# Patient Record
Sex: FEMALE | Race: WHITE | Employment: OTHER | URBAN - METROPOLITAN AREA
[De-identification: names, ages, dates, MRNs, and addresses within clinical notes are randomized per-mention and may not be internally consistent; named-entity substitution may affect disease eponyms.]

---

## 2019-06-05 ENCOUNTER — OFFICE VISIT (OUTPATIENT)
Dept: INTERNAL MEDICINE CLINIC | Facility: CLINIC | Age: 64
End: 2019-06-05
Payer: COMMERCIAL

## 2019-06-05 VITALS
TEMPERATURE: 99 F | BODY MASS INDEX: 33.78 KG/M2 | OXYGEN SATURATION: 96 % | DIASTOLIC BLOOD PRESSURE: 78 MMHG | HEIGHT: 63 IN | WEIGHT: 190.63 LBS | HEART RATE: 84 BPM | SYSTOLIC BLOOD PRESSURE: 120 MMHG

## 2019-06-05 DIAGNOSIS — Z00.00 ANNUAL PHYSICAL EXAM: Primary | ICD-10-CM

## 2019-06-05 DIAGNOSIS — N63.20 LEFT BREAST LUMP: ICD-10-CM

## 2019-06-05 DIAGNOSIS — Z12.11 SCREENING FOR COLON CANCER: ICD-10-CM

## 2019-06-05 DIAGNOSIS — Z12.39 SCREENING FOR BREAST CANCER: ICD-10-CM

## 2019-06-05 DIAGNOSIS — Z78.0 MENOPAUSE: ICD-10-CM

## 2019-06-05 DIAGNOSIS — Z12.4 SCREENING FOR CERVICAL CANCER: ICD-10-CM

## 2019-06-05 PROCEDURE — 99396 PREV VISIT EST AGE 40-64: CPT | Performed by: INTERNAL MEDICINE

## 2019-06-05 NOTE — PROGRESS NOTES
HPI:   Elsa Palmer is a 60year old female who presents for a complete physical exam. Symptoms: is menopausal.  She did palpate  Lump on her left breast.     Wt Readings from Last 3 Encounters:  06/05/19 : 190 lb 9.6 oz (86.5 kg)  01/29/15 : 193 lb (87. Neuro Negative Dizziness, extremity weakness, headache, memory impairment, numbness in extremities, seizures and tremors. Psych Negative Anxiety, depression and insomnia. Integumentary Negative Breast discharge, breast lump(s), hair loss and rash. Balance & gait - Normal.   Psychiatric Normal Orientation - Oriented to time, place, person & situation. Appropriate mood and affect.           ASSESSMENT AND PLAN:   Maribel Torres is a 60year old female who presents for a complete physical exam. Pap and

## 2019-06-10 ENCOUNTER — TELEPHONE (OUTPATIENT)
Dept: INTERNAL MEDICINE CLINIC | Facility: CLINIC | Age: 64
End: 2019-06-10

## 2019-06-10 NOTE — TELEPHONE ENCOUNTER
Spoke with physician scheduling will call mammography to get sooner appt and call patient directly to schedule.

## 2019-06-10 NOTE — TELEPHONE ENCOUNTER
Patient called to inform us that she already scheduled an appt for her mammogram, but they only have an opening until the 19th. Would like to know if this is okay or are you going to call them to schedule it sooner?  as she had felt a lump    Please advis

## 2019-06-11 ENCOUNTER — HOSPITAL ENCOUNTER (OUTPATIENT)
Dept: ULTRASOUND IMAGING | Facility: HOSPITAL | Age: 64
Discharge: HOME | End: 2019-06-11
Attending: INTERNAL MEDICINE
Payer: COMMERCIAL

## 2019-06-11 ENCOUNTER — HOSPITAL ENCOUNTER (OUTPATIENT)
Dept: MAMMOGRAPHY | Facility: HOSPITAL | Age: 64
Discharge: HOME | End: 2019-06-11
Attending: INTERNAL MEDICINE
Payer: COMMERCIAL

## 2019-06-11 ENCOUNTER — TELEPHONE (OUTPATIENT)
Dept: INTERNAL MEDICINE CLINIC | Facility: CLINIC | Age: 64
End: 2019-06-11

## 2019-06-11 DIAGNOSIS — Z12.39 SCREENING FOR BREAST CANCER: ICD-10-CM

## 2019-06-11 DIAGNOSIS — N63.20 LEFT BREAST LUMP: ICD-10-CM

## 2019-06-11 PROCEDURE — 77062 BREAST TOMOSYNTHESIS BI: CPT | Performed by: INTERNAL MEDICINE

## 2019-06-11 PROCEDURE — 76642 ULTRASOUND BREAST LIMITED: CPT | Performed by: INTERNAL MEDICINE

## 2019-06-11 PROCEDURE — 77066 DX MAMMO INCL CAD BI: CPT | Performed by: INTERNAL MEDICINE

## 2019-06-11 NOTE — TELEPHONE ENCOUNTER
Attempted to call the patient and discuss about the mammogram results unable to leave a message.   I will call her again

## 2019-06-11 NOTE — IMAGING NOTE
This nurse introduced self and role of breast coordinator. Discussed recommended breast biopsy with patient.  Pt was recommended by Dr Rosa Rubalcava to have a left  Breast x 2 sites  ultrasound guided biopsy and stereotactic biopsy of the right breast x 2 sites Aleve, or other anti-inflammatory medication)   and aspirin 81 mg to be held for 5 days prior to biopsy will stop her herbals       -Blood thinners/antiplatelet medications (Coumadin, Plavix  xeralto , effient brilinta  etc) should be held at the  directio be awake during this procedure and are able to drive themselves home if they wish. Educated patient that they should eat breakfast and park in green lot and check in with [facility] .  Educated patient that some soreness  may occur after biopsy.   Dis

## 2019-06-11 NOTE — TELEPHONE ENCOUNTER
Radiologist calling. Patient presented for diagnostic mammogram today. Letting you know he suspects cancer. Recommending left breast ultrasound biopsy two sites and right breast sterotactic biopsy two sites. Report to follow.

## 2019-06-12 ENCOUNTER — TELEPHONE (OUTPATIENT)
Dept: INTERNAL MEDICINE CLINIC | Facility: CLINIC | Age: 64
End: 2019-06-12

## 2019-06-14 ENCOUNTER — LAB ENCOUNTER (OUTPATIENT)
Dept: LAB | Age: 64
End: 2019-06-14
Attending: INTERNAL MEDICINE
Payer: COMMERCIAL

## 2019-06-14 DIAGNOSIS — Z00.00 ANNUAL PHYSICAL EXAM: ICD-10-CM

## 2019-06-14 PROCEDURE — 83036 HEMOGLOBIN GLYCOSYLATED A1C: CPT

## 2019-06-14 PROCEDURE — 80053 COMPREHEN METABOLIC PANEL: CPT

## 2019-06-14 PROCEDURE — 84439 ASSAY OF FREE THYROXINE: CPT

## 2019-06-14 PROCEDURE — 36415 COLL VENOUS BLD VENIPUNCTURE: CPT

## 2019-06-14 PROCEDURE — 80061 LIPID PANEL: CPT

## 2019-06-14 PROCEDURE — 85025 COMPLETE CBC W/AUTO DIFF WBC: CPT

## 2019-06-14 PROCEDURE — 84443 ASSAY THYROID STIM HORMONE: CPT

## 2019-06-19 ENCOUNTER — HOSPITAL ENCOUNTER (OUTPATIENT)
Dept: ULTRASOUND IMAGING | Facility: HOSPITAL | Age: 64
Discharge: HOME | End: 2019-06-19
Attending: INTERNAL MEDICINE
Payer: COMMERCIAL

## 2019-06-19 ENCOUNTER — HOSPITAL ENCOUNTER (OUTPATIENT)
Dept: MAMMOGRAPHY | Facility: HOSPITAL | Age: 64
Discharge: HOME | End: 2019-06-19
Attending: INTERNAL MEDICINE
Payer: COMMERCIAL

## 2019-06-19 VITALS — SYSTOLIC BLOOD PRESSURE: 141 MMHG | DIASTOLIC BLOOD PRESSURE: 81 MMHG | HEART RATE: 85 BPM | RESPIRATION RATE: 20 BRPM

## 2019-06-19 DIAGNOSIS — R92.8 ABNORMAL MAMMOGRAM: ICD-10-CM

## 2019-06-19 PROCEDURE — 88305 TISSUE EXAM BY PATHOLOGIST: CPT | Performed by: INTERNAL MEDICINE

## 2019-06-19 PROCEDURE — 19083 BX BREAST 1ST LESION US IMAG: CPT | Performed by: INTERNAL MEDICINE

## 2019-06-19 PROCEDURE — 19084 BX BREAST ADD LESION US IMAG: CPT | Performed by: INTERNAL MEDICINE

## 2019-06-19 PROCEDURE — 88342 IMHCHEM/IMCYTCHM 1ST ANTB: CPT | Performed by: INTERNAL MEDICINE

## 2019-06-19 PROCEDURE — 88360 TUMOR IMMUNOHISTOCHEM/MANUAL: CPT | Performed by: INTERNAL MEDICINE

## 2019-06-19 PROCEDURE — 77065 DX MAMMO INCL CAD UNI: CPT | Performed by: INTERNAL MEDICINE

## 2019-06-19 NOTE — IMAGING NOTE
905 AM  hx take and as follows SEE BELOW  identified with spelling of name and date of birth. Medications and allergies reviewed. Denies taking aspirin containing medications, NSAIDs, fish oil, vitamin E  5 days prior to biopsy.  Denies prescription anti co Dr. Green Salts at 15:30 on 6/11/2019. BI-RADS CATEGORY:     DIAGNOSTIC CATEGORY 5--HIGHLY SUGGESTIVE OF MALIGNANCY.        RECOMMENDATIONS:   STEREOTACTIC BREAST BIOPSY: RIGHT BREAST TWO SITES    ULTRASOUND-GUIDED CORE BIOPSY: LEFT BREAST TWO SITES
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
## 2019-06-20 ENCOUNTER — TELEPHONE (OUTPATIENT)
Dept: HEMATOLOGY/ONCOLOGY | Facility: HOSPITAL | Age: 64
End: 2019-06-20

## 2019-06-20 ENCOUNTER — TELEPHONE (OUTPATIENT)
Dept: INTERNAL MEDICINE CLINIC | Facility: CLINIC | Age: 64
End: 2019-06-20

## 2019-06-20 NOTE — TELEPHONE ENCOUNTER
Patient is s/p left breast US guided biopsy, performed 6/19/19. Dr. Helder Gentile has shared biopsy results with the patient, and referred her to Dr. Althea Mcmahan and Dr. Kyaw Magana for further management. Phoned patient and introduced myself as Breast RN Navigator.   Pa

## 2019-06-21 PROBLEM — Z17.0 MALIGNANT NEOPLASM OF OVERLAPPING SITES OF LEFT BREAST IN FEMALE, ESTROGEN RECEPTOR POSITIVE  (HCC): Status: ACTIVE | Noted: 2019-06-21

## 2019-06-21 PROBLEM — C50.812 MALIGNANT NEOPLASM OF OVERLAPPING SITES OF LEFT BREAST IN FEMALE, ESTROGEN RECEPTOR POSITIVE: Status: ACTIVE | Noted: 2019-06-21

## 2019-06-21 PROBLEM — Z17.0 MALIGNANT NEOPLASM OF OVERLAPPING SITES OF LEFT BREAST IN FEMALE, ESTROGEN RECEPTOR POSITIVE: Status: ACTIVE | Noted: 2019-06-21

## 2019-06-21 PROBLEM — Z17.0 MALIGNANT NEOPLASM OF OVERLAPPING SITES OF LEFT BREAST IN FEMALE, ESTROGEN RECEPTOR POSITIVE (HCC): Status: ACTIVE | Noted: 2019-06-21

## 2019-06-21 PROBLEM — C50.812 MALIGNANT NEOPLASM OF OVERLAPPING SITES OF LEFT BREAST IN FEMALE, ESTROGEN RECEPTOR POSITIVE  (HCC): Status: ACTIVE | Noted: 2019-06-21

## 2019-06-21 PROBLEM — C50.812 MALIGNANT NEOPLASM OF OVERLAPPING SITES OF LEFT BREAST IN FEMALE, ESTROGEN RECEPTOR POSITIVE (HCC): Status: ACTIVE | Noted: 2019-06-21

## 2019-06-24 ENCOUNTER — NURSE NAVIGATOR ENCOUNTER (OUTPATIENT)
Dept: HEMATOLOGY/ONCOLOGY | Facility: HOSPITAL | Age: 64
End: 2019-06-24

## 2019-06-24 ENCOUNTER — TELEPHONE (OUTPATIENT)
Dept: HEMATOLOGY/ONCOLOGY | Facility: HOSPITAL | Age: 64
End: 2019-06-24

## 2019-06-24 ENCOUNTER — OFFICE VISIT (OUTPATIENT)
Dept: HEMATOLOGY/ONCOLOGY | Facility: HOSPITAL | Age: 64
End: 2019-06-24
Attending: INTERNAL MEDICINE
Payer: COMMERCIAL

## 2019-06-24 VITALS
HEART RATE: 93 BPM | RESPIRATION RATE: 18 BRPM | DIASTOLIC BLOOD PRESSURE: 95 MMHG | OXYGEN SATURATION: 98 % | TEMPERATURE: 99 F | SYSTOLIC BLOOD PRESSURE: 162 MMHG

## 2019-06-24 DIAGNOSIS — F41.9 ANXIETY: ICD-10-CM

## 2019-06-24 DIAGNOSIS — Z17.0 MALIGNANT NEOPLASM OF OVERLAPPING SITES OF LEFT BREAST IN FEMALE, ESTROGEN RECEPTOR POSITIVE (HCC): Primary | ICD-10-CM

## 2019-06-24 DIAGNOSIS — C50.812 MALIGNANT NEOPLASM OF OVERLAPPING SITES OF LEFT BREAST IN FEMALE, ESTROGEN RECEPTOR POSITIVE (HCC): Primary | ICD-10-CM

## 2019-06-24 PROCEDURE — 99245 OFF/OP CONSLTJ NEW/EST HI 55: CPT | Performed by: INTERNAL MEDICINE

## 2019-06-24 NOTE — TELEPHONE ENCOUNTER
Phoned patient for care coordination. Shared with patient that Dr. Benito Rodney is unable to meet with her today. Shared with patient that I have spoken with Dr. Benito Rodney who has reviewed her case, and provided feedback for next steps in her management.   Patient

## 2019-06-25 ENCOUNTER — HOSPITAL ENCOUNTER (OUTPATIENT)
Dept: MAMMOGRAPHY | Facility: HOSPITAL | Age: 64
Discharge: HOME | End: 2019-06-25
Attending: INTERNAL MEDICINE
Payer: COMMERCIAL

## 2019-06-25 VITALS — SYSTOLIC BLOOD PRESSURE: 152 MMHG | HEART RATE: 75 BPM | DIASTOLIC BLOOD PRESSURE: 86 MMHG | RESPIRATION RATE: 16 BRPM

## 2019-06-25 DIAGNOSIS — R92.8 ABNORMAL MAMMOGRAM: ICD-10-CM

## 2019-06-25 PROCEDURE — 77065 DX MAMMO INCL CAD UNI: CPT | Performed by: INTERNAL MEDICINE

## 2019-06-25 NOTE — PROGRESS NOTES
Patient presents to the [facility], accompanied by her close friend Racquel Sawyer, for consultation with Dr. Jessika Bernstein. Introduced myself to patient, as we have not met previously.   Shared with patient my role as Navigator, to provide her with support and educat [facility] and encouraged patient to reach out to the staff there. Provided patient with Breast Cancer Treatment Handbook as well. Reinforced my role as Navigator. Emotional support again provided to patient.   Encouraged patient to contact me with

## 2019-06-25 NOTE — IMAGING NOTE
1115 am  hx take and as follows newly dx  Left breast ca Identified with spelling of name and date of birth. Medications and allergies reviewed. Denies taking aspirin containing medications, NSAIDs, fish oil, vitamin E  5 days prior to biopsy.  Denies pres

## 2019-06-26 ENCOUNTER — TELEPHONE (OUTPATIENT)
Dept: INTERNAL MEDICINE CLINIC | Facility: CLINIC | Age: 64
End: 2019-06-26

## 2019-06-26 ENCOUNTER — TELEPHONE (OUTPATIENT)
Dept: HEMATOLOGY/ONCOLOGY | Facility: HOSPITAL | Age: 64
End: 2019-06-26

## 2019-06-26 PROBLEM — F41.9 ANXIETY: Status: ACTIVE | Noted: 2019-06-26

## 2019-06-26 NOTE — CONSULTS
[facility] History and Physical    Patient Name: Cynthia Thibodeaux   YOB: 1955   Medical Record Number: [id number]   CSN: [id number]   Attending Physician:  Gm Flynn MD       Date of Visit: 6/26/2019     Chief Complaint:  Patient prese did not find any suspicious sonographic correlate. And an incidental cyst was seen at 12:00 location. Patient's last mammogram was in 2014.   She had not had repeat mammograms due to complications with her family and caretaker role that patient was in a 12-point system reviewed and negative except as listed per HPI    Vital Signs:  BP (!) 162/95 (BP Location: Left arm, Patient Position: Sitting, Cuff Size: adult)   Pulse 93   Temp 98.7 °F (37.1 °C) (Oral)   Resp 18   LMP 07/14/2006   SpO2 98%     Physical 7.7 06/14/2019       Radiology reviewed:  Lito De León Procedure Image Right (cpt=77065)    Result Date: 6/25/2019  PROCEDURE: [facility] BIOPSY POST DIGITAL IMAGE RIGHT (CPT=77065)  COMPARISON: [facility], [facility]. for [facility], [facility] POST [facility] IMAGE LEFT (CPT BIOPSY WITH CLIP 2 SITE LEFT; [facility] POST PROCEDURAL IMAGE LEFT (CPT=77065)  COMPARISON: [facility], [facility]. [facility], [address] [place] [address] ([facility] SAME DAY ) (DPE=07167-00), 6/11/2019, 13:37.   [facility], [facility], [facility] KODAK 2D+3D Maricruz Connor anesthesia. A small incision was made in the skin with a #11 scalpel. LOCATION, SPECIMEN #: Left breast 10:00 o'clock 5 cm from the nipple; 6 core specimens. BIOPSY NEEDLE: 12 gauge spring activated biopsy device.  MARKERS(S) PLACED: Q marker was placed grade 1-2, with with focal ductal formation, and associated focal microcalcification. ? The maximum length of tumor area in the tissue submitted is 13 mm.   Comment: Immunohistochemical stain for E-cadherin on specimens A and B demonstrate negative staining projection and 4.5 cm on lateral projection.   Given mammographic and sonographic appearance, the additional areas of decreased echogenicity in the left breast at 11:00 o'clock and 12:00 o'clock between the biopsy clips are considered additional extent of d performed and images were reviewed with the Videostrip GERBER [de-identified] CAD device. 3D tomosynthesis was performed and reviewed. Limited bilateral breast ultrasound. BREAST COMPOSITION:   CATEGORY b- There are scattered areas of fibroglandular density.    FINDINGS: which spans the upper inner and upper central left breast.  This includes the patient directed palpable area of concern at 11:00 o'clock 2 cm from the nipple.   Findings are most readily seen at 10:00 o'clock 5 cm from the nipple, measuring 2.7 x 0.6 x 1.4 MALIGNANCY. RECOMMENDATIONS:  STEREOTACTIC BREAST BIOPSY: RIGHT BREAST TWO SITES  ULTRASOUND-GUIDED CORE BIOPSY: LEFT BREAST TWO SITES        PLEASE NOTE: NORMAL MAMMOGRAM DOES NOT EXCLUDE THE POSSIBILITY OF BREAST CANCER.   A CLINICALLY SUSPICIOUS PALPAB discussion with patient and her friend. Together we went over all of her laboratory, pathology and imaging. We discussed her diagnosis of breast cancer, clinical course, further management, and treatment options.   Case was discussed with her primary care MD

## 2019-06-26 NOTE — TELEPHONE ENCOUNTER
Phoned patient to discuss care coordination. Report from Breast Care Coordinator, Natalia Neville RN, patient unable to complete right breast stereotactic breast biopsy 6/25/19. Patient has been re-scheduled for Monday 7/1/19.     Patient shares she has a

## 2019-06-26 NOTE — TELEPHONE ENCOUNTER
Patient called, she is scheduled for a breast biopsy next week and she is very nervous. Patient went to have it done, but she couldn't get through it. She is requesting a rx for Lorazepam 1 mg to be taken the night before and the day of the procedure.  Pa

## 2019-06-27 NOTE — PROGRESS NOTES
Breast Surgery New Patient Consultation    This is the first visit for this 60year old woman, referred by Dr. Jimmy Moseley, who presents for multicentric L breast cancer and R ALH.     History of Present Illness:   Ms. Kunal Fraire is a 60 year ol History:   Procedure Laterality Date   • EMBER BIOPSY STEREOTACTIC NODULE 1 SITE LEFT     • EMBER BIOPSY STEREOTACTIC NODULE 1 SITE RIGHT  2019    KODAK        Gynecological History:  Pt is a   Pt was 34years old at time of first pregnancy.     She ha ears, ear drainage, earaches, nasal congestion, nose bleeds, snoring, pain in mouth/throat, hoarseness, change in voice, facial trauma.     Respiratory:  The patient denies chronic cough, phlegm, hemoptysis, pleurisy/chest pain, pneumonia, asthma, wheezing, lost her  to cancer 15 years ago and was caring for her parents who both recently passed. Endocrine: There is no history of poor/slow wound healing, weight loss/gain, fertility or hormone problems, cold intolerance, thyroid disease.      Allerg liver is not enlarged. There are no palpable masses. Lymph Nodes: The supraclavicular, axillary and cervical regions are free of significant lymphadenopathy. Back: There is no vertebral column tenderness.     Skin: The skin appears normal. There are this procedure. With regard to systemic therapy, final recommendation will be made following receipt of final  pathology post-op, but I outlined the possibility of endocrine therapy, chemotherapy, and herceptin, depending on tumor marker profile.  Following

## 2019-06-29 ENCOUNTER — HOSPITAL ENCOUNTER (OUTPATIENT)
Dept: CT IMAGING | Facility: HOSPITAL | Age: 64
Discharge: HOME | End: 2019-06-29
Attending: INTERNAL MEDICINE
Payer: COMMERCIAL

## 2019-06-29 DIAGNOSIS — C50.812 MALIGNANT NEOPLASM OF OVERLAPPING SITES OF LEFT BREAST IN FEMALE, ESTROGEN RECEPTOR POSITIVE (HCC): ICD-10-CM

## 2019-06-29 DIAGNOSIS — Z17.0 MALIGNANT NEOPLASM OF OVERLAPPING SITES OF LEFT BREAST IN FEMALE, ESTROGEN RECEPTOR POSITIVE (HCC): ICD-10-CM

## 2019-06-29 PROCEDURE — 74177 CT ABD & PELVIS W/CONTRAST: CPT | Performed by: INTERNAL MEDICINE

## 2019-06-29 PROCEDURE — 71260 CT THORAX DX C+: CPT | Performed by: INTERNAL MEDICINE

## 2019-07-01 ENCOUNTER — HOSPITAL ENCOUNTER (OUTPATIENT)
Dept: MAMMOGRAPHY | Facility: HOSPITAL | Age: 64
Discharge: HOME | End: 2019-07-01
Attending: INTERNAL MEDICINE
Payer: COMMERCIAL

## 2019-07-01 ENCOUNTER — NURSE NAVIGATOR ENCOUNTER (OUTPATIENT)
Dept: HEMATOLOGY/ONCOLOGY | Facility: HOSPITAL | Age: 64
End: 2019-07-01

## 2019-07-01 VITALS — DIASTOLIC BLOOD PRESSURE: 81 MMHG | HEART RATE: 85 BPM | SYSTOLIC BLOOD PRESSURE: 156 MMHG

## 2019-07-01 DIAGNOSIS — R92.8 ABNORMAL MAMMOGRAM: ICD-10-CM

## 2019-07-01 PROCEDURE — 19499 UNLISTED PROCEDURE BREAST: CPT

## 2019-07-01 PROCEDURE — 19081 BX BREAST 1ST LESION STRTCTC: CPT | Performed by: INTERNAL MEDICINE

## 2019-07-01 PROCEDURE — 19082 BX BREAST ADD LESION STRTCTC: CPT | Performed by: INTERNAL MEDICINE

## 2019-07-01 PROCEDURE — 88305 TISSUE EXAM BY PATHOLOGIST: CPT | Performed by: INTERNAL MEDICINE

## 2019-07-01 PROCEDURE — 88342 IMHCHEM/IMCYTCHM 1ST ANTB: CPT | Performed by: INTERNAL MEDICINE

## 2019-07-01 NOTE — PROCEDURES
[facility]  Procedure Note    Yovana Gannon Patient Status:  Outpatient    1955 MRN [id number]   Location [address] [facility] Attending Misael Jauregui MD   Hosp Day # 0 PCP Jason Bowling MD     Procedure: T

## 2019-07-01 NOTE — PROGRESS NOTES
Patient able to tolerate stereotactic right breast biopsy today with the assistance of Lorazepam, as prescribed by Dr. Yolande Acuña. Met with patient s/p biopsy for emotional support.   Discussed with patient scheduling a follow up appointment with Dr. Arin Jensen fo

## 2019-07-02 ENCOUNTER — TELEPHONE (OUTPATIENT)
Dept: GASTROENTEROLOGY | Facility: CLINIC | Age: 64
End: 2019-07-02

## 2019-07-02 ENCOUNTER — OFFICE VISIT (OUTPATIENT)
Dept: HEMATOLOGY/ONCOLOGY | Facility: HOSPITAL | Age: 64
End: 2019-07-02
Attending: INTERNAL MEDICINE
Payer: COMMERCIAL

## 2019-07-02 VITALS
HEART RATE: 80 BPM | WEIGHT: 190 LBS | SYSTOLIC BLOOD PRESSURE: 130 MMHG | RESPIRATION RATE: 18 BRPM | BODY MASS INDEX: 33.66 KG/M2 | DIASTOLIC BLOOD PRESSURE: 73 MMHG | HEIGHT: 63 IN | TEMPERATURE: 99 F

## 2019-07-02 DIAGNOSIS — C50.812 MALIGNANT NEOPLASM OF OVERLAPPING SITES OF LEFT BREAST IN FEMALE, ESTROGEN RECEPTOR POSITIVE (HCC): Primary | ICD-10-CM

## 2019-07-02 DIAGNOSIS — K86.89 PANCREATIC MASS: ICD-10-CM

## 2019-07-02 DIAGNOSIS — Z17.0 MALIGNANT NEOPLASM OF OVERLAPPING SITES OF LEFT BREAST IN FEMALE, ESTROGEN RECEPTOR POSITIVE (HCC): Primary | ICD-10-CM

## 2019-07-02 PROCEDURE — 99212 OFFICE O/P EST SF 10 MIN: CPT | Performed by: INTERNAL MEDICINE

## 2019-07-02 NOTE — TELEPHONE ENCOUNTER
----- Message from Summer De La Fuente MD sent at 7/2/2019 11:35 AM CDT -----  Joselin Pump  This patient has breast cancer, CT showed incidental 1cm pancreatic foci. I will plan on an MRCP but she will need bx. Can you get it with EUS?

## 2019-07-02 NOTE — TELEPHONE ENCOUNTER
GI Staff:    Please call the patient to schedule an appointment with me in clinic per Dr. Diana Sow.     I can see her next Tuesday at 8:30 AM    Thanks    Clementina Elmore MD  [facility], [facility] - Gastroenterology  7/2/2019  3:33 PM

## 2019-07-02 NOTE — PROGRESS NOTES
Cancer Center Progress Note    Patient Name: Cynthia Thibodeaux   YOB: 1955   Medical Record Number: [id number]   Attending Physician: Gm Flynn M.D.      Chief Complaint:  Breast cancer    Oncology History:  60year old female being referr Patient's last mammogram was in 2014. Interim HPI:  Here to follow up on her results of the CT scan. She had her bx yesterday, feeling good today. Better than before. Breast pain is stable, no change, bruising is better.       Past Medical History:  Anx bruising  Neurological: motor strength grossly intact  Psych: appropriate mood and affect      Laboratory assessed and reviewed:  Lab Results   Component Value Date    GLU 94 06/14/2019    BUN 15 06/14/2019    BUNCREA 23.1 (H) 06/14/2019    CREATSERUM 0.65 discharge summary from the technologist after completion of exam.  Breast marker legend used on images  Triangle = Palpable lump [place] = Skin tag or mole BB = Nipple Linear marc = Scar Square = Pain    Dictated by (CST): Allyson Steele MD on 6/25/2019 [facility] Ze 2d+3d Diagnostic [facility]  Bilat (yol=[id number])        CONCLUSION:   Left breast mass at 02:00 o'clock 8 cm from the nipple, highly suggestive of malignancy. Ultrasound-guided biopsy is recommended for further evaluation.   Left breast areas of Vladimir Sepulveda MD on 6/11/2019 at 15:52          [address] ([facility] Day Us)(ivo=[id number])           Impression and Plan:  Cancer Staging  Malignant neoplasm of overlapping sites of left breast in female, estrogen receptor positive ([facility] [place] [address]  [address]

## 2019-07-02 NOTE — TELEPHONE ENCOUNTER
Pt contacted and reviewed Dr. Rockwell Bearded message below. She states she will be out of town from 7/5/19 to possibly 7/18/19, but will rearrange if need be depending on her consultation with the surgeon, Dr. Izabela Tate tomorrow.     She declined appt on 7/9/19 and states

## 2019-07-03 ENCOUNTER — NURSE NAVIGATOR ENCOUNTER (OUTPATIENT)
Dept: HEMATOLOGY/ONCOLOGY | Facility: HOSPITAL | Age: 64
End: 2019-07-03

## 2019-07-03 ENCOUNTER — OFFICE VISIT (OUTPATIENT)
Dept: SURGERY | Facility: CLINIC | Age: 64
End: 2019-07-03
Payer: COMMERCIAL

## 2019-07-03 VITALS
DIASTOLIC BLOOD PRESSURE: 85 MMHG | WEIGHT: 190 LBS | OXYGEN SATURATION: 97 % | HEIGHT: 63 IN | BODY MASS INDEX: 33.66 KG/M2 | SYSTOLIC BLOOD PRESSURE: 142 MMHG | HEART RATE: 81 BPM | RESPIRATION RATE: 16 BRPM

## 2019-07-03 DIAGNOSIS — C50.812 MALIGNANT NEOPLASM OF OVERLAPPING SITES OF LEFT BREAST IN FEMALE, ESTROGEN RECEPTOR POSITIVE (HCC): Primary | ICD-10-CM

## 2019-07-03 DIAGNOSIS — Z17.0 MALIGNANT NEOPLASM OF OVERLAPPING SITES OF LEFT BREAST IN FEMALE, ESTROGEN RECEPTOR POSITIVE (HCC): Primary | ICD-10-CM

## 2019-07-03 PROCEDURE — 99245 OFF/OP CONSLTJ NEW/EST HI 55: CPT | Performed by: SURGERY

## 2019-07-03 NOTE — PROGRESS NOTES
Patient presents for surgical consultation with Dr. Merlin Villasenor. She is accompanied by a close friend from her [employer] [place]. Patient has been referred to Plastics for discussion of breast reconstruction options. Met with patient.   Emotional support prov

## 2019-07-08 NOTE — TELEPHONE ENCOUNTER
Pt out of town from 7/5/19-7/18/19.  Has consultation with Dr. Alexandra Travis on 7/19/19.     -Will f/u w/ pt upon her return to schedule consultation with Dr. Lazara Salvador.

## 2019-07-19 ENCOUNTER — OFFICE VISIT (OUTPATIENT)
Dept: SURGERY | Facility: CLINIC | Age: 64
End: 2019-07-19
Payer: COMMERCIAL

## 2019-07-19 VITALS — BODY MASS INDEX: 32.78 KG/M2 | WEIGHT: 192 LBS | HEIGHT: 64 IN

## 2019-07-19 DIAGNOSIS — C50.812 MALIGNANT NEOPLASM OF OVERLAPPING SITES OF LEFT BREAST IN FEMALE, ESTROGEN RECEPTOR POSITIVE (HCC): Primary | ICD-10-CM

## 2019-07-19 DIAGNOSIS — Z17.0 MALIGNANT NEOPLASM OF OVERLAPPING SITES OF LEFT BREAST IN FEMALE, ESTROGEN RECEPTOR POSITIVE (HCC): Primary | ICD-10-CM

## 2019-07-19 PROCEDURE — 99243 OFF/OP CNSLTJ NEW/EST LOW 30: CPT | Performed by: SURGERY

## 2019-07-19 NOTE — CONSULTS
New Patient Consultation    This is the first visit for this 60year old female who presents to discuss reconstructive options following surgery for breast cancer. History of Present Illness:    The patient is a 60year old female who presents with a lef wound healing, weight loss/gain, fertility or hormone problems, cold intolerance, +heat intolerance, excessive thirst, or thyroid disease. Allergic/Immunologic:  There is no history of hives, hay fever, angioedema or anaphylaxis.     HEENT:  The patient fainting/black outs, dizziness, memory problems, loss of sensation/numbness, problems walking, weakness, tingling or burning in hands/feet.      Psychiatric:  There is no history of abusive relationship, bipolar disorder, sleep disturbance, +anxiety, depres awaiting bilateral skin-sparing mastectomy. Discussion and Plan: We reviewed the options for post-mastectomy reconstruction and discussed the risks/benefits of  timing (immediate versus delayed) and technique (implant-based versus autologous).      Spe free TRAM flap. Representative illustrations and photographs were demonstrated to the patient.  The risks of surgery including but not limited to bleeding, infection, scarring, seroma, delayed wound healing, partial/total flap loss, fat necrosis, asymmetry,

## 2019-07-19 NOTE — PROGRESS NOTES
Surgeon: Dr. Chris Cross      Tel:  [phone]    Fax: [phone]     Surgery/Procedure: Bilateral Breast Reconstruction with Tissue Expander Placement, Acellular Dermal Matrix   Joint with Dr. Jigna Larsen, 3 hours     Hospital:  [facility]: [address]

## 2019-07-22 ENCOUNTER — TELEPHONE (OUTPATIENT)
Dept: SURGERY | Facility: CLINIC | Age: 64
End: 2019-07-22

## 2019-07-22 ENCOUNTER — TELEPHONE (OUTPATIENT)
Dept: INTERNAL MEDICINE CLINIC | Facility: CLINIC | Age: 64
End: 2019-07-22

## 2019-07-22 ENCOUNTER — TELEPHONE (OUTPATIENT)
Dept: HEMATOLOGY/ONCOLOGY | Facility: HOSPITAL | Age: 64
End: 2019-07-22

## 2019-07-22 DIAGNOSIS — Z17.0 MALIGNANT NEOPLASM OF OVERLAPPING SITES OF LEFT BREAST IN FEMALE, ESTROGEN RECEPTOR POSITIVE (HCC): Primary | ICD-10-CM

## 2019-07-22 DIAGNOSIS — C50.812 MALIGNANT NEOPLASM OF OVERLAPPING SITES OF LEFT BREAST IN FEMALE, ESTROGEN RECEPTOR POSITIVE (HCC): Primary | ICD-10-CM

## 2019-07-22 NOTE — TELEPHONE ENCOUNTER
Patient calling is having some anxiety regarding upcoming breast surgery patient has appointment with Dr Jose Avila Tuesday July 23rd patient is unsure what pre surgery testing she will need she just wants to clarify.

## 2019-07-23 ENCOUNTER — OFFICE VISIT (OUTPATIENT)
Dept: INTERNAL MEDICINE CLINIC | Facility: CLINIC | Age: 64
End: 2019-07-23
Payer: COMMERCIAL

## 2019-07-23 VITALS
HEIGHT: 64 IN | SYSTOLIC BLOOD PRESSURE: 126 MMHG | BODY MASS INDEX: 32.44 KG/M2 | DIASTOLIC BLOOD PRESSURE: 83 MMHG | TEMPERATURE: 98 F | RESPIRATION RATE: 18 BRPM | WEIGHT: 190 LBS | HEART RATE: 78 BPM

## 2019-07-23 DIAGNOSIS — Z01.810 PREOP CARDIOVASCULAR EXAM: ICD-10-CM

## 2019-07-23 DIAGNOSIS — C50.912 MALIGNANT NEOPLASM OF BOTH BREASTS IN FEMALE, ESTROGEN RECEPTOR NEGATIVE, UNSPECIFIED SITE OF BREAST (HCC): ICD-10-CM

## 2019-07-23 DIAGNOSIS — Z17.1 MALIGNANT NEOPLASM OF BOTH BREASTS IN FEMALE, ESTROGEN RECEPTOR NEGATIVE, UNSPECIFIED SITE OF BREAST (HCC): ICD-10-CM

## 2019-07-23 DIAGNOSIS — C50.911 MALIGNANT NEOPLASM OF BOTH BREASTS IN FEMALE, ESTROGEN RECEPTOR NEGATIVE, UNSPECIFIED SITE OF BREAST (HCC): ICD-10-CM

## 2019-07-23 DIAGNOSIS — Z01.818 PREOP EXAM FOR INTERNAL MEDICINE: Primary | ICD-10-CM

## 2019-07-23 PROCEDURE — 93000 ELECTROCARDIOGRAM COMPLETE: CPT | Performed by: INTERNAL MEDICINE

## 2019-07-23 PROCEDURE — 99213 OFFICE O/P EST LOW 20 MIN: CPT | Performed by: INTERNAL MEDICINE

## 2019-07-23 NOTE — PROGRESS NOTES
Hao Solorzano is a 60year old female who presents for a pre-operative physical exam. Patient is to have double mastectomy  to be done by Dr. Chantal Cruz  at [facility] on August 8   Pt has had previous anesthesia:  No.  Previous complications:    Patient p dysuria  MUSCULOSKELETAL: denies joint pain  NEURO: denies headaches  PSYCHE: denies depression or anxiety  HEMATOLOGIC: denies hx of anemia  ALL/ASTHMA: denies hx of allergy or asthma    EXAM:   /83   Pulse 78   Temp 98.4 °F (36.9 °C) (Oral)   Resp present. · No in situ or invasive carcinoma identified. B. Right breast, anterior (site #2); core biopsy:  · Multiple foci of atypical lobular hyperplasia. · Microcalcifications present. · No in situ or invasive carcinoma identified.     Comment:  E-c enlargement , no previous ekg to compare, will order echo, if ok , seh can proceed with surgery     Per ACC/ AHA guidelines, this patient may proceed to surgery without further risk stratification.         3. Heme  No history of bleeding disorder, no eviden

## 2019-07-23 NOTE — TELEPHONE ENCOUNTER
Dr. Hurman Claude Dr. Alejo Belt. Lizette Allen    3rd attempt to reach pt to schedule CONSULTATION appt with Dr. Shilpa Ochoa as per Dr. Michelle Light request. Pt has not c/b to schedule and when previously spoke to her on 7/2/19, she also declined appt with GI. TE closed.

## 2019-07-24 ENCOUNTER — OFFICE VISIT (OUTPATIENT)
Dept: PHYSICAL THERAPY | Facility: HOSPITAL | Age: 64
End: 2019-07-24
Attending: SURGERY
Payer: COMMERCIAL

## 2019-07-24 NOTE — PROGRESS NOTES
BREAST CANCER SURGICAL SCREENINGS  [facility]      PATIENT SUMMARY:     Involved Side:      LEFT                                               Dominant Hand:    RIGHT                              Occupation:      Works with special ed abd     abd      ER 80 80   ER     ER      IR 80 80   IR     IR     Sitting flex 155 145  Sitting flex    Sitting flex      abd 165 150*   abd     abd      ext 50 40   ext     ext     Functional IR  T9 T9  Functional IR     Functional IR         *pain in u POSITION  supine with 1 pillow and bolster under knees   LIMB POSITION 75 deg abduction   STARTING POINT 17cm from 3rd cuticle   LEFT HAND VOLUME 345   MEASUREMENT A 16   MEASUREMENT B 16.8   MEASUREMENT C 22   MEASUREMENT D 24.7   MEASUREMENT E 26   MEASU

## 2019-07-25 ENCOUNTER — LAB ENCOUNTER (OUTPATIENT)
Dept: LAB | Facility: HOSPITAL | Age: 64
End: 2019-07-25
Attending: INTERNAL MEDICINE
Payer: COMMERCIAL

## 2019-07-25 ENCOUNTER — TELEPHONE (OUTPATIENT)
Dept: HEMATOLOGY/ONCOLOGY | Facility: HOSPITAL | Age: 64
End: 2019-07-25

## 2019-07-25 DIAGNOSIS — Z01.818 PREOP EXAM FOR INTERNAL MEDICINE: ICD-10-CM

## 2019-07-25 LAB
ALBUMIN SERPL-MCNC: 3.8 G/DL (ref 3.4–5)
ALBUMIN/GLOB SERPL: 1 {RATIO} (ref 1–2)
ALP LIVER SERPL-CCNC: 77 U/L (ref 50–130)
ALT SERPL-CCNC: 47 U/L (ref 13–56)
ANION GAP SERPL CALC-SCNC: 7 MMOL/L (ref 0–18)
APTT PPP: 26.4 SECONDS (ref 23.2–35.3)
AST SERPL-CCNC: 27 U/L (ref 15–37)
BASOPHILS # BLD AUTO: 0.06 X10(3) UL (ref 0–0.2)
BASOPHILS NFR BLD AUTO: 0.9 %
BILIRUB SERPL-MCNC: 0.4 MG/DL (ref 0.1–2)
BUN BLD-MCNC: 15 MG/DL (ref 7–18)
BUN/CREAT SERPL: 23.8 (ref 10–20)
CALCIUM BLD-MCNC: 8.8 MG/DL (ref 8.5–10.1)
CHLORIDE SERPL-SCNC: 108 MMOL/L (ref 98–112)
CO2 SERPL-SCNC: 29 MMOL/L (ref 21–32)
CREAT BLD-MCNC: 0.63 MG/DL (ref 0.55–1.02)
DEPRECATED RDW RBC AUTO: 40.7 FL (ref 35.1–46.3)
EOSINOPHIL # BLD AUTO: 0.15 X10(3) UL (ref 0–0.7)
EOSINOPHIL NFR BLD AUTO: 2.2 %
ERYTHROCYTE [DISTWIDTH] IN BLOOD BY AUTOMATED COUNT: 12.3 % (ref 11–15)
GLOBULIN PLAS-MCNC: 3.9 G/DL (ref 2.8–4.4)
GLUCOSE BLD-MCNC: 100 MG/DL (ref 70–99)
HCT VFR BLD AUTO: 42.8 % (ref 35–48)
HGB BLD-MCNC: 14 G/DL (ref 12–16)
IMM GRANULOCYTES # BLD AUTO: 0.01 X10(3) UL (ref 0–1)
IMM GRANULOCYTES NFR BLD: 0.1 %
INR BLD: 0.99 (ref 0.9–1.2)
LYMPHOCYTES # BLD AUTO: 2.13 X10(3) UL (ref 1–4)
LYMPHOCYTES NFR BLD AUTO: 31.7 %
M PROTEIN MFR SERPL ELPH: 7.7 G/DL (ref 6.4–8.2)
MCH RBC QN AUTO: 29.9 PG (ref 26–34)
MCHC RBC AUTO-ENTMCNC: 32.7 G/DL (ref 31–37)
MCV RBC AUTO: 91.3 FL (ref 80–100)
MONOCYTES # BLD AUTO: 0.5 X10(3) UL (ref 0.1–1)
MONOCYTES NFR BLD AUTO: 7.4 %
NEUTROPHILS # BLD AUTO: 3.87 X10 (3) UL (ref 1.5–7.7)
NEUTROPHILS # BLD AUTO: 3.87 X10(3) UL (ref 1.5–7.7)
NEUTROPHILS NFR BLD AUTO: 57.7 %
OSMOLALITY SERPL CALC.SUM OF ELEC: 299 MOSM/KG (ref 275–295)
PATIENT FASTING: YES
PLATELET # BLD AUTO: 222 10(3)UL (ref 150–450)
POTASSIUM SERPL-SCNC: 4 MMOL/L (ref 3.5–5.1)
PROTHROMBIN TIME: 12.9 SECONDS (ref 11.8–14.5)
RBC # BLD AUTO: 4.69 X10(6)UL (ref 3.8–5.3)
SODIUM SERPL-SCNC: 144 MMOL/L (ref 136–145)
WBC # BLD AUTO: 6.7 X10(3) UL (ref 4–11)

## 2019-07-25 PROCEDURE — 80053 COMPREHEN METABOLIC PANEL: CPT

## 2019-07-25 PROCEDURE — 36415 COLL VENOUS BLD VENIPUNCTURE: CPT

## 2019-07-25 PROCEDURE — 85610 PROTHROMBIN TIME: CPT

## 2019-07-25 PROCEDURE — 85025 COMPLETE CBC W/AUTO DIFF WBC: CPT

## 2019-07-25 PROCEDURE — 85730 THROMBOPLASTIN TIME PARTIAL: CPT

## 2019-07-25 NOTE — TELEPHONE ENCOUNTER
Patient aware she needs to schedule. She is planning on proceding with surgery first then will see GI.

## 2019-07-25 NOTE — TELEPHONE ENCOUNTER
Patient phoned asking for assistance with obtaining an appointment for 2D echo prior to Aug 3rd. Patient phoned to schedule and was told this was the first availability.     Phoned Department on patient's behalf and re-scheduled to Tuesday 7/30/19 at [address] [place] [place] [address] a

## 2019-07-30 ENCOUNTER — HOSPITAL ENCOUNTER (OUTPATIENT)
Dept: CV DIAGNOSTICS | Age: 64
Discharge: HOME | End: 2019-07-30
Attending: INTERNAL MEDICINE
Payer: COMMERCIAL

## 2019-07-30 DIAGNOSIS — Z01.810 PREOP CARDIOVASCULAR EXAM: ICD-10-CM

## 2019-07-30 PROCEDURE — 93306 TTE W/DOPPLER COMPLETE: CPT | Performed by: INTERNAL MEDICINE

## 2019-08-05 ENCOUNTER — TELEPHONE (OUTPATIENT)
Dept: INTERNAL MEDICINE CLINIC | Facility: CLINIC | Age: 64
End: 2019-08-05

## 2019-08-05 ENCOUNTER — TELEPHONE (OUTPATIENT)
Dept: HEMATOLOGY/ONCOLOGY | Facility: HOSPITAL | Age: 64
End: 2019-08-05

## 2019-08-05 RX ORDER — SENNOSIDES 8.6 MG
CAPSULE ORAL EVERY 8 HOURS PRN
COMMUNITY

## 2019-08-05 NOTE — TELEPHONE ENCOUNTER
Was done July 23, 2019 and seeing if echo was okay on the bottom of the note that she should be okay for surgery. So we can attach the echo results to it.

## 2019-08-05 NOTE — TELEPHONE ENCOUNTER
Phoned patient for reinforcement of preoperative education. Patient is scheduled for lymphoscintigraphy on 8/7/19 at 2:30. Educated patient as to this exam.  This included rationale for performing as well as what to expect.   Provided patient with all velvet

## 2019-08-05 NOTE — TELEPHONE ENCOUNTER
Patient called requesting note of clearance for surgery 8/8/2019, for a double mastectomy. Please send Dr. Wilmer Garcia office. Patient also would like to get the echo results.   Please call patient if there is any problems

## 2019-08-06 DIAGNOSIS — Z17.0 MALIGNANT NEOPLASM OF OVERLAPPING SITES OF LEFT BREAST IN FEMALE, ESTROGEN RECEPTOR POSITIVE (HCC): Primary | ICD-10-CM

## 2019-08-06 DIAGNOSIS — C50.812 MALIGNANT NEOPLASM OF OVERLAPPING SITES OF LEFT BREAST IN FEMALE, ESTROGEN RECEPTOR POSITIVE (HCC): Primary | ICD-10-CM

## 2019-08-07 ENCOUNTER — HOSPITAL ENCOUNTER (OUTPATIENT)
Dept: NUCLEAR MEDICINE | Facility: HOSPITAL | Age: 64
Discharge: HOME | End: 2019-08-07
Attending: SURGERY
Payer: COMMERCIAL

## 2019-08-07 DIAGNOSIS — C50.812 MALIGNANT NEOPLASM OF OVERLAPPING SITES OF LEFT BREAST IN FEMALE, ESTROGEN RECEPTOR POSITIVE (HCC): ICD-10-CM

## 2019-08-07 DIAGNOSIS — Z17.0 MALIGNANT NEOPLASM OF OVERLAPPING SITES OF LEFT BREAST IN FEMALE, ESTROGEN RECEPTOR POSITIVE (HCC): ICD-10-CM

## 2019-08-07 PROCEDURE — 78195 LYMPH SYSTEM IMAGING: CPT | Performed by: SURGERY

## 2019-08-08 ENCOUNTER — ANESTHESIA EVENT (OUTPATIENT)
Dept: SURGERY | Facility: HOSPITAL | Age: 64
End: 2019-08-08
Payer: COMMERCIAL

## 2019-08-08 ENCOUNTER — ANESTHESIA (OUTPATIENT)
Dept: SURGERY | Facility: HOSPITAL | Age: 64
End: 2019-08-08
Payer: COMMERCIAL

## 2019-08-08 ENCOUNTER — HOSPITAL ENCOUNTER (OUTPATIENT)
Facility: HOSPITAL | Age: 64
Discharge: HOME HEALTH | End: 2019-08-09
Attending: SURGERY | Admitting: SURGERY
Payer: COMMERCIAL

## 2019-08-08 DIAGNOSIS — C50.812 MALIGNANT NEOPLASM OF OVERLAPPING SITES OF LEFT BREAST IN FEMALE, ESTROGEN RECEPTOR POSITIVE (HCC): ICD-10-CM

## 2019-08-08 DIAGNOSIS — Z17.0 MALIGNANT NEOPLASM OF OVERLAPPING SITES OF LEFT BREAST IN FEMALE, ESTROGEN RECEPTOR POSITIVE (HCC): ICD-10-CM

## 2019-08-08 PROCEDURE — 0HRV0JZ REPLACEMENT OF BILATERAL BREAST WITH SYNTHETIC SUBSTITUTE, OPEN APPROACH: ICD-10-PCS | Performed by: SURGERY

## 2019-08-08 PROCEDURE — 0HTV0ZZ RESECTION OF BILATERAL BREAST, OPEN APPROACH: ICD-10-PCS | Performed by: SURGERY

## 2019-08-08 PROCEDURE — 88300 SURGICAL PATH GROSS: CPT | Performed by: SURGERY

## 2019-08-08 PROCEDURE — 88363 XM ARCHIVE TISSUE MOLEC ANAL: CPT | Performed by: SURGERY

## 2019-08-08 PROCEDURE — 88334 PATH CONSLTJ SURG CYTO XM EA: CPT | Performed by: SURGERY

## 2019-08-08 PROCEDURE — 88342 IMHCHEM/IMCYTCHM 1ST ANTB: CPT | Performed by: SURGERY

## 2019-08-08 PROCEDURE — 88341 IMHCHEM/IMCYTCHM EA ADD ANTB: CPT | Performed by: SURGERY

## 2019-08-08 PROCEDURE — 88331 PATH CONSLTJ SURG 1 BLK 1SPC: CPT | Performed by: SURGERY

## 2019-08-08 PROCEDURE — 88307 TISSUE EXAM BY PATHOLOGIST: CPT | Performed by: SURGERY

## 2019-08-08 PROCEDURE — 88360 TUMOR IMMUNOHISTOCHEM/MANUAL: CPT | Performed by: SURGERY

## 2019-08-08 PROCEDURE — 07B53ZX EXCISION OF RIGHT AXILLARY LYMPHATIC, PERCUTANEOUS APPROACH, DIAGNOSTIC: ICD-10-PCS | Performed by: SURGERY

## 2019-08-08 PROCEDURE — 88309 TISSUE EXAM BY PATHOLOGIST: CPT | Performed by: SURGERY

## 2019-08-08 PROCEDURE — 88305 TISSUE EXAM BY PATHOLOGIST: CPT | Performed by: SURGERY

## 2019-08-08 RX ORDER — ONDANSETRON 2 MG/ML
4 INJECTION INTRAMUSCULAR; INTRAVENOUS ONCE AS NEEDED
Status: DISCONTINUED | OUTPATIENT
Start: 2019-08-08 | End: 2019-08-08 | Stop reason: HOSPADM

## 2019-08-08 RX ORDER — ACETAMINOPHEN 500 MG
1000 TABLET ORAL ONCE
Status: COMPLETED | OUTPATIENT
Start: 2019-08-08 | End: 2019-08-08

## 2019-08-08 RX ORDER — HYDROCODONE BITARTRATE AND ACETAMINOPHEN 5; 325 MG/1; MG/1
1 TABLET ORAL EVERY 4 HOURS PRN
Status: DISCONTINUED | OUTPATIENT
Start: 2019-08-08 | End: 2019-08-09

## 2019-08-08 RX ORDER — SODIUM CHLORIDE, SODIUM LACTATE, POTASSIUM CHLORIDE, CALCIUM CHLORIDE 600; 310; 30; 20 MG/100ML; MG/100ML; MG/100ML; MG/100ML
INJECTION, SOLUTION INTRAVENOUS CONTINUOUS
Status: DISCONTINUED | OUTPATIENT
Start: 2019-08-08 | End: 2019-08-08

## 2019-08-08 RX ORDER — HYDROCODONE BITARTRATE AND ACETAMINOPHEN 5; 325 MG/1; MG/1
1 TABLET ORAL AS NEEDED
Status: DISCONTINUED | OUTPATIENT
Start: 2019-08-08 | End: 2019-08-08 | Stop reason: HOSPADM

## 2019-08-08 RX ORDER — NALOXONE HYDROCHLORIDE 0.4 MG/ML
80 INJECTION, SOLUTION INTRAMUSCULAR; INTRAVENOUS; SUBCUTANEOUS AS NEEDED
Status: DISCONTINUED | OUTPATIENT
Start: 2019-08-08 | End: 2019-08-08 | Stop reason: HOSPADM

## 2019-08-08 RX ORDER — ENOXAPARIN SODIUM 100 MG/ML
40 INJECTION SUBCUTANEOUS DAILY
Status: DISCONTINUED | OUTPATIENT
Start: 2019-08-09 | End: 2019-08-09

## 2019-08-08 RX ORDER — FAMOTIDINE 20 MG/1
20 TABLET ORAL ONCE
Status: COMPLETED | OUTPATIENT
Start: 2019-08-08 | End: 2019-08-08

## 2019-08-08 RX ORDER — DEXTROSE, SODIUM CHLORIDE, SODIUM LACTATE, POTASSIUM CHLORIDE, AND CALCIUM CHLORIDE 5; .6; .31; .03; .02 G/100ML; G/100ML; G/100ML; G/100ML; G/100ML
INJECTION, SOLUTION INTRAVENOUS CONTINUOUS
Status: DISCONTINUED | OUTPATIENT
Start: 2019-08-08 | End: 2019-08-09

## 2019-08-08 RX ORDER — ACETAMINOPHEN 325 MG/1
650 TABLET ORAL EVERY 6 HOURS PRN
Status: DISCONTINUED | OUTPATIENT
Start: 2019-08-08 | End: 2019-08-09

## 2019-08-08 RX ORDER — ONDANSETRON 2 MG/ML
4 INJECTION INTRAMUSCULAR; INTRAVENOUS EVERY 6 HOURS PRN
Status: DISCONTINUED | OUTPATIENT
Start: 2019-08-08 | End: 2019-08-09

## 2019-08-08 RX ORDER — DOCUSATE SODIUM 100 MG/1
100 CAPSULE, LIQUID FILLED ORAL 2 TIMES DAILY
Status: DISCONTINUED | OUTPATIENT
Start: 2019-08-08 | End: 2019-08-09

## 2019-08-08 RX ORDER — HYDROMORPHONE HYDROCHLORIDE 1 MG/ML
0.4 INJECTION, SOLUTION INTRAMUSCULAR; INTRAVENOUS; SUBCUTANEOUS EVERY 5 MIN PRN
Status: DISCONTINUED | OUTPATIENT
Start: 2019-08-08 | End: 2019-08-08 | Stop reason: HOSPADM

## 2019-08-08 RX ORDER — DEXAMETHASONE SODIUM PHOSPHATE 4 MG/ML
VIAL (ML) INJECTION AS NEEDED
Status: DISCONTINUED | OUTPATIENT
Start: 2019-08-08 | End: 2019-08-08 | Stop reason: SURG

## 2019-08-08 RX ORDER — MIDAZOLAM HYDROCHLORIDE 1 MG/ML
INJECTION INTRAMUSCULAR; INTRAVENOUS AS NEEDED
Status: DISCONTINUED | OUTPATIENT
Start: 2019-08-08 | End: 2019-08-08 | Stop reason: SURG

## 2019-08-08 RX ORDER — 0.9 % SODIUM CHLORIDE 0.9 %
VIAL (ML) INJECTION AS NEEDED
Status: DISCONTINUED | OUTPATIENT
Start: 2019-08-08 | End: 2019-08-08 | Stop reason: HOSPADM

## 2019-08-08 RX ORDER — METOCLOPRAMIDE 10 MG/1
10 TABLET ORAL ONCE
Status: COMPLETED | OUTPATIENT
Start: 2019-08-08 | End: 2019-08-08

## 2019-08-08 RX ORDER — EPHEDRINE SULFATE 50 MG/ML
INJECTION, SOLUTION INTRAVENOUS AS NEEDED
Status: DISCONTINUED | OUTPATIENT
Start: 2019-08-08 | End: 2019-08-08 | Stop reason: SURG

## 2019-08-08 RX ORDER — LORAZEPAM 1 MG/1
1 TABLET ORAL NIGHTLY PRN
Status: DISCONTINUED | OUTPATIENT
Start: 2019-08-08 | End: 2019-08-09

## 2019-08-08 RX ORDER — CEFAZOLIN SODIUM/WATER 2 G/20 ML
2 SYRINGE (ML) INTRAVENOUS ONCE
Status: COMPLETED | OUTPATIENT
Start: 2019-08-08 | End: 2019-08-08

## 2019-08-08 RX ORDER — PHENYLEPHRINE HCL 10 MG/ML
VIAL (ML) INJECTION AS NEEDED
Status: DISCONTINUED | OUTPATIENT
Start: 2019-08-08 | End: 2019-08-08 | Stop reason: SURG

## 2019-08-08 RX ORDER — CEFAZOLIN SODIUM/WATER 2 G/20 ML
2 SYRINGE (ML) INTRAVENOUS EVERY 8 HOURS
Status: COMPLETED | OUTPATIENT
Start: 2019-08-08 | End: 2019-08-09

## 2019-08-08 RX ORDER — MORPHINE SULFATE 4 MG/ML
2 INJECTION, SOLUTION INTRAMUSCULAR; INTRAVENOUS EVERY 2 HOUR PRN
Status: DISCONTINUED | OUTPATIENT
Start: 2019-08-08 | End: 2019-08-09

## 2019-08-08 RX ORDER — LORAZEPAM 1 MG/1
1 TABLET ORAL EVERY 6 HOURS PRN
Status: DISCONTINUED | OUTPATIENT
Start: 2019-08-08 | End: 2019-08-08

## 2019-08-08 RX ORDER — ONDANSETRON 2 MG/ML
INJECTION INTRAMUSCULAR; INTRAVENOUS AS NEEDED
Status: DISCONTINUED | OUTPATIENT
Start: 2019-08-08 | End: 2019-08-08 | Stop reason: SURG

## 2019-08-08 RX ORDER — SODIUM CHLORIDE, SODIUM LACTATE, POTASSIUM CHLORIDE, CALCIUM CHLORIDE 600; 310; 30; 20 MG/100ML; MG/100ML; MG/100ML; MG/100ML
INJECTION, SOLUTION INTRAVENOUS CONTINUOUS
Status: DISCONTINUED | OUTPATIENT
Start: 2019-08-08 | End: 2019-08-08 | Stop reason: HOSPADM

## 2019-08-08 RX ORDER — METHYLENE BLUE 10 MG/ML
INJECTION INTRAVENOUS AS NEEDED
Status: DISCONTINUED | OUTPATIENT
Start: 2019-08-08 | End: 2019-08-08 | Stop reason: HOSPADM

## 2019-08-08 RX ORDER — BUPIVACAINE HYDROCHLORIDE 5 MG/ML
INJECTION, SOLUTION EPIDURAL; INTRACAUDAL AS NEEDED
Status: DISCONTINUED | OUTPATIENT
Start: 2019-08-08 | End: 2019-08-08 | Stop reason: HOSPADM

## 2019-08-08 RX ORDER — MORPHINE SULFATE 4 MG/ML
4 INJECTION, SOLUTION INTRAMUSCULAR; INTRAVENOUS EVERY 10 MIN PRN
Status: DISCONTINUED | OUTPATIENT
Start: 2019-08-08 | End: 2019-08-08 | Stop reason: HOSPADM

## 2019-08-08 RX ORDER — HYDROMORPHONE HYDROCHLORIDE 1 MG/ML
0.2 INJECTION, SOLUTION INTRAMUSCULAR; INTRAVENOUS; SUBCUTANEOUS EVERY 5 MIN PRN
Status: DISCONTINUED | OUTPATIENT
Start: 2019-08-08 | End: 2019-08-08 | Stop reason: HOSPADM

## 2019-08-08 RX ORDER — MORPHINE SULFATE 10 MG/ML
6 INJECTION, SOLUTION INTRAMUSCULAR; INTRAVENOUS EVERY 10 MIN PRN
Status: DISCONTINUED | OUTPATIENT
Start: 2019-08-08 | End: 2019-08-08 | Stop reason: HOSPADM

## 2019-08-08 RX ORDER — HYDROCODONE BITARTRATE AND ACETAMINOPHEN 5; 325 MG/1; MG/1
2 TABLET ORAL AS NEEDED
Status: DISCONTINUED | OUTPATIENT
Start: 2019-08-08 | End: 2019-08-08 | Stop reason: HOSPADM

## 2019-08-08 RX ORDER — HYDROMORPHONE HYDROCHLORIDE 1 MG/ML
0.6 INJECTION, SOLUTION INTRAMUSCULAR; INTRAVENOUS; SUBCUTANEOUS EVERY 5 MIN PRN
Status: DISCONTINUED | OUTPATIENT
Start: 2019-08-08 | End: 2019-08-08 | Stop reason: HOSPADM

## 2019-08-08 RX ORDER — MORPHINE SULFATE 2 MG/ML
2 INJECTION, SOLUTION INTRAMUSCULAR; INTRAVENOUS EVERY 10 MIN PRN
Status: DISCONTINUED | OUTPATIENT
Start: 2019-08-08 | End: 2019-08-08 | Stop reason: HOSPADM

## 2019-08-08 RX ORDER — HYDROCODONE BITARTRATE AND ACETAMINOPHEN 5; 325 MG/1; MG/1
2 TABLET ORAL EVERY 4 HOURS PRN
Status: DISCONTINUED | OUTPATIENT
Start: 2019-08-08 | End: 2019-08-09

## 2019-08-08 RX ORDER — LIDOCAINE HYDROCHLORIDE 10 MG/ML
INJECTION, SOLUTION EPIDURAL; INFILTRATION; INTRACAUDAL; PERINEURAL AS NEEDED
Status: DISCONTINUED | OUTPATIENT
Start: 2019-08-08 | End: 2019-08-08 | Stop reason: SURG

## 2019-08-08 RX ORDER — ROCURONIUM BROMIDE 10 MG/ML
INJECTION, SOLUTION INTRAVENOUS AS NEEDED
Status: DISCONTINUED | OUTPATIENT
Start: 2019-08-08 | End: 2019-08-08 | Stop reason: SURG

## 2019-08-08 RX ORDER — CEFAZOLIN SODIUM/WATER 2 G/20 ML
2 SYRINGE (ML) INTRAVENOUS EVERY 8 HOURS
Status: DISCONTINUED | OUTPATIENT
Start: 2019-08-08 | End: 2019-08-08

## 2019-08-08 RX ADMIN — MIDAZOLAM HYDROCHLORIDE 2 MG: 1 INJECTION INTRAMUSCULAR; INTRAVENOUS at 07:34:00

## 2019-08-08 RX ADMIN — LIDOCAINE HYDROCHLORIDE 20 MG: 10 INJECTION, SOLUTION EPIDURAL; INFILTRATION; INTRACAUDAL; PERINEURAL at 07:39:00

## 2019-08-08 RX ADMIN — DEXAMETHASONE SODIUM PHOSPHATE 4 MG: 4 MG/ML VIAL (ML) INJECTION at 11:16:00

## 2019-08-08 RX ADMIN — PHENYLEPHRINE HCL 100 MCG: 10 MG/ML VIAL (ML) INJECTION at 08:10:00

## 2019-08-08 RX ADMIN — SODIUM CHLORIDE, SODIUM LACTATE, POTASSIUM CHLORIDE, CALCIUM CHLORIDE: 600; 310; 30; 20 INJECTION, SOLUTION INTRAVENOUS at 11:52:00

## 2019-08-08 RX ADMIN — SODIUM CHLORIDE, SODIUM LACTATE, POTASSIUM CHLORIDE, CALCIUM CHLORIDE: 600; 310; 30; 20 INJECTION, SOLUTION INTRAVENOUS at 09:50:00

## 2019-08-08 RX ADMIN — PHENYLEPHRINE HCL 100 MCG: 10 MG/ML VIAL (ML) INJECTION at 08:05:00

## 2019-08-08 RX ADMIN — EPHEDRINE SULFATE 5 MG: 50 INJECTION, SOLUTION INTRAVENOUS at 09:07:00

## 2019-08-08 RX ADMIN — EPHEDRINE SULFATE 5 MG: 50 INJECTION, SOLUTION INTRAVENOUS at 08:35:00

## 2019-08-08 RX ADMIN — ROCURONIUM BROMIDE 50 MG: 10 INJECTION, SOLUTION INTRAVENOUS at 07:39:00

## 2019-08-08 RX ADMIN — ONDANSETRON 4 MG: 2 INJECTION INTRAMUSCULAR; INTRAVENOUS at 09:32:00

## 2019-08-08 RX ADMIN — LIDOCAINE HYDROCHLORIDE 30 MG: 10 INJECTION, SOLUTION EPIDURAL; INFILTRATION; INTRACAUDAL; PERINEURAL at 07:38:00

## 2019-08-08 RX ADMIN — CEFAZOLIN SODIUM/WATER 2 G: 2 G/20 ML SYRINGE (ML) INTRAVENOUS at 07:50:00

## 2019-08-08 NOTE — ANESTHESIA POSTPROCEDURE EVALUATION
Patient: Hao Solorzano    Procedure Summary     Date:  08/08/19 Room / Location:  [facility] MAIN OR 02 / [address] [facility] MAIN OR    Anesthesia Start:  2557 Anesthesia Stop:  9842    Procedures:       BREAST MASTECTOMY WITH PLASTIC SURGEON RECONSTRUCTION (Bilateral [place]

## 2019-08-08 NOTE — BRIEF OP NOTE
Pre-Operative Diagnosis: Malignant [facility] of [facility] sites of left [facility] in [facility], [facility] [C50.812, Z17.0]     Post-Operative Diagnosis: Malignant neoplasm of overlapping sites of left breast in female, estrogen receptor p

## 2019-08-08 NOTE — PROGRESS NOTES
Plan for bilateral mastectomy by Dr. Beth Polanco and immediate reconstruction with tissue expander and acellular dermal matrix reviewed with patient and daughter.  Given the pt's large, ptotic breasts, we discussed the need for skin envelope reduction via a stag

## 2019-08-08 NOTE — H&P
History of Present Illness:   Ms. Gloria Quezada is a 60year old woman who presents with a left palpable breast mass. She noticed a firmness in her left breast several months ago which has worsened over time.   On June 4, 2019 she noticed a small lump History:  Pt is a   Pt was 34years old at time of first pregnancy. She has cumulative breastfeeding history of 6 months, last unknown years.   She achieved menarche at age 17 and LMP at age 52  Age of Menopause: 52  Type: Natural menopause  She rajendra trauma.     Respiratory:  The patient denies chronic cough, phlegm, hemoptysis, pleurisy/chest pain, pneumonia, asthma, wheezing, difficulty in breathing with exertion, emphysema, chronic bronchitis, shortness of breath or abnormal sound when breathing. is no history of poor/slow wound healing, weight loss/gain, fertility or hormone problems, cold intolerance, thyroid disease.      Allergic/Immunologic:  There is no history of hives, hay fever, angioedema or anaphylaxis.     /85 (BP Location: Left a regions are free of significant lymphadenopathy.     Back: There is no vertebral column tenderness.     Skin: The skin appears normal. There are no suspicious appearing rashes or lesions.     Extremities:  The extremities are without deformity, cyanosis or final  pathology post-op, but I outlined the possibility of endocrine therapy, chemotherapy, and herceptin, depending on tumor marker profile.  Following this discussion, where all of the patient's questions were answered, we agreed to proceed with olvin

## 2019-08-08 NOTE — OPERATIVE REPORT
[facility]    PATIENT'S NAME: Anibal Carmichael   ATTENDING PHYSICIAN: Wang Beyer MD   OPERATING PHYSICIAN: Wang Beyer MD   PATIENT ACCOUNT#:   [id number]    LOCATION:  [address] [place] [address] #:   [id number]       DA The risks and possible complications of the procedure including, but not limited to infection, bleeding, injury to surrounding structures, possible need for reoperation were discussed with the patient, and she agreed to proceed.   The approach to abdiel stag contents, and the long thoracic and thoracodorsal nerves were both confirmed to be intact at the conclusion of procedure. Additional time and efforts were taken in order to preserve the sensory intercostal brachial nerve as well during this procedure.   At electrocautery. Hemoclips and a moist lap running pad was left in place for the remainder of the procedure which would be dictated separately by Dr. Carrol Bamberger. All counts were correct at the conclusion of my portion of the procedure.   Estimated blood loss a Statement Selected

## 2019-08-08 NOTE — ANESTHESIA PROCEDURE NOTES
Airway  Date/Time: 8/8/2019 7:40 AM  Urgency: elective    Airway not difficult    General Information and Staff    Patient location during procedure: OR  Anesthesiologist: Akash Salguero MD  Resident/CRNA: Tasneem Saul CRNA  Performed: CRNA     Indications
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
## 2019-08-08 NOTE — CM/SW NOTE
8/9 203pm: Plan is for discharge home today 8/9. [facility] is aware of discharge.   ---------------------  8/8 439pm: SANAZ met with the pt. And her dtr. Collette At bedside. The pt. [place] with 1 step to enter. The pt.  Reports being

## 2019-08-08 NOTE — OPERATIVE REPORT
[facility]    PATIENT'S NAME: Chad Ramirez   ATTENDING PHYSICIAN: Jorge Luis Frye MD   OPERATING PHYSICIAN: Jorge Larsen MD   PATIENT ACCOUNT#:   [id number]    LOCATION:  [id number] #:   [id number]       DATE [facility] plastic surgery portion of the procedure began. The mastectomy skin flaps were inspected. They appeared to be of suitable thickness. There were some areas at the superior pole bilaterally which appeared somewhat thin.   There was significant ptosis of th

## 2019-08-08 NOTE — BRIEF OP NOTE
Pre-Operative Diagnosis: Malignant neoplasm of overlapping sites of left breast in female, estrogen receptor positive [facility] [C50.812, Z17.0]     Post-Operative Diagnosis: Malignant neoplasm of overlapping sites of left breast in female, estrogen receptor p

## 2019-08-08 NOTE — PLAN OF CARE
Patient up with standby assist. Surgical dressing in place, ace wrap. 4x TRISHA drain sites with bloody drainage. Patient's daughter at bedside. Concerned about discharge plan, met with social work today to discuss options. Morphine prn for pain control.  IVF r severity of pain and evaluate response  - Implement non-pharmacological measures as appropriate and evaluate response  - Consider cultural and social influences on pain and pain management  - Manage/alleviate anxiety  - Utilize distraction and/or relaxatio ability or social support system  Outcome: Progressing     Problem: GASTROINTESTINAL - ADULT  Goal: Minimal or absence of nausea and vomiting  Description  INTERVENTIONS:  - Maintain adequate hydration with IV or PO as ordered and tolerated  - Nasogastric bundle as indicated  Outcome: Progressing     Problem: Impaired Functional Mobility  Goal: Achieve highest/safest level of mobility/gait  Description  Interventions:  - Assess patient's functional ability and stability  - Promote increasing activity/tolera

## 2019-08-09 ENCOUNTER — NURSE NAVIGATOR ENCOUNTER (OUTPATIENT)
Dept: HEMATOLOGY/ONCOLOGY | Facility: HOSPITAL | Age: 64
End: 2019-08-09

## 2019-08-09 VITALS
HEIGHT: 64 IN | SYSTOLIC BLOOD PRESSURE: 128 MMHG | BODY MASS INDEX: 32.61 KG/M2 | OXYGEN SATURATION: 96 % | HEART RATE: 82 BPM | DIASTOLIC BLOOD PRESSURE: 68 MMHG | WEIGHT: 191 LBS | RESPIRATION RATE: 18 BRPM | TEMPERATURE: 98 F

## 2019-08-09 RX ORDER — DOCUSATE SODIUM 100 MG/1
100 CAPSULE, LIQUID FILLED ORAL 2 TIMES DAILY
Qty: 30 CAPSULE | Refills: 1 | Status: SHIPPED | OUTPATIENT
Start: 2019-08-09 | End: 2019-10-03

## 2019-08-09 RX ORDER — HYDROCODONE BITARTRATE AND ACETAMINOPHEN 5; 325 MG/1; MG/1
1-2 TABLET ORAL EVERY 4 HOURS PRN
Qty: 40 TABLET | Refills: 0 | Status: SHIPPED | OUTPATIENT
Start: 2019-08-09 | End: 2019-08-16

## 2019-08-09 NOTE — HOME CARE LIAISON
Met with patient at the bedside. [facility] is agreeable to [facility] Residential brochure provided with contact information. All questions addressed and answered.

## 2019-08-09 NOTE — PROGRESS NOTES
Pain well-controlled  Drains 238 cc in 24hrs  Awake and alert  Mastectomy skin well-perfused  Drain effluent serosanguinous  A/P:  POD# 1  Doing well  Plan d/c home  Home care instructions reviewed with pt and questions answererd

## 2019-08-09 NOTE — PROGRESS NOTES
Patient is POD #1 s/p bilateral skin-sparing mastectomies with left sentinel lymph node biopsy and left completion axillary lymph node dissection, with complex closure of breast wounds. Patient is up and in a chair.   Patient reports tolerating PO intake

## 2019-08-09 NOTE — PLAN OF CARE
Problem: Patient/Family Goals  Goal: Patient/Family Long Term Goal  Description  Patient's Long Term Goal: discharge home with home health care    Interventions:  - See additional Care Plan goals for specific interventions   Outcome: Adequate for Dischar appropriate  Outcome: Adequate for Discharge     Problem: SAFETY ADULT - FALL  Goal: Free from fall injury  Description  INTERVENTIONS:  - Assess pt frequently for physical needs  - Identify cognitive and physical deficits and behaviors that affect risk of appropriate  - Advance diet as tolerated, if ordered  - Obtain nutritional consult as needed  - Evaluate fluid balance  Outcome: Adequate for Discharge     Problem: GENITOURINARY - ADULT  Goal: Absence of urinary retention  Description  INTERVENTIONS:  - A precautions  Outcome: Adequate for Discharge     Problem: Impaired Activities of Daily Living  Goal: Achieve highest/safest level of independence in self care  Description  Interventions:  - Assess ability and encourage patient to participate in ADLs to ma

## 2019-08-09 NOTE — PLAN OF CARE
Pt A&Ox4. Pt is anxious at time when family is at bedside. Morphine for pain with relief. Pt stated Jimmy Strange makes her wired and wanted to try it in the morning. Jpx4 with bloody output. Surgical dressing and ace wrap CDI. Upx1 Sb.  SCDs and lovenox for VTE pr cultural and social influences on pain and pain management  - Manage/alleviate anxiety  - Utilize distraction and/or relaxation techniques  - Monitor for opioid side effects  - Notify MD/LIP if interventions unsuccessful or patient reports new pain  - Anti vomiting  Description  INTERVENTIONS:  - Maintain adequate hydration with IV or PO as ordered and tolerated  - Nasogastric tube to low intermittent suction as ordered  - Evaluate effectiveness of ordered antiemetic medications  - Provide nonpharmacologic c mobility/gait  Description  Interventions:  - Assess patient's functional ability and stability  - Promote increasing activity/tolerance for mobility and gait  - Educate and engage patient/family in tolerated activity level and precautions  {Additional Mob

## 2019-08-12 ENCOUNTER — TELEPHONE (OUTPATIENT)
Dept: HEMATOLOGY/ONCOLOGY | Facility: HOSPITAL | Age: 64
End: 2019-08-12

## 2019-08-12 ENCOUNTER — TELEPHONE (OUTPATIENT)
Dept: INTERNAL MEDICINE CLINIC | Facility: CLINIC | Age: 64
End: 2019-08-12

## 2019-08-12 NOTE — TELEPHONE ENCOUNTER
Phoned patient to confirm her follow up with Dr. Jules Dangelo for this Wednesday at 11am.  Patient states she will be attending. Patient is s/p bilateral mastectomies. Patient shares pain is controlled with Tylenol.   Home Health RN visited patient on Saturday

## 2019-08-14 ENCOUNTER — MED REC SCAN ONLY (OUTPATIENT)
Dept: INTERNAL MEDICINE CLINIC | Facility: CLINIC | Age: 64
End: 2019-08-14

## 2019-08-14 ENCOUNTER — TELEPHONE (OUTPATIENT)
Dept: HEMATOLOGY/ONCOLOGY | Facility: HOSPITAL | Age: 64
End: 2019-08-14

## 2019-08-14 ENCOUNTER — TELEPHONE (OUTPATIENT)
Dept: SURGERY | Facility: CLINIC | Age: 64
End: 2019-08-14

## 2019-08-14 ENCOUNTER — OFFICE VISIT (OUTPATIENT)
Dept: HEMATOLOGY/ONCOLOGY | Facility: HOSPITAL | Age: 64
End: 2019-08-14
Attending: Other
Payer: COMMERCIAL

## 2019-08-14 ENCOUNTER — NURSE NAVIGATOR ENCOUNTER (OUTPATIENT)
Dept: HEMATOLOGY/ONCOLOGY | Facility: HOSPITAL | Age: 64
End: 2019-08-14

## 2019-08-14 VITALS
BODY MASS INDEX: 32.1 KG/M2 | HEART RATE: 80 BPM | DIASTOLIC BLOOD PRESSURE: 79 MMHG | WEIGHT: 188 LBS | SYSTOLIC BLOOD PRESSURE: 138 MMHG | RESPIRATION RATE: 16 BRPM | OXYGEN SATURATION: 97 % | TEMPERATURE: 98 F | HEIGHT: 64 IN

## 2019-08-14 DIAGNOSIS — Z17.0 MALIGNANT NEOPLASM OF OVERLAPPING SITES OF LEFT BREAST IN FEMALE, ESTROGEN RECEPTOR POSITIVE (HCC): Primary | ICD-10-CM

## 2019-08-14 DIAGNOSIS — K86.89 PANCREATIC MASS: ICD-10-CM

## 2019-08-14 DIAGNOSIS — F41.9 ANXIETY: ICD-10-CM

## 2019-08-14 DIAGNOSIS — C50.812 MALIGNANT NEOPLASM OF OVERLAPPING SITES OF LEFT BREAST IN FEMALE, ESTROGEN RECEPTOR POSITIVE (HCC): Primary | ICD-10-CM

## 2019-08-14 PROCEDURE — 99214 OFFICE O/P EST MOD 30 MIN: CPT | Performed by: INTERNAL MEDICINE

## 2019-08-14 NOTE — TELEPHONE ENCOUNTER
I contacted the patient to check in. She is doing well. Taking Tylenol for pain, may add Advil at this time. She is a little sore, but is being pretty active. She is managing the drains without a problem.  The right has pretty low output, and left is ade

## 2019-08-14 NOTE — TELEPHONE ENCOUNTER
Called Juanita Serna to let her know that the tumor board decided to run the mammoprint in order to determine her course of treatment. Explained that it would take approximately 2 weeks to get the results. Made an appointment for 8/29 to review the results.   Pt

## 2019-08-14 NOTE — PROGRESS NOTES
Cancer Center Progress Note    Patient Name: Boston Claudio   YOB: 1955   Medical Record Number: [id number]   Attending Physician: Zoraida Crespo M.D.      Chief Complaint:  Breast cancer (PCP: Dr Guillermina Monzon)    Oncology History:  60year old pos of malignancy in the dermal lymphatics or the overlying skin.   [place] lymph node was positive with extracapsular extension she proceeded to have axillary dissection which showed 2 of 22 regional lymph nodes had metastatic cancer without extracapsular ext Medications:    Current Outpatient Medications:   •  docusate sodium 100 MG Oral Cap, Take 1 capsule (100 mg total) by mouth 2 (two) times daily. , Disp: 30 capsule, Rfl: 1  •  HYDROcodone-acetaminophen 5-325 MG Oral Tab, Take 1-2 tablets by mouth every 4 ( 07/25/2019    GLOBULIN 3.9 07/25/2019     07/25/2019    K 4.0 07/25/2019     07/25/2019    CO2 29.0 07/25/2019       Radiology assessed and reviewed:  Ct Chest+abdomen+pelvis(all Cntrst Only)(cpt=71260/37721)         CONCLUSION:  1.   There is h bleeding/hemorrhage, infection, and need f       CONCLUSION:   Ultrasound-guided core needle biopsy of left breast mass at 02:00 o'clock 5 cm from the nipple marked with vision clip. Pathology is malignant and concordant with the imaging assessment.   Ultr is recommended. Postprocedure mammographic clip correlation is recommended.   Right breast segmental amorphous calcifications in the upper central breast.  Tomosynthesis guided biopsies of the anterior and posterior extent are recommended for further evalu 8/12/2019    1. left invasive mammary carcinoma with features of lobular and ductal, ER/DC >90% HER2 neg, cT2N0, pT3N1  - CT CAP as below  - s/p double mastectomy; path reviewed and discussed at tumor board today  - Discussed in detail MINDACT trial data in

## 2019-08-16 ENCOUNTER — OFFICE VISIT (OUTPATIENT)
Dept: SURGERY | Facility: CLINIC | Age: 64
End: 2019-08-16
Payer: COMMERCIAL

## 2019-08-16 DIAGNOSIS — C50.812 MALIGNANT NEOPLASM OF OVERLAPPING SITES OF LEFT BREAST IN FEMALE, ESTROGEN RECEPTOR POSITIVE (HCC): Primary | ICD-10-CM

## 2019-08-16 DIAGNOSIS — Z17.0 MALIGNANT NEOPLASM OF OVERLAPPING SITES OF LEFT BREAST IN FEMALE, ESTROGEN RECEPTOR POSITIVE (HCC): Primary | ICD-10-CM

## 2019-08-16 PROCEDURE — 99024 POSTOP FOLLOW-UP VISIT: CPT | Performed by: SURGERY

## 2019-08-16 RX ORDER — MULTIVIT-MIN/IRON/FOLIC ACID/K 18-600-40
1000 CAPSULE ORAL DAILY
COMMUNITY

## 2019-08-16 NOTE — PROGRESS NOTES
Rogelio Rainey is a 60year old female who presents today for a follow-up. She reports minimal drainage from her remaining drains    Physical Examination:  Breasts: Her breast incisions are with intact Steri-Strips.   Drain effluent is serosanguineous

## 2019-08-19 ENCOUNTER — OFFICE VISIT (OUTPATIENT)
Dept: SURGERY | Facility: CLINIC | Age: 64
End: 2019-08-19
Payer: COMMERCIAL

## 2019-08-19 ENCOUNTER — TELEPHONE (OUTPATIENT)
Dept: SURGERY | Facility: CLINIC | Age: 64
End: 2019-08-19

## 2019-08-19 DIAGNOSIS — Z90.13 HISTORY OF MASTECTOMY, BILATERAL: ICD-10-CM

## 2019-08-19 DIAGNOSIS — Z17.0 MALIGNANT NEOPLASM OF OVERLAPPING SITES OF LEFT BREAST IN FEMALE, ESTROGEN RECEPTOR POSITIVE (HCC): Primary | ICD-10-CM

## 2019-08-19 DIAGNOSIS — C50.812 MALIGNANT NEOPLASM OF OVERLAPPING SITES OF LEFT BREAST IN FEMALE, ESTROGEN RECEPTOR POSITIVE (HCC): Primary | ICD-10-CM

## 2019-08-19 DIAGNOSIS — Z90.13 ABSENCE OF BREAST, BILATERAL: Primary | ICD-10-CM

## 2019-08-19 DIAGNOSIS — Z98.890 HISTORY OF LYMPH NODE DISSECTION OF LEFT AXILLA: ICD-10-CM

## 2019-08-19 PROCEDURE — 99213 OFFICE O/P EST LOW 20 MIN: CPT | Performed by: PHYSICIAN ASSISTANT

## 2019-08-19 NOTE — PROGRESS NOTES
This is a 62-year-old female that is 11 days status post her bilateral mastectomy with left sentinel lymph node biopsy with complete axillary dissection by Dr. Nereida Mcdaniel with closure without reconstruction by Dr. Paz Amaya.   Given the patient's large ptotic skin her back in a week for reevaluation to ensure that there is no seroma collection. She will notify us if she experience is any fevers, redness, chills. Currently there are no signs of infection or reasons for acute intervention at this time.

## 2019-08-19 NOTE — TELEPHONE ENCOUNTER
Patient called regarding concerns with Left Arm/ Left Axilla area pain and swelling. Stated that she had drains removed on 8/16 with Dr. Rich Phillips and her concerns with Left arm are now worse and would like to be seen ASAP.     Patient to be scheduled for toda

## 2019-08-20 ENCOUNTER — OFFICE VISIT (OUTPATIENT)
Dept: PHYSICAL THERAPY | Facility: HOSPITAL | Age: 64
End: 2019-08-20
Attending: SURGERY
Payer: COMMERCIAL

## 2019-08-20 DIAGNOSIS — Z90.13 HISTORY OF MASTECTOMY, BILATERAL: ICD-10-CM

## 2019-08-20 DIAGNOSIS — C50.812 MALIGNANT NEOPLASM OF OVERLAPPING SITES OF LEFT BREAST IN FEMALE, ESTROGEN RECEPTOR POSITIVE (HCC): ICD-10-CM

## 2019-08-20 DIAGNOSIS — Z98.890 HISTORY OF LYMPH NODE DISSECTION OF LEFT AXILLA: ICD-10-CM

## 2019-08-20 DIAGNOSIS — Z17.0 MALIGNANT NEOPLASM OF OVERLAPPING SITES OF LEFT BREAST IN FEMALE, ESTROGEN RECEPTOR POSITIVE (HCC): ICD-10-CM

## 2019-08-20 PROCEDURE — 97110 THERAPEUTIC EXERCISES: CPT | Performed by: PHYSICAL THERAPIST

## 2019-08-20 PROCEDURE — 97162 PT EVAL MOD COMPLEX 30 MIN: CPT | Performed by: PHYSICAL THERAPIST

## 2019-08-20 PROCEDURE — 97140 MANUAL THERAPY 1/> REGIONS: CPT | Performed by: PHYSICAL THERAPIST

## 2019-08-20 NOTE — PROGRESS NOTES
Referring Physician: Dr. Sofia Shone  Diagnosis:  Malignant neoplasm of overlapping sites of left breast in female, estrogen receptor positive ([facility] (C50.812,Z17.0)  History of lymph node dissection of left axilla (D66.182)  History of mastectomy, bilateral Indurations noted B upper trap from previous bra straps   Protracted scapula and forward head  AROM/Strength:     L shld AROM flex 65, abd 50   R shld AROM flex 75, abd 75    Edema/Tissue Observations:   Patient arrived wearing binder across chest, pulled abd 165; L flex 145, abd 150; B ER to 80;  B IR to T10 to improve ease of dressing/bathing/and reaching overhead  4) Increase UE strength to at least 4/5 to improve ease of lifting and performing household chores    Frequency / Duration: Patient will be see

## 2019-08-21 ENCOUNTER — OFFICE VISIT (OUTPATIENT)
Dept: SURGERY | Facility: CLINIC | Age: 64
End: 2019-08-21
Payer: COMMERCIAL

## 2019-08-21 ENCOUNTER — OFFICE VISIT (OUTPATIENT)
Dept: PHYSICAL THERAPY | Facility: HOSPITAL | Age: 64
End: 2019-08-21
Attending: SURGERY
Payer: COMMERCIAL

## 2019-08-21 VITALS
SYSTOLIC BLOOD PRESSURE: 159 MMHG | OXYGEN SATURATION: 97 % | WEIGHT: 188 LBS | DIASTOLIC BLOOD PRESSURE: 81 MMHG | RESPIRATION RATE: 16 BRPM | HEART RATE: 100 BPM | BODY MASS INDEX: 32 KG/M2

## 2019-08-21 DIAGNOSIS — Z17.0 MALIGNANT NEOPLASM OF OVERLAPPING SITES OF LEFT BREAST IN FEMALE, ESTROGEN RECEPTOR POSITIVE (HCC): Primary | ICD-10-CM

## 2019-08-21 DIAGNOSIS — C50.812 MALIGNANT NEOPLASM OF OVERLAPPING SITES OF LEFT BREAST IN FEMALE, ESTROGEN RECEPTOR POSITIVE (HCC): Primary | ICD-10-CM

## 2019-08-21 PROCEDURE — 99024 POSTOP FOLLOW-UP VISIT: CPT | Performed by: SURGERY

## 2019-08-21 PROCEDURE — 97140 MANUAL THERAPY 1/> REGIONS: CPT

## 2019-08-21 PROCEDURE — 97110 THERAPEUTIC EXERCISES: CPT

## 2019-08-21 NOTE — PROGRESS NOTES
Breast Surgery Post-Operative Visit    Diagnosis:   Left invasive ductal carcinoma status post bilateral mastectomies with left sentinel lymph node biopsy, and left completion axillary lymph node dissection on August 8, 2019.     Stage: Cancer Staging  [facility] the [place] Ki-67 25%. Tomosynthesis guided biopsies of the anterior and posterior extent in the right breast were performed on July 1, 2019. Both sites show foci of atypical lobular hyperplasia. She does not have breast pain.  She does not have significant past h achieved menarche at age 17 and LMP at age 52  Age of Menopause: 52  Type: Natural menopause  She denies any history of hormone replacement therapy. She has history of oral contraceptive use for 3 years, last in 1989.   She denies infertility treatment to earaches, nasal congestion, nose bleeds, snoring, pain in mouth/throat, hoarseness, change in voice, facial trauma.     Respiratory:  The patient denies chronic cough, phlegm, hemoptysis, pleurisy/chest pain, pneumonia, asthma, wheezing, difficulty in breat cancer 15 years ago and was caring for her parents who both recently passed. Endocrine: There is no history of poor/slow wound healing, weight loss/gain, fertility or hormone problems, cold intolerance, thyroid disease. Allergic/Immunologic:   The scheduled appointment.

## 2019-08-21 NOTE — PROGRESS NOTES
Referring Physician: Dr. Shelbie Al  Diagnosis:  Malignant neoplasm of overlapping sites of left breast in female, [facility] (C50.812,Z17.0)  History of lymph node dissection of left axilla (E39.677)  History of mastectomy, bilateral (Z9 w/ MLD techniques to B chest, axilla, and trunk. STM to B upper trap/levator for pain relief.  STM to left axilla/UE (areas of cording) to improve tissue mobility.      Compression:  Loosened binder, provided custom fabricated compression pad for trunk    overhead  4) Increase UE strength to at least 4/5 to improve ease of lifting and performing household chores       Plan: Follow up with Dr. Ronny Cannon today. Frequency / Duration: Patient will be seen for 2 x/week or a total of 10 visits over a 90 day period.

## 2019-08-22 ENCOUNTER — TELEPHONE (OUTPATIENT)
Dept: INTERNAL MEDICINE CLINIC | Facility: CLINIC | Age: 64
End: 2019-08-22

## 2019-08-22 NOTE — TELEPHONE ENCOUNTER
Marcelle Nugent with [facility] calling to inform pt has started receiving outpatient therapy as of Monday 8/19, will be discharged at this time.     Please reply to radha: BARRY Turner

## 2019-08-23 NOTE — TELEPHONE ENCOUNTER
Left voice mail for Talia reina with Dr. Quan Stafford to d/c patient as she is going for out patient hospital physical therapy.

## 2019-08-26 ENCOUNTER — OFFICE VISIT (OUTPATIENT)
Dept: PHYSICAL THERAPY | Facility: HOSPITAL | Age: 64
End: 2019-08-26
Attending: SURGERY
Payer: COMMERCIAL

## 2019-08-26 PROCEDURE — 97140 MANUAL THERAPY 1/> REGIONS: CPT

## 2019-08-26 PROCEDURE — 97110 THERAPEUTIC EXERCISES: CPT

## 2019-08-26 NOTE — PROGRESS NOTES
Referring Physician: Dr. Calixto Webber  Diagnosis:  Malignant neoplasm of overlapping sites of left breast in female, estrogen receptor positive [facility] (C50.812,Z17.0)  History of lymph node dissection of left axilla (O64.657)  History of mastectomy, bilateral (Z9 arm. Bruising of chest. L trunk w/ blister that had scabbed over. Onset of axillary cording into inner arm, down to the elbow. R: Moderate swelling of chest and trunk.            Treatment:    Date 8/20/2019 8/21/2019 8/26/2019     Visit # 1/10  2/10 3/1 Management/Education:        There Act (5 minutes):      Education: Self-Care Management/Education:                Assessment:   Continues to demonstrated improved AROM. Pain still limites mobility as well as guarding.      Goals:   Goals (discussed and pl

## 2019-08-28 ENCOUNTER — OFFICE VISIT (OUTPATIENT)
Dept: PHYSICAL THERAPY | Facility: HOSPITAL | Age: 64
End: 2019-08-28
Attending: SURGERY
Payer: COMMERCIAL

## 2019-08-28 PROCEDURE — 97140 MANUAL THERAPY 1/> REGIONS: CPT

## 2019-08-28 PROCEDURE — 97110 THERAPEUTIC EXERCISES: CPT

## 2019-08-28 NOTE — PROGRESS NOTES
Referring Physician: Dr. Alpesh Hollis  Diagnosis:  Malignant neoplasm of overlapping sites of left breast in female, [facility] (C50.812,Z17.0)  History of lymph node dissection of left axilla (R85.424)  History of mastectomy, bilateral (Z9 shld AROM flex 65, abd 50               R shld AROM flex 75, abd 75     Edema/Tissue Observations:   Patient arrived wearing binder across chest, pulled tight, causing swelling up into B axilla and pect tendon region  L:  Marked swelling chest, trunk, axil against binder.)  . There Ex ( 10 minutes):  Standing codmans  Shld flex/abd x 10   shld shrugs x 10  scap retraction/protraction x 10     Re-inforced ROM exercises throughout the day in pain free range.  Encouraged pt to play cards or board games with f facilitate swelling reduction and to be independent at self management once discharged  3) Increase shoulder AROM R flex 155, abd 165; L flex 145, abd 150; B ER to 80;  B IR to T10 to improve ease of dressing/bathing/and reaching overhead  4) Increase UE st

## 2019-08-29 ENCOUNTER — OFFICE VISIT (OUTPATIENT)
Dept: HEMATOLOGY/ONCOLOGY | Facility: HOSPITAL | Age: 64
End: 2019-08-29
Attending: Other
Payer: COMMERCIAL

## 2019-08-29 VITALS
SYSTOLIC BLOOD PRESSURE: 140 MMHG | HEIGHT: 64 IN | RESPIRATION RATE: 18 BRPM | DIASTOLIC BLOOD PRESSURE: 83 MMHG | TEMPERATURE: 98 F | HEART RATE: 78 BPM | WEIGHT: 190 LBS | OXYGEN SATURATION: 95 % | BODY MASS INDEX: 32.44 KG/M2

## 2019-08-29 DIAGNOSIS — C50.812 MALIGNANT NEOPLASM OF OVERLAPPING SITES OF LEFT BREAST IN FEMALE, ESTROGEN RECEPTOR POSITIVE (HCC): Primary | ICD-10-CM

## 2019-08-29 DIAGNOSIS — K86.89 PANCREATIC MASS: ICD-10-CM

## 2019-08-29 DIAGNOSIS — Z17.0 MALIGNANT NEOPLASM OF OVERLAPPING SITES OF LEFT BREAST IN FEMALE, ESTROGEN RECEPTOR POSITIVE (HCC): Primary | ICD-10-CM

## 2019-08-29 DIAGNOSIS — F41.9 ANXIETY: ICD-10-CM

## 2019-08-29 DIAGNOSIS — N64.4 BREAST PAIN: ICD-10-CM

## 2019-08-29 PROCEDURE — 99215 OFFICE O/P EST HI 40 MIN: CPT | Performed by: INTERNAL MEDICINE

## 2019-08-29 NOTE — PROGRESS NOTES
Cancer Center Progress Note    Patient Name: Yovana Gannon   YOB: 1955   Medical Record Number: [id number]   Attending Physician: Summer De La Fuente M.D.      Chief Complaint:  Breast cancer (PCP: Dr John Shrestha)    Oncology History:  60year old pos of malignancy in the dermal lymphatics or the overlying skin.   [place] lymph node was positive with extracapsular extension she proceeded to have axillary dissection which showed 2 of 22 regional lymph nodes had metastatic cancer without extracapsular ext Medications:   •  Ascorbic Acid (VITAMIN C) 100 MG Oral Tab, Take 100 mg by mouth daily. , Disp: , Rfl:   •  docusate sodium 100 MG Oral Cap, Take 1 capsule (100 mg total) by mouth 2 (two) times daily. , Disp: 30 capsule, Rfl: 1  •  Acetaminophen ER (TYLENOL 07/25/2019     07/25/2019    CO2 29.0 07/25/2019       Radiology assessed and reviewed:  Ct Chest+abdomen+pelvis(all Cntrst Only)(cpt=71260/[id number])         CONCLUSION:  1. There is history of recently diagnosed left breast cancer.   There are 2 nodula mastectomy  - benign tissue    3. Pancreatic mass  -Recommend ERCP/EUS  - anxiety remains severe, will bring this up again next week    4. Anxiety  Much controlled currently and stable.     5. Surgical pain  - change to Ibuprofen 600mg to allow for mobility

## 2019-08-30 ENCOUNTER — OFFICE VISIT (OUTPATIENT)
Dept: SURGERY | Facility: CLINIC | Age: 64
End: 2019-08-30
Payer: COMMERCIAL

## 2019-08-30 DIAGNOSIS — Z90.13 ABSENCE OF BREAST, BILATERAL: Primary | ICD-10-CM

## 2019-08-30 PROCEDURE — 99212 OFFICE O/P EST SF 10 MIN: CPT | Performed by: SURGERY

## 2019-08-30 NOTE — PROGRESS NOTES
Boston Claudio is a 60year old female who presents today for a follow-up. She is undergoing physical therapy for cording of the left axillary region. She denies fevers or chills.     Physical Examination:  Breasts:bilateral breast incisions with int

## 2019-09-03 ENCOUNTER — OFFICE VISIT (OUTPATIENT)
Dept: PHYSICAL THERAPY | Facility: HOSPITAL | Age: 64
End: 2019-09-03
Attending: SURGERY
Payer: COMMERCIAL

## 2019-09-03 PROCEDURE — 97140 MANUAL THERAPY 1/> REGIONS: CPT

## 2019-09-03 PROCEDURE — 97110 THERAPEUTIC EXERCISES: CPT

## 2019-09-03 NOTE — PROGRESS NOTES
Referring Physician: Dr. Jigna Larsen  Diagnosis:  Malignant neoplasm of overlapping sites of left breast in female, estrogen receptor [facility] (C50.812,Z17.0)  History of lymph node dissection of left axilla (E91.149)  History of mastectomy, bilateral (Z9 d/t guarding    Evaluation Measurements: (8/20/2019)     Posture:                Significant guarding BUE, especially the left               Tightness of B upper trap and levator.  Indurations noted B upper trap from previous bra straps               Protra w/ MLD techniques to L chest, axilla, and trunk and upper arm. STM to L upper trap/levator for pain relief. STM to L axilla/UE (areas of cording)  to improve tissue mobility.    Lotion used on L chest, axilla, upper arms    Additional 3\" stockinette cut wi axilla tissue stretch using R hand 5 x 10 seconds  Door way stretch 5 x 10 seconds  Posterior capsule stretch 3 x 10 seconds. Reviewed and printed out ROM exercises to be performed 3 times a day. Pictures and written instructions provided.    There Act ( Complete Decongestive Therapy, Manual Therapy, Self-Care/Home Management Training, Therapeutic Exercise, Neuromuscular Re-education, Orthotic Management and Training    Charges: MM4,  Ex1   Total Timed Treatment:80 min  Total Treatment Time: 80 min

## 2019-09-04 ENCOUNTER — TELEPHONE (OUTPATIENT)
Dept: SURGERY | Facility: CLINIC | Age: 64
End: 2019-09-04

## 2019-09-04 ENCOUNTER — OFFICE VISIT (OUTPATIENT)
Dept: SURGERY | Facility: CLINIC | Age: 64
End: 2019-09-04
Payer: COMMERCIAL

## 2019-09-04 VITALS
OXYGEN SATURATION: 98 % | DIASTOLIC BLOOD PRESSURE: 93 MMHG | SYSTOLIC BLOOD PRESSURE: 153 MMHG | HEART RATE: 98 BPM | WEIGHT: 190 LBS | BODY MASS INDEX: 32.44 KG/M2 | RESPIRATION RATE: 16 BRPM | HEIGHT: 64 IN

## 2019-09-04 DIAGNOSIS — C50.812 MALIGNANT NEOPLASM OF OVERLAPPING SITES OF LEFT BREAST IN FEMALE, ESTROGEN RECEPTOR POSITIVE (HCC): Primary | ICD-10-CM

## 2019-09-04 DIAGNOSIS — Z17.0 MALIGNANT NEOPLASM OF OVERLAPPING SITES OF LEFT BREAST IN FEMALE, ESTROGEN RECEPTOR POSITIVE (HCC): Primary | ICD-10-CM

## 2019-09-04 PROCEDURE — 99024 POSTOP FOLLOW-UP VISIT: CPT | Performed by: SURGERY

## 2019-09-04 NOTE — TELEPHONE ENCOUNTER
I returned the patients call regarding port placement and taking the advil. She is taking Advil 600 mg every 6 hours for the inflammation and swelling in her affected side, and it is helping.    She was told she had to hold NSAIDS for 5 days prior to port

## 2019-09-05 ENCOUNTER — OFFICE VISIT (OUTPATIENT)
Dept: HEMATOLOGY/ONCOLOGY | Facility: HOSPITAL | Age: 64
End: 2019-09-05
Attending: INTERNAL MEDICINE
Payer: COMMERCIAL

## 2019-09-05 ENCOUNTER — TELEPHONE (OUTPATIENT)
Dept: GASTROENTEROLOGY | Facility: CLINIC | Age: 64
End: 2019-09-05

## 2019-09-05 VITALS
TEMPERATURE: 98 F | RESPIRATION RATE: 16 BRPM | HEART RATE: 77 BPM | OXYGEN SATURATION: 97 % | SYSTOLIC BLOOD PRESSURE: 131 MMHG | WEIGHT: 189 LBS | DIASTOLIC BLOOD PRESSURE: 68 MMHG | BODY MASS INDEX: 32.27 KG/M2 | HEIGHT: 64 IN

## 2019-09-05 DIAGNOSIS — Z17.0 MALIGNANT NEOPLASM OF OVERLAPPING SITES OF LEFT BREAST IN FEMALE, ESTROGEN RECEPTOR POSITIVE (HCC): Primary | ICD-10-CM

## 2019-09-05 DIAGNOSIS — C50.812 MALIGNANT NEOPLASM OF OVERLAPPING SITES OF LEFT BREAST IN FEMALE, ESTROGEN RECEPTOR POSITIVE (HCC): Primary | ICD-10-CM

## 2019-09-05 DIAGNOSIS — K86.89 PANCREATIC MASS: ICD-10-CM

## 2019-09-05 DIAGNOSIS — F41.9 ANXIETY: ICD-10-CM

## 2019-09-05 DIAGNOSIS — G89.18 PAIN ASSOCIATED WITH SURGICAL PROCEDURE: ICD-10-CM

## 2019-09-05 PROCEDURE — 99215 OFFICE O/P EST HI 40 MIN: CPT | Performed by: PHYSICIAN ASSISTANT

## 2019-09-05 PROCEDURE — 99214 OFFICE O/P EST MOD 30 MIN: CPT | Performed by: INTERNAL MEDICINE

## 2019-09-05 RX ORDER — ONDANSETRON HYDROCHLORIDE 8 MG/1
8 TABLET, FILM COATED ORAL EVERY 8 HOURS PRN
Qty: 30 TABLET | Refills: 3 | Status: SHIPPED | OUTPATIENT
Start: 2019-09-05 | End: 2020-06-02

## 2019-09-05 RX ORDER — LORAZEPAM 1 MG/1
1 TABLET ORAL EVERY 8 HOURS PRN
Qty: 90 TABLET | Refills: 0 | OUTPATIENT
Start: 2019-09-05

## 2019-09-05 RX ORDER — PROCHLORPERAZINE MALEATE 10 MG
10 TABLET ORAL EVERY 6 HOURS PRN
Qty: 60 TABLET | Refills: 2 | Status: SHIPPED | OUTPATIENT
Start: 2019-09-05 | End: 2020-06-02

## 2019-09-05 NOTE — PATIENT INSTRUCTIONS
Medication Education Record: IV Therapy    Learner:  Patient and Friend    Barriers / Limitations:  None    Psychosocial Assessment:  patient psychosocial response appropriate    Diagnosis:   Breast cancer    IV Cancer Treatment Name(s): Adriamycin, Cyclop prescribed therapy has a higher risk for this, steps will be taken to prevent and minimize this from occurring.     Recommended Anti-nausea medications (as directed by your provider):  Prochloperazine (Compazine) 10 mg every 6 hours and Ondansetron (Zofran) include Senokot, Ducolax, Milk of Magnesia or MiraLAX  o If you have persistent diarrhea or constipation, please contact the triage nurse for further instructions.   Skin Care  o Avoid direct sunlight.  o Wear a broad-spectrum sunscreen with an SPF of 27 or to the following link if you are interested in additional information about chemotherapy for yourself or family members: http://www.heck.info/. html        Safety and Handling of Chemotherapy  While you or your family member is receiv be used during this time. Effective birth control should be used throughout treatment to prevent pregnancy while on these medications and for several months or years after therapy.   Chemotherapy can have harmful side effects to the fetus, especially in th

## 2019-09-05 NOTE — TELEPHONE ENCOUNTER
Dr. Liz Alvarado-    Discussed w/ Alecia Amend. She is requesting Wed, 9/11/19 at 1PM if OK w/ MD, please advise, thank you.

## 2019-09-05 NOTE — PROGRESS NOTES
Medication Education Record: IV Therapy    Learner:  Patient and Friend    Barriers / Limitations:  None    Psychosocial Assessment:  patient psychosocial response appropriate    Diagnosis:   Breast cancer    IV Cancer Treatment Name(s): Adriamycin, Cyclop prescribed therapy has a higher risk for this, steps will be taken to prevent and minimize this from occurring.     Recommended Anti-nausea medications (as directed by your provider):  Prochloperazine (Compazine) 10 mg every 6 hours and Ondansetron (Zofran) include Senokot, Ducolax, Milk of Magnesia or MiraLAX  o If you have persistent diarrhea or constipation, please contact the triage nurse for further instructions.   Skin Care  o Avoid direct sunlight.  o Wear a broad-spectrum sunscreen with an SPF of 27 or to the following link if you are interested in additional information about chemotherapy for yourself or family members: http://www.heck.info/. 160 EZEQUIEL Ochoa and Gayathri resources provided.      Safety in vaginal fluid or semen for 48 hours after taking. A condom should be used during this time. Effective birth control should be used throughout treatment to prevent pregnancy while on these medications and for several months or years after therapy.   La Webber

## 2019-09-05 NOTE — TELEPHONE ENCOUNTER
Noted, thank you.     Future Appointments   Date Time Provider Nacho Mcginnis   9/11/2019  1:00 PM Shruthi Castillo MD [facility] Enzo CASTRO

## 2019-09-05 NOTE — PROGRESS NOTES
Cancer Center Progress Note    Patient Name: Kunal Fraire   YOB: 1955   Medical Record Number: [id number]   Attending Physician: Bianca Gonzalez M.D.      Chief Complaint:  Breast cancer (PCP: Dr Alyssa Prajapati)    Oncology History:  60year old pos of malignancy in the dermal lymphatics or the overlying skin.   [place] lymph node was positive with extracapsular extension she proceeded to have axillary dissection which showed 2 of 22 regional lymph nodes had metastatic cancer without extracapsular ext total) by mouth 2 (two) times daily. , Disp: 30 capsule, Rfl: 1  •  Acetaminophen ER (TYLENOL 8 HOUR) 650 MG Oral Tab CR, Take 650 mg by mouth every 8 (eight) hours as needed for Pain., Disp: , Rfl:   •  Multiple Vitamins-Minerals (MULTIVITAMIN OR), Take 1 biopsied tumor sites. There is no axillary lymphadenopathy or evidence for metastatic disease. 2.  Nonspecific avidly enhancing 10 x 9 mm nodular focus in the uncinate process of the pancreas.   The enhancement pattern raises the possibility of a pancreati

## 2019-09-05 NOTE — TELEPHONE ENCOUNTER
Asuncion Rainey.     GI Staff:    Please add her to my schedule next Tuesday at 8:30    Thank you    Mayur Calle MD  [facility], [facility] - Gastroenterology  9/5/2019  9:41 AM

## 2019-09-05 NOTE — TELEPHONE ENCOUNTER
That is fine    Thanks    Nuzhat Hillman MD  [facility], [facility] - Gastroenterology  9/5/2019  10:35 AM

## 2019-09-05 NOTE — TELEPHONE ENCOUNTER
----- Message from Corin Rosario MD sent at 9/5/2019  8:57 AM CDT -----  Hate to do this to you, but this patient is ready to discuss the pancreatic mass with you now ;-)    I am planning to start chemotherapy week of the 16th for 5 months.   TAMI

## 2019-09-05 NOTE — TELEPHONE ENCOUNTER
Left detailed message on pt identifiable VM that per Dr. Sushma Knox request she is to schedule consultation appt on 9/11/19 at 1PM at the [facility], advised this is urgent and needs to c/b to confirm this appt ASAP.      CSS-please confirm appt on 9/11/1

## 2019-09-05 NOTE — TELEPHONE ENCOUNTER
Allayne Pallas, MD Juline Sor, MD 43 minutes ago (9:48 AM)      Thank you for accommodating.      Routing comment

## 2019-09-06 ENCOUNTER — TELEPHONE (OUTPATIENT)
Dept: HEMATOLOGY/ONCOLOGY | Facility: HOSPITAL | Age: 64
End: 2019-09-06

## 2019-09-06 ENCOUNTER — OFFICE VISIT (OUTPATIENT)
Dept: PHYSICAL THERAPY | Facility: HOSPITAL | Age: 64
End: 2019-09-06
Attending: SURGERY
Payer: COMMERCIAL

## 2019-09-06 PROBLEM — Z45.2 ENCOUNTER FOR CENTRAL LINE CARE: Status: ACTIVE | Noted: 2019-09-06

## 2019-09-06 PROBLEM — Z79.899 ENCOUNTER FOR MEDICATION MANAGEMENT: Status: ACTIVE | Noted: 2019-09-06

## 2019-09-06 PROCEDURE — 97140 MANUAL THERAPY 1/> REGIONS: CPT

## 2019-09-06 PROCEDURE — 97110 THERAPEUTIC EXERCISES: CPT

## 2019-09-06 NOTE — PROGRESS NOTES
Referring Physician: Dr. Black Me  Diagnosis:  Malignant neoplasm of overlapping sites of left breast in female, estrogen receptor positive ([facility] (C50.812,Z17.0)  History of lymph node dissection of left axilla (B10.268)  History of mastectomy, bilateral (Z9 L shld AROM flex 100, abd 75 (significant improvement, but still limited by pain)               R shld AROM flex 150, abd 120 ( significant improvement, but still limited by pain)  Pt continues to wear binder around chest and foam piece fabricated for L tr chest, axilla, and trunk and upper arm. STM to B upper trap/levator for pain relief. STM to L axilla/UE (areas of cording)  to improve tissue mobility. Lotion used on B chest, axilla, upper arms.       3\" stockinette cut with artiflex for L upper arm (ar retraction/protraction x 10     Re-inforced ROM exercises throughout the day in pain free range.  Encouraged pt to play cards or board games with friends to keep arms moving/away from guarded positioning     There Ex (5 minutes)  PROM during STM/MLD of L sh instructed to improve tissue mobility, LUE ROM and strength. There Act 5 minutes:  Discussed with patient to use the new foam compression pads as needed. That if they are uncomfortable to remove then try them again. Pt verbalized understanding.

## 2019-09-06 NOTE — TELEPHONE ENCOUNTER
Left message letting gely know that Dr Adonay Frazier would not like her to take the supplements during the week of chemo, but can take it the weeks following. Also told her that IR will be reaching out to her to schedule the port today.   Asked for a call back if

## 2019-09-09 ENCOUNTER — TELEPHONE (OUTPATIENT)
Dept: HEMATOLOGY/ONCOLOGY | Facility: HOSPITAL | Age: 64
End: 2019-09-09

## 2019-09-09 ENCOUNTER — OFFICE VISIT (OUTPATIENT)
Dept: PHYSICAL THERAPY | Facility: HOSPITAL | Age: 64
End: 2019-09-09
Attending: SURGERY
Payer: COMMERCIAL

## 2019-09-09 PROCEDURE — 97110 THERAPEUTIC EXERCISES: CPT

## 2019-09-09 PROCEDURE — 97140 MANUAL THERAPY 1/> REGIONS: CPT

## 2019-09-09 NOTE — PROGRESS NOTES
Referring Physician: Dr. Disha Munoz  Diagnosis:  Malignant neoplasm of overlapping sites of left breast in female, estrogen receptor positive ([facility] (C50.812,Z17.0)  History of lymph node dissection of left axilla (Q09.963)  History of mastectomy, bilateral (Z9 pulls on her chest.   Pain and tenderness, hypersensitivity to L medial arm  L UE AROM limited and difficult to raise arm higher than 80 deg elevation  (+) cording L axilla    (8/26/2019)  AROM/Strength:                 L shld AROM flex 100, abd 75 (signif STM to L axilla/UE (areas of cording)  to improve tissue mobility. Lotion used on L chest, axilla, upper arms  Used aquaphor and towel to remove old glue from axillary scar and L breast scar. Tissue integrity has improved to these areas.   STM provided to during STM/MLD BUE shoulders  Supine B abduction x 10 BUE  Hands clasped OH flexion x 10 BUE  Pulleys into flexion and abduction x 15 with 3 second holds. Wall wash circles CW/CCW LUE x 10 each.      There Act (5 minutes):  Reviewed/reinforced importance o completed  Purchase over the door pulley system (information printed on where to purchase home pulleys)- completed  Frequency / Duration: Patient will be seen for 2 x/week or a total of 10 visits over a 90 day period.    Treatment will include: Complete Leigha Lank

## 2019-09-10 NOTE — H&P
[facility], [facility] - Gastroenterology                                                                                                  Clinic History and Physical NODULE 1 SITE LEFT     • EMBER BIOPSY STEREOTACTIC NODULE 1 SITE RIGHT  07/01/2019    KODAK    • MASTECTOMY,SIMPLE Bilateral 08/08/2019    Bilateral mastectomies w L SLNB and immediate reconstruction with tissue expander and acellular dermal matrix and L SLNB 10 systems were reviewed and were negative except as noted in the HPI    PHYSICAL EXAM:   Blood pressure 142/84, pulse 85, height 5' 4\" (1.626 m), weight 191 lb (86.6 kg), last menstrual period 07/14/2006, not currently breastfeeding.     Iker Martinez helpful to better characterize this pancreas lesion. We discussed the other issues at hand like her planned port placement next week which will likely preclude future MRI.  We also discussed that ultimately she may still require EUS based on MRI imaging, wh

## 2019-09-11 ENCOUNTER — TELEPHONE (OUTPATIENT)
Dept: GASTROENTEROLOGY | Facility: CLINIC | Age: 64
End: 2019-09-11

## 2019-09-11 ENCOUNTER — OFFICE VISIT (OUTPATIENT)
Dept: GASTROENTEROLOGY | Facility: CLINIC | Age: 64
End: 2019-09-11
Payer: COMMERCIAL

## 2019-09-11 VITALS
HEIGHT: 64 IN | HEART RATE: 85 BPM | SYSTOLIC BLOOD PRESSURE: 142 MMHG | BODY MASS INDEX: 32.61 KG/M2 | DIASTOLIC BLOOD PRESSURE: 84 MMHG | WEIGHT: 191 LBS

## 2019-09-11 DIAGNOSIS — K86.9 LESION OF PANCREAS: Primary | ICD-10-CM

## 2019-09-11 DIAGNOSIS — R93.5 ABNORMAL CT OF THE ABDOMEN: ICD-10-CM

## 2019-09-11 PROCEDURE — 99244 OFF/OP CNSLTJ NEW/EST MOD 40: CPT | Performed by: INTERNAL MEDICINE

## 2019-09-11 NOTE — TELEPHONE ENCOUNTER
[employer] contacted at [phone] and spoke with Vida who then transferred me to [facility] at [phone] for prior authorization. MRI MRCP approved.  Auth # [id number]    Pt Surgery/Procedure: MRI MRCP  Pt insurance/number to co

## 2019-09-11 NOTE — PROGRESS NOTES
Breast Surgery Surveillance Visit    Diagnosis:   Left invasive ductal carcinoma status post bilateral mastectomies with left sentinel lymph node biopsy, and left completion axillary lymph node dissection on August 8, 2019.     Stage: Cancer Staging  Malign Her-2 Negative 1+, Ki-67 25%. Tomosynthesis guided biopsies of the anterior and posterior extent in the right breast were performed on July 1, 2019. Both sites show foci of atypical lobular hyperplasia. She does not have breast pain.  She does not have 6 months, last unknown years. She achieved menarche at age 17 and LMP at age 52  Age of Menopause: 52  Type: Natural menopause  She denies any history of hormone replacement therapy. She has history of oral contraceptive use for 3 years, last in 1989.   Delfina Grandchild ringing in the ears, ear drainage, earaches, nasal congestion, nose bleeds, snoring, pain in mouth/throat, hoarseness, change in voice, facial trauma.     Respiratory:  The patient denies chronic cough, phlegm, hemoptysis, pleurisy/chest pain, pneumonia, as +depression. She lost her  to cancer 15 years ago and was caring for her parents who both recently passed. Endocrine:     There is no history of poor/slow wound healing, weight loss/gain, fertility or hormone problems, cold intolerance, thyroid di

## 2019-09-11 NOTE — TELEPHONE ENCOUNTER
GI Staff:    I would appreciate if we can assist Ms. Laurita Youngblood in trying to obtain prior auth and schedule MRI/MRCP in the next 2-3 days. She has a plan for port placement mid next week and then starting chemotherapy the following day.  She may need EUS Monday

## 2019-09-11 NOTE — TELEPHONE ENCOUNTER
Fantastic! Thanks!     Neptali Samano MD  [facility], [facility] - Gastroenterology  9/11/2019  3:22 PM

## 2019-09-12 ENCOUNTER — OFFICE VISIT (OUTPATIENT)
Dept: PHYSICAL THERAPY | Facility: HOSPITAL | Age: 64
End: 2019-09-12
Attending: SURGERY
Payer: COMMERCIAL

## 2019-09-12 ENCOUNTER — HOSPITAL ENCOUNTER (OUTPATIENT)
Dept: MRI IMAGING | Age: 64
Discharge: HOME | End: 2019-09-12
Attending: INTERNAL MEDICINE
Payer: COMMERCIAL

## 2019-09-12 DIAGNOSIS — R93.5 ABNORMAL CT OF THE ABDOMEN: ICD-10-CM

## 2019-09-12 DIAGNOSIS — K86.9 LESION OF PANCREAS: ICD-10-CM

## 2019-09-12 LAB — CREAT BLD-MCNC: 0.5 MG/DL (ref 0.55–1.02)

## 2019-09-12 PROCEDURE — 97140 MANUAL THERAPY 1/> REGIONS: CPT

## 2019-09-12 PROCEDURE — 97110 THERAPEUTIC EXERCISES: CPT

## 2019-09-12 PROCEDURE — 74183 MRI ABD W/O CNTR FLWD CNTR: CPT | Performed by: INTERNAL MEDICINE

## 2019-09-12 PROCEDURE — 82565 ASSAY OF CREATININE: CPT

## 2019-09-12 PROCEDURE — A9575 INJ GADOTERATE MEGLUMI 0.1ML: HCPCS | Performed by: INTERNAL MEDICINE

## 2019-09-12 NOTE — TELEPHONE ENCOUNTER
Future Appointments   Date Time Provider Nacho Mcginnis   9/12/2019  1:00 PM ADO MRI RM1 (1.5T) ADO MRI EM Rishabh

## 2019-09-12 NOTE — PROGRESS NOTES
Referring Physician: Dr. Jigna Larsen  Diagnosis:  Malignant neoplasm of overlapping sites of left breast in female, [facility] (C50.812,Z17.0)  History of lymph node dissection of left axilla (X10.691)  History of mastectomy, bilateral (Z9 hypersensitivity to L medial arm  L UE AROM limited and difficult to raise arm higher than 80 deg elevation  (+) cording L axilla    (8/26/2019)  AROM/Strength:                 L shld AROM flex 100, abd 75 (significant improvement, but still limited by elena cording)  to improve tissue mobility. Lotion used on L chest, axilla, upper arms  Used aquaphor and towel to remove old glue from axillary scar and L breast scar. Tissue integrity has improved to these areas. STM provided to R axilla, chest and trunk. day.  Pictures and written instructions provided.  There Ex (15 minutes)  PROM during STM/MLD techniques to LUE shoulder, elbow  Supine abduction x 10 LUE  Hands clasped OH supine x 10  Pulleys into flexion and abduction x 10 each (reviewed for use at home) bandages/garments to facilitate swelling reduction and to be independent at self management once discharged  3) Increase shoulder AROM R flex 155, abd 165; L flex 145, abd 150; B ER to 80;  B IR to T10 to improve ease of dressing/bathing/and reaching overhe

## 2019-09-13 ENCOUNTER — TELEPHONE (OUTPATIENT)
Dept: GASTROENTEROLOGY | Facility: CLINIC | Age: 64
End: 2019-09-13

## 2019-09-13 NOTE — TELEPHONE ENCOUNTER
Called patient and discussed MRI/MRCP which does not show a pancreatic lesion.  Discussed given the CT from approximately 2.5 months ago and interval to now showing no lesion then would suggest we can likely defer EUS at this time, which she also prefers

## 2019-09-16 ENCOUNTER — OFFICE VISIT (OUTPATIENT)
Dept: PHYSICAL THERAPY | Facility: HOSPITAL | Age: 64
End: 2019-09-16
Attending: SURGERY
Payer: COMMERCIAL

## 2019-09-16 PROCEDURE — 97110 THERAPEUTIC EXERCISES: CPT

## 2019-09-16 PROCEDURE — 97140 MANUAL THERAPY 1/> REGIONS: CPT

## 2019-09-16 NOTE — PROGRESS NOTES
Referring Physician: Dr. Dolly Frye  Diagnosis:  Malignant neoplasm of overlapping sites of left breast in female, estrogen receptor positive [facility] (C50.812,Z17.0)  History of lymph node dissection of left axilla (H94.915)  History of mastectomy, bilateral (Z9 significant improvement, but still limited by pain)  Pt continues to wear binder around chest and foam piece fabricated for L trunk    (8/21/2019)  Guarded movement d/t pain and pain anticipation  Over firing L upper trap d/t guarding    Evaluation Measure well.  Instructed pt on self massage to L axilla to help decrease tightness in this area from cording. Pt return demos in clinic. Will need review. Cut smaller foam compression pads for B lateral trunk and chest to wear with her new zipper front bras.  Tomás Asher to be performed 3 times a day. Pictures and written instructions provided.  There Ex (15 minutes)  PROM during STM/MLD techniques to LUE shoulder, elbow  Supine abduction x 10 LUE  Hands clasped OH supine x 10  Pulleys into flexion and abduction x 10 each overwhelming and distracting at times. Will continue to assess. Will continue to progress as able. Goals:   Goals (discussed and planned with patient involvement):       To be met in 10 visits:  1) Decrease swelling to be less than 5 % greater than unaff

## 2019-09-17 ENCOUNTER — APPOINTMENT (OUTPATIENT)
Dept: INTEGRATIVE MEDICINE | Facility: HOSPITAL | Age: 64
End: 2019-09-17
Attending: PHYSICIAN ASSISTANT

## 2019-09-17 ENCOUNTER — TELEPHONE (OUTPATIENT)
Dept: HEMATOLOGY/ONCOLOGY | Facility: HOSPITAL | Age: 64
End: 2019-09-17

## 2019-09-17 ENCOUNTER — APPOINTMENT (OUTPATIENT)
Dept: INTEGRATIVE MEDICINE | Facility: HOSPITAL | Age: 64
End: 2019-09-17
Attending: INTERNAL MEDICINE

## 2019-09-17 DIAGNOSIS — Z17.0 MALIGNANT NEOPLASM OF OVERLAPPING SITES OF LEFT BREAST IN FEMALE, ESTROGEN RECEPTOR POSITIVE (HCC): Primary | ICD-10-CM

## 2019-09-17 DIAGNOSIS — C50.812 MALIGNANT NEOPLASM OF OVERLAPPING SITES OF LEFT BREAST IN FEMALE, ESTROGEN RECEPTOR POSITIVE (HCC): Primary | ICD-10-CM

## 2019-09-17 RX ORDER — IBUPROFEN 200 MG
400 TABLET ORAL EVERY 6 HOURS PRN
COMMUNITY

## 2019-09-17 NOTE — TELEPHONE ENCOUNTER
Patient returned call to Breast RN Navigator. Patient shares her concerns related to an increase in the swelling to her left trunk. Patient is s/p bilateral skin-sparing mastectomies with left axillary lymph node dissection performed 8/8/19.   Patient h

## 2019-09-17 NOTE — TELEPHONE ENCOUNTER
Returned message left by patient sharing concerns related to left chest edema. Will await return call from patient.

## 2019-09-18 ENCOUNTER — HOSPITAL ENCOUNTER (OUTPATIENT)
Dept: INTERVENTIONAL RADIOLOGY/VASCULAR | Facility: HOSPITAL | Age: 64
Discharge: HOME | End: 2019-09-18
Attending: INTERNAL MEDICINE | Admitting: INTERNAL MEDICINE
Payer: COMMERCIAL

## 2019-09-18 VITALS
WEIGHT: 190 LBS | OXYGEN SATURATION: 98 % | BODY MASS INDEX: 33 KG/M2 | SYSTOLIC BLOOD PRESSURE: 124 MMHG | RESPIRATION RATE: 20 BRPM | HEART RATE: 81 BPM | DIASTOLIC BLOOD PRESSURE: 74 MMHG

## 2019-09-18 DIAGNOSIS — Z17.0 MALIGNANT NEOPLASM OF OVERLAPPING SITES OF LEFT BREAST IN FEMALE, ESTROGEN RECEPTOR POSITIVE (HCC): ICD-10-CM

## 2019-09-18 DIAGNOSIS — C50.812 MALIGNANT NEOPLASM OF OVERLAPPING SITES OF LEFT BREAST IN FEMALE, ESTROGEN RECEPTOR POSITIVE (HCC): ICD-10-CM

## 2019-09-18 LAB
ALBUMIN SERPL-MCNC: 3.6 G/DL (ref 3.4–5)
ALBUMIN/GLOB SERPL: 0.8 {RATIO} (ref 1–2)
ALP LIVER SERPL-CCNC: 77 U/L (ref 50–130)
ALT SERPL-CCNC: 29 U/L (ref 13–56)
ANION GAP SERPL CALC-SCNC: 5 MMOL/L (ref 0–18)
AST SERPL-CCNC: 20 U/L (ref 15–37)
BASOPHILS # BLD AUTO: 0.06 X10(3) UL (ref 0–0.2)
BASOPHILS NFR BLD AUTO: 0.7 %
BILIRUB SERPL-MCNC: 0.4 MG/DL (ref 0.1–2)
BUN BLD-MCNC: 16 MG/DL (ref 7–18)
BUN/CREAT SERPL: 26.2 (ref 10–20)
CALCIUM BLD-MCNC: 9.1 MG/DL (ref 8.5–10.1)
CHLORIDE SERPL-SCNC: 108 MMOL/L (ref 98–112)
CO2 SERPL-SCNC: 27 MMOL/L (ref 21–32)
CREAT BLD-MCNC: 0.61 MG/DL (ref 0.55–1.02)
DEPRECATED RDW RBC AUTO: 40.1 FL (ref 35.1–46.3)
EOSINOPHIL # BLD AUTO: 0.12 X10(3) UL (ref 0–0.7)
EOSINOPHIL NFR BLD AUTO: 1.4 %
ERYTHROCYTE [DISTWIDTH] IN BLOOD BY AUTOMATED COUNT: 12.1 % (ref 11–15)
GLOBULIN PLAS-MCNC: 4.6 G/DL (ref 2.8–4.4)
GLUCOSE BLD-MCNC: 105 MG/DL (ref 70–99)
HCT VFR BLD AUTO: 39.3 % (ref 35–48)
HGB BLD-MCNC: 13.2 G/DL (ref 12–16)
IMM GRANULOCYTES # BLD AUTO: 0.02 X10(3) UL (ref 0–1)
IMM GRANULOCYTES NFR BLD: 0.2 %
LYMPHOCYTES # BLD AUTO: 2.13 X10(3) UL (ref 1–4)
LYMPHOCYTES NFR BLD AUTO: 24.5 %
M PROTEIN MFR SERPL ELPH: 8.2 G/DL (ref 6.4–8.2)
MCH RBC QN AUTO: 30.2 PG (ref 26–34)
MCHC RBC AUTO-ENTMCNC: 33.6 G/DL (ref 31–37)
MCV RBC AUTO: 89.9 FL (ref 80–100)
MONOCYTES # BLD AUTO: 0.69 X10(3) UL (ref 0.1–1)
MONOCYTES NFR BLD AUTO: 7.9 %
NEUTROPHILS # BLD AUTO: 5.66 X10 (3) UL (ref 1.5–7.7)
NEUTROPHILS # BLD AUTO: 5.66 X10(3) UL (ref 1.5–7.7)
NEUTROPHILS NFR BLD AUTO: 65.3 %
OSMOLALITY SERPL CALC.SUM OF ELEC: 292 MOSM/KG (ref 275–295)
PATIENT FASTING: YES
PLATELET # BLD AUTO: 285 10(3)UL (ref 150–450)
POTASSIUM SERPL-SCNC: 4 MMOL/L (ref 3.5–5.1)
RBC # BLD AUTO: 4.37 X10(6)UL (ref 3.8–5.3)
SODIUM SERPL-SCNC: 140 MMOL/L (ref 136–145)
WBC # BLD AUTO: 8.7 X10(3) UL (ref 4–11)

## 2019-09-18 PROCEDURE — 85025 COMPLETE CBC W/AUTO DIFF WBC: CPT | Performed by: PHYSICIAN ASSISTANT

## 2019-09-18 PROCEDURE — 02HV33Z INSERTION OF INFUSION DEVICE INTO SUPERIOR VENA CAVA, PERCUTANEOUS APPROACH: ICD-10-PCS | Performed by: RADIOLOGY

## 2019-09-18 PROCEDURE — 99153 MOD SED SAME PHYS/QHP EA: CPT

## 2019-09-18 PROCEDURE — 36561 INSERT TUNNELED CV CATH: CPT

## 2019-09-18 PROCEDURE — 77001 FLUOROGUIDE FOR VEIN DEVICE: CPT

## 2019-09-18 PROCEDURE — 99152 MOD SED SAME PHYS/QHP 5/>YRS: CPT

## 2019-09-18 PROCEDURE — B5181ZA FLUOROSCOPY OF SUPERIOR VENA CAVA USING LOW OSMOLAR CONTRAST, GUIDANCE: ICD-10-PCS | Performed by: RADIOLOGY

## 2019-09-18 PROCEDURE — 80053 COMPREHEN METABOLIC PANEL: CPT | Performed by: PHYSICIAN ASSISTANT

## 2019-09-18 PROCEDURE — 36415 COLL VENOUS BLD VENIPUNCTURE: CPT

## 2019-09-18 RX ORDER — SODIUM CHLORIDE 9 MG/ML
INJECTION, SOLUTION INTRAVENOUS CONTINUOUS
Status: DISCONTINUED | OUTPATIENT
Start: 2019-09-18 | End: 2019-09-18

## 2019-09-18 RX ORDER — MIDAZOLAM HYDROCHLORIDE 1 MG/ML
INJECTION INTRAMUSCULAR; INTRAVENOUS
Status: COMPLETED
Start: 2019-09-18 | End: 2019-09-18

## 2019-09-18 RX ORDER — BACITRACIN 50000 [USP'U]/1
INJECTION, POWDER, LYOPHILIZED, FOR SOLUTION INTRAMUSCULAR
Status: COMPLETED
Start: 2019-09-18 | End: 2019-09-18

## 2019-09-18 RX ORDER — LIDOCAINE HYDROCHLORIDE 20 MG/ML
INJECTION, SOLUTION EPIDURAL; INFILTRATION; INTRACAUDAL; PERINEURAL
Status: COMPLETED
Start: 2019-09-18 | End: 2019-09-18

## 2019-09-18 RX ORDER — HEPARIN SODIUM (PORCINE) LOCK FLUSH IV SOLN 100 UNIT/ML 100 UNIT/ML
SOLUTION INTRAVENOUS
Status: COMPLETED
Start: 2019-09-18 | End: 2019-09-18

## 2019-09-18 RX ORDER — SODIUM CHLORIDE 9 MG/ML
INJECTION, SOLUTION INTRAVENOUS
Status: DISCONTINUED
Start: 2019-09-18 | End: 2019-09-18

## 2019-09-18 RX ORDER — ACETAMINOPHEN 500 MG
TABLET ORAL
Status: DISCONTINUED
Start: 2019-09-18 | End: 2019-09-18

## 2019-09-18 RX ORDER — CEFAZOLIN SODIUM/WATER 2 G/20 ML
SYRINGE (ML) INTRAVENOUS
Status: COMPLETED
Start: 2019-09-18 | End: 2019-09-18

## 2019-09-19 ENCOUNTER — APPOINTMENT (OUTPATIENT)
Dept: HEMATOLOGY/ONCOLOGY | Facility: HOSPITAL | Age: 64
End: 2019-09-19
Attending: INTERNAL MEDICINE
Payer: COMMERCIAL

## 2019-09-19 ENCOUNTER — APPOINTMENT (OUTPATIENT)
Dept: PHYSICAL THERAPY | Facility: HOSPITAL | Age: 64
End: 2019-09-19
Attending: SURGERY
Payer: COMMERCIAL

## 2019-09-19 VITALS
BODY MASS INDEX: 32.61 KG/M2 | RESPIRATION RATE: 16 BRPM | SYSTOLIC BLOOD PRESSURE: 168 MMHG | TEMPERATURE: 98 F | DIASTOLIC BLOOD PRESSURE: 81 MMHG | WEIGHT: 191 LBS | HEIGHT: 64 IN | OXYGEN SATURATION: 97 % | HEART RATE: 104 BPM

## 2019-09-19 VITALS
WEIGHT: 191 LBS | TEMPERATURE: 99 F | RESPIRATION RATE: 16 BRPM | DIASTOLIC BLOOD PRESSURE: 92 MMHG | HEART RATE: 102 BPM | SYSTOLIC BLOOD PRESSURE: 163 MMHG | OXYGEN SATURATION: 96 % | BODY MASS INDEX: 33 KG/M2

## 2019-09-19 DIAGNOSIS — Z51.11 CHEMOTHERAPY MANAGEMENT, ENCOUNTER FOR: ICD-10-CM

## 2019-09-19 DIAGNOSIS — F41.9 ANXIETY: ICD-10-CM

## 2019-09-19 DIAGNOSIS — Z17.0 MALIGNANT NEOPLASM OF OVERLAPPING SITES OF LEFT BREAST IN FEMALE, ESTROGEN RECEPTOR POSITIVE (HCC): Primary | ICD-10-CM

## 2019-09-19 DIAGNOSIS — C50.812 MALIGNANT NEOPLASM OF OVERLAPPING SITES OF LEFT BREAST IN FEMALE, ESTROGEN RECEPTOR POSITIVE (HCC): Primary | ICD-10-CM

## 2019-09-19 DIAGNOSIS — Z45.2 ENCOUNTER FOR CENTRAL LINE CARE: ICD-10-CM

## 2019-09-19 DIAGNOSIS — Z79.899 ENCOUNTER FOR MEDICATION MANAGEMENT: ICD-10-CM

## 2019-09-19 DIAGNOSIS — K86.89 PANCREATIC MASS: ICD-10-CM

## 2019-09-19 PROCEDURE — 96411 CHEMO IV PUSH ADDL DRUG: CPT

## 2019-09-19 PROCEDURE — 96367 TX/PROPH/DG ADDL SEQ IV INF: CPT

## 2019-09-19 PROCEDURE — 99215 OFFICE O/P EST HI 40 MIN: CPT | Performed by: INTERNAL MEDICINE

## 2019-09-19 PROCEDURE — 96413 CHEMO IV INFUSION 1 HR: CPT

## 2019-09-19 RX ORDER — SUCRALFATE 1 G/1
1 TABLET ORAL
Qty: 60 TABLET | Refills: 0 | Status: SHIPPED | OUTPATIENT
Start: 2019-09-19 | End: 2019-10-09

## 2019-09-19 RX ORDER — 0.9 % SODIUM CHLORIDE 0.9 %
10 VIAL (ML) INJECTION ONCE
Status: CANCELLED | OUTPATIENT
Start: 2019-09-19

## 2019-09-19 RX ORDER — HEPARIN SODIUM (PORCINE) LOCK FLUSH IV SOLN 100 UNIT/ML 100 UNIT/ML
5 SOLUTION INTRAVENOUS ONCE
Status: COMPLETED | OUTPATIENT
Start: 2019-09-19 | End: 2019-09-19

## 2019-09-19 RX ORDER — HEPARIN SODIUM (PORCINE) LOCK FLUSH IV SOLN 100 UNIT/ML 100 UNIT/ML
SOLUTION INTRAVENOUS
Status: COMPLETED
Start: 2019-09-19 | End: 2019-09-19

## 2019-09-19 RX ORDER — LORATADINE 10 MG/1
10 TABLET ORAL DAILY
Qty: 30 TABLET | Refills: 0 | Status: SHIPPED | OUTPATIENT
Start: 2019-09-19 | End: 2019-10-03

## 2019-09-19 RX ORDER — 0.9 % SODIUM CHLORIDE 0.9 %
VIAL (ML) INJECTION
Status: DISCONTINUED
Start: 2019-09-19 | End: 2019-09-19

## 2019-09-19 RX ORDER — HEPARIN SODIUM (PORCINE) LOCK FLUSH IV SOLN 100 UNIT/ML 100 UNIT/ML
5 SOLUTION INTRAVENOUS ONCE
Status: CANCELLED | OUTPATIENT
Start: 2019-09-19

## 2019-09-19 RX ADMIN — HEPARIN SODIUM (PORCINE) LOCK FLUSH IV SOLN 100 UNIT/ML 500 UNITS: 100 SOLUTION INTRAVENOUS at 16:00:00

## 2019-09-19 NOTE — PROGRESS NOTES
Pt here for C1D1 AC.   Arrives Ambulating independently, accompanied by Friend and Provide name: Danielle Group           Patient denies possible pregnancy since last therapy cycle: Not Applicable    Modifications in dose or schedule: No.  Pt very anxious about josefina

## 2019-09-19 NOTE — PATIENT INSTRUCTIONS
Carafate-  With every meal and before bed, 4 times daily, for 3 days  Claritin-  Day after chemo in AM with shot and day after shot

## 2019-09-19 NOTE — PATIENT INSTRUCTIONS
Recommended Anti-nausea medications (as directed by your provider):  Prochloperazine (Compazine) 10 mg every 6 hours and Ondansetron (Zofran) 8 mg every 8 hours  Take as needed     Helpful hints during cancer treatment:                  Diet:  ?  Avoid grea constipation, please contact the triage nurse for further instructions. Skin Care  · Avoid direct sunlight. · Wear a broad-spectrum sunscreen with an SPF of 30 or higher on any skin exposed to the sun.   Re-apply every 2 hours if in the sun and after bath medication. Handling Body Waste:   1. Caregivers must wear gloves if exposed to the patient’s blood, urine, stool or vomit. Dispose of the used gloves after each use and wash hands with soap and water.   2. Any sheets or clothes soiled with the bodily you are at a time in your cycle when you could become pregnant or if you are actually pregnant.

## 2019-09-19 NOTE — PROGRESS NOTES
Cancer Center Progress Note    Patient Name: Will Davis   YOB: 1955   Medical Record Number: [id number]   Attending Physician: Jorge Moreno M.D.      Chief Complaint:  Breast cancer (PCP: Dr Brian Osborn)    Oncology History:  60year old pos of malignancy in the dermal lymphatics or the overlying skin.   [place] lymph node was positive with extracapsular extension she proceeded to have axillary dissection which showed 2 of 22 regional lymph nodes had metastatic cancer without extracapsular ext (four) times daily before meals and nightly., Disp: 60 tablet, Rfl: 0  •  loratadine 10 MG Oral Tab, Take 1 tablet (10 mg total) by mouth daily. , Disp: 30 tablet, Rfl: 0  •  ibuprofen (ADVIL) 200 MG Oral Tab, Take 400 mg by mouth every 6 (six) hours as nee Oropharynx is clear. Chest:  nonlabored breathing  Breast: Bilateral mastectomy.   Left mastectomy shows slightly worse cording and subq fluid,   Extremities: No edema, cyanosis, or bruising  Neurological: motor strength grossly intact  Psych: appropriate [facility])  Staging form: Breast, AJCC 8th Edition  - Clinical stage from 6/20/2019: Stage IIA (cT3(2), cN0, cM0, G1, ER+, WA+, HER2-) - Signed by Lea Andrea MD on 9/5/2019  - Pathologic stage from 8/9/2019: Stage IB (pT3, pN1a(sn), cM0, G2, ER+, WA+, HER2-)

## 2019-09-20 ENCOUNTER — OFFICE VISIT (OUTPATIENT)
Dept: PHYSICAL THERAPY | Facility: HOSPITAL | Age: 64
End: 2019-09-20
Attending: SURGERY
Payer: COMMERCIAL

## 2019-09-20 ENCOUNTER — TELEPHONE (OUTPATIENT)
Dept: HEMATOLOGY/ONCOLOGY | Facility: HOSPITAL | Age: 64
End: 2019-09-20

## 2019-09-20 ENCOUNTER — NURSE ONLY (OUTPATIENT)
Dept: HEMATOLOGY/ONCOLOGY | Facility: HOSPITAL | Age: 64
End: 2019-09-20
Attending: INTERNAL MEDICINE
Payer: COMMERCIAL

## 2019-09-20 ENCOUNTER — TELEPHONE (OUTPATIENT)
Dept: SURGERY | Facility: CLINIC | Age: 64
End: 2019-09-20

## 2019-09-20 DIAGNOSIS — B99.9 INFECTION: Primary | ICD-10-CM

## 2019-09-20 DIAGNOSIS — Z17.0 MALIGNANT NEOPLASM OF OVERLAPPING SITES OF LEFT BREAST IN FEMALE, ESTROGEN RECEPTOR POSITIVE (HCC): Primary | ICD-10-CM

## 2019-09-20 DIAGNOSIS — C50.812 MALIGNANT NEOPLASM OF OVERLAPPING SITES OF LEFT BREAST IN FEMALE, ESTROGEN RECEPTOR POSITIVE (HCC): Primary | ICD-10-CM

## 2019-09-20 PROCEDURE — 97140 MANUAL THERAPY 1/> REGIONS: CPT | Performed by: PHYSICAL THERAPIST

## 2019-09-20 PROCEDURE — 96372 THER/PROPH/DIAG INJ SC/IM: CPT

## 2019-09-20 RX ORDER — FLUCONAZOLE 150 MG/1
150 TABLET ORAL ONCE
Qty: 1 TABLET | Refills: 0 | Status: SHIPPED | OUTPATIENT
Start: 2019-09-20 | End: 2019-10-09 | Stop reason: ALTCHOICE

## 2019-09-20 RX ORDER — CEFADROXIL 500 MG/1
500 CAPSULE ORAL 2 TIMES DAILY
Qty: 14 CAPSULE | Refills: 0 | Status: SHIPPED | OUTPATIENT
Start: 2019-09-20 | End: 2019-10-09 | Stop reason: ALTCHOICE

## 2019-09-20 NOTE — PROGRESS NOTES
Referring Physician: Dr. Merlin Villasenor  Diagnosis:  Malignant neoplasm of overlapping sites of left breast in female, estrogen receptor positive [facility] (C50.812,Z17.0)  History of lymph node dissection of left axilla (Q60.444)  History of mastectomy, bilateral (Z9 cording still present that extends from axilla to forearm, limited L shoulder mobility  Hypersensitivity to L chest especially  Increased swelling, tissue hard, some pitting areas at L chest  Slight cording LUE, mostly at ant elbow            9/9/2019  Rem and upper arm. STM to B upper trap/levator for pain relief. STM to B axilla/UE (areas of cording on L)  to improve tissue mobility. 2 small cavitations palpated during STM. Mild relief noted per pt report. Lotion used on B chest, axilla, upper arms. (discussed and planned with patient involvement):       To be met in 10 visits:  1) Decrease swelling to be less than 5 % greater than unaffected arm to allow patient to be fitted for compression garments due to the higher risk of lymphedema  2) Pt to be in

## 2019-09-20 NOTE — PROGRESS NOTES
Patient arrives for [facility]. Reports she is very fatigued today. Reports no nausea, states she took her antiemetics. Educated patient on what to expect when receiving udenyca, patient verablized understanding.  Udenyca given in right upper arm, patient dorothy

## 2019-09-20 NOTE — TELEPHONE ENCOUNTER
Received a call from PT Katina Kee, regarding redness noted on pt's left chest.   It seems worse than its been in the past, and is warm to touch. Had 1 fever a week ago, nothing since. Does not feel like there is a seroma to aspirate.

## 2019-09-20 NOTE — PROGRESS NOTES
Breast Surgery Surveillance Visit    Diagnosis:   Left invasive ductal carcinoma status post bilateral mastectomies with left sentinel lymph node biopsy, and left completion axillary lymph node dissection on August 8, 2019.     Stage: Cancer Staging  Malign showed an infiltrating lobular carcinoma, Grade 1-2  ER 99% positive, RI 98% positive, Her-2 Negative 1+, Ki-67 25%. Tomosynthesis guided biopsies of the anterior and posterior extent in the right breast were performed on July 1, 2019.  Both sites show foc MASTECTOMY,SIMPLE Bilateral 2019    Bilateral mastectomies w L SLNB and immediate reconstruction with tissue expander and acellular dermal matrix and L SLNB     Gynecological History:  Pt is a   Pt was 34years old at time of first pregnancy. mouth every 8 (eight) hours as needed for Pain. Disp:  Rfl:    Multiple Vitamins-Minerals (MULTIVITAMIN OR) Take 1 tablet by mouth daily. Disp:  Rfl:    Multiple Vitamins-Minerals (EYE VITAMINS OR) Take 1 tablet by mouth daily.    Disp:  Rfl:    Bioflav no history of difficulty or pain with swallowing, reflux symptoms, vomiting, dark or bloody stools, constipation, yellowing of the skin, indigestion, nausea, change in bowel habits, diarrhea, abdominal pain or vomiting blood.      Genitourinary:  The patien underlying seroma or cellulitis.     Impression:   Ms. Jame Cadet is a 60year old woman presents with invasive ductal carcinoma left Cancer Staging  Malignant neoplasm of overlapping sites of left breast in female, estrogen receptor positive ([facility]

## 2019-09-20 NOTE — TELEPHONE ENCOUNTER
Called to check in after chemo. Rochelle Gilbert states she has a touch of nausea and a slight headache but took advil which seemed to work. Asked gely to try tylenol for the headache and to keep pushing fluids today.   Rochelle Gilbert states she is really pushing the fluids

## 2019-09-23 ENCOUNTER — OFFICE VISIT (OUTPATIENT)
Dept: PHYSICAL THERAPY | Facility: HOSPITAL | Age: 64
End: 2019-09-23
Attending: SURGERY
Payer: COMMERCIAL

## 2019-09-23 ENCOUNTER — OFFICE VISIT (OUTPATIENT)
Dept: HEMATOLOGY/ONCOLOGY | Facility: HOSPITAL | Age: 64
End: 2019-09-23
Attending: INTERNAL MEDICINE
Payer: COMMERCIAL

## 2019-09-23 ENCOUNTER — OFFICE VISIT (OUTPATIENT)
Dept: SURGERY | Facility: CLINIC | Age: 64
End: 2019-09-23
Payer: COMMERCIAL

## 2019-09-23 VITALS
TEMPERATURE: 98 F | DIASTOLIC BLOOD PRESSURE: 58 MMHG | SYSTOLIC BLOOD PRESSURE: 137 MMHG | RESPIRATION RATE: 20 BRPM | HEART RATE: 100 BPM | BODY MASS INDEX: 33 KG/M2 | WEIGHT: 190 LBS

## 2019-09-23 DIAGNOSIS — T45.1X5A CHEMOTHERAPY ADVERSE REACTION, INITIAL ENCOUNTER: ICD-10-CM

## 2019-09-23 DIAGNOSIS — T45.1X5A CHEMOTHERAPY INDUCED NEUTROPENIA (HCC): ICD-10-CM

## 2019-09-23 DIAGNOSIS — R22.2 SOFT TISSUE SWELLING OF CHEST WALL: ICD-10-CM

## 2019-09-23 DIAGNOSIS — I89.0 LYMPHEDEMA OF ARM: ICD-10-CM

## 2019-09-23 DIAGNOSIS — D70.1 CHEMOTHERAPY INDUCED NEUTROPENIA (HCC): ICD-10-CM

## 2019-09-23 DIAGNOSIS — T45.1X5A CHEMOTHERAPY INDUCED NAUSEA AND VOMITING: ICD-10-CM

## 2019-09-23 DIAGNOSIS — Z17.0 MALIGNANT NEOPLASM OF OVERLAPPING SITES OF LEFT BREAST IN FEMALE, ESTROGEN RECEPTOR POSITIVE (HCC): Primary | ICD-10-CM

## 2019-09-23 DIAGNOSIS — R11.2 CHEMOTHERAPY INDUCED NAUSEA AND VOMITING: ICD-10-CM

## 2019-09-23 DIAGNOSIS — C50.812 MALIGNANT NEOPLASM OF OVERLAPPING SITES OF LEFT BREAST IN FEMALE, ESTROGEN RECEPTOR POSITIVE (HCC): Primary | ICD-10-CM

## 2019-09-23 PROBLEM — K86.89 PANCREATIC MASS: Status: RESOLVED | Noted: 2019-07-02 | Resolved: 2019-09-23

## 2019-09-23 PROBLEM — K86.89 PANCREATIC MASS (HCC): Status: RESOLVED | Noted: 2019-07-02 | Resolved: 2019-09-23

## 2019-09-23 PROCEDURE — 99024 POSTOP FOLLOW-UP VISIT: CPT | Performed by: SURGERY

## 2019-09-23 PROCEDURE — 99215 OFFICE O/P EST HI 40 MIN: CPT | Performed by: INTERNAL MEDICINE

## 2019-09-23 PROCEDURE — 97110 THERAPEUTIC EXERCISES: CPT | Performed by: PHYSICAL THERAPIST

## 2019-09-23 PROCEDURE — 97140 MANUAL THERAPY 1/> REGIONS: CPT | Performed by: PHYSICAL THERAPIST

## 2019-09-23 RX ORDER — OLANZAPINE 5 MG/1
5 TABLET ORAL NIGHTLY
Qty: 3 TABLET | Refills: 0 | Status: SHIPPED | OUTPATIENT
Start: 2019-09-23 | End: 2019-10-03

## 2019-09-23 NOTE — PROGRESS NOTES
Pt got her first chemo last Thursday. A lot of nausea. Feels like her left chest wall increased swelling. Tolerating abx, started on Friday. Denies fevers or chills.

## 2019-09-23 NOTE — PROGRESS NOTES
Cancer Center Progress Note    Patient Name: Venancio Lopez   YOB: 1955   Medical Record Number: [id number]   Attending Physician: Corin Rosario M.D.      Chief Complaint:  Breast cancer (PCP: Dr Lisy Manuel)    Oncology History:  60year old pos of malignancy in the dermal lymphatics or the overlying skin.   [place] lymph node was positive with extracapsular extension she proceeded to have axillary dissection which showed 2 of 22 regional lymph nodes had metastatic cancer without extracapsular ext Disp: 3 tablet, Rfl: 0  •  Cefadroxil 500 MG Oral Cap, Take 1 capsule (500 mg total) by mouth 2 (two) times daily for 7 days. , Disp: 14 capsule, Rfl: 0  •  sucralfate (CARAFATE) 1 g Oral Tab, Take 1 tablet (1 g total) by mouth 4 (four) times daily before m distress. HEENT: Oropharynx is clear. Heart: regular  Chest:  nonlabored breathing  Breast: Bilateral mastectomy.   Left mastectomy with subq edema and lymphedema  Extremities: No edema, cyanosis, or bruising  Neurological: motor strength grossly intact positive ([facility]  Staging form: Breast, AJCC 8th Edition  - Clinical stage from 6/20/2019: Stage IIA (cT3(2), cN0, cM0, G1, ER+, OH+, HER2-) - Signed by Amado Lewis MD on 9/5/2019  - Pathologic stage from 8/9/2019: Stage IB (pT3, pN1a(sn), cM0, G2, ER+, OH+

## 2019-09-23 NOTE — PROGRESS NOTES
Referring Physician: Dr. Izabela Tate  Diagnosis:  Malignant neoplasm of overlapping sites of left breast in female, estrogen receptor positive [facility] (C50.812,Z17.0)  History of lymph node dissection of left axilla (C38.345)  History of mastectomy, bilateral (Z9 L shld AROM flex 100, abd 75 (significant improvement, but still limited by pain)               R shld AROM flex 150, abd 120 ( significant improvement, but still limited by pain)  Pt continues to wear binder around chest and foam piece Bulmaro Salazar Contacted Dr. Frank Dull regarding increased redness/swelling left chest. While waiting for return call, provided STM and stretching to left UE / areas of cording.  Further assessment of L chest - due to significant swelling and skin sparing mastectomy there is Deferred right side to be able to spend more time on left (since left has more swelling and decreased tissue mobilty)  Much improved visually as compared to last week. Goals:   Goals (discussed and planned with patient involvement):       To be met in

## 2019-09-25 ENCOUNTER — OFFICE VISIT (OUTPATIENT)
Dept: PHYSICAL THERAPY | Facility: HOSPITAL | Age: 64
End: 2019-09-25
Attending: SURGERY
Payer: COMMERCIAL

## 2019-09-25 PROCEDURE — 97140 MANUAL THERAPY 1/> REGIONS: CPT

## 2019-09-25 PROCEDURE — 97110 THERAPEUTIC EXERCISES: CPT

## 2019-09-25 NOTE — PROGRESS NOTES
Referring Physician: Dr. Ambar Dimas  Diagnosis:  Malignant neoplasm of overlapping sites of left breast in female, estrogen receptor positive [facility] (C50.812,Z17.0)  History of lymph node dissection of left axilla (R83.549)  History of mastectomy, bilateral (Z9 L shld AROM flex 100, abd 75 (significant improvement, but still limited by pain)               R shld AROM flex 150, abd 120 ( significant improvement, but still limited by pain)  Pt continues to wear binder around chest and foam piece [facility] Contacted Dr. Kurtis Win regarding increased redness/swelling left chest. While waiting for return call, provided STM and stretching to left UE / areas of cording.  Further assessment of L chest - due to significant swelling and skin sparing mastectomy there is NuStep L4 UE/LE x 5 minutes  Standing Shld flex/abd/horiz abd/add 2 x10 B   HEP: continue as previously outlined. HEP: Continue as previously outlined. There Act (5): discussed benefits of compression sleeve.  Patient wanting to find out what her co

## 2019-09-26 ENCOUNTER — APPOINTMENT (OUTPATIENT)
Dept: HEMATOLOGY/ONCOLOGY | Facility: HOSPITAL | Age: 64
End: 2019-09-26
Attending: INTERNAL MEDICINE
Payer: COMMERCIAL

## 2019-09-26 ENCOUNTER — APPOINTMENT (OUTPATIENT)
Dept: PHYSICAL THERAPY | Facility: HOSPITAL | Age: 64
End: 2019-09-26
Attending: SURGERY
Payer: COMMERCIAL

## 2019-09-30 ENCOUNTER — APPOINTMENT (OUTPATIENT)
Dept: PHYSICAL THERAPY | Facility: HOSPITAL | Age: 64
End: 2019-09-30
Attending: SURGERY
Payer: COMMERCIAL

## 2019-10-01 ENCOUNTER — OFFICE VISIT (OUTPATIENT)
Dept: PHYSICAL THERAPY | Facility: HOSPITAL | Age: 64
End: 2019-10-01
Attending: SURGERY
Payer: COMMERCIAL

## 2019-10-01 PROCEDURE — 97110 THERAPEUTIC EXERCISES: CPT

## 2019-10-01 PROCEDURE — 97140 MANUAL THERAPY 1/> REGIONS: CPT

## 2019-10-01 NOTE — PROGRESS NOTES
Referring Physician: Dr. Lili Lorenz  Diagnosis:  Malignant neoplasm of overlapping sites of left breast in female, estrogen receptor positive ([facility] (C50.812,Z17.0)  History of lymph node dissection of left axilla (G30.857)  History of mastectomy, bilateral (Z9 Glue still on axillary incision and breast incision on L. Pt said she is showering but not really wiping over these areas. R breast incisions appear clean and well healed.     Cording still present in L axilla palpable and visible to medial upper arm to e STM: provided STM w/ MLD techniques to B chest, axilla, and trunk and upper arm. STM to B axilla/UE (areas of cording on L)  to improve tissue mobility. Again,   2 small cavitations palpated during STM. Mild relief noted per pt report.   Lotion used on B Compression:  Pt to be fitted for compression by vendor prior to her next PT session on Monday, Oct 7th at 315pm.   There Ex (10 minutes)  PROM during STM/MLD BUE shoulders  Supine B abduction x 10 BUE  scap squeezes x 10       There Ex (15 minutes)  PROM 2) Pt to be independent with self MLD, ROM exercises, and donning/doffing compression bandages/garments to facilitate swelling reduction and to be independent at self management once discharged -   3) Increase shoulder AROM R flex 155, abd 165; L flex 145,

## 2019-10-03 ENCOUNTER — NURSE ONLY (OUTPATIENT)
Dept: HEMATOLOGY/ONCOLOGY | Facility: HOSPITAL | Age: 64
End: 2019-10-03
Attending: INTERNAL MEDICINE
Payer: COMMERCIAL

## 2019-10-03 ENCOUNTER — APPOINTMENT (OUTPATIENT)
Dept: PHYSICAL THERAPY | Facility: HOSPITAL | Age: 64
End: 2019-10-03
Attending: SURGERY
Payer: COMMERCIAL

## 2019-10-03 VITALS
OXYGEN SATURATION: 97 % | RESPIRATION RATE: 16 BRPM | BODY MASS INDEX: 32.78 KG/M2 | HEART RATE: 86 BPM | SYSTOLIC BLOOD PRESSURE: 142 MMHG | WEIGHT: 192 LBS | DIASTOLIC BLOOD PRESSURE: 78 MMHG | HEIGHT: 64 IN | TEMPERATURE: 97 F

## 2019-10-03 DIAGNOSIS — C50.812 MALIGNANT NEOPLASM OF OVERLAPPING SITES OF LEFT BREAST IN FEMALE, ESTROGEN RECEPTOR POSITIVE (HCC): ICD-10-CM

## 2019-10-03 DIAGNOSIS — C50.812 MALIGNANT NEOPLASM OF OVERLAPPING SITES OF LEFT BREAST IN FEMALE, ESTROGEN RECEPTOR POSITIVE (HCC): Primary | ICD-10-CM

## 2019-10-03 DIAGNOSIS — Z79.899 ENCOUNTER FOR MEDICATION MANAGEMENT: ICD-10-CM

## 2019-10-03 DIAGNOSIS — Z45.2 ENCOUNTER FOR CENTRAL LINE CARE: ICD-10-CM

## 2019-10-03 DIAGNOSIS — Z17.0 MALIGNANT NEOPLASM OF OVERLAPPING SITES OF LEFT BREAST IN FEMALE, ESTROGEN RECEPTOR POSITIVE (HCC): Primary | ICD-10-CM

## 2019-10-03 DIAGNOSIS — Z51.11 CHEMOTHERAPY MANAGEMENT, ENCOUNTER FOR: Primary | ICD-10-CM

## 2019-10-03 DIAGNOSIS — Z17.0 MALIGNANT NEOPLASM OF OVERLAPPING SITES OF LEFT BREAST IN FEMALE, ESTROGEN RECEPTOR POSITIVE (HCC): ICD-10-CM

## 2019-10-03 PROCEDURE — 80053 COMPREHEN METABOLIC PANEL: CPT

## 2019-10-03 PROCEDURE — 96411 CHEMO IV PUSH ADDL DRUG: CPT

## 2019-10-03 PROCEDURE — 96367 TX/PROPH/DG ADDL SEQ IV INF: CPT

## 2019-10-03 PROCEDURE — 85027 COMPLETE CBC AUTOMATED: CPT

## 2019-10-03 PROCEDURE — 85007 BL SMEAR W/DIFF WBC COUNT: CPT

## 2019-10-03 PROCEDURE — 96375 TX/PRO/DX INJ NEW DRUG ADDON: CPT

## 2019-10-03 PROCEDURE — 85025 COMPLETE CBC W/AUTO DIFF WBC: CPT

## 2019-10-03 PROCEDURE — 99215 OFFICE O/P EST HI 40 MIN: CPT | Performed by: PHYSICIAN ASSISTANT

## 2019-10-03 PROCEDURE — 96413 CHEMO IV INFUSION 1 HR: CPT

## 2019-10-03 RX ORDER — HEPARIN SODIUM (PORCINE) LOCK FLUSH IV SOLN 100 UNIT/ML 100 UNIT/ML
5 SOLUTION INTRAVENOUS ONCE
Status: COMPLETED | OUTPATIENT
Start: 2019-10-03 | End: 2019-10-03

## 2019-10-03 RX ORDER — 0.9 % SODIUM CHLORIDE 0.9 %
10 VIAL (ML) INJECTION ONCE
Status: CANCELLED | OUTPATIENT
Start: 2019-10-03

## 2019-10-03 RX ORDER — HEPARIN SODIUM (PORCINE) LOCK FLUSH IV SOLN 100 UNIT/ML 100 UNIT/ML
5 SOLUTION INTRAVENOUS ONCE
Status: CANCELLED | OUTPATIENT
Start: 2019-10-03

## 2019-10-03 RX ORDER — DOCUSATE SODIUM 100 MG/1
100 CAPSULE, LIQUID FILLED ORAL 2 TIMES DAILY PRN
Qty: 60 CAPSULE | Refills: 2 | Status: SHIPPED | OUTPATIENT
Start: 2019-10-03 | End: 2019-10-09

## 2019-10-03 RX ORDER — OLANZAPINE 5 MG/1
5 TABLET ORAL NIGHTLY
Qty: 15 TABLET | Refills: 0 | Status: SHIPPED | OUTPATIENT
Start: 2019-10-03 | End: 2019-11-21

## 2019-10-03 RX ORDER — HEPARIN SODIUM (PORCINE) LOCK FLUSH IV SOLN 100 UNIT/ML 100 UNIT/ML
SOLUTION INTRAVENOUS
Status: COMPLETED
Start: 2019-10-03 | End: 2019-10-03

## 2019-10-03 RX ORDER — 0.9 % SODIUM CHLORIDE 0.9 %
VIAL (ML) INJECTION
Status: DISCONTINUED
Start: 2019-10-03 | End: 2019-10-03

## 2019-10-03 RX ORDER — LORATADINE 10 MG/1
10 TABLET ORAL DAILY
Qty: 30 TABLET | Refills: 1 | Status: SHIPPED | OUTPATIENT
Start: 2019-10-03 | End: 2019-11-21

## 2019-10-03 RX ORDER — LIDOCAINE AND PRILOCAINE 25; 25 MG/G; MG/G
CREAM TOPICAL
Qty: 1 TUBE | Refills: 0 | Status: SHIPPED | OUTPATIENT
Start: 2019-10-03 | End: 2019-12-13

## 2019-10-03 RX ADMIN — HEPARIN SODIUM (PORCINE) LOCK FLUSH IV SOLN 100 UNIT/ML 500 UNITS: 100 SOLUTION INTRAVENOUS at 16:45:00

## 2019-10-03 NOTE — PROGRESS NOTES
Cancer Center Progress Note    Patient Name: Duyen Zuniga   YOB: 1955   Medical Record Number: [id number]   Attending Physician: Jef Mccollum M.D.      Chief Complaint:  Breast cancer (PCP: Dr Stacey Mills)    Oncology History:  60year old pos of malignancy in the dermal lymphatics or the overlying skin.   [place] lymph node was positive with extracapsular extension she proceeded to have axillary dissection which showed 2 of 22 regional lymph nodes had metastatic cancer without extracapsular ext Bilateral 08/08/2019    Bilateral mastectomies w L SLNB and immediate reconstruction with tissue expander and acellular dermal matrix and L SLNB       Family History:  Family History   Problem Relation Age of Onset   • Hypertension Father    • Heart Diseas mouth daily. , Disp: , Rfl:   •  Acetaminophen ER (TYLENOL 8 HOUR) 650 MG Oral Tab CR, Take 650-1,300 mg by mouth every 8 (eight) hours as needed for Pain.  , Disp: , Rfl:   •  Multiple Vitamins-Minerals (MULTIVITAMIN OR), Take 1 tablet by mouth daily.   , D reviewed:  Ct Chest+abdomen+pelvis(all Cntrst Only)(cpt=71260/39488)         CONCLUSION:  1. There is history of recently diagnosed left breast cancer.   There are 2 nodular foci which contain clips/markers in the left breast compatible with recently biops need, while on ddAC. Refilled #15    Left breast lymphedema  - Compression, therapy    3. Pancreatic mass  -MRCP reviewed, noted for a separate LN, no mass; will repeat scan in 3 months to assess stability (Dec)    4.  Anxiety  Much controlled currently an

## 2019-10-03 NOTE — PATIENT INSTRUCTIONS
1. Proceed with cycle 2    2. Loratadine 10 mg once daily as needed for Neulasta-related bone pain or allergies    3. Motrin as needed. Or Tylenol for bone pain or headaches    4. Olanzapine 5 mg starting tonight, Thursday, 10/3, and take for 3 days.

## 2019-10-04 ENCOUNTER — NURSE ONLY (OUTPATIENT)
Dept: HEMATOLOGY/ONCOLOGY | Facility: HOSPITAL | Age: 64
End: 2019-10-04
Attending: INTERNAL MEDICINE
Payer: COMMERCIAL

## 2019-10-04 VITALS
TEMPERATURE: 98 F | RESPIRATION RATE: 16 BRPM | SYSTOLIC BLOOD PRESSURE: 135 MMHG | DIASTOLIC BLOOD PRESSURE: 73 MMHG | HEART RATE: 87 BPM

## 2019-10-04 DIAGNOSIS — C50.812 MALIGNANT NEOPLASM OF OVERLAPPING SITES OF LEFT BREAST IN FEMALE, ESTROGEN RECEPTOR POSITIVE (HCC): Primary | ICD-10-CM

## 2019-10-04 DIAGNOSIS — Z17.0 MALIGNANT NEOPLASM OF OVERLAPPING SITES OF LEFT BREAST IN FEMALE, ESTROGEN RECEPTOR POSITIVE (HCC): Primary | ICD-10-CM

## 2019-10-04 PROCEDURE — 96372 THER/PROPH/DIAG INJ SC/IM: CPT

## 2019-10-04 NOTE — PROGRESS NOTES
Patient arrives for [facility]. Reports she is feeling well today. Reports no nausea, states she took her antiemetics. She is taking miralax to avoid constipation. Udenyca given in right upper arm, patient tolerated well.  Discharged ambulating independently w

## 2019-10-07 ENCOUNTER — OFFICE VISIT (OUTPATIENT)
Dept: PHYSICAL THERAPY | Facility: HOSPITAL | Age: 64
End: 2019-10-07
Attending: SURGERY
Payer: COMMERCIAL

## 2019-10-07 PROCEDURE — 97110 THERAPEUTIC EXERCISES: CPT

## 2019-10-07 PROCEDURE — 97140 MANUAL THERAPY 1/> REGIONS: CPT

## 2019-10-07 NOTE — PROGRESS NOTES
Referring Physician: Dr. Essence Gonzales  Diagnosis:  Malignant neoplasm of overlapping sites of left breast in female, estrogen receptor positive ([facility] (C50.812,Z17.0)  History of lymph node dissection of left axilla (H17.622)  History of mastectomy, bilateral (Z9 Removed remaining glue on B breast incisions using aquaphor and light touch with towel to remove. 9/6/2019  Glue still on axillary incision and breast incision on L. Pt said she is showering but not really wiping over these areas.   R breast incisions velvet STM: provided STM w/ MLD techniques to L chest, axilla, and trunk and upper arm. STM to B axilla/UE (areas of cording)  to improve tissue mobility. Lotion used on B chest, axilla, upper arms.     Compression:  Discussed with pt that her insurance should f Standing Shld flex/abd/horiz abd/add 2 x10 B There Ex (23 minutes)  Supine AA/PROM stretching B shoulders with greater time spent on LUE pin and stretch  Side lying L shoulder abduction x 10  Side lying L shoulder circles 5 CW, 5CCW      Standing Shld flex

## 2019-10-09 ENCOUNTER — NURSE ONLY (OUTPATIENT)
Dept: HEMATOLOGY/ONCOLOGY | Facility: HOSPITAL | Age: 64
End: 2019-10-09
Attending: INTERNAL MEDICINE
Payer: COMMERCIAL

## 2019-10-09 VITALS
BODY MASS INDEX: 32.27 KG/M2 | RESPIRATION RATE: 16 BRPM | HEART RATE: 94 BPM | OXYGEN SATURATION: 95 % | DIASTOLIC BLOOD PRESSURE: 66 MMHG | HEIGHT: 64 IN | SYSTOLIC BLOOD PRESSURE: 121 MMHG | TEMPERATURE: 99 F | WEIGHT: 189 LBS

## 2019-10-09 DIAGNOSIS — Z17.0 MALIGNANT NEOPLASM OF OVERLAPPING SITES OF LEFT BREAST IN FEMALE, ESTROGEN RECEPTOR POSITIVE (HCC): Primary | ICD-10-CM

## 2019-10-09 DIAGNOSIS — Z79.899 ENCOUNTER FOR MEDICATION MANAGEMENT: Primary | ICD-10-CM

## 2019-10-09 DIAGNOSIS — D70.1 CHEMOTHERAPY INDUCED NEUTROPENIA (HCC): ICD-10-CM

## 2019-10-09 DIAGNOSIS — I89.0 LYMPHEDEMA OF ARM: ICD-10-CM

## 2019-10-09 DIAGNOSIS — T45.1X5A CHEMOTHERAPY ADVERSE REACTION, INITIAL ENCOUNTER: ICD-10-CM

## 2019-10-09 DIAGNOSIS — T45.1X5A CHEMOTHERAPY INDUCED NAUSEA AND VOMITING: ICD-10-CM

## 2019-10-09 DIAGNOSIS — C50.812 MALIGNANT NEOPLASM OF OVERLAPPING SITES OF LEFT BREAST IN FEMALE, ESTROGEN RECEPTOR POSITIVE (HCC): Primary | ICD-10-CM

## 2019-10-09 DIAGNOSIS — R11.2 CHEMOTHERAPY INDUCED NAUSEA AND VOMITING: ICD-10-CM

## 2019-10-09 DIAGNOSIS — T45.1X5A CHEMOTHERAPY INDUCED NEUTROPENIA (HCC): ICD-10-CM

## 2019-10-09 DIAGNOSIS — Z45.2 ENCOUNTER FOR CENTRAL LINE CARE: ICD-10-CM

## 2019-10-09 PROCEDURE — 85025 COMPLETE CBC W/AUTO DIFF WBC: CPT

## 2019-10-09 PROCEDURE — 99214 OFFICE O/P EST MOD 30 MIN: CPT | Performed by: INTERNAL MEDICINE

## 2019-10-09 PROCEDURE — 80053 COMPREHEN METABOLIC PANEL: CPT

## 2019-10-09 PROCEDURE — 36591 DRAW BLOOD OFF VENOUS DEVICE: CPT

## 2019-10-09 RX ORDER — SENNA AND DOCUSATE SODIUM 50; 8.6 MG/1; MG/1
2 TABLET, FILM COATED ORAL NIGHTLY
Qty: 60 TABLET | Refills: 1 | Status: SHIPPED | OUTPATIENT
Start: 2019-10-09 | End: 2020-01-27

## 2019-10-09 RX ORDER — 0.9 % SODIUM CHLORIDE 0.9 %
VIAL (ML) INJECTION
Status: DISCONTINUED
Start: 2019-10-09 | End: 2019-10-09

## 2019-10-09 RX ORDER — HEPARIN SODIUM (PORCINE) LOCK FLUSH IV SOLN 100 UNIT/ML 100 UNIT/ML
5 SOLUTION INTRAVENOUS ONCE
Status: COMPLETED | OUTPATIENT
Start: 2019-10-09 | End: 2019-10-09

## 2019-10-09 RX ORDER — HEPARIN SODIUM (PORCINE) LOCK FLUSH IV SOLN 100 UNIT/ML 100 UNIT/ML
5 SOLUTION INTRAVENOUS ONCE
Status: CANCELLED | OUTPATIENT
Start: 2019-10-09

## 2019-10-09 RX ORDER — 0.9 % SODIUM CHLORIDE 0.9 %
10 VIAL (ML) INJECTION ONCE
Status: CANCELLED | OUTPATIENT
Start: 2019-10-09

## 2019-10-09 RX ADMIN — HEPARIN SODIUM (PORCINE) LOCK FLUSH IV SOLN 100 UNIT/ML 500 UNITS: 100 SOLUTION INTRAVENOUS at 15:28:00

## 2019-10-09 NOTE — PROGRESS NOTES
Cancer Center Progress Note    Patient Name: Cathy Melissa   YOB: 1955   Medical Record Number: [id number]   Attending Physician: Keven Boyle M.D.      Chief Complaint:  Breast cancer (PCP: Dr Kay Olmstead)    Oncology History:  60year old pos of malignancy in the dermal lymphatics or the overlying skin.   [place] lymph node was positive with extracapsular extension she proceeded to have axillary dissection which showed 2 of 22 regional lymph nodes had metastatic cancer without extracapsular ext Medications:    Current Outpatient Medications:   •  lidocaine-prilocaine 2.5-2.5 % External Cream, Apply to site 1 hour prior to port a cath needle insertion, Disp: 1 Tube, Rfl: 0  •  loratadine 10 MG Oral Tab, Take 1 tablet (10 mg total) by mouth daily. , Right arm, Patient Position: Sitting, Cuff Size: large)   Pulse 94   Temp 98.7 °F (37.1 °C) (Oral)   Resp 16   Ht 1.626 m (5' 4\")   Wt 85.7 kg (189 lb)   LMP 07/14/2006   SpO2 95%   BMI 32.44 kg/m²     Physical Examination:  Performance Status:  General: stone. 5.  Cholelithiasis. No biliary ductal dilatation. 6.  Small hiatal hernia.    Dictated by (CST): Yuriy Palma MD on 6/29/2019 at 12:17     Approved by (CST): Yuriy Palma MD on 6/29/2019 at 12:35          Impression and Plan:  Cancer Staging  Malign

## 2019-10-10 ENCOUNTER — APPOINTMENT (OUTPATIENT)
Dept: HEMATOLOGY/ONCOLOGY | Facility: HOSPITAL | Age: 64
End: 2019-10-10
Attending: INTERNAL MEDICINE
Payer: COMMERCIAL

## 2019-10-10 ENCOUNTER — APPOINTMENT (OUTPATIENT)
Dept: HEMATOLOGY/ONCOLOGY | Facility: HOSPITAL | Age: 64
End: 2019-10-10
Payer: COMMERCIAL

## 2019-10-15 ENCOUNTER — OFFICE VISIT (OUTPATIENT)
Dept: PHYSICAL THERAPY | Facility: HOSPITAL | Age: 64
End: 2019-10-15
Attending: SURGERY
Payer: COMMERCIAL

## 2019-10-15 PROCEDURE — 97110 THERAPEUTIC EXERCISES: CPT

## 2019-10-15 PROCEDURE — 97140 MANUAL THERAPY 1/> REGIONS: CPT

## 2019-10-15 NOTE — PROGRESS NOTES
Referring Physician: Dr. Alpesh Hollis  Diagnosis:  Malignant neoplasm of overlapping sites of left breast in female, estrogen receptor positive ([facility] (C50.812,Z17.0)  History of lymph node dissection of left axilla (A23.038)  History of mastectomy, bilateral (Z9 L axilla cording still present that extends from axilla to forearm, limited L shoulder mobility  Hypersensitivity to L chest especially  Increased swelling, tissue hard, some pitting areas at L chest  Slight cording LUE, mostly at ant elbow            9/9/ STM: provided STM w/ MLD techniques to L chest, axilla, and trunk and upper arm. STM to L axilla/UE (areas of cording)  to improve tissue mobility. Lotion used on B chest, axilla, upper arms.   Manual Therapy (35 minutes)  STM: provided STM w/ MLD techniq *no reaction to kinesio tape. Will use taping techniques in the future as needed. Compression:  Anticipate that pt will have her compression sleeve by next week. CCL1 elvarex soft sleeve.    There Ex (10 Min)   -Supine AAROM/stretching L shld  - standi B chest tissue looks good. Redness completely resolved to L chest.  Tissue much softer as well. Cording still persists in L axilla extending down to below L elbow limiting movement in LUE.   Pt admitted to skipping a few days of exercises while out of tow

## 2019-10-16 ENCOUNTER — LAB ENCOUNTER (OUTPATIENT)
Dept: LAB | Age: 64
End: 2019-10-16
Attending: INTERNAL MEDICINE
Payer: COMMERCIAL

## 2019-10-16 DIAGNOSIS — Z51.11 CHEMOTHERAPY MANAGEMENT, ENCOUNTER FOR: ICD-10-CM

## 2019-10-16 DIAGNOSIS — Z17.0 MALIGNANT NEOPLASM OF OVERLAPPING SITES OF LEFT BREAST IN FEMALE, ESTROGEN RECEPTOR POSITIVE (HCC): ICD-10-CM

## 2019-10-16 DIAGNOSIS — C50.812 MALIGNANT NEOPLASM OF OVERLAPPING SITES OF LEFT BREAST IN FEMALE, ESTROGEN RECEPTOR POSITIVE (HCC): ICD-10-CM

## 2019-10-16 PROCEDURE — 36415 COLL VENOUS BLD VENIPUNCTURE: CPT

## 2019-10-16 PROCEDURE — 80053 COMPREHEN METABOLIC PANEL: CPT

## 2019-10-16 PROCEDURE — 85027 COMPLETE CBC AUTOMATED: CPT

## 2019-10-16 PROCEDURE — 85007 BL SMEAR W/DIFF WBC COUNT: CPT

## 2019-10-16 PROCEDURE — 85025 COMPLETE CBC W/AUTO DIFF WBC: CPT

## 2019-10-17 ENCOUNTER — OFFICE VISIT (OUTPATIENT)
Dept: HEMATOLOGY/ONCOLOGY | Facility: HOSPITAL | Age: 64
End: 2019-10-17
Attending: INTERNAL MEDICINE
Payer: COMMERCIAL

## 2019-10-17 VITALS
OXYGEN SATURATION: 96 % | WEIGHT: 189 LBS | TEMPERATURE: 99 F | HEART RATE: 96 BPM | DIASTOLIC BLOOD PRESSURE: 80 MMHG | RESPIRATION RATE: 16 BRPM | SYSTOLIC BLOOD PRESSURE: 157 MMHG | BODY MASS INDEX: 32.27 KG/M2 | HEIGHT: 64 IN

## 2019-10-17 VITALS
TEMPERATURE: 99 F | SYSTOLIC BLOOD PRESSURE: 157 MMHG | HEART RATE: 96 BPM | OXYGEN SATURATION: 96 % | RESPIRATION RATE: 16 BRPM | DIASTOLIC BLOOD PRESSURE: 80 MMHG

## 2019-10-17 DIAGNOSIS — Z17.0 MALIGNANT NEOPLASM OF OVERLAPPING SITES OF LEFT BREAST IN FEMALE, ESTROGEN RECEPTOR POSITIVE (HCC): Primary | ICD-10-CM

## 2019-10-17 DIAGNOSIS — Z45.2 ENCOUNTER FOR CENTRAL LINE CARE: ICD-10-CM

## 2019-10-17 DIAGNOSIS — Z79.899 ENCOUNTER FOR MEDICATION MANAGEMENT: ICD-10-CM

## 2019-10-17 DIAGNOSIS — I89.0 LYMPHEDEMA OF ARM: ICD-10-CM

## 2019-10-17 DIAGNOSIS — T45.1X5A CHEMOTHERAPY INDUCED NAUSEA AND VOMITING: ICD-10-CM

## 2019-10-17 DIAGNOSIS — R11.2 CHEMOTHERAPY INDUCED NAUSEA AND VOMITING: ICD-10-CM

## 2019-10-17 DIAGNOSIS — F41.9 ANXIETY: ICD-10-CM

## 2019-10-17 DIAGNOSIS — D70.1 CHEMOTHERAPY INDUCED NEUTROPENIA (HCC): ICD-10-CM

## 2019-10-17 DIAGNOSIS — C50.812 MALIGNANT NEOPLASM OF OVERLAPPING SITES OF LEFT BREAST IN FEMALE, ESTROGEN RECEPTOR POSITIVE (HCC): Primary | ICD-10-CM

## 2019-10-17 DIAGNOSIS — Z51.11 CHEMOTHERAPY MANAGEMENT, ENCOUNTER FOR: ICD-10-CM

## 2019-10-17 DIAGNOSIS — T45.1X5A CHEMOTHERAPY INDUCED NEUTROPENIA (HCC): ICD-10-CM

## 2019-10-17 PROBLEM — K86.89 PANCREATIC MASS (HCC): Status: ACTIVE | Noted: 2019-10-17

## 2019-10-17 PROBLEM — G89.18 PAIN ASSOCIATED WITH SURGICAL PROCEDURE: Status: RESOLVED | Noted: 2019-09-05 | Resolved: 2019-10-17

## 2019-10-17 PROBLEM — K86.89 PANCREATIC MASS: Status: ACTIVE | Noted: 2019-10-17

## 2019-10-17 PROCEDURE — 96367 TX/PROPH/DG ADDL SEQ IV INF: CPT

## 2019-10-17 PROCEDURE — 96375 TX/PRO/DX INJ NEW DRUG ADDON: CPT

## 2019-10-17 PROCEDURE — 96411 CHEMO IV PUSH ADDL DRUG: CPT

## 2019-10-17 PROCEDURE — 99215 OFFICE O/P EST HI 40 MIN: CPT | Performed by: INTERNAL MEDICINE

## 2019-10-17 PROCEDURE — 96413 CHEMO IV INFUSION 1 HR: CPT

## 2019-10-17 RX ORDER — HEPARIN SODIUM (PORCINE) LOCK FLUSH IV SOLN 100 UNIT/ML 100 UNIT/ML
5 SOLUTION INTRAVENOUS ONCE
Status: COMPLETED | OUTPATIENT
Start: 2019-10-17 | End: 2019-10-17

## 2019-10-17 RX ORDER — 0.9 % SODIUM CHLORIDE 0.9 %
10 VIAL (ML) INJECTION ONCE
Status: CANCELLED | OUTPATIENT
Start: 2019-10-17

## 2019-10-17 RX ORDER — 0.9 % SODIUM CHLORIDE 0.9 %
VIAL (ML) INJECTION
Status: DISCONTINUED
Start: 2019-10-17 | End: 2019-10-17

## 2019-10-17 RX ORDER — HEPARIN SODIUM (PORCINE) LOCK FLUSH IV SOLN 100 UNIT/ML 100 UNIT/ML
SOLUTION INTRAVENOUS
Status: COMPLETED
Start: 2019-10-17 | End: 2019-10-17

## 2019-10-17 RX ORDER — HEPARIN SODIUM (PORCINE) LOCK FLUSH IV SOLN 100 UNIT/ML 100 UNIT/ML
5 SOLUTION INTRAVENOUS ONCE
Status: CANCELLED | OUTPATIENT
Start: 2019-10-17

## 2019-10-17 RX ADMIN — HEPARIN SODIUM (PORCINE) LOCK FLUSH IV SOLN 100 UNIT/ML 500 UNITS: 100 SOLUTION INTRAVENOUS at 15:35:00

## 2019-10-17 NOTE — PROGRESS NOTES
Pt here for C3D1 AC. Arrives Ambulating independently, accompanied by Friend       Patient denies possible pregnancy since last therapy cycle: Not Applicable    Modifications in dose or schedule: No.      Patient received from MD visit.  She reports she ha

## 2019-10-17 NOTE — PROGRESS NOTES
Cancer Center Progress Note    Patient Name: Claudio Schrader   YOB: 1955   Medical Record Number: [id number]   Attending Physician: Nidia Anthony M.D.      Chief Complaint:  Breast cancer (PCP: Dr Minal Pagan)    Oncology History:  60year old pos of malignancy in the dermal lymphatics or the overlying skin.   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  Devices Does not apply Misc, For hair prosthesis, Disp: 1 Units, Rfl: 0 oriented x 3, not in acute distress. HEENT: Oropharynx is clear. Heart: regular  Chest:  nonlabored breathing  Breast: Bilateral mastectomy. Left mastectomy. Slightly more edema today.    Extremities: No edema, cyanosis, or bruising  Neurological: motor female, estrogen receptor positive ([facility]  Staging form: Breast, AJCC 8th Edition  - Clinical stage from 6/20/2019: Stage IIA (cT3(2), cN0, cM0, G1, ER+, ME+, HER2-) - Signed by Sonia Shea MD on 9/5/2019  - Pathologic stage from 8/9/2019: Stage IB (pT3, p

## 2019-10-18 ENCOUNTER — NURSE ONLY (OUTPATIENT)
Dept: HEMATOLOGY/ONCOLOGY | Facility: HOSPITAL | Age: 64
End: 2019-10-18
Attending: INTERNAL MEDICINE
Payer: COMMERCIAL

## 2019-10-18 VITALS
RESPIRATION RATE: 16 BRPM | TEMPERATURE: 98 F | SYSTOLIC BLOOD PRESSURE: 152 MMHG | HEART RATE: 85 BPM | DIASTOLIC BLOOD PRESSURE: 85 MMHG

## 2019-10-18 DIAGNOSIS — C50.812 MALIGNANT NEOPLASM OF OVERLAPPING SITES OF LEFT BREAST IN FEMALE, ESTROGEN RECEPTOR POSITIVE (HCC): Primary | ICD-10-CM

## 2019-10-18 DIAGNOSIS — Z17.0 MALIGNANT NEOPLASM OF OVERLAPPING SITES OF LEFT BREAST IN FEMALE, ESTROGEN RECEPTOR POSITIVE (HCC): Primary | ICD-10-CM

## 2019-10-18 PROCEDURE — 96372 THER/PROPH/DIAG INJ SC/IM: CPT

## 2019-10-18 NOTE — PROGRESS NOTES
Kim to infusion today for Udenyca injection. She arrives alert and independent. Reports she is feeling well today except for having some indigestion. Denies nausea, constipation, and abdominal pain. Taking anti-emetics as needed.  Patient drinking Ginger

## 2019-10-21 ENCOUNTER — OFFICE VISIT (OUTPATIENT)
Dept: PHYSICAL THERAPY | Facility: HOSPITAL | Age: 64
End: 2019-10-21
Attending: SURGERY
Payer: COMMERCIAL

## 2019-10-21 PROCEDURE — 97110 THERAPEUTIC EXERCISES: CPT

## 2019-10-21 PROCEDURE — 97140 MANUAL THERAPY 1/> REGIONS: CPT

## 2019-10-21 NOTE — PROGRESS NOTES
Referring Physician: Dr. Gregorio Hartman  Diagnosis:  Malignant neoplasm of overlapping sites of left breast in female, estrogen receptor positive ([facility] (C50.812,Z17.0)  History of lymph node dissection of left axilla (V18.626)  History of mastectomy, bilateral (Z9 trap from previous bra straps               Protracted scapula and forward head  AROM/Strength:                 L shld AROM flex 65, abd 50               R shld AROM flex 75, abd 75     Edema/Tissue Observations:   Patient arrived wearing binder across julio of cording) in axilla. .  No cording visible or palpable today at elbow. STM and MLD provided to decrease swelling and  to improve tissue mobility. Extra time spent with pt in R side lying to work on L trunk.   Lotion used on B chest, axilla, upper arms shoulder AROM R flex 155, abd 165; L flex 145, abd 150; B ER to 80;  B IR to T10 to improve ease of dressing/bathing/and reaching overhead -MET  4) Increase UE strength to at least 4/5 to improve ease of lifting and performing household chores -MET       Pl

## 2019-10-22 ENCOUNTER — APPOINTMENT (OUTPATIENT)
Dept: INTEGRATIVE MEDICINE | Facility: HOSPITAL | Age: 64
End: 2019-10-22
Attending: PHYSICIAN ASSISTANT

## 2019-10-23 ENCOUNTER — TELEPHONE (OUTPATIENT)
Dept: PHYSICAL THERAPY | Facility: HOSPITAL | Age: 64
End: 2019-10-23

## 2019-10-23 ENCOUNTER — OFFICE VISIT (OUTPATIENT)
Dept: HEMATOLOGY/ONCOLOGY | Facility: HOSPITAL | Age: 64
End: 2019-10-23
Attending: INTERNAL MEDICINE
Payer: COMMERCIAL

## 2019-10-23 VITALS
RESPIRATION RATE: 16 BRPM | OXYGEN SATURATION: 97 % | HEART RATE: 97 BPM | WEIGHT: 188 LBS | HEIGHT: 64 IN | TEMPERATURE: 99 F | SYSTOLIC BLOOD PRESSURE: 136 MMHG | DIASTOLIC BLOOD PRESSURE: 80 MMHG | BODY MASS INDEX: 32.1 KG/M2

## 2019-10-23 DIAGNOSIS — R11.2 CHEMOTHERAPY INDUCED NAUSEA AND VOMITING: ICD-10-CM

## 2019-10-23 DIAGNOSIS — I89.0 LYMPHEDEMA OF ARM: ICD-10-CM

## 2019-10-23 DIAGNOSIS — Z45.2 ENCOUNTER FOR CENTRAL LINE CARE: ICD-10-CM

## 2019-10-23 DIAGNOSIS — Z17.0 MALIGNANT NEOPLASM OF OVERLAPPING SITES OF LEFT BREAST IN FEMALE, ESTROGEN RECEPTOR POSITIVE (HCC): Primary | ICD-10-CM

## 2019-10-23 DIAGNOSIS — F41.9 ANXIETY: ICD-10-CM

## 2019-10-23 DIAGNOSIS — D70.1 CHEMOTHERAPY INDUCED NEUTROPENIA (HCC): ICD-10-CM

## 2019-10-23 DIAGNOSIS — Z51.11 CHEMOTHERAPY MANAGEMENT, ENCOUNTER FOR: ICD-10-CM

## 2019-10-23 DIAGNOSIS — C50.812 MALIGNANT NEOPLASM OF OVERLAPPING SITES OF LEFT BREAST IN FEMALE, ESTROGEN RECEPTOR POSITIVE (HCC): Primary | ICD-10-CM

## 2019-10-23 DIAGNOSIS — Z79.899 ENCOUNTER FOR MEDICATION MANAGEMENT: Primary | ICD-10-CM

## 2019-10-23 DIAGNOSIS — T45.1X5A CHEMOTHERAPY INDUCED NAUSEA AND VOMITING: ICD-10-CM

## 2019-10-23 DIAGNOSIS — T45.1X5A CHEMOTHERAPY INDUCED NEUTROPENIA (HCC): ICD-10-CM

## 2019-10-23 DIAGNOSIS — T45.1X5A CHEMOTHERAPY ADVERSE REACTION, INITIAL ENCOUNTER: ICD-10-CM

## 2019-10-23 PROBLEM — K86.89 PANCREATIC MASS (HCC): Status: RESOLVED | Noted: 2019-10-17 | Resolved: 2019-10-23

## 2019-10-23 PROBLEM — K86.89 PANCREATIC MASS: Status: RESOLVED | Noted: 2019-10-17 | Resolved: 2019-10-23

## 2019-10-23 PROCEDURE — 85025 COMPLETE CBC W/AUTO DIFF WBC: CPT

## 2019-10-23 PROCEDURE — 99214 OFFICE O/P EST MOD 30 MIN: CPT | Performed by: INTERNAL MEDICINE

## 2019-10-23 PROCEDURE — 36591 DRAW BLOOD OFF VENOUS DEVICE: CPT

## 2019-10-23 PROCEDURE — 80053 COMPREHEN METABOLIC PANEL: CPT

## 2019-10-23 RX ORDER — 0.9 % SODIUM CHLORIDE 0.9 %
VIAL (ML) INJECTION
Status: DISCONTINUED
Start: 2019-10-23 | End: 2019-10-23

## 2019-10-23 RX ORDER — 0.9 % SODIUM CHLORIDE 0.9 %
10 VIAL (ML) INJECTION ONCE
Status: CANCELLED | OUTPATIENT
Start: 2019-10-23

## 2019-10-23 RX ORDER — HEPARIN SODIUM (PORCINE) LOCK FLUSH IV SOLN 100 UNIT/ML 100 UNIT/ML
5 SOLUTION INTRAVENOUS ONCE
Status: CANCELLED | OUTPATIENT
Start: 2019-10-23

## 2019-10-23 RX ORDER — HEPARIN SODIUM (PORCINE) LOCK FLUSH IV SOLN 100 UNIT/ML 100 UNIT/ML
5 SOLUTION INTRAVENOUS ONCE
Status: DISCONTINUED | OUTPATIENT
Start: 2019-10-23 | End: 2019-10-23

## 2019-10-23 NOTE — PROGRESS NOTES
Cancer Center Progress Note    Patient Name: Jovanna Mendoza   YOB: 1955   Medical Record Number: [id number]   Attending Physician: Kyle Tena M.D.      Chief Complaint:  Breast cancer (PCP: Dr Jose Dolan)    Oncology History:  60year old pos of malignancy in the dermal lymphatics or the overlying skin.   [place] lymph node was positive with extracapsular extension she proceeded to have axillary dissection which showed 2 of 22 regional lymph nodes had metastatic cancer without extracapsular ext Misc. Devices Does not apply Misc, For hair prosthesis, Disp: 1 Units, Rfl: 0  •  lidocaine-prilocaine 2.5-2.5 % External Cream, Apply to site 1 hour prior to port a cath needle insertion, Disp: 1 Tube, Rfl: 0  •  loratadine 10 MG Oral Tab, Take 1 tablet ( Status:  General: Patient is alert and oriented x 3, not in acute distress. HEENT: Oropharynx is clear. Heart: regular  Chest:  nonlabored breathing  Breast: Bilateral mastectomy. Left mastectomy.  Slightly better swelling today, no cording  Extremities Staging  Malignant neoplasm of overlapping sites of left breast in female, estrogen receptor positive ([facility]  Staging form: Breast, AJCC 8th Edition  - Clinical stage from 6/20/2019: Stage IIA (cT3(2), cN0, cM0, G1, ER+, GA+, HER2-) - Signed by Lea Andrea,

## 2019-10-23 NOTE — TELEPHONE ENCOUNTER
Denice Teresa in the [facility] to try on her CCL1 compression sleeve and gauntlet. Good fit noted. Instructed Kim on donning/doffing techniques. That she should wear the sleeve during the day only, never sleep with her compression sleeve.   Reviewed gar

## 2019-10-28 ENCOUNTER — OFFICE VISIT (OUTPATIENT)
Dept: PHYSICAL THERAPY | Facility: HOSPITAL | Age: 64
End: 2019-10-28
Attending: SURGERY
Payer: COMMERCIAL

## 2019-10-28 PROCEDURE — 97140 MANUAL THERAPY 1/> REGIONS: CPT

## 2019-10-28 NOTE — PROGRESS NOTES
Referring Physician: Dr. Debi Jones  Diagnosis:  Malignant neoplasm of overlapping sites of left breast in female, estrogen receptor positive ([facility] (C50.812,Z17.0)  History of lymph node dissection of left axilla (Y33.313)  History of mastectomy, bilateral (Z9 Tightness of B upper trap and levator.  Indurations noted B upper trap from previous bra straps               Protracted scapula and forward head  AROM/Strength:                 L shld AROM flex 65, abd 50               R shld AROM flex 75, abd 7 STM w/ MLD techniques to L chest, axilla, and trunk and entire L arm. STM to L axilla/UE (areas of cording) in axilla. .  No cording visible or palpable today at elbow. STM and MLD provided to decrease swelling and  to improve tissue mobility.   Extra time lying L shoulder circles 5 CW, 5CCW      Standing Shld flex/abd/horiz abd/add 2 x10 B There ex (10 minutes)  Supine AA/PROM stretching B shoulders. L UE very limited initially. AROM B shoulder OH stretch in supine and sitting.   *REINFORCED WITH PATIENT T

## 2019-10-30 ENCOUNTER — LAB ENCOUNTER (OUTPATIENT)
Dept: LAB | Age: 64
End: 2019-10-30
Attending: INTERNAL MEDICINE
Payer: COMMERCIAL

## 2019-10-30 DIAGNOSIS — Z51.11 CHEMOTHERAPY MANAGEMENT, ENCOUNTER FOR: ICD-10-CM

## 2019-10-30 DIAGNOSIS — Z17.0 MALIGNANT NEOPLASM OF OVERLAPPING SITES OF LEFT BREAST IN FEMALE, ESTROGEN RECEPTOR POSITIVE (HCC): ICD-10-CM

## 2019-10-30 DIAGNOSIS — C50.812 MALIGNANT NEOPLASM OF OVERLAPPING SITES OF LEFT BREAST IN FEMALE, ESTROGEN RECEPTOR POSITIVE (HCC): ICD-10-CM

## 2019-10-30 PROCEDURE — 36415 COLL VENOUS BLD VENIPUNCTURE: CPT

## 2019-10-30 PROCEDURE — 85025 COMPLETE CBC W/AUTO DIFF WBC: CPT

## 2019-10-30 PROCEDURE — 85027 COMPLETE CBC AUTOMATED: CPT

## 2019-10-30 PROCEDURE — 80053 COMPREHEN METABOLIC PANEL: CPT

## 2019-10-30 PROCEDURE — 85007 BL SMEAR W/DIFF WBC COUNT: CPT

## 2019-10-31 ENCOUNTER — OFFICE VISIT (OUTPATIENT)
Dept: HEMATOLOGY/ONCOLOGY | Facility: HOSPITAL | Age: 64
End: 2019-10-31
Attending: INTERNAL MEDICINE
Payer: COMMERCIAL

## 2019-10-31 VITALS
DIASTOLIC BLOOD PRESSURE: 79 MMHG | RESPIRATION RATE: 18 BRPM | BODY MASS INDEX: 32.61 KG/M2 | HEIGHT: 64 IN | SYSTOLIC BLOOD PRESSURE: 141 MMHG | WEIGHT: 191 LBS | HEART RATE: 96 BPM | TEMPERATURE: 98 F | OXYGEN SATURATION: 95 %

## 2019-10-31 VITALS
TEMPERATURE: 98 F | DIASTOLIC BLOOD PRESSURE: 79 MMHG | OXYGEN SATURATION: 95 % | HEART RATE: 96 BPM | HEIGHT: 64 IN | SYSTOLIC BLOOD PRESSURE: 141 MMHG | WEIGHT: 191 LBS | BODY MASS INDEX: 32.61 KG/M2 | RESPIRATION RATE: 18 BRPM

## 2019-10-31 DIAGNOSIS — T45.1X5A CHEMOTHERAPY ADVERSE REACTION, INITIAL ENCOUNTER: ICD-10-CM

## 2019-10-31 DIAGNOSIS — Z79.899 ENCOUNTER FOR MEDICATION MANAGEMENT: ICD-10-CM

## 2019-10-31 DIAGNOSIS — C50.812 MALIGNANT NEOPLASM OF OVERLAPPING SITES OF LEFT BREAST IN FEMALE, ESTROGEN RECEPTOR POSITIVE (HCC): Primary | ICD-10-CM

## 2019-10-31 DIAGNOSIS — T45.1X5A CHEMOTHERAPY INDUCED NEUTROPENIA (HCC): ICD-10-CM

## 2019-10-31 DIAGNOSIS — R11.2 CHEMOTHERAPY INDUCED NAUSEA AND VOMITING: ICD-10-CM

## 2019-10-31 DIAGNOSIS — Z17.0 MALIGNANT NEOPLASM OF OVERLAPPING SITES OF LEFT BREAST IN FEMALE, ESTROGEN RECEPTOR POSITIVE (HCC): Primary | ICD-10-CM

## 2019-10-31 DIAGNOSIS — Z51.11 CHEMOTHERAPY MANAGEMENT, ENCOUNTER FOR: ICD-10-CM

## 2019-10-31 DIAGNOSIS — D70.1 CHEMOTHERAPY INDUCED NEUTROPENIA (HCC): ICD-10-CM

## 2019-10-31 DIAGNOSIS — I89.0 LYMPHEDEMA OF ARM: ICD-10-CM

## 2019-10-31 DIAGNOSIS — Z45.2 ENCOUNTER FOR CENTRAL LINE CARE: ICD-10-CM

## 2019-10-31 DIAGNOSIS — T45.1X5A CHEMOTHERAPY INDUCED NAUSEA AND VOMITING: ICD-10-CM

## 2019-10-31 PROCEDURE — 96411 CHEMO IV PUSH ADDL DRUG: CPT

## 2019-10-31 PROCEDURE — 96367 TX/PROPH/DG ADDL SEQ IV INF: CPT

## 2019-10-31 PROCEDURE — 99215 OFFICE O/P EST HI 40 MIN: CPT | Performed by: INTERNAL MEDICINE

## 2019-10-31 PROCEDURE — 96413 CHEMO IV INFUSION 1 HR: CPT

## 2019-10-31 PROCEDURE — 96375 TX/PRO/DX INJ NEW DRUG ADDON: CPT

## 2019-10-31 RX ORDER — MELATONIN
1000 DAILY
Qty: 30 TABLET | Refills: 0 | Status: SHIPPED | OUTPATIENT
Start: 2019-10-31 | End: 2019-11-30

## 2019-10-31 RX ORDER — 0.9 % SODIUM CHLORIDE 0.9 %
VIAL (ML) INJECTION
Status: DISCONTINUED
Start: 2019-10-31 | End: 2019-10-31

## 2019-10-31 RX ORDER — HEPARIN SODIUM (PORCINE) LOCK FLUSH IV SOLN 100 UNIT/ML 100 UNIT/ML
5 SOLUTION INTRAVENOUS ONCE
Status: CANCELLED | OUTPATIENT
Start: 2019-10-31

## 2019-10-31 RX ORDER — HEPARIN SODIUM (PORCINE) LOCK FLUSH IV SOLN 100 UNIT/ML 100 UNIT/ML
5 SOLUTION INTRAVENOUS ONCE
Status: COMPLETED | OUTPATIENT
Start: 2019-10-31 | End: 2019-10-31

## 2019-10-31 RX ORDER — 0.9 % SODIUM CHLORIDE 0.9 %
10 VIAL (ML) INJECTION ONCE
Status: CANCELLED | OUTPATIENT
Start: 2019-10-31

## 2019-10-31 RX ORDER — HEPARIN SODIUM (PORCINE) LOCK FLUSH IV SOLN 100 UNIT/ML 100 UNIT/ML
SOLUTION INTRAVENOUS
Status: COMPLETED
Start: 2019-10-31 | End: 2019-10-31

## 2019-10-31 RX ADMIN — HEPARIN SODIUM (PORCINE) LOCK FLUSH IV SOLN 100 UNIT/ML 500 UNITS: 100 SOLUTION INTRAVENOUS at 16:25:00

## 2019-10-31 NOTE — PROGRESS NOTES
Cancer Center Progress Note    Patient Name: Gloria City   YOB: 1955   Medical Record Number: [id number]   Attending Physician: Leticia Bryant M.D.      Chief Complaint:  Breast cancer (PCP: Dr Renny Medina)    Oncology History:  60year old pos of malignancy in the dermal lymphatics or the overlying skin.   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 , Disp: 30 tablet, Rfl: 0  •  Senna-Docusate Sodium 8.6-50 MG Oral Tab, Take 2 tablets by mouth nightly. , above    Vital Signs:  /79 (BP Location: Right arm, Patient Position: Sitting)   Pulse 96   Temp 98 °F (36.7 °C) (Oral)   Resp 18   Ht 1.626 m (5' 4\")   Wt 86.6 kg (191 lb)   LMP 07/14/2006   SpO2 95%   BMI 32.79 kg/m²     Physical Examination:  Per Cholelithiasis. No biliary ductal dilatation. 6.  Small hiatal hernia.    Dictated by (CST): Óscar Whitehead MD on 6/29/2019 at 12:17     Approved by (CST): Óscar Whitehead MD on 6/29/2019 at 12:35          Impression and Plan:  Cancer Staging  Malignant neoplas

## 2019-10-31 NOTE — PROGRESS NOTES
Pt here for C4D1 AC.   Arrives Ambulating independently, accompanied by Friend       Patient denies possible pregnancy since last therapy cycle: Not Applicable    Modifications in dose or schedule: No.      Labs appropriate for treatment which were drawn ye

## 2019-11-01 ENCOUNTER — NURSE ONLY (OUTPATIENT)
Dept: HEMATOLOGY/ONCOLOGY | Facility: HOSPITAL | Age: 64
End: 2019-11-01
Attending: INTERNAL MEDICINE
Payer: COMMERCIAL

## 2019-11-01 VITALS
RESPIRATION RATE: 18 BRPM | TEMPERATURE: 98 F | SYSTOLIC BLOOD PRESSURE: 128 MMHG | DIASTOLIC BLOOD PRESSURE: 71 MMHG | HEART RATE: 88 BPM

## 2019-11-01 DIAGNOSIS — C50.812 MALIGNANT NEOPLASM OF OVERLAPPING SITES OF LEFT BREAST IN FEMALE, ESTROGEN RECEPTOR POSITIVE (HCC): Primary | ICD-10-CM

## 2019-11-01 DIAGNOSIS — Z17.0 MALIGNANT NEOPLASM OF OVERLAPPING SITES OF LEFT BREAST IN FEMALE, ESTROGEN RECEPTOR POSITIVE (HCC): Primary | ICD-10-CM

## 2019-11-01 PROCEDURE — 96372 THER/PROPH/DIAG INJ SC/IM: CPT

## 2019-11-01 NOTE — PROGRESS NOTES
Kim to infusion today for Udenyca injection. She arrives alert and independent. Reports she is feeling well today except for having some nausea and fatigue. . Taking anti-emetics as needed.    Patient reports doing well with past Udencya injections, alread

## 2019-11-04 ENCOUNTER — APPOINTMENT (OUTPATIENT)
Dept: PHYSICAL THERAPY | Facility: HOSPITAL | Age: 64
End: 2019-11-04
Attending: SURGERY
Payer: COMMERCIAL

## 2019-11-05 ENCOUNTER — OFFICE VISIT (OUTPATIENT)
Dept: PHYSICAL THERAPY | Facility: HOSPITAL | Age: 64
End: 2019-11-05
Attending: SURGERY
Payer: COMMERCIAL

## 2019-11-05 PROCEDURE — 97140 MANUAL THERAPY 1/> REGIONS: CPT

## 2019-11-05 PROCEDURE — 97110 THERAPEUTIC EXERCISES: CPT

## 2019-11-05 NOTE — PROGRESS NOTES
Referring Physician: Dr. Nereida Mcdaniel  Diagnosis:  Malignant neoplasm of overlapping sites of left breast in female, estrogen receptor positive [facility] (C50.812,Z17.0)  History of lymph node dissection of left axilla (G81.631)  History of mastectomy, bilateral (Z9 Indurations noted B upper trap from previous bra straps               Protracted scapula and forward head  AROM/Strength:                 L shld AROM flex 65, abd 50               R shld AROM flex 75, abd 75     Edema/Tissue Observations:   Patient arrived axilla, and trunk and entire L arm. STM to L axilla/UE (areas of cording) in axilla. .  No cording visible or palpable today at elbow. STM and MLD provided to decrease swelling and  to improve tissue mobility.   Extra time spent with pt in R side lying to cording visible or palpable today at elbow. STM and MLD provided to decrease swelling and  to improve tissue mobility. Extra time spent with pt in R side lying to work on L trunk.   Lotion used on B chest, axilla, upper arms      Compression:  Pt only wea garments due to the higher risk of lymphedema - MET  2) Pt to be independent with self MLD, ROM exercises, and donning/doffing compression bandages/garments to facilitate swelling reduction and to be independent at self management once discharged -   3) In

## 2019-11-06 ENCOUNTER — OFFICE VISIT (OUTPATIENT)
Dept: HEMATOLOGY/ONCOLOGY | Facility: HOSPITAL | Age: 64
End: 2019-11-06
Attending: INTERNAL MEDICINE
Payer: COMMERCIAL

## 2019-11-06 VITALS
WEIGHT: 187 LBS | DIASTOLIC BLOOD PRESSURE: 61 MMHG | SYSTOLIC BLOOD PRESSURE: 114 MMHG | RESPIRATION RATE: 18 BRPM | OXYGEN SATURATION: 100 % | TEMPERATURE: 98 F | BODY MASS INDEX: 32 KG/M2 | HEART RATE: 96 BPM

## 2019-11-06 VITALS
HEIGHT: 64 IN | SYSTOLIC BLOOD PRESSURE: 114 MMHG | WEIGHT: 187 LBS | RESPIRATION RATE: 18 BRPM | BODY MASS INDEX: 31.92 KG/M2 | DIASTOLIC BLOOD PRESSURE: 61 MMHG | HEART RATE: 96 BPM | OXYGEN SATURATION: 100 % | TEMPERATURE: 98 F

## 2019-11-06 DIAGNOSIS — R11.0 NAUSEA: Primary | ICD-10-CM

## 2019-11-06 DIAGNOSIS — Z79.899 ENCOUNTER FOR MEDICATION MANAGEMENT: ICD-10-CM

## 2019-11-06 DIAGNOSIS — C50.812 MALIGNANT NEOPLASM OF OVERLAPPING SITES OF LEFT BREAST IN FEMALE, ESTROGEN RECEPTOR POSITIVE (HCC): Primary | ICD-10-CM

## 2019-11-06 DIAGNOSIS — Z17.0 MALIGNANT NEOPLASM OF OVERLAPPING SITES OF LEFT BREAST IN FEMALE, ESTROGEN RECEPTOR POSITIVE (HCC): Primary | ICD-10-CM

## 2019-11-06 DIAGNOSIS — T45.1X5A CHEMOTHERAPY INDUCED NAUSEA AND VOMITING: ICD-10-CM

## 2019-11-06 DIAGNOSIS — T45.1X5A CHEMOTHERAPY INDUCED NEUTROPENIA (HCC): ICD-10-CM

## 2019-11-06 DIAGNOSIS — D70.1 CHEMOTHERAPY INDUCED NEUTROPENIA (HCC): ICD-10-CM

## 2019-11-06 DIAGNOSIS — E86.0 DEHYDRATION: ICD-10-CM

## 2019-11-06 DIAGNOSIS — Z45.2 ENCOUNTER FOR CENTRAL LINE CARE: ICD-10-CM

## 2019-11-06 DIAGNOSIS — R11.2 CHEMOTHERAPY INDUCED NAUSEA AND VOMITING: ICD-10-CM

## 2019-11-06 DIAGNOSIS — T45.1X5A CHEMOTHERAPY ADVERSE REACTION, INITIAL ENCOUNTER: ICD-10-CM

## 2019-11-06 PROCEDURE — 96374 THER/PROPH/DIAG INJ IV PUSH: CPT

## 2019-11-06 PROCEDURE — 85007 BL SMEAR W/DIFF WBC COUNT: CPT

## 2019-11-06 PROCEDURE — 80053 COMPREHEN METABOLIC PANEL: CPT

## 2019-11-06 PROCEDURE — 36591 DRAW BLOOD OFF VENOUS DEVICE: CPT

## 2019-11-06 PROCEDURE — 85025 COMPLETE CBC W/AUTO DIFF WBC: CPT

## 2019-11-06 PROCEDURE — 85027 COMPLETE CBC AUTOMATED: CPT

## 2019-11-06 PROCEDURE — 96361 HYDRATE IV INFUSION ADD-ON: CPT

## 2019-11-06 PROCEDURE — 99215 OFFICE O/P EST HI 40 MIN: CPT | Performed by: INTERNAL MEDICINE

## 2019-11-06 RX ORDER — 0.9 % SODIUM CHLORIDE 0.9 %
10 VIAL (ML) INJECTION ONCE
Status: CANCELLED | OUTPATIENT
Start: 2019-11-06

## 2019-11-06 RX ORDER — 0.9 % SODIUM CHLORIDE 0.9 %
VIAL (ML) INJECTION
Status: DISCONTINUED
Start: 2019-11-06 | End: 2019-11-06

## 2019-11-06 RX ORDER — HEPARIN SODIUM (PORCINE) LOCK FLUSH IV SOLN 100 UNIT/ML 100 UNIT/ML
5 SOLUTION INTRAVENOUS ONCE
Status: COMPLETED | OUTPATIENT
Start: 2019-11-06 | End: 2019-11-06

## 2019-11-06 RX ORDER — HEPARIN SODIUM (PORCINE) LOCK FLUSH IV SOLN 100 UNIT/ML 100 UNIT/ML
5 SOLUTION INTRAVENOUS ONCE
Status: CANCELLED | OUTPATIENT
Start: 2019-11-06

## 2019-11-06 RX ORDER — HEPARIN SODIUM (PORCINE) LOCK FLUSH IV SOLN 100 UNIT/ML 100 UNIT/ML
SOLUTION INTRAVENOUS
Status: COMPLETED
Start: 2019-11-06 | End: 2019-11-06

## 2019-11-06 RX ORDER — DEXAMETHASONE SODIUM PHOSPHATE 4 MG/ML
4 VIAL (ML) INJECTION ONCE
Status: COMPLETED | OUTPATIENT
Start: 2019-11-06 | End: 2019-11-06

## 2019-11-06 RX ORDER — DEXAMETHASONE SODIUM PHOSPHATE 4 MG/ML
VIAL (ML) INJECTION
Status: COMPLETED
Start: 2019-11-06 | End: 2019-11-06

## 2019-11-06 RX ORDER — 0.9 % SODIUM CHLORIDE 0.9 %
500 INTRAVENOUS SOLUTION INTRAVENOUS ONCE
Status: COMPLETED | OUTPATIENT
Start: 2019-11-06 | End: 2019-11-06

## 2019-11-06 RX ADMIN — HEPARIN SODIUM (PORCINE) LOCK FLUSH IV SOLN 100 UNIT/ML 500 UNITS: 100 SOLUTION INTRAVENOUS at 16:56:00

## 2019-11-06 RX ADMIN — DEXAMETHASONE SODIUM PHOSPHATE 4 MG: 4 MG/ML VIAL (ML) INJECTION at 16:25:00

## 2019-11-06 RX ADMIN — 0.9 % SODIUM CHLORIDE 500 ML: 0.9 % INTRAVENOUS SOLUTION INTRAVENOUS at 16:22:00

## 2019-11-06 RX ADMIN — HEPARIN SODIUM (PORCINE) LOCK FLUSH IV SOLN 100 UNIT/ML 500 UNITS: 100 SOLUTION INTRAVENOUS at 15:38:00

## 2019-11-06 NOTE — PROGRESS NOTES
Cancer Center Progress Note    Patient Name: Cynthia Thibodeaux   YOB: 1955   Medical Record Number: [id number]   Attending Physician: Gm Flynn M.D.      Chief Complaint:  Breast cancer (PCP: Dr Gopal Tijerina)    Oncology History:  60year old pos of malignancy in the dermal lymphatics or the overlying skin.   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 , Disp: 30 tablet, Rfl: 0  •  Senna-Docusate Sodium 8.6-50 MG Oral Tab, Take 2 t except as listed above    Vital Signs:  /61 (BP Location: Right arm, Patient Position: Sitting, Cuff Size: large)   Pulse 96   Temp 97.7 °F (36.5 °C) (Oral)   Resp 18   Ht 1.626 m (5' 4\")   Wt 84.8 kg (187 lb)   LMP 07/14/2006   SpO2 100%   BMI 32. 1 Nonobstructing left renal stone. 5.  Cholelithiasis. No biliary ductal dilatation. 6.  Small hiatal hernia.    Dictated by (CST): Anette Bernard MD on 6/29/2019 at 12:17     Approved by (CST): Anette Bernard MD on 6/29/2019 at 12:35          Impression and Sonia

## 2019-11-06 NOTE — PROGRESS NOTES
Pt added on for decadron and 500mL hydration. States she is experiencing some nausea, may be anticipatory. Had C4 AC last week and saw Dr Jules Dangelo in F/U today. Discharged from infusion, ambulating independently w/ steady gait.

## 2019-11-12 ENCOUNTER — OFFICE VISIT (OUTPATIENT)
Dept: PHYSICAL THERAPY | Facility: HOSPITAL | Age: 64
End: 2019-11-12
Attending: SURGERY
Payer: COMMERCIAL

## 2019-11-12 PROCEDURE — 97140 MANUAL THERAPY 1/> REGIONS: CPT

## 2019-11-12 PROCEDURE — 97110 THERAPEUTIC EXERCISES: CPT

## 2019-11-12 NOTE — PROGRESS NOTES
Referring Physician: Dr. Alpesh Hollis  Diagnosis:  Malignant neoplasm of overlapping sites of left breast in female, estrogen receptor positive [facility] (C50.812,Z17.0)  History of lymph node dissection of left axilla (W01.514)  History of mastectomy, bilateral (Z9 4+/5      Evaluation Measurements: (8/20/2019)     Posture:                Significant guarding BUE, especially the left               Tightness of B upper trap and levator.  Indurations noted B upper trap from previous bra straps               Protracted s needed. Compression:  Anticipate that pt will have her compression sleeve by next week. CCL1 elvarex soft sleeve. Manual Therapy (45 minutes)  STM: provided STM w/ MLD techniques to L chest, axilla, and trunk and entire L arm.   STM to L axilla/UE (area trunk. Manual Therapy (60minutes)  STM: provided STM w/ MLD techniques to L chest, axilla, and trunk and entire L arm. STM to L axilla/UE (areas of cording not visible today) in axilla. .  No cording visible or palpable today at elbow.   STM and MLD provide initially. AROM B shoulder OH stretch in supine and sitting. *REINFORCED WITH PATIENT THE IMPORTANCE OF DAILY STRETCHING TO MAINTAIN GAINS FROM THERPAY. There ex (15minutes)  Remeasured AROM B shoulders and strength.   Supine AA/PROM stretching B should performing household chores -MET       Plan:   Cont w/ Complete Decongestive Therapy to decrease swelling, stretching, and progress towards strengthening      Charges: MM3, ex1   Total Timed Treatment: 53 min  Total Treatment Time: 53 min

## 2019-11-14 ENCOUNTER — OFFICE VISIT (OUTPATIENT)
Dept: HEMATOLOGY/ONCOLOGY | Facility: HOSPITAL | Age: 64
End: 2019-11-14
Attending: INTERNAL MEDICINE
Payer: COMMERCIAL

## 2019-11-14 VITALS
DIASTOLIC BLOOD PRESSURE: 87 MMHG | OXYGEN SATURATION: 96 % | SYSTOLIC BLOOD PRESSURE: 142 MMHG | HEART RATE: 105 BPM | RESPIRATION RATE: 18 BRPM | TEMPERATURE: 98 F | WEIGHT: 187 LBS | BODY MASS INDEX: 32 KG/M2

## 2019-11-14 VITALS
TEMPERATURE: 98 F | SYSTOLIC BLOOD PRESSURE: 142 MMHG | WEIGHT: 187 LBS | DIASTOLIC BLOOD PRESSURE: 87 MMHG | RESPIRATION RATE: 18 BRPM | BODY MASS INDEX: 31.92 KG/M2 | OXYGEN SATURATION: 96 % | HEART RATE: 105 BPM | HEIGHT: 64 IN

## 2019-11-14 DIAGNOSIS — I89.0 LYMPHEDEMA OF ARM: ICD-10-CM

## 2019-11-14 DIAGNOSIS — D70.1 CHEMOTHERAPY INDUCED NEUTROPENIA (HCC): ICD-10-CM

## 2019-11-14 DIAGNOSIS — Z17.0 MALIGNANT NEOPLASM OF OVERLAPPING SITES OF LEFT BREAST IN FEMALE, ESTROGEN RECEPTOR POSITIVE (HCC): Primary | ICD-10-CM

## 2019-11-14 DIAGNOSIS — Z45.2 ENCOUNTER FOR CENTRAL LINE CARE: ICD-10-CM

## 2019-11-14 DIAGNOSIS — C50.812 MALIGNANT NEOPLASM OF OVERLAPPING SITES OF LEFT BREAST IN FEMALE, ESTROGEN RECEPTOR POSITIVE (HCC): Primary | ICD-10-CM

## 2019-11-14 DIAGNOSIS — Z51.11 CHEMOTHERAPY MANAGEMENT, ENCOUNTER FOR: ICD-10-CM

## 2019-11-14 DIAGNOSIS — T45.1X5A CHEMOTHERAPY INDUCED NEUTROPENIA (HCC): ICD-10-CM

## 2019-11-14 DIAGNOSIS — R11.2 CHEMOTHERAPY INDUCED NAUSEA AND VOMITING: ICD-10-CM

## 2019-11-14 DIAGNOSIS — T45.1X5A CHEMOTHERAPY INDUCED NAUSEA AND VOMITING: ICD-10-CM

## 2019-11-14 DIAGNOSIS — Z79.899 ENCOUNTER FOR MEDICATION MANAGEMENT: ICD-10-CM

## 2019-11-14 PROCEDURE — 96413 CHEMO IV INFUSION 1 HR: CPT

## 2019-11-14 PROCEDURE — 99215 OFFICE O/P EST HI 40 MIN: CPT | Performed by: INTERNAL MEDICINE

## 2019-11-14 PROCEDURE — 85025 COMPLETE CBC W/AUTO DIFF WBC: CPT

## 2019-11-14 PROCEDURE — 80053 COMPREHEN METABOLIC PANEL: CPT

## 2019-11-14 PROCEDURE — 96375 TX/PRO/DX INJ NEW DRUG ADDON: CPT

## 2019-11-14 PROCEDURE — S0028 INJECTION, FAMOTIDINE, 20 MG: HCPCS

## 2019-11-14 RX ORDER — DIPHENHYDRAMINE HYDROCHLORIDE 50 MG/ML
INJECTION INTRAMUSCULAR; INTRAVENOUS
Status: COMPLETED
Start: 2019-11-14 | End: 2019-11-14

## 2019-11-14 RX ORDER — HEPARIN SODIUM (PORCINE) LOCK FLUSH IV SOLN 100 UNIT/ML 100 UNIT/ML
5 SOLUTION INTRAVENOUS ONCE
Status: CANCELLED | OUTPATIENT
Start: 2019-11-14

## 2019-11-14 RX ORDER — DIPHENHYDRAMINE HYDROCHLORIDE 50 MG/ML
25 INJECTION INTRAMUSCULAR; INTRAVENOUS ONCE
Status: COMPLETED | OUTPATIENT
Start: 2019-11-14 | End: 2019-11-14

## 2019-11-14 RX ORDER — 0.9 % SODIUM CHLORIDE 0.9 %
10 VIAL (ML) INJECTION ONCE
Status: CANCELLED | OUTPATIENT
Start: 2019-11-14

## 2019-11-14 RX ORDER — FAMOTIDINE 10 MG/ML
20 INJECTION, SOLUTION INTRAVENOUS ONCE
Status: COMPLETED | OUTPATIENT
Start: 2019-11-14 | End: 2019-11-14

## 2019-11-14 RX ORDER — FAMOTIDINE 10 MG/ML
INJECTION, SOLUTION INTRAVENOUS
Status: COMPLETED
Start: 2019-11-14 | End: 2019-11-14

## 2019-11-14 RX ORDER — FAMOTIDINE 10 MG/ML
20 INJECTION, SOLUTION INTRAVENOUS ONCE
Status: CANCELLED | OUTPATIENT
Start: 2019-11-14

## 2019-11-14 RX ORDER — 0.9 % SODIUM CHLORIDE 0.9 %
VIAL (ML) INJECTION
Status: DISCONTINUED
Start: 2019-11-14 | End: 2019-11-14 | Stop reason: WASHOUT

## 2019-11-14 RX ORDER — DIPHENHYDRAMINE HYDROCHLORIDE 50 MG/ML
25 INJECTION INTRAMUSCULAR; INTRAVENOUS ONCE
Status: CANCELLED | OUTPATIENT
Start: 2019-11-14

## 2019-11-14 RX ORDER — 0.9 % SODIUM CHLORIDE 0.9 %
VIAL (ML) INJECTION
Status: DISCONTINUED
Start: 2019-11-14 | End: 2019-11-14

## 2019-11-14 RX ORDER — HEPARIN SODIUM (PORCINE) LOCK FLUSH IV SOLN 100 UNIT/ML 100 UNIT/ML
5 SOLUTION INTRAVENOUS ONCE
Status: COMPLETED | OUTPATIENT
Start: 2019-11-14 | End: 2019-11-14

## 2019-11-14 RX ADMIN — FAMOTIDINE 20 MG: 10 INJECTION, SOLUTION INTRAVENOUS at 14:32:00

## 2019-11-14 RX ADMIN — HEPARIN SODIUM (PORCINE) LOCK FLUSH IV SOLN 100 UNIT/ML 500 UNITS: 100 SOLUTION INTRAVENOUS at 16:56:00

## 2019-11-14 RX ADMIN — DIPHENHYDRAMINE HYDROCHLORIDE 25 MG: 50 INJECTION INTRAMUSCULAR; INTRAVENOUS at 14:36:00

## 2019-11-14 NOTE — PATIENT INSTRUCTIONS
Oral Care  Keep your mouth clean.   Rinse your mouth before and after meals with plain water or with a mild mouth rinse (made with 8 ounces of water, 1/2 teaspoon salt, and 1/2 teaspoon baking soda, shake before using)   · Avoid commercial mouthwashes that

## 2019-11-14 NOTE — PROGRESS NOTES
Pt here for C5D1 TAXOL. Arrives Ambulating independently, accompanied by Friend       Patient denies possible pregnancy since last therapy cycle: Not Applicable    Modifications in dose or schedule: No.      Patient received from MD visit.  Pt reports inte

## 2019-11-15 NOTE — PROGRESS NOTES
Cancer Center Progress Note    Patient Name: Aishwarya Spring   YOB: 1955   Medical Record Number: [id number]   Attending Physician: Rosa May M.D.      Chief Complaint:  Breast cancer (PCP: Dr Justin Yang)    Oncology History:  60year old pos of malignancy in the dermal lymphatics or the overlying skin.   [place] lymph node was positive with extracapsular extension she proceeded to have axillary dissection which showed 2 of 22 regional lymph nodes had metastatic cancer without extracapsular ext mcg total) by mouth daily. , Disp: 30 tablet, Rfl: 0  •  Senna-Docusate Sodium 8.6-50 MG Oral Tab, Take 2 tablets by mouth nightly., Disp: 60 tablet, Rfl: 1  •  Misc.  Devices Does not apply Misc, For hair prosthesis, Disp: 1 Units, Rfl: 0  •  lidocaine-pril 18   Ht 1.626 m (5' 4\")   Wt 84.8 kg (187 lb)   LMP 07/14/2006   SpO2 96%   BMI 32.10 kg/m²     Physical Examination:  Performance Status:  General: Patient is alert and oriented x 3, not in acute distress. HEENT: Oropharynx is clear.    Heart: regular  C Approved by (CST): Vipul Soto MD on 6/29/2019 at 12:35          Impression and Plan:  Cancer Staging  Malignant neoplasm of overlapping sites of left breast in female, estrogen receptor positive ([facility]  Staging form: Breast, AJCC 8th Edition  - Clinical

## 2019-11-18 ENCOUNTER — APPOINTMENT (OUTPATIENT)
Dept: PHYSICAL THERAPY | Facility: HOSPITAL | Age: 64
End: 2019-11-18
Attending: SURGERY
Payer: COMMERCIAL

## 2019-11-20 ENCOUNTER — OFFICE VISIT (OUTPATIENT)
Dept: PHYSICAL THERAPY | Facility: HOSPITAL | Age: 64
End: 2019-11-20
Attending: SURGERY
Payer: COMMERCIAL

## 2019-11-20 PROCEDURE — 97140 MANUAL THERAPY 1/> REGIONS: CPT

## 2019-11-20 PROCEDURE — 97110 THERAPEUTIC EXERCISES: CPT

## 2019-11-20 NOTE — PROGRESS NOTES
Referring Physician: Dr. Alpesh Hollis  Diagnosis:  Malignant neoplasm of overlapping sites of left breast in female, estrogen receptor positive [facility] (C50.812,Z17.0)  History of lymph node dissection of left axilla (L03.583)  History of mastectomy, bilateral (Z9 pillow and bolster under knees   LIMB POSITION 75 deg abduction 75 deg abduction   STARTING POINT 17cm from 3rd cuticle 17cm from 3rd cuticle   LEFT HAND VOLUME 350 345   MEASUREMENT A 16.3 16   MEASUREMENT B 17 16.8   MEASUREMENT C 21.8 22   MEASUREMENT D Indurations noted B upper trap from previous bra straps               Protracted scapula and forward head  AROM/Strength:                 L shld AROM flex 65, abd 50               R shld AROM flex 75, abd 75     Edema/Tissue Observations:   Patient arrived purchase the same bra in a larger size and was encouraged to try the larger size for now. Pt only wearing her compression sleeve in the evenings after work. \"It is really hard to get it on in the morning before school.   I been extremely fatigued from previous swelling which may be d/t better fitting bra and pt using foam compression for L trunk.   Pt continues to benefit from therapy while completing her chemotherapy to improve strength, address cording in L axilla and to continue to assess swelling of

## 2019-11-21 ENCOUNTER — OFFICE VISIT (OUTPATIENT)
Dept: HEMATOLOGY/ONCOLOGY | Facility: HOSPITAL | Age: 64
End: 2019-11-21
Attending: INTERNAL MEDICINE
Payer: COMMERCIAL

## 2019-11-21 VITALS
HEART RATE: 98 BPM | BODY MASS INDEX: 31.79 KG/M2 | HEIGHT: 64 IN | SYSTOLIC BLOOD PRESSURE: 122 MMHG | DIASTOLIC BLOOD PRESSURE: 75 MMHG | OXYGEN SATURATION: 99 % | RESPIRATION RATE: 18 BRPM | TEMPERATURE: 99 F | WEIGHT: 186.19 LBS

## 2019-11-21 VITALS
DIASTOLIC BLOOD PRESSURE: 75 MMHG | TEMPERATURE: 99 F | BODY MASS INDEX: 31.76 KG/M2 | HEIGHT: 64 IN | WEIGHT: 186 LBS | SYSTOLIC BLOOD PRESSURE: 122 MMHG | RESPIRATION RATE: 18 BRPM | HEART RATE: 98 BPM | OXYGEN SATURATION: 99 %

## 2019-11-21 DIAGNOSIS — Z45.2 ENCOUNTER FOR CENTRAL LINE CARE: ICD-10-CM

## 2019-11-21 DIAGNOSIS — Z17.0 MALIGNANT NEOPLASM OF OVERLAPPING SITES OF LEFT BREAST IN FEMALE, ESTROGEN RECEPTOR POSITIVE (HCC): Primary | ICD-10-CM

## 2019-11-21 DIAGNOSIS — C50.812 MALIGNANT NEOPLASM OF OVERLAPPING SITES OF LEFT BREAST IN FEMALE, ESTROGEN RECEPTOR POSITIVE (HCC): Primary | ICD-10-CM

## 2019-11-21 DIAGNOSIS — Z79.899 ENCOUNTER FOR MEDICATION MANAGEMENT: ICD-10-CM

## 2019-11-21 DIAGNOSIS — R11.2 CHEMOTHERAPY INDUCED NAUSEA AND VOMITING: ICD-10-CM

## 2019-11-21 DIAGNOSIS — K12.30 MUCOSITIS: ICD-10-CM

## 2019-11-21 DIAGNOSIS — Z51.11 CHEMOTHERAPY MANAGEMENT, ENCOUNTER FOR: ICD-10-CM

## 2019-11-21 DIAGNOSIS — I89.0 LYMPHEDEMA OF ARM: ICD-10-CM

## 2019-11-21 DIAGNOSIS — L27.1 PALMAR PLANTAR ERYTHRODYSAESTHESIA: ICD-10-CM

## 2019-11-21 DIAGNOSIS — T45.1X5A CHEMOTHERAPY INDUCED NAUSEA AND VOMITING: ICD-10-CM

## 2019-11-21 PROCEDURE — 80053 COMPREHEN METABOLIC PANEL: CPT

## 2019-11-21 PROCEDURE — 96413 CHEMO IV INFUSION 1 HR: CPT

## 2019-11-21 PROCEDURE — S0028 INJECTION, FAMOTIDINE, 20 MG: HCPCS

## 2019-11-21 PROCEDURE — 96375 TX/PRO/DX INJ NEW DRUG ADDON: CPT

## 2019-11-21 PROCEDURE — 85025 COMPLETE CBC W/AUTO DIFF WBC: CPT

## 2019-11-21 PROCEDURE — 99215 OFFICE O/P EST HI 40 MIN: CPT | Performed by: INTERNAL MEDICINE

## 2019-11-21 RX ORDER — 0.9 % SODIUM CHLORIDE 0.9 %
10 VIAL (ML) INJECTION ONCE
Status: CANCELLED | OUTPATIENT
Start: 2019-11-21

## 2019-11-21 RX ORDER — FAMOTIDINE 10 MG/ML
20 INJECTION, SOLUTION INTRAVENOUS ONCE
Status: COMPLETED | OUTPATIENT
Start: 2019-11-21 | End: 2019-11-21

## 2019-11-21 RX ORDER — FAMOTIDINE 10 MG/ML
20 INJECTION, SOLUTION INTRAVENOUS ONCE
Status: CANCELLED | OUTPATIENT
Start: 2019-11-21

## 2019-11-21 RX ORDER — LORATADINE 10 MG/1
10 TABLET ORAL DAILY
Qty: 90 TABLET | Refills: 1 | Status: SHIPPED | OUTPATIENT
Start: 2019-11-21 | End: 2019-12-19

## 2019-11-21 RX ORDER — DIPHENHYDRAMINE HYDROCHLORIDE 50 MG/ML
25 INJECTION INTRAMUSCULAR; INTRAVENOUS ONCE
Status: CANCELLED | OUTPATIENT
Start: 2019-11-21

## 2019-11-21 RX ORDER — 0.9 % SODIUM CHLORIDE 0.9 %
VIAL (ML) INJECTION
Status: DISCONTINUED
Start: 2019-11-21 | End: 2019-11-21

## 2019-11-21 RX ORDER — FAMOTIDINE 10 MG/ML
INJECTION, SOLUTION INTRAVENOUS
Status: COMPLETED
Start: 2019-11-21 | End: 2019-11-21

## 2019-11-21 RX ORDER — HEPARIN SODIUM (PORCINE) LOCK FLUSH IV SOLN 100 UNIT/ML 100 UNIT/ML
5 SOLUTION INTRAVENOUS ONCE
Status: CANCELLED | OUTPATIENT
Start: 2019-11-21

## 2019-11-21 RX ORDER — HEPARIN SODIUM (PORCINE) LOCK FLUSH IV SOLN 100 UNIT/ML 100 UNIT/ML
SOLUTION INTRAVENOUS
Status: COMPLETED
Start: 2019-11-21 | End: 2019-11-21

## 2019-11-21 RX ORDER — DIPHENHYDRAMINE HYDROCHLORIDE 50 MG/ML
25 INJECTION INTRAMUSCULAR; INTRAVENOUS ONCE
Status: COMPLETED | OUTPATIENT
Start: 2019-11-21 | End: 2019-11-21

## 2019-11-21 RX ORDER — DIPHENHYDRAMINE HYDROCHLORIDE 50 MG/ML
INJECTION INTRAMUSCULAR; INTRAVENOUS
Status: COMPLETED
Start: 2019-11-21 | End: 2019-11-21

## 2019-11-21 RX ADMIN — HEPARIN SODIUM (PORCINE) LOCK FLUSH IV SOLN 100 UNIT/ML 500 UNITS: 100 SOLUTION INTRAVENOUS at 17:07:00

## 2019-11-21 RX ADMIN — DIPHENHYDRAMINE HYDROCHLORIDE 25 MG: 50 INJECTION INTRAMUSCULAR; INTRAVENOUS at 15:17:00

## 2019-11-21 RX ADMIN — FAMOTIDINE 20 MG: 10 INJECTION, SOLUTION INTRAVENOUS at 15:21:00

## 2019-11-21 NOTE — PROGRESS NOTES
Pt here for C5D8 TAXOL. Arrives Ambulating independently, accompanied by Friend       Patient denies possible pregnancy since last therapy cycle: Not Applicable    Modifications in dose or schedule: No.      Patient received from MD visit.  Pt reports inte

## 2019-11-21 NOTE — PROGRESS NOTES
Cancer Center Progress Note    Patient Name: Kunal Fraire   YOB: 1955   Medical Record Number: [id number]   Attending Physician: Bianca Gonzalez M.D.      Chief Complaint:  Breast cancer (PCP: Dr Alyssa Prajapati)    Oncology History:  60year old pos of malignancy in the dermal lymphatics or the overlying skin.   [place] lymph node was positive with extracapsular extension she proceeded to have axillary dissection which showed 2 of 22 regional lymph nodes had metastatic cancer without extracapsular ext Tab, Take 1 tablet (10 mg total) by mouth daily. , Disp: 90 tablet, Rfl: 1  •  Vitamin B-12 1000 MCG Oral Tab, Take 1 tablet (1,000 mcg total) by mouth daily. , Disp: 30 tablet, Rfl: 0  •  Senna-Docusate Sodium 8.6-50 MG Oral Tab, Take 2 tablets by mouth nig kg/m²     Physical Examination:  Performance Status:  General: Patient is alert and oriented x 3, not in acute distress.   HEENT: Oropharynx iswith 5mm grade 1 ulceration lower lip x 2   Heart: regular  Chest:  nonlabored breathing  Breast: Bilateral mastec 12: 35          Impression and Plan:  Cancer Staging  Malignant neoplasm of overlapping sites of left breast in female, estrogen receptor positive ([facility]  Staging form: Breast, AJCC 8th Edition  - Clinical stage from 6/20/2019: Stage IIA (cT3(2), cN0, cM0, G

## 2019-11-25 ENCOUNTER — OFFICE VISIT (OUTPATIENT)
Dept: PHYSICAL THERAPY | Facility: HOSPITAL | Age: 64
End: 2019-11-25
Attending: SURGERY
Payer: COMMERCIAL

## 2019-11-25 PROCEDURE — 97140 MANUAL THERAPY 1/> REGIONS: CPT

## 2019-11-25 PROCEDURE — 97110 THERAPEUTIC EXERCISES: CPT

## 2019-11-25 NOTE — PROGRESS NOTES
Referring Physician: Dr. Ronny Cannon  Diagnosis:  Malignant neoplasm of overlapping sites of left breast in female, [facility] (C50.812,Z17.0)  History of lymph node dissection of left axilla (R76.904)  History of mastectomy, bilateral (Z9 pillow and bolster under knees   LIMB POSITION 75 deg abduction 75 deg abduction   STARTING POINT 17cm from 3rd cuticle 17cm from 3rd cuticle   LEFT HAND VOLUME 350 345   MEASUREMENT A 16.3 16   MEASUREMENT B 17 16.8   MEASUREMENT C 21.8 22   MEASUREMENT D Indurations noted B upper trap from previous bra straps               Protracted scapula and forward head  AROM/Strength:                 L shld AROM flex 65, abd 50               R shld AROM flex 75, abd 75     Edema/Tissue Observations:   Patient arrived that she did purchase the same bra in a larger size and was encouraged to try the larger size for now. Pt only wearing her compression sleeve in the evenings after work. \"It is really hard to get it on in the morning before school.   I been extremely f bra.  Assisted patient with donning CCL1 compression sleeve at end of session. *Fitter reached out to patient and pt is ordering more compression sleeves/gloves.      Therer Ex 15 minutes  Supine AA/PROM B Shoulders  Prone: T, W, Y, Extension x 10 each strengthening      Charges: MM2 ex1   Total Timed Treatment: 50 min  Total Treatment Time: 50 min

## 2019-11-27 ENCOUNTER — APPOINTMENT (OUTPATIENT)
Dept: INTEGRATIVE MEDICINE | Facility: HOSPITAL | Age: 64
End: 2019-11-27
Attending: PHYSICIAN ASSISTANT

## 2019-11-27 ENCOUNTER — NURSE ONLY (OUTPATIENT)
Dept: HEMATOLOGY/ONCOLOGY | Facility: HOSPITAL | Age: 64
End: 2019-11-27
Attending: INTERNAL MEDICINE
Payer: COMMERCIAL

## 2019-11-27 VITALS
SYSTOLIC BLOOD PRESSURE: 129 MMHG | OXYGEN SATURATION: 96 % | RESPIRATION RATE: 18 BRPM | HEIGHT: 64 IN | TEMPERATURE: 98 F | WEIGHT: 186 LBS | DIASTOLIC BLOOD PRESSURE: 71 MMHG | HEART RATE: 110 BPM | BODY MASS INDEX: 31.76 KG/M2

## 2019-11-27 DIAGNOSIS — Z17.0 MALIGNANT NEOPLASM OF OVERLAPPING SITES OF LEFT BREAST IN FEMALE, ESTROGEN RECEPTOR POSITIVE (HCC): Primary | ICD-10-CM

## 2019-11-27 DIAGNOSIS — Z45.2 ENCOUNTER FOR CENTRAL LINE CARE: ICD-10-CM

## 2019-11-27 DIAGNOSIS — T45.1X5A CHEMOTHERAPY INDUCED NAUSEA AND VOMITING: ICD-10-CM

## 2019-11-27 DIAGNOSIS — F19.982 DRUG-INDUCED INSOMNIA (HCC): ICD-10-CM

## 2019-11-27 DIAGNOSIS — C50.812 MALIGNANT NEOPLASM OF OVERLAPPING SITES OF LEFT BREAST IN FEMALE, ESTROGEN RECEPTOR POSITIVE (HCC): Primary | ICD-10-CM

## 2019-11-27 DIAGNOSIS — Z79.899 ENCOUNTER FOR MEDICATION MANAGEMENT: ICD-10-CM

## 2019-11-27 DIAGNOSIS — Z51.11 CHEMOTHERAPY MANAGEMENT, ENCOUNTER FOR: ICD-10-CM

## 2019-11-27 DIAGNOSIS — F41.9 ANXIETY: ICD-10-CM

## 2019-11-27 DIAGNOSIS — R11.2 CHEMOTHERAPY INDUCED NAUSEA AND VOMITING: ICD-10-CM

## 2019-11-27 PROBLEM — D70.1 CHEMOTHERAPY INDUCED NEUTROPENIA: Status: RESOLVED | Noted: 2019-09-23 | Resolved: 2019-11-27

## 2019-11-27 PROBLEM — D70.1 CHEMOTHERAPY INDUCED NEUTROPENIA  (HCC): Status: RESOLVED | Noted: 2019-09-23 | Resolved: 2019-11-27

## 2019-11-27 PROBLEM — E86.0 DEHYDRATION: Status: RESOLVED | Noted: 2019-11-06 | Resolved: 2019-11-27

## 2019-11-27 PROBLEM — D70.1 CHEMOTHERAPY INDUCED NEUTROPENIA (HCC): Status: RESOLVED | Noted: 2019-09-23 | Resolved: 2019-11-27

## 2019-11-27 PROCEDURE — 99215 OFFICE O/P EST HI 40 MIN: CPT | Performed by: INTERNAL MEDICINE

## 2019-11-27 PROCEDURE — 36591 DRAW BLOOD OFF VENOUS DEVICE: CPT

## 2019-11-27 PROCEDURE — 85025 COMPLETE CBC W/AUTO DIFF WBC: CPT

## 2019-11-27 RX ORDER — 0.9 % SODIUM CHLORIDE 0.9 %
10 VIAL (ML) INJECTION ONCE
Status: CANCELLED | OUTPATIENT
Start: 2019-11-27

## 2019-11-27 RX ORDER — HEPARIN SODIUM (PORCINE) LOCK FLUSH IV SOLN 100 UNIT/ML 100 UNIT/ML
5 SOLUTION INTRAVENOUS ONCE
Status: DISCONTINUED | OUTPATIENT
Start: 2019-11-27 | End: 2019-11-27

## 2019-11-27 RX ORDER — 0.9 % SODIUM CHLORIDE 0.9 %
VIAL (ML) INJECTION
Status: DISCONTINUED
Start: 2019-11-27 | End: 2019-11-27

## 2019-11-27 RX ORDER — HEPARIN SODIUM (PORCINE) LOCK FLUSH IV SOLN 100 UNIT/ML 100 UNIT/ML
5 SOLUTION INTRAVENOUS ONCE
Status: CANCELLED | OUTPATIENT
Start: 2019-11-27

## 2019-11-27 RX ORDER — FLUTICASONE PROPIONATE 50 MCG
2 SPRAY, SUSPENSION (ML) NASAL DAILY
Qty: 1 BOTTLE | Refills: 0 | Status: SHIPPED | OUTPATIENT
Start: 2019-11-27 | End: 2020-06-10

## 2019-11-27 RX ORDER — DIPHENHYDRAMINE HCL 25 MG
25 CAPSULE ORAL EVERY 6 HOURS PRN
Status: CANCELLED
Start: 2019-11-28

## 2019-11-27 RX ORDER — FAMOTIDINE 10 MG/ML
20 INJECTION, SOLUTION INTRAVENOUS ONCE
Status: CANCELLED | OUTPATIENT
Start: 2019-11-28

## 2019-11-27 NOTE — PROGRESS NOTES
Cancer Center Progress Note    Patient Name: Cathy Melissa   YOB: 1955   Medical Record Number: [id number]   Attending Physician: Keven Boyel M.D.      Chief Complaint:  Breast cancer (PCP: Dr Kay Olmstead)    Oncology History:  60year old pos of malignancy in the dermal lymphatics or the overlying skin.   [place] lymph node was positive with extracapsular extension she proceeded to have axillary dissection which showed 2 of 22 regional lymph nodes had metastatic cancer without extracapsular ext Propionate 50 MCG/ACT Nasal Suspension, 2 sprays by Nasal route daily. , Disp: 1 Bottle, Rfl: 0  •  loratadine 10 MG Oral Tab, Take 1 tablet (10 mg total) by mouth daily. , Disp: 90 tablet, Rfl: 1  •  Vitamin B-12 1000 MCG Oral Tab, Take 1 tablet (1,000 mcg (36.9 °C) (Oral)   Resp 18   Ht 1.626 m (5' 4\")   Wt 84.4 kg (186 lb)   LMP 07/14/2006   SpO2 96%   BMI 31.93 kg/m²     Physical Examination:  Performance Status:  General: Patient is alert and oriented x 3, not in acute distress.   HEENT: Oropharynx clear Small hiatal hernia.    Dictated by (CST): Elsa Robledo MD on 6/29/2019 at 12:17     Approved by (CST): Elsa Robledo MD on 6/29/2019 at 12:35          Impression and Plan:  Cancer Staging  Malignant neoplasm of overlapping sites of left breast in female, es

## 2019-11-29 ENCOUNTER — OFFICE VISIT (OUTPATIENT)
Dept: HEMATOLOGY/ONCOLOGY | Facility: HOSPITAL | Age: 64
End: 2019-11-29
Attending: INTERNAL MEDICINE
Payer: COMMERCIAL

## 2019-11-29 VITALS
RESPIRATION RATE: 18 BRPM | OXYGEN SATURATION: 97 % | TEMPERATURE: 98 F | DIASTOLIC BLOOD PRESSURE: 67 MMHG | HEART RATE: 94 BPM | SYSTOLIC BLOOD PRESSURE: 115 MMHG

## 2019-11-29 DIAGNOSIS — Z17.0 MALIGNANT NEOPLASM OF OVERLAPPING SITES OF LEFT BREAST IN FEMALE, ESTROGEN RECEPTOR POSITIVE (HCC): Primary | ICD-10-CM

## 2019-11-29 DIAGNOSIS — Z79.899 ENCOUNTER FOR MEDICATION MANAGEMENT: ICD-10-CM

## 2019-11-29 DIAGNOSIS — C50.812 MALIGNANT NEOPLASM OF OVERLAPPING SITES OF LEFT BREAST IN FEMALE, ESTROGEN RECEPTOR POSITIVE (HCC): Primary | ICD-10-CM

## 2019-11-29 DIAGNOSIS — Z45.2 ENCOUNTER FOR CENTRAL LINE CARE: ICD-10-CM

## 2019-11-29 PROCEDURE — S0028 INJECTION, FAMOTIDINE, 20 MG: HCPCS

## 2019-11-29 PROCEDURE — 96375 TX/PRO/DX INJ NEW DRUG ADDON: CPT

## 2019-11-29 PROCEDURE — 96413 CHEMO IV INFUSION 1 HR: CPT

## 2019-11-29 RX ORDER — DIPHENHYDRAMINE HCL 25 MG
25 CAPSULE ORAL ONCE AS NEEDED
Status: COMPLETED | OUTPATIENT
Start: 2019-11-29 | End: 2019-11-29

## 2019-11-29 RX ORDER — HEPARIN SODIUM (PORCINE) LOCK FLUSH IV SOLN 100 UNIT/ML 100 UNIT/ML
5 SOLUTION INTRAVENOUS ONCE
Status: COMPLETED | OUTPATIENT
Start: 2019-11-29 | End: 2019-11-29

## 2019-11-29 RX ORDER — 0.9 % SODIUM CHLORIDE 0.9 %
10 VIAL (ML) INJECTION ONCE
Status: CANCELLED | OUTPATIENT
Start: 2019-11-29

## 2019-11-29 RX ORDER — HEPARIN SODIUM (PORCINE) LOCK FLUSH IV SOLN 100 UNIT/ML 100 UNIT/ML
5 SOLUTION INTRAVENOUS ONCE
Status: CANCELLED | OUTPATIENT
Start: 2019-11-29

## 2019-11-29 RX ORDER — HEPARIN SODIUM (PORCINE) LOCK FLUSH IV SOLN 100 UNIT/ML 100 UNIT/ML
SOLUTION INTRAVENOUS
Status: COMPLETED
Start: 2019-11-29 | End: 2019-11-29

## 2019-11-29 RX ORDER — FAMOTIDINE 10 MG/ML
INJECTION, SOLUTION INTRAVENOUS
Status: COMPLETED
Start: 2019-11-29 | End: 2019-11-29

## 2019-11-29 RX ORDER — 0.9 % SODIUM CHLORIDE 0.9 %
VIAL (ML) INJECTION
Status: DISCONTINUED
Start: 2019-11-29 | End: 2019-11-29

## 2019-11-29 RX ORDER — FAMOTIDINE 10 MG/ML
20 INJECTION, SOLUTION INTRAVENOUS ONCE
Status: COMPLETED | OUTPATIENT
Start: 2019-11-29 | End: 2019-11-29

## 2019-11-29 RX ORDER — DIPHENHYDRAMINE HCL 25 MG
CAPSULE ORAL
Status: DISCONTINUED
Start: 2019-11-29 | End: 2019-11-29

## 2019-11-29 RX ADMIN — FAMOTIDINE 20 MG: 10 INJECTION, SOLUTION INTRAVENOUS at 15:01:00

## 2019-11-29 RX ADMIN — HEPARIN SODIUM (PORCINE) LOCK FLUSH IV SOLN 100 UNIT/ML 500 UNITS: 100 SOLUTION INTRAVENOUS at 16:45:00

## 2019-11-29 RX ADMIN — DIPHENHYDRAMINE HCL 25 MG: 25 MG CAPSULE ORAL at 14:54:00

## 2019-11-29 NOTE — PROGRESS NOTES
Pt here for C5D15 TAXOL.   Arrives Ambulating independently, accompanied by Family member       Patient denies possible pregnancy since last therapy cycle: Not Applicable    Modifications in dose or schedule: No.      States she has been feeling ok except h

## 2019-12-03 ENCOUNTER — APPOINTMENT (OUTPATIENT)
Dept: PHYSICAL THERAPY | Facility: HOSPITAL | Age: 64
End: 2019-12-03
Attending: INTERNAL MEDICINE
Payer: COMMERCIAL

## 2019-12-04 ENCOUNTER — OFFICE VISIT (OUTPATIENT)
Dept: PHYSICAL THERAPY | Facility: HOSPITAL | Age: 64
End: 2019-12-04
Attending: SURGERY
Payer: COMMERCIAL

## 2019-12-04 PROCEDURE — 97140 MANUAL THERAPY 1/> REGIONS: CPT

## 2019-12-04 PROCEDURE — 97110 THERAPEUTIC EXERCISES: CPT

## 2019-12-04 NOTE — PROGRESS NOTES
Referring Physician: Dr. Janee Galindo  Diagnosis:  Malignant neoplasm of overlapping sites of left breast in female, estrogen receptor positive ([facility] (C50.812,Z17.0)  History of lymph node dissection of left axilla (R66.995)  History of mastectomy, bilateral (Z9 abduction   STARTING POINT 17cm from 3rd cuticle 17cm from 3rd cuticle   LEFT HAND VOLUME 350 345   MEASUREMENT A 16.3 16   MEASUREMENT B 17 16.8   MEASUREMENT C 21.8 22   MEASUREMENT D 24.7 24.7   MEASUREMENT E 25.7 26   MEASUREMENT F 28.8 27.4   MEASUREM scapula and forward head  AROM/Strength:                 L shld AROM flex 65, abd 50               R shld AROM flex 75, abd 75     Edema/Tissue Observations:   Patient arrived wearing binder across chest, pulled tight, causing swelling up into B axilla and encouraged to try the larger size for now. Pt only wearing her compression sleeve in the evenings after work. \"It is really hard to get it on in the morning before school. I been extremely fatigued from chemo and can only do so much.   I feel that it of session. *Fitter reached out to patient and pt is ordering more compression sleeves/gloves. Manual Therapy (30 minutes)      STM: provided STM w/ MLD techniques to L chest, axilla, and trunk and entire L arm.   STM to L axilla/UE (cording still palp maintain contact with wall, slides hand overhead x 10 (difficult and fatigues easy)                            Assessment:   Pt requires cueing and coaching to advance exercises to improve strength, ROM and endurance.   Pt has 9 more weeks of chemo therapy

## 2019-12-05 ENCOUNTER — NURSE ONLY (OUTPATIENT)
Dept: HEMATOLOGY/ONCOLOGY | Facility: HOSPITAL | Age: 64
End: 2019-12-05
Attending: INTERNAL MEDICINE
Payer: COMMERCIAL

## 2019-12-05 VITALS
TEMPERATURE: 99 F | RESPIRATION RATE: 18 BRPM | WEIGHT: 183 LBS | OXYGEN SATURATION: 99 % | SYSTOLIC BLOOD PRESSURE: 143 MMHG | DIASTOLIC BLOOD PRESSURE: 90 MMHG | HEART RATE: 95 BPM | BODY MASS INDEX: 31.24 KG/M2 | HEIGHT: 64 IN

## 2019-12-05 VITALS
SYSTOLIC BLOOD PRESSURE: 143 MMHG | BODY MASS INDEX: 31.24 KG/M2 | OXYGEN SATURATION: 99 % | HEIGHT: 64 IN | HEART RATE: 95 BPM | RESPIRATION RATE: 18 BRPM | DIASTOLIC BLOOD PRESSURE: 90 MMHG | WEIGHT: 183 LBS | TEMPERATURE: 99 F

## 2019-12-05 DIAGNOSIS — K12.30 MUCOSITIS: ICD-10-CM

## 2019-12-05 DIAGNOSIS — Z45.2 ENCOUNTER FOR CENTRAL LINE CARE: ICD-10-CM

## 2019-12-05 DIAGNOSIS — Z17.0 MALIGNANT NEOPLASM OF OVERLAPPING SITES OF LEFT BREAST IN FEMALE, ESTROGEN RECEPTOR POSITIVE (HCC): Primary | ICD-10-CM

## 2019-12-05 DIAGNOSIS — C50.812 MALIGNANT NEOPLASM OF OVERLAPPING SITES OF LEFT BREAST IN FEMALE, ESTROGEN RECEPTOR POSITIVE (HCC): Primary | ICD-10-CM

## 2019-12-05 DIAGNOSIS — Z51.11 CHEMOTHERAPY MANAGEMENT, ENCOUNTER FOR: ICD-10-CM

## 2019-12-05 DIAGNOSIS — K12.1 ULCER (TRAUMATIC) OF ORAL MUCOSA: ICD-10-CM

## 2019-12-05 DIAGNOSIS — Z79.899 ENCOUNTER FOR MEDICATION MANAGEMENT: ICD-10-CM

## 2019-12-05 DIAGNOSIS — L27.1 PALMAR PLANTAR ERYTHRODYSAESTHESIA: ICD-10-CM

## 2019-12-05 PROCEDURE — 80053 COMPREHEN METABOLIC PANEL: CPT

## 2019-12-05 PROCEDURE — 96413 CHEMO IV INFUSION 1 HR: CPT

## 2019-12-05 PROCEDURE — 99215 OFFICE O/P EST HI 40 MIN: CPT | Performed by: INTERNAL MEDICINE

## 2019-12-05 PROCEDURE — 96375 TX/PRO/DX INJ NEW DRUG ADDON: CPT

## 2019-12-05 PROCEDURE — 85025 COMPLETE CBC W/AUTO DIFF WBC: CPT

## 2019-12-05 RX ORDER — 0.9 % SODIUM CHLORIDE 0.9 %
VIAL (ML) INJECTION
Status: DISCONTINUED
Start: 2019-12-05 | End: 2019-12-05

## 2019-12-05 RX ORDER — DIPHENHYDRAMINE HCL 25 MG
25 CAPSULE ORAL ONCE AS NEEDED
Status: COMPLETED | OUTPATIENT
Start: 2019-12-05 | End: 2019-12-05

## 2019-12-05 RX ORDER — 0.9 % SODIUM CHLORIDE 0.9 %
10 VIAL (ML) INJECTION ONCE
Status: CANCELLED | OUTPATIENT
Start: 2019-12-05

## 2019-12-05 RX ORDER — DIPHENHYDRAMINE HCL 25 MG
CAPSULE ORAL
Status: COMPLETED
Start: 2019-12-05 | End: 2019-12-05

## 2019-12-05 RX ORDER — FAMOTIDINE 10 MG/ML
20 INJECTION, SOLUTION INTRAVENOUS ONCE
Status: COMPLETED | OUTPATIENT
Start: 2019-12-05 | End: 2019-12-05

## 2019-12-05 RX ORDER — HEPARIN SODIUM (PORCINE) LOCK FLUSH IV SOLN 100 UNIT/ML 100 UNIT/ML
5 SOLUTION INTRAVENOUS ONCE
Status: COMPLETED | OUTPATIENT
Start: 2019-12-05 | End: 2019-12-05

## 2019-12-05 RX ORDER — DIPHENHYDRAMINE HCL 25 MG
25 CAPSULE ORAL EVERY 6 HOURS PRN
Status: CANCELLED
Start: 2019-12-05

## 2019-12-05 RX ORDER — HEPARIN SODIUM (PORCINE) LOCK FLUSH IV SOLN 100 UNIT/ML 100 UNIT/ML
SOLUTION INTRAVENOUS
Status: COMPLETED
Start: 2019-12-05 | End: 2019-12-05

## 2019-12-05 RX ORDER — HEPARIN SODIUM (PORCINE) LOCK FLUSH IV SOLN 100 UNIT/ML 100 UNIT/ML
5 SOLUTION INTRAVENOUS ONCE
Status: CANCELLED | OUTPATIENT
Start: 2019-12-05

## 2019-12-05 RX ORDER — FAMOTIDINE 10 MG/ML
20 INJECTION, SOLUTION INTRAVENOUS ONCE
Status: CANCELLED | OUTPATIENT
Start: 2019-12-05

## 2019-12-05 RX ADMIN — HEPARIN SODIUM (PORCINE) LOCK FLUSH IV SOLN 100 UNIT/ML 500 UNITS: 100 SOLUTION INTRAVENOUS at 16:24:00

## 2019-12-05 RX ADMIN — DIPHENHYDRAMINE HCL 25 MG: 25 MG CAPSULE ORAL at 14:53:00

## 2019-12-05 RX ADMIN — FAMOTIDINE 20 MG: 10 INJECTION, SOLUTION INTRAVENOUS at 14:53:00

## 2019-12-05 NOTE — PROGRESS NOTES
Pt here for [id number]. Arrives Ambulating independently  Patient denies possible pregnancy since last therapy cycle: Not Applicable    Modifications in dose or schedule: No.    Wearing iced gloves during infusion of taxol per recommendation of Soo arnold

## 2019-12-10 ENCOUNTER — OFFICE VISIT (OUTPATIENT)
Dept: PHYSICAL THERAPY | Facility: HOSPITAL | Age: 64
End: 2019-12-10
Attending: INTERNAL MEDICINE
Payer: COMMERCIAL

## 2019-12-10 PROCEDURE — 97110 THERAPEUTIC EXERCISES: CPT

## 2019-12-10 PROCEDURE — 97140 MANUAL THERAPY 1/> REGIONS: CPT

## 2019-12-10 NOTE — PROGRESS NOTES
Referring Physician: Dr. Hilton Dear  Diagnosis:  Malignant neoplasm of overlapping sites of left breast in female, estrogen receptor positive ([facility] (C50.812,Z17.0)  History of lymph node dissection of left axilla (V44.244)  History of mastectomy, bilateral (Z9 VOLUME  [id number]   DATE MEASURED 7/24/2019 7/24/2019   LOCATION/MEASUREMENTS MISTIE BELKIS   RIGHT HAND VOLUME 325 330   MEASUREMENT A 16.2 16   MEASUREMENT B 18.2 17.2   MEASUREMENT C 22 21.5   MEASUREMENT D 24.5 24.7   MEASUREMENT E 25.4 25.6   ADE upper arm. Bruising of chest. L trunk w/ blister that had scabbed over. Onset of axillary cording into inner arm, down to the elbow. R: Moderate swelling of chest and trunk.            Treatment:    11/5/2019 11/12/2019 11/20/2019 11/25/2019 12/4/201 (65 minutes)  Re-assessed volume BUE's    STM: provided STM w/ MLD techniques to L chest, axilla, and trunk and entire L arm.   STM to L axilla/UE (cording still palpable in L axilla)   STM and MLD provided to decrease swelling and  to improve tissue mobili axilla. STM and MLD provided to decrease swelling and  to improve tissue mobility LUE in supine and R side lying. Provided STM to R axilla, breast, pect to decrease tightness.   Extra time spent with pt in R side lying to work on L trunk and in supine wo Shoulders  R side lying for L shoulder abduction x 10  L shoulder circles CW/CCW x 10 each. There Ex (15 minutes)  Supine AA/PROM B Shoulders  R side lying for L shoulder abduction x 10  L shoulder circles CW/CCW x 10 each.   Sci fit UE only 2F/2B THere Ex reaching overhead -MET  4) Increase UE strength to at least 4/5 to improve ease of lifting and performing household chores -MET       Plan: Continue with PT 1x/week for 8 weeks while pt is having chemotherapy.   Pt has difficulty with managing all symptoms

## 2019-12-12 ENCOUNTER — NURSE ONLY (OUTPATIENT)
Dept: HEMATOLOGY/ONCOLOGY | Facility: HOSPITAL | Age: 64
End: 2019-12-12
Attending: INTERNAL MEDICINE
Payer: COMMERCIAL

## 2019-12-12 VITALS
HEIGHT: 64 IN | SYSTOLIC BLOOD PRESSURE: 151 MMHG | DIASTOLIC BLOOD PRESSURE: 85 MMHG | WEIGHT: 184 LBS | OXYGEN SATURATION: 98 % | TEMPERATURE: 98 F | RESPIRATION RATE: 18 BRPM | HEART RATE: 99 BPM | BODY MASS INDEX: 31.41 KG/M2

## 2019-12-12 DIAGNOSIS — Z79.899 ENCOUNTER FOR MEDICATION MANAGEMENT: Primary | ICD-10-CM

## 2019-12-12 DIAGNOSIS — Z45.2 ENCOUNTER FOR CENTRAL LINE CARE: ICD-10-CM

## 2019-12-12 DIAGNOSIS — C50.812 MALIGNANT NEOPLASM OF OVERLAPPING SITES OF LEFT BREAST IN FEMALE, ESTROGEN RECEPTOR POSITIVE (HCC): ICD-10-CM

## 2019-12-12 DIAGNOSIS — C50.812 MALIGNANT NEOPLASM OF OVERLAPPING SITES OF LEFT BREAST IN FEMALE, ESTROGEN RECEPTOR POSITIVE (HCC): Primary | ICD-10-CM

## 2019-12-12 DIAGNOSIS — Z17.0 MALIGNANT NEOPLASM OF OVERLAPPING SITES OF LEFT BREAST IN FEMALE, ESTROGEN RECEPTOR POSITIVE (HCC): Primary | ICD-10-CM

## 2019-12-12 DIAGNOSIS — Z17.0 MALIGNANT NEOPLASM OF OVERLAPPING SITES OF LEFT BREAST IN FEMALE, ESTROGEN RECEPTOR POSITIVE (HCC): ICD-10-CM

## 2019-12-12 PROCEDURE — 96375 TX/PRO/DX INJ NEW DRUG ADDON: CPT

## 2019-12-12 PROCEDURE — S0028 INJECTION, FAMOTIDINE, 20 MG: HCPCS

## 2019-12-12 PROCEDURE — 96367 TX/PROPH/DG ADDL SEQ IV INF: CPT

## 2019-12-12 PROCEDURE — 85025 COMPLETE CBC W/AUTO DIFF WBC: CPT

## 2019-12-12 PROCEDURE — 96413 CHEMO IV INFUSION 1 HR: CPT

## 2019-12-12 RX ORDER — DIPHENHYDRAMINE HCL 25 MG
CAPSULE ORAL
Status: COMPLETED
Start: 2019-12-12 | End: 2019-12-12

## 2019-12-12 RX ORDER — HEPARIN SODIUM (PORCINE) LOCK FLUSH IV SOLN 100 UNIT/ML 100 UNIT/ML
5 SOLUTION INTRAVENOUS ONCE
Status: COMPLETED | OUTPATIENT
Start: 2019-12-12 | End: 2019-12-12

## 2019-12-12 RX ORDER — HEPARIN SODIUM (PORCINE) LOCK FLUSH IV SOLN 100 UNIT/ML 100 UNIT/ML
SOLUTION INTRAVENOUS
Status: COMPLETED
Start: 2019-12-12 | End: 2019-12-12

## 2019-12-12 RX ORDER — FAMOTIDINE 10 MG/ML
20 INJECTION, SOLUTION INTRAVENOUS ONCE
Status: CANCELLED | OUTPATIENT
Start: 2019-12-12

## 2019-12-12 RX ORDER — 0.9 % SODIUM CHLORIDE 0.9 %
VIAL (ML) INJECTION
Status: DISCONTINUED
Start: 2019-12-12 | End: 2019-12-12

## 2019-12-12 RX ORDER — FAMOTIDINE 10 MG/ML
20 INJECTION, SOLUTION INTRAVENOUS ONCE
Status: COMPLETED | OUTPATIENT
Start: 2019-12-12 | End: 2019-12-12

## 2019-12-12 RX ORDER — HEPARIN SODIUM (PORCINE) LOCK FLUSH IV SOLN 100 UNIT/ML 100 UNIT/ML
5 SOLUTION INTRAVENOUS ONCE
Status: CANCELLED | OUTPATIENT
Start: 2019-12-12

## 2019-12-12 RX ORDER — FAMOTIDINE 10 MG/ML
INJECTION, SOLUTION INTRAVENOUS
Status: COMPLETED
Start: 2019-12-12 | End: 2019-12-12

## 2019-12-12 RX ORDER — DIPHENHYDRAMINE HCL 25 MG
25 CAPSULE ORAL EVERY 6 HOURS PRN
Status: CANCELLED
Start: 2019-12-12

## 2019-12-12 RX ORDER — DIPHENHYDRAMINE HCL 25 MG
25 CAPSULE ORAL ONCE AS NEEDED
Status: COMPLETED | OUTPATIENT
Start: 2019-12-12 | End: 2019-12-12

## 2019-12-12 RX ORDER — 0.9 % SODIUM CHLORIDE 0.9 %
10 VIAL (ML) INJECTION ONCE
Status: CANCELLED | OUTPATIENT
Start: 2019-12-12

## 2019-12-12 RX ADMIN — HEPARIN SODIUM (PORCINE) LOCK FLUSH IV SOLN 100 UNIT/ML 500 UNITS: 100 SOLUTION INTRAVENOUS at 16:05:00

## 2019-12-12 RX ADMIN — FAMOTIDINE 20 MG: 10 INJECTION, SOLUTION INTRAVENOUS at 14:28:00

## 2019-12-12 RX ADMIN — DIPHENHYDRAMINE HCL 25 MG: 25 MG CAPSULE ORAL at 14:28:00

## 2019-12-12 NOTE — PROGRESS NOTES
Kim to infusion today for C6 D8 Taxol. Arrives Ambulating independently. Patient reports fatigue as a major factor.  She also has a large sore to the right side of her tongue, taking new medication for but states it is expensive so she is only taking it reinforcement  Comments: Reviewed side effects with patient stated understanding.

## 2019-12-16 RX ORDER — LIDOCAINE AND PRILOCAINE 25; 25 MG/G; MG/G
CREAM TOPICAL
Qty: 30 G | Refills: 0 | Status: SHIPPED | OUTPATIENT
Start: 2019-12-16 | End: 2020-01-23

## 2019-12-18 ENCOUNTER — LAB ENCOUNTER (OUTPATIENT)
Dept: LAB | Age: 64
End: 2019-12-18
Attending: INTERNAL MEDICINE
Payer: COMMERCIAL

## 2019-12-18 DIAGNOSIS — Z45.2 ENCOUNTER FOR CENTRAL LINE CARE: ICD-10-CM

## 2019-12-18 DIAGNOSIS — Z79.899 ENCOUNTER FOR MEDICATION MANAGEMENT: ICD-10-CM

## 2019-12-18 PROCEDURE — 36415 COLL VENOUS BLD VENIPUNCTURE: CPT

## 2019-12-18 PROCEDURE — 80053 COMPREHEN METABOLIC PANEL: CPT

## 2019-12-18 PROCEDURE — 85025 COMPLETE CBC W/AUTO DIFF WBC: CPT

## 2019-12-19 ENCOUNTER — OFFICE VISIT (OUTPATIENT)
Dept: HEMATOLOGY/ONCOLOGY | Facility: HOSPITAL | Age: 64
End: 2019-12-19
Attending: INTERNAL MEDICINE
Payer: COMMERCIAL

## 2019-12-19 ENCOUNTER — OFFICE VISIT (OUTPATIENT)
Dept: PHYSICAL THERAPY | Facility: HOSPITAL | Age: 64
End: 2019-12-19
Attending: SURGERY
Payer: COMMERCIAL

## 2019-12-19 VITALS
TEMPERATURE: 98 F | DIASTOLIC BLOOD PRESSURE: 73 MMHG | HEART RATE: 102 BPM | OXYGEN SATURATION: 98 % | BODY MASS INDEX: 31.24 KG/M2 | HEIGHT: 64 IN | RESPIRATION RATE: 18 BRPM | SYSTOLIC BLOOD PRESSURE: 143 MMHG | WEIGHT: 183 LBS

## 2019-12-19 DIAGNOSIS — Z17.0 MALIGNANT NEOPLASM OF OVERLAPPING SITES OF LEFT BREAST IN FEMALE, ESTROGEN RECEPTOR POSITIVE (HCC): Primary | ICD-10-CM

## 2019-12-19 DIAGNOSIS — C50.812 MALIGNANT NEOPLASM OF OVERLAPPING SITES OF LEFT BREAST IN FEMALE, ESTROGEN RECEPTOR POSITIVE (HCC): Primary | ICD-10-CM

## 2019-12-19 DIAGNOSIS — L27.1 PALMAR PLANTAR ERYTHRODYSAESTHESIA: ICD-10-CM

## 2019-12-19 DIAGNOSIS — T45.1X5A CHEMOTHERAPY INDUCED NAUSEA AND VOMITING: ICD-10-CM

## 2019-12-19 DIAGNOSIS — K12.30 MUCOSITIS: ICD-10-CM

## 2019-12-19 DIAGNOSIS — Z51.11 CHEMOTHERAPY MANAGEMENT, ENCOUNTER FOR: ICD-10-CM

## 2019-12-19 DIAGNOSIS — R11.2 CHEMOTHERAPY INDUCED NAUSEA AND VOMITING: ICD-10-CM

## 2019-12-19 DIAGNOSIS — F19.982 DRUG-INDUCED INSOMNIA (HCC): ICD-10-CM

## 2019-12-19 PROCEDURE — 97110 THERAPEUTIC EXERCISES: CPT

## 2019-12-19 PROCEDURE — 99215 OFFICE O/P EST HI 40 MIN: CPT | Performed by: INTERNAL MEDICINE

## 2019-12-19 PROCEDURE — 97140 MANUAL THERAPY 1/> REGIONS: CPT

## 2019-12-19 RX ORDER — AZITHROMYCIN 250 MG/1
250 TABLET, FILM COATED ORAL DAILY
Qty: 6 TABLET | Refills: 0 | Status: SHIPPED | OUTPATIENT
Start: 2019-12-19 | End: 2019-12-25

## 2019-12-19 RX ORDER — FAMOTIDINE 10 MG/ML
20 INJECTION, SOLUTION INTRAVENOUS ONCE
Status: CANCELLED | OUTPATIENT
Start: 2019-12-19

## 2019-12-19 RX ORDER — DIPHENHYDRAMINE HCL 25 MG
25 CAPSULE ORAL EVERY 6 HOURS PRN
Status: CANCELLED
Start: 2019-12-19

## 2019-12-19 RX ORDER — LORATADINE 10 MG/1
10 TABLET ORAL DAILY
Qty: 90 TABLET | Refills: 3 | Status: SHIPPED | OUTPATIENT
Start: 2019-12-19 | End: 2020-01-23

## 2019-12-19 NOTE — PROGRESS NOTES
Referring Physician: Dr. Francine Mark  Diagnosis:  Malignant neoplasm of overlapping sites of left breast in female, estrogen receptor positive ([facility] (C50.812,Z17.0)  History of lymph node dissection of left axilla (J21.822)  History of mastectomy, bilateral (Z9 7/24/2019 7/24/2019   LOCATION/MEASUREMENTS BELKIS TAMAYO   RIGHT HAND VOLUME 325 330   MEASUREMENT A 16.2 16   MEASUREMENT B 18.2 17.2   MEASUREMENT C 22 21.5   MEASUREMENT D 24.5 24.7   MEASUREMENT E 25.4 25.6   MEASUREMENT F 27.5 26.3   MEASUREMENT G 29.9 29. that had scabbed over. Onset of axillary cording into inner arm, down to the elbow. R: Moderate swelling of chest and trunk.            Treatment:    11/20/2019   11/25/2019   12/4/2019   12/10/2019   12/19/2019     20/20  PN SENT 21/30 22/30 23/30 24/30 patient and pt is ordering more compression sleeves/gloves. Manual Therapy (30 minutes)      STM: provided STM w/ MLD techniques to L chest, axilla, and trunk and entire L arm. STM to L axilla/UE (cording still palpable in L axilla).  0 cavitation during LUE in supine and R side lying. Provided STM to R axilla, breast, pect to decrease tightness. Extra time spent with pt in R side lying to work on L trunk and in supine working on  L chest to areas of tightness or possibly scar tissue.       Compression: coughing. Assessment:   Overall pt continues to improve with strength, mobility and decreased tissue swelling. Pt has approx 7 more weeks of chemo. Possible radiation therapy to follow.   Pt continues to benefit from therapy to e

## 2019-12-20 ENCOUNTER — OFFICE VISIT (OUTPATIENT)
Dept: HEMATOLOGY/ONCOLOGY | Facility: HOSPITAL | Age: 64
End: 2019-12-20
Attending: INTERNAL MEDICINE
Payer: COMMERCIAL

## 2019-12-20 VITALS
HEART RATE: 98 BPM | SYSTOLIC BLOOD PRESSURE: 127 MMHG | DIASTOLIC BLOOD PRESSURE: 73 MMHG | OXYGEN SATURATION: 97 % | TEMPERATURE: 99 F | RESPIRATION RATE: 18 BRPM

## 2019-12-20 DIAGNOSIS — Z17.0 MALIGNANT NEOPLASM OF OVERLAPPING SITES OF LEFT BREAST IN FEMALE, ESTROGEN RECEPTOR POSITIVE (HCC): Primary | ICD-10-CM

## 2019-12-20 DIAGNOSIS — Z79.899 ENCOUNTER FOR MEDICATION MANAGEMENT: ICD-10-CM

## 2019-12-20 DIAGNOSIS — Z45.2 ENCOUNTER FOR CENTRAL LINE CARE: ICD-10-CM

## 2019-12-20 DIAGNOSIS — C50.812 MALIGNANT NEOPLASM OF OVERLAPPING SITES OF LEFT BREAST IN FEMALE, ESTROGEN RECEPTOR POSITIVE (HCC): Primary | ICD-10-CM

## 2019-12-20 PROCEDURE — 96375 TX/PRO/DX INJ NEW DRUG ADDON: CPT

## 2019-12-20 PROCEDURE — S0028 INJECTION, FAMOTIDINE, 20 MG: HCPCS

## 2019-12-20 PROCEDURE — 96413 CHEMO IV INFUSION 1 HR: CPT

## 2019-12-20 RX ORDER — DIPHENHYDRAMINE HCL 25 MG
CAPSULE ORAL
Status: COMPLETED
Start: 2019-12-20 | End: 2019-12-20

## 2019-12-20 RX ORDER — HEPARIN SODIUM (PORCINE) LOCK FLUSH IV SOLN 100 UNIT/ML 100 UNIT/ML
5 SOLUTION INTRAVENOUS ONCE
Status: CANCELLED | OUTPATIENT
Start: 2019-12-20

## 2019-12-20 RX ORDER — 0.9 % SODIUM CHLORIDE 0.9 %
10 VIAL (ML) INJECTION ONCE
Status: CANCELLED | OUTPATIENT
Start: 2019-12-20

## 2019-12-20 RX ORDER — 0.9 % SODIUM CHLORIDE 0.9 %
VIAL (ML) INJECTION
Status: DISCONTINUED
Start: 2019-12-20 | End: 2019-12-20

## 2019-12-20 RX ORDER — FAMOTIDINE 10 MG/ML
20 INJECTION, SOLUTION INTRAVENOUS ONCE
Status: COMPLETED | OUTPATIENT
Start: 2019-12-20 | End: 2019-12-20

## 2019-12-20 RX ORDER — HEPARIN SODIUM (PORCINE) LOCK FLUSH IV SOLN 100 UNIT/ML 100 UNIT/ML
5 SOLUTION INTRAVENOUS ONCE
Status: COMPLETED | OUTPATIENT
Start: 2019-12-20 | End: 2019-12-20

## 2019-12-20 RX ORDER — DIPHENHYDRAMINE HCL 25 MG
25 CAPSULE ORAL ONCE AS NEEDED
Status: COMPLETED | OUTPATIENT
Start: 2019-12-20 | End: 2019-12-20

## 2019-12-20 RX ORDER — FAMOTIDINE 10 MG/ML
INJECTION, SOLUTION INTRAVENOUS
Status: COMPLETED
Start: 2019-12-20 | End: 2019-12-20

## 2019-12-20 RX ORDER — HEPARIN SODIUM (PORCINE) LOCK FLUSH IV SOLN 100 UNIT/ML 100 UNIT/ML
SOLUTION INTRAVENOUS
Status: COMPLETED
Start: 2019-12-20 | End: 2019-12-20

## 2019-12-20 RX ADMIN — FAMOTIDINE 20 MG: 10 INJECTION, SOLUTION INTRAVENOUS at 15:10:00

## 2019-12-20 RX ADMIN — HEPARIN SODIUM (PORCINE) LOCK FLUSH IV SOLN 100 UNIT/ML 500 UNITS: 100 SOLUTION INTRAVENOUS at 16:59:00

## 2019-12-20 RX ADMIN — DIPHENHYDRAMINE HCL 25 MG: 25 MG CAPSULE ORAL at 15:07:00

## 2019-12-20 NOTE — PROGRESS NOTES
Kim to infusion today for C6 D15 Taxol. Arrives Ambulating independently. Christie Sprague arrived stating she hadshad a cold with post nasal symptoms, including sore throat and cough for the past two days. She has not checked her temp at home.  She is coughing supported/coping well  understands plan of care  verbalize how to care for self  Progress towards outcome:  making progress    Education Record    Learner:  Patient and Friend  Barriers / Limitations:  Emotional factors   Method:  Discussion and Reinforcem

## 2019-12-23 ENCOUNTER — OFFICE VISIT (OUTPATIENT)
Dept: PHYSICAL THERAPY | Facility: HOSPITAL | Age: 64
End: 2019-12-23
Attending: INTERNAL MEDICINE
Payer: COMMERCIAL

## 2019-12-23 PROCEDURE — 97110 THERAPEUTIC EXERCISES: CPT

## 2019-12-23 PROCEDURE — 97140 MANUAL THERAPY 1/> REGIONS: CPT

## 2019-12-23 NOTE — PROGRESS NOTES
Referring Physician: Dr. Ronny Cannon  Diagnosis:  Malignant neoplasm of overlapping sites of left breast in female, estrogen receptor positive ([facility] (C50.812,Z17.0)  History of lymph node dissection of left axilla (C21.102)  History of mastectomy, bilateral (Z9 LEFT HAND VOLUME 350 345   MEASUREMENT A 16.3 16   MEASUREMENT B 17 16.8   MEASUREMENT C 21.8 22   MEASUREMENT D 24.7 24.7   MEASUREMENT E 25.7 26   MEASUREMENT F 28.8 27.4   MEASUREMENT G 30.5 29.4   MEASUREMENT H 32 32.2   MEASUREMENT I 37 35    TOTAL flex 65, abd 50               R shld AROM flex 75, abd 75     Edema/Tissue Observations:   Patient arrived wearing binder across chest, pulled tight, causing swelling up into B axilla and pect tendon region  L:  Marked swelling chest, trunk, axilla, and up wearing better fitting bra today. She started wearing foam compression pad for L trunk again last week to help with trunk swelling. This area does appear to look better than last week. No indurations noted today from bra.   Assisted patient with sarina [employer] vendor re: coverage for lumpectomy pad Manual therapy (35 minutes)    STM: provided STM w/ MLD techniques to B chest, axilla, and trunk and entire L arm. STM to L axilla/UE (cording still palpable in L axilla). 0 cavitation during STM to L axilla.    STM a shoulder abduction x 10  L shoulder circles CW/CCW x 10 each.   Sci fit UE only 2F/2B THere Ex (20 minutes)  Supine AA/PROM of B shoulders in supine and side lying  Sci fit UE only L2 2F/2B  Stand at wall, lower trap lift off one arm alternating x 10 each bandages/garments to facilitate swelling reduction and to be independent at self management once discharged -   3) Increase shoulder AROM R flex 155, abd 165; L flex 145, abd 150; B ER to 80;  B IR to T10 to improve ease of dressing/bathing/and reaching ove

## 2019-12-24 NOTE — PROGRESS NOTES
Cancer Center Progress Note    Patient Name: Deonna Hooks   YOB: 1955   Medical Record Number: [id number]   Attending Physician: Timothy Monroe M.D.      Chief Complaint:  Breast cancer (PCP: Dr Wade Nixon)    Oncology History:  60year old pos of malignancy in the dermal lymphatics or the overlying skin.   [place] lymph node was positive with extracapsular extension she proceeded to have axillary dissection which showed 2 of 22 regional lymph nodes had metastatic cancer without extracapsular ext Take 1 tablet (250 mg total) by mouth daily for 6 days. , Disp: 6 tablet, Rfl: 0  •  loratadine 10 MG Oral Tab, Take 1 tablet (10 mg total) by mouth daily. , Disp: 90 tablet, Rfl: 3  •  LIDOCAINE-PRILOCAINE 2.5-2.5 % External Cream, APPLY TO SITE 1 HOUR PRIO Location: Right arm, Patient Position: Sitting, Cuff Size: large)   Pulse 102   Temp 98 °F (36.7 °C) (Oral)   Resp 18   Ht 1.626 m (5' 4\")   Wt 83 kg (183 lb)   LMP 07/14/2006   SpO2 98%   BMI 31.41 kg/m²     Physical Examination:  Performance Status:  Ge renal stone. 5.  Cholelithiasis. No biliary ductal dilatation. 6.  Small hiatal hernia.    Dictated by (CST): Linda Blackwell MD on 6/29/2019 at 12:17     Approved by (CST): Linda Blackwell MD on 6/29/2019 at 12:35          Impression and Plan:  Cancer Staging

## 2019-12-26 ENCOUNTER — OFFICE VISIT (OUTPATIENT)
Dept: HEMATOLOGY/ONCOLOGY | Facility: HOSPITAL | Age: 64
End: 2019-12-26
Attending: INTERNAL MEDICINE
Payer: COMMERCIAL

## 2019-12-26 VITALS
SYSTOLIC BLOOD PRESSURE: 130 MMHG | DIASTOLIC BLOOD PRESSURE: 72 MMHG | WEIGHT: 181 LBS | HEIGHT: 64 IN | RESPIRATION RATE: 18 BRPM | HEART RATE: 96 BPM | BODY MASS INDEX: 30.9 KG/M2 | TEMPERATURE: 98 F | OXYGEN SATURATION: 100 %

## 2019-12-26 DIAGNOSIS — Z51.11 CHEMOTHERAPY MANAGEMENT, ENCOUNTER FOR: ICD-10-CM

## 2019-12-26 DIAGNOSIS — L27.1 PALMAR PLANTAR ERYTHRODYSAESTHESIA: ICD-10-CM

## 2019-12-26 DIAGNOSIS — Z45.2 ENCOUNTER FOR CENTRAL LINE CARE: ICD-10-CM

## 2019-12-26 DIAGNOSIS — Z79.899 ENCOUNTER FOR MEDICATION MANAGEMENT: ICD-10-CM

## 2019-12-26 DIAGNOSIS — K12.30 MUCOSITIS: ICD-10-CM

## 2019-12-26 DIAGNOSIS — C50.812 MALIGNANT NEOPLASM OF OVERLAPPING SITES OF LEFT BREAST IN FEMALE, ESTROGEN RECEPTOR POSITIVE (HCC): Primary | ICD-10-CM

## 2019-12-26 DIAGNOSIS — Z17.0 MALIGNANT NEOPLASM OF OVERLAPPING SITES OF LEFT BREAST IN FEMALE, ESTROGEN RECEPTOR POSITIVE (HCC): Primary | ICD-10-CM

## 2019-12-26 DIAGNOSIS — K12.1 ULCER (TRAUMATIC) OF ORAL MUCOSA: ICD-10-CM

## 2019-12-26 PROCEDURE — 80053 COMPREHEN METABOLIC PANEL: CPT

## 2019-12-26 PROCEDURE — 99215 OFFICE O/P EST HI 40 MIN: CPT | Performed by: INTERNAL MEDICINE

## 2019-12-26 PROCEDURE — 36591 DRAW BLOOD OFF VENOUS DEVICE: CPT

## 2019-12-26 PROCEDURE — 85025 COMPLETE CBC W/AUTO DIFF WBC: CPT

## 2019-12-26 RX ORDER — 0.9 % SODIUM CHLORIDE 0.9 %
VIAL (ML) INJECTION
Status: DISCONTINUED
Start: 2019-12-26 | End: 2019-12-26

## 2019-12-26 RX ORDER — HEPARIN SODIUM (PORCINE) LOCK FLUSH IV SOLN 100 UNIT/ML 100 UNIT/ML
5 SOLUTION INTRAVENOUS ONCE
Status: CANCELLED | OUTPATIENT
Start: 2019-12-26

## 2019-12-26 RX ORDER — HEPARIN SODIUM (PORCINE) LOCK FLUSH IV SOLN 100 UNIT/ML 100 UNIT/ML
5 SOLUTION INTRAVENOUS ONCE
Status: COMPLETED | OUTPATIENT
Start: 2019-12-26 | End: 2019-12-26

## 2019-12-26 RX ORDER — 0.9 % SODIUM CHLORIDE 0.9 %
10 VIAL (ML) INJECTION ONCE
Status: CANCELLED | OUTPATIENT
Start: 2019-12-26

## 2019-12-26 RX ORDER — FAMOTIDINE 10 MG/ML
20 INJECTION, SOLUTION INTRAVENOUS ONCE
Status: CANCELLED | OUTPATIENT
Start: 2019-12-26

## 2019-12-26 RX ORDER — DIPHENHYDRAMINE HCL 25 MG
25 CAPSULE ORAL EVERY 6 HOURS PRN
Status: CANCELLED
Start: 2019-12-26

## 2019-12-26 RX ADMIN — HEPARIN SODIUM (PORCINE) LOCK FLUSH IV SOLN 100 UNIT/ML 500 UNITS: 100 SOLUTION INTRAVENOUS at 13:55:00

## 2019-12-26 NOTE — PROGRESS NOTES
Cancer Center Progress Note    Patient Name: Kunal Fraire   YOB: 1955   Medical Record Number: [id number]   Attending Physician: Bianca Gonzalez M.D.      Chief Complaint:  Breast cancer (PCP: Dr Alyssa Prajapati)    Oncology History:  60year old pos of malignancy in the dermal lymphatics or the overlying skin.   [place] lymph node was positive with extracapsular extension she proceeded to have axillary dissection which showed 2 of 22 regional lymph nodes had metastatic cancer without extracapsular ext Medications:    Current Outpatient Medications:   •  loratadine 10 MG Oral Tab, Take 1 tablet (10 mg total) by mouth daily. , Disp: 90 tablet, Rfl: 3  •  LIDOCAINE-PRILOCAINE 2.5-2.5 % External Cream, APPLY TO SITE 1 HOUR PRIOR TO PORT A CATH NEEDLE INSERTI Sitting, Cuff Size: adult)   Pulse 96   Temp 98.3 °F (36.8 °C) (Oral)   Resp 18   Ht 1.626 m (5' 4\")   Wt 82.1 kg (181 lb)   LMP 07/14/2006   SpO2 100%   BMI 31.07 kg/m²     Physical Examination:  Performance Status:  General: Patient is alert and oriente biliary ductal dilatation. 6.  Small hiatal hernia.    Dictated by (CST): Lino Fuller MD on 6/29/2019 at 12:17     Approved by (CST): Lino Fuller MD on 6/29/2019 at 12:35          Impression and Plan:  Cancer Staging  Malignant neoplasm of overlapping sit

## 2019-12-27 ENCOUNTER — OFFICE VISIT (OUTPATIENT)
Dept: HEMATOLOGY/ONCOLOGY | Facility: HOSPITAL | Age: 64
End: 2019-12-27
Attending: INTERNAL MEDICINE
Payer: COMMERCIAL

## 2019-12-27 VITALS
OXYGEN SATURATION: 100 % | SYSTOLIC BLOOD PRESSURE: 154 MMHG | TEMPERATURE: 98 F | HEART RATE: 105 BPM | RESPIRATION RATE: 18 BRPM | DIASTOLIC BLOOD PRESSURE: 71 MMHG

## 2019-12-27 DIAGNOSIS — Z17.0 MALIGNANT NEOPLASM OF OVERLAPPING SITES OF LEFT BREAST IN FEMALE, ESTROGEN RECEPTOR POSITIVE (HCC): Primary | ICD-10-CM

## 2019-12-27 DIAGNOSIS — C50.812 MALIGNANT NEOPLASM OF OVERLAPPING SITES OF LEFT BREAST IN FEMALE, ESTROGEN RECEPTOR POSITIVE (HCC): Primary | ICD-10-CM

## 2019-12-27 PROCEDURE — 96413 CHEMO IV INFUSION 1 HR: CPT

## 2019-12-27 PROCEDURE — 96375 TX/PRO/DX INJ NEW DRUG ADDON: CPT

## 2019-12-27 PROCEDURE — S0028 INJECTION, FAMOTIDINE, 20 MG: HCPCS

## 2019-12-27 RX ORDER — FAMOTIDINE 10 MG/ML
INJECTION, SOLUTION INTRAVENOUS
Status: DISPENSED
Start: 2019-12-27 | End: 2019-12-28

## 2019-12-27 RX ORDER — HEPARIN SODIUM (PORCINE) LOCK FLUSH IV SOLN 100 UNIT/ML 100 UNIT/ML
SOLUTION INTRAVENOUS
Status: COMPLETED
Start: 2019-12-27 | End: 2019-12-27

## 2019-12-27 RX ORDER — DIPHENHYDRAMINE HCL 25 MG
CAPSULE ORAL
Status: DISPENSED
Start: 2019-12-27 | End: 2019-12-28

## 2019-12-27 RX ORDER — 0.9 % SODIUM CHLORIDE 0.9 %
VIAL (ML) INJECTION
Status: DISPENSED
Start: 2019-12-27 | End: 2019-12-28

## 2019-12-27 RX ORDER — FAMOTIDINE 10 MG/ML
20 INJECTION, SOLUTION INTRAVENOUS ONCE
Status: COMPLETED | OUTPATIENT
Start: 2019-12-27 | End: 2019-12-27

## 2019-12-27 RX ORDER — DIPHENHYDRAMINE HCL 25 MG
25 CAPSULE ORAL EVERY 6 HOURS PRN
Status: DISCONTINUED | OUTPATIENT
Start: 2019-12-27 | End: 2019-12-27

## 2019-12-27 RX ADMIN — HEPARIN SODIUM (PORCINE) LOCK FLUSH IV SOLN 100 UNIT/ML: 100 SOLUTION INTRAVENOUS at 16:35:00

## 2019-12-27 RX ADMIN — FAMOTIDINE 20 MG: 10 INJECTION, SOLUTION INTRAVENOUS at 15:08:00

## 2019-12-27 RX ADMIN — DIPHENHYDRAMINE HCL 25 MG: 25 MG CAPSULE ORAL at 15:07:00

## 2019-12-27 NOTE — PROGRESS NOTES
Pt here for [id number]. Arrives Ambulating independently  Patient denies possible pregnancy since last therapy cycle: Not Applicable    Modifications in dose or schedule: No.    Wearing iced gloves during infusion of taxol per recommendation of Lilian Yarbrough.  maldonado

## 2019-12-30 ENCOUNTER — OFFICE VISIT (OUTPATIENT)
Dept: PHYSICAL THERAPY | Facility: HOSPITAL | Age: 64
End: 2019-12-30
Attending: INTERNAL MEDICINE
Payer: COMMERCIAL

## 2019-12-30 PROCEDURE — 97140 MANUAL THERAPY 1/> REGIONS: CPT | Performed by: PHYSICAL THERAPIST

## 2019-12-30 NOTE — PROGRESS NOTES
Referring Physician: Dr. Ambar Dimas  Diagnosis:  Malignant neoplasm of overlapping sites of left breast in female, estrogen receptor positive ([facility] (C50.812,Z17.0)  History of lymph node dissection of left axilla (H16.112)  History of mastectomy, bilateral (Z9 STARTING POINT 17cm from 3rd cuticle 17cm from 3rd cuticle   LEFT HAND VOLUME 350 345   MEASUREMENT A 16.3 16   MEASUREMENT B 17 16.8   MEASUREMENT C 21.8 22   MEASUREMENT D 24.7 24.7   MEASUREMENT E 25.7 26   MEASUREMENT F 28.8 27.4   MEASUREMENT G 30. 5 and forward head  AROM/Strength:                 L shld AROM flex 65, abd 50               R shld AROM flex 75, abd 75     Edema/Tissue Observations:   Patient arrived wearing binder across chest, pulled tight, causing swelling up into B axilla and pect te to wear better fitting bras which has improved the tissue integrity of L trunk, decreased swelling to this area as well. Pt brought in all of her new compression sleeves (4 of them) to try on. Good fit noted with all.   Discussed with pt to make sure sh to B shoulders during STM all planes in supine  BUE OH flexion 2 x 10 with 2#  BUE OH abduction x 10, x 8 2#  BUE Horiz abduction 2x10 with 2#    *did not do bike or any exercises in the gym as pt has a cold sneezing and coughing.  There ex (10 minutes)  IA

## 2020-01-02 ENCOUNTER — NURSE ONLY (OUTPATIENT)
Dept: HEMATOLOGY/ONCOLOGY | Facility: HOSPITAL | Age: 65
End: 2020-01-02
Attending: INTERNAL MEDICINE
Payer: COMMERCIAL

## 2020-01-02 VITALS
WEIGHT: 181 LBS | RESPIRATION RATE: 16 BRPM | TEMPERATURE: 98 F | DIASTOLIC BLOOD PRESSURE: 67 MMHG | OXYGEN SATURATION: 96 % | HEART RATE: 103 BPM | BODY MASS INDEX: 30.9 KG/M2 | SYSTOLIC BLOOD PRESSURE: 121 MMHG | HEIGHT: 64 IN

## 2020-01-02 DIAGNOSIS — C50.812 MALIGNANT NEOPLASM OF OVERLAPPING SITES OF LEFT BREAST IN FEMALE, ESTROGEN RECEPTOR POSITIVE (HCC): Primary | ICD-10-CM

## 2020-01-02 DIAGNOSIS — Z45.2 ENCOUNTER FOR CENTRAL LINE CARE: ICD-10-CM

## 2020-01-02 DIAGNOSIS — Z79.899 ENCOUNTER FOR MEDICATION MANAGEMENT: ICD-10-CM

## 2020-01-02 DIAGNOSIS — K12.30 MUCOSITIS: ICD-10-CM

## 2020-01-02 DIAGNOSIS — Z17.0 MALIGNANT NEOPLASM OF OVERLAPPING SITES OF LEFT BREAST IN FEMALE, ESTROGEN RECEPTOR POSITIVE (HCC): Primary | ICD-10-CM

## 2020-01-02 DIAGNOSIS — L27.1 PALMAR PLANTAR ERYTHRODYSAESTHESIA: ICD-10-CM

## 2020-01-02 DIAGNOSIS — Z51.11 CHEMOTHERAPY MANAGEMENT, ENCOUNTER FOR: ICD-10-CM

## 2020-01-02 DIAGNOSIS — K12.1 ULCER (TRAUMATIC) OF ORAL MUCOSA: ICD-10-CM

## 2020-01-02 LAB
BASOPHILS # BLD AUTO: 0.05 X10(3) UL (ref 0–0.2)
BASOPHILS NFR BLD AUTO: 1.3 %
DEPRECATED RDW RBC AUTO: 54.5 FL (ref 35.1–46.3)
EOSINOPHIL # BLD AUTO: 0.04 X10(3) UL (ref 0–0.7)
EOSINOPHIL NFR BLD AUTO: 1.1 %
ERYTHROCYTE [DISTWIDTH] IN BLOOD BY AUTOMATED COUNT: 15.9 % (ref 11–15)
HCT VFR BLD AUTO: 29.4 % (ref 35–48)
HGB BLD-MCNC: 10 G/DL (ref 12–16)
IMM GRANULOCYTES # BLD AUTO: 0.03 X10(3) UL (ref 0–1)
IMM GRANULOCYTES NFR BLD: 0.8 %
LYMPHOCYTES # BLD AUTO: 0.9 X10(3) UL (ref 1–4)
LYMPHOCYTES NFR BLD AUTO: 24.3 %
MCH RBC QN AUTO: 32.1 PG (ref 26–34)
MCHC RBC AUTO-ENTMCNC: 34 G/DL (ref 31–37)
MCV RBC AUTO: 94.2 FL (ref 80–100)
MONOCYTES # BLD AUTO: 0.23 X10(3) UL (ref 0.1–1)
MONOCYTES NFR BLD AUTO: 6.2 %
NEUTROPHILS # BLD AUTO: 2.46 X10 (3) UL (ref 1.5–7.7)
NEUTROPHILS # BLD AUTO: 2.46 X10(3) UL (ref 1.5–7.7)
NEUTROPHILS NFR BLD AUTO: 66.3 %
PLATELET # BLD AUTO: 258 10(3)UL (ref 150–450)
RBC # BLD AUTO: 3.12 X10(6)UL (ref 3.8–5.3)
WBC # BLD AUTO: 3.7 X10(3) UL (ref 4–11)

## 2020-01-02 PROCEDURE — 36591 DRAW BLOOD OFF VENOUS DEVICE: CPT

## 2020-01-02 PROCEDURE — 99215 OFFICE O/P EST HI 40 MIN: CPT | Performed by: INTERNAL MEDICINE

## 2020-01-02 PROCEDURE — 85025 COMPLETE CBC W/AUTO DIFF WBC: CPT

## 2020-01-02 RX ORDER — HEPARIN SODIUM (PORCINE) LOCK FLUSH IV SOLN 100 UNIT/ML 100 UNIT/ML
5 SOLUTION INTRAVENOUS ONCE
Status: COMPLETED | OUTPATIENT
Start: 2020-01-02 | End: 2020-01-02

## 2020-01-02 RX ORDER — FAMOTIDINE 10 MG/ML
20 INJECTION, SOLUTION INTRAVENOUS ONCE
Status: CANCELLED | OUTPATIENT
Start: 2020-01-02

## 2020-01-02 RX ORDER — 0.9 % SODIUM CHLORIDE 0.9 %
VIAL (ML) INJECTION
Status: DISCONTINUED
Start: 2020-01-02 | End: 2020-01-02

## 2020-01-02 RX ORDER — DIPHENHYDRAMINE HCL 25 MG
25 CAPSULE ORAL EVERY 6 HOURS PRN
Status: CANCELLED
Start: 2020-01-02

## 2020-01-02 RX ORDER — 0.9 % SODIUM CHLORIDE 0.9 %
10 VIAL (ML) INJECTION ONCE
Status: CANCELLED | OUTPATIENT
Start: 2020-01-02

## 2020-01-02 RX ORDER — HEPARIN SODIUM (PORCINE) LOCK FLUSH IV SOLN 100 UNIT/ML 100 UNIT/ML
5 SOLUTION INTRAVENOUS ONCE
Status: CANCELLED | OUTPATIENT
Start: 2020-01-02

## 2020-01-02 RX ADMIN — HEPARIN SODIUM (PORCINE) LOCK FLUSH IV SOLN 100 UNIT/ML 500 UNITS: 100 SOLUTION INTRAVENOUS at 13:45:00

## 2020-01-02 NOTE — PROGRESS NOTES
Cancer Center Progress Note    Patient Name: Pavan Villasenor   YOB: 1955   Medical Record Number: [id number]   Attending Physician: Lizette Olson M.D.      Chief Complaint:  Breast cancer (PCP: Dr Veronica Spencer)    Oncology History:  60year old pos of malignancy in the dermal lymphatics or the overlying skin.   [place] lymph node was positive with extracapsular extension she proceeded to have axillary dissection which showed 2 of 22 regional lymph nodes had metastatic cancer without extracapsular ext MG Oral Tab, Take 1 tablet (10 mg total) by mouth daily. , Disp: 90 tablet, Rfl: 3  •  LIDOCAINE-PRILOCAINE 2.5-2.5 % External Cream, APPLY TO SITE 1 HOUR PRIOR TO PORT A CATH NEEDLE INSERTION, Disp: 30 g, Rfl: 0  •  mucositis mixture Oral Suspension, Swish Ht 1.626 m (5' 4\")   Wt 82.1 kg (181 lb)   LMP 07/14/2006   SpO2 96%   BMI 31.07 kg/m²     Physical Examination:  Performance Status:  General: Patient is alert and oriented x 3, not in acute distress. HEENT: lateral tongue ulcer about 9mm, healed.  Faci by (CST): Kassandra Bridges MD on 6/29/2019 at 12:17     Approved by (CST): Kassandra Bridges MD on 6/29/2019 at 12:35          Impression and Plan:  Cancer Staging  Malignant neoplasm of overlapping sites of left breast in female, estrogen receptor positive ([facility]

## 2020-01-03 ENCOUNTER — OFFICE VISIT (OUTPATIENT)
Dept: HEMATOLOGY/ONCOLOGY | Facility: HOSPITAL | Age: 65
End: 2020-01-03
Attending: INTERNAL MEDICINE
Payer: COMMERCIAL

## 2020-01-03 VITALS
SYSTOLIC BLOOD PRESSURE: 144 MMHG | DIASTOLIC BLOOD PRESSURE: 73 MMHG | OXYGEN SATURATION: 98 % | RESPIRATION RATE: 16 BRPM | TEMPERATURE: 99 F | HEART RATE: 112 BPM

## 2020-01-03 DIAGNOSIS — Z79.899 ENCOUNTER FOR MEDICATION MANAGEMENT: ICD-10-CM

## 2020-01-03 DIAGNOSIS — C50.812 MALIGNANT NEOPLASM OF OVERLAPPING SITES OF LEFT BREAST IN FEMALE, ESTROGEN RECEPTOR POSITIVE (HCC): Primary | ICD-10-CM

## 2020-01-03 DIAGNOSIS — Z45.2 ENCOUNTER FOR CENTRAL LINE CARE: ICD-10-CM

## 2020-01-03 DIAGNOSIS — Z17.0 MALIGNANT NEOPLASM OF OVERLAPPING SITES OF LEFT BREAST IN FEMALE, ESTROGEN RECEPTOR POSITIVE (HCC): Primary | ICD-10-CM

## 2020-01-03 PROCEDURE — 96375 TX/PRO/DX INJ NEW DRUG ADDON: CPT

## 2020-01-03 PROCEDURE — 96367 TX/PROPH/DG ADDL SEQ IV INF: CPT

## 2020-01-03 PROCEDURE — 96413 CHEMO IV INFUSION 1 HR: CPT

## 2020-01-03 PROCEDURE — S0028 INJECTION, FAMOTIDINE, 20 MG: HCPCS

## 2020-01-03 RX ORDER — HEPARIN SODIUM (PORCINE) LOCK FLUSH IV SOLN 100 UNIT/ML 100 UNIT/ML
5 SOLUTION INTRAVENOUS ONCE
Status: CANCELLED | OUTPATIENT
Start: 2020-01-03

## 2020-01-03 RX ORDER — DIPHENHYDRAMINE HCL 25 MG
CAPSULE ORAL
Status: COMPLETED
Start: 2020-01-03 | End: 2020-01-03

## 2020-01-03 RX ORDER — HEPARIN SODIUM (PORCINE) LOCK FLUSH IV SOLN 100 UNIT/ML 100 UNIT/ML
5 SOLUTION INTRAVENOUS ONCE
Status: COMPLETED | OUTPATIENT
Start: 2020-01-03 | End: 2020-01-03

## 2020-01-03 RX ORDER — HEPARIN SODIUM (PORCINE) LOCK FLUSH IV SOLN 100 UNIT/ML 100 UNIT/ML
SOLUTION INTRAVENOUS
Status: COMPLETED
Start: 2020-01-03 | End: 2020-01-03

## 2020-01-03 RX ORDER — 0.9 % SODIUM CHLORIDE 0.9 %
10 VIAL (ML) INJECTION ONCE
Status: CANCELLED | OUTPATIENT
Start: 2020-01-03

## 2020-01-03 RX ORDER — FAMOTIDINE 10 MG/ML
20 INJECTION, SOLUTION INTRAVENOUS ONCE
Status: COMPLETED | OUTPATIENT
Start: 2020-01-03 | End: 2020-01-03

## 2020-01-03 RX ORDER — DIPHENHYDRAMINE HCL 25 MG
25 CAPSULE ORAL EVERY 6 HOURS PRN
Status: DISCONTINUED | OUTPATIENT
Start: 2020-01-03 | End: 2020-01-03

## 2020-01-03 RX ORDER — FAMOTIDINE 10 MG/ML
INJECTION, SOLUTION INTRAVENOUS
Status: COMPLETED
Start: 2020-01-03 | End: 2020-01-03

## 2020-01-03 RX ORDER — 0.9 % SODIUM CHLORIDE 0.9 %
VIAL (ML) INJECTION
Status: DISCONTINUED
Start: 2020-01-03 | End: 2020-01-03

## 2020-01-03 RX ADMIN — DIPHENHYDRAMINE HCL 25 MG: 25 MG CAPSULE ORAL at 15:13:00

## 2020-01-03 RX ADMIN — FAMOTIDINE 20 MG: 10 INJECTION, SOLUTION INTRAVENOUS at 15:20:00

## 2020-01-03 RX ADMIN — HEPARIN SODIUM (PORCINE) LOCK FLUSH IV SOLN 100 UNIT/ML 500 UNITS: 100 SOLUTION INTRAVENOUS at 17:06:00

## 2020-01-03 NOTE — PROGRESS NOTES
Kim to infusion today for C7 D8 Taxol. Arrives Ambulating independently. Patient reports fatigue as a major factor. States she still has a mouth sore but it is vastly improved. Wears cold gloves during infusion, states it is helping with neuropathy.

## 2020-01-06 ENCOUNTER — OFFICE VISIT (OUTPATIENT)
Dept: PHYSICAL THERAPY | Facility: HOSPITAL | Age: 65
End: 2020-01-06
Attending: INTERNAL MEDICINE
Payer: COMMERCIAL

## 2020-01-06 PROCEDURE — 97140 MANUAL THERAPY 1/> REGIONS: CPT

## 2020-01-06 PROCEDURE — 97110 THERAPEUTIC EXERCISES: CPT

## 2020-01-06 NOTE — PROGRESS NOTES
Referring Physician: Dr. Ashley Lopes  Diagnosis:  Malignant neoplasm of overlapping sites of left breast in female, estrogen receptor positive ([facility] (C50.812,Z17.0)  History of lymph node dissection of left axilla (G23.965)  History of mastectomy, bilateral (Z9 pillow and bolster under knees   LIMB POSITION 75 deg abduction 75 deg abduction   STARTING POINT 17cm from 3rd cuticle 17cm from 3rd cuticle   LEFT HAND VOLUME 350 345   MEASUREMENT A 16.3 16   MEASUREMENT B 17 16.8   MEASUREMENT C 21.8 22   MEASUREMENT D Indurations noted B upper trap from previous bra straps               Protracted scapula and forward head  AROM/Strength:                 L shld AROM flex 65, abd 50               R shld AROM flex 75, abd 75     Edema/Tissue Observations:   Patient arrived supine working on  L chest to areas of tightness or possibly scar tissue. Compression:  Pt continues to wear better fitting bras which has improved the tissue integrity of L trunk, decreased swelling to this area as well.     Pt brought in all of her n tightness. Compression:  Pt did not bring in her compression sleeve today but will td when she gets home.    THere Ex (20 minutes)  Supine AA/PROM of B shoulders in supine and side lying  Side lying L shoulder abduction and circles CW/CCW x 10 each with MET  2) Pt to be independent with self MLD, ROM exercises, and donning/doffing compression bandages/garments to facilitate swelling reduction and to be independent at self management once discharged -   3) Increase shoulder AROM R flex 155, abd 165; L flex

## 2020-01-09 ENCOUNTER — OFFICE VISIT (OUTPATIENT)
Dept: HEMATOLOGY/ONCOLOGY | Facility: HOSPITAL | Age: 65
End: 2020-01-09
Attending: INTERNAL MEDICINE
Payer: COMMERCIAL

## 2020-01-09 VITALS
SYSTOLIC BLOOD PRESSURE: 136 MMHG | HEIGHT: 64 IN | TEMPERATURE: 98 F | OXYGEN SATURATION: 97 % | RESPIRATION RATE: 16 BRPM | WEIGHT: 183 LBS | HEART RATE: 100 BPM | BODY MASS INDEX: 31.24 KG/M2 | DIASTOLIC BLOOD PRESSURE: 77 MMHG

## 2020-01-09 DIAGNOSIS — Z45.2 ENCOUNTER FOR CENTRAL LINE CARE: ICD-10-CM

## 2020-01-09 DIAGNOSIS — Z79.899 ENCOUNTER FOR MEDICATION MANAGEMENT: ICD-10-CM

## 2020-01-09 DIAGNOSIS — C50.812 MALIGNANT NEOPLASM OF OVERLAPPING SITES OF LEFT BREAST IN FEMALE, ESTROGEN RECEPTOR POSITIVE (HCC): Primary | ICD-10-CM

## 2020-01-09 DIAGNOSIS — L27.1 PALMAR PLANTAR ERYTHRODYSAESTHESIA: ICD-10-CM

## 2020-01-09 DIAGNOSIS — Z17.0 MALIGNANT NEOPLASM OF OVERLAPPING SITES OF LEFT BREAST IN FEMALE, ESTROGEN RECEPTOR POSITIVE (HCC): Primary | ICD-10-CM

## 2020-01-09 DIAGNOSIS — Z51.11 CHEMOTHERAPY MANAGEMENT, ENCOUNTER FOR: ICD-10-CM

## 2020-01-09 DIAGNOSIS — I89.0 LYMPHEDEMA OF ARM: ICD-10-CM

## 2020-01-09 DIAGNOSIS — K12.30 MUCOSITIS: ICD-10-CM

## 2020-01-09 LAB
BASOPHILS # BLD AUTO: 0.06 X10(3) UL (ref 0–0.2)
BASOPHILS NFR BLD AUTO: 1.9 %
DEPRECATED RDW RBC AUTO: 54.8 FL (ref 35.1–46.3)
EOSINOPHIL # BLD AUTO: 0.03 X10(3) UL (ref 0–0.7)
EOSINOPHIL NFR BLD AUTO: 0.9 %
ERYTHROCYTE [DISTWIDTH] IN BLOOD BY AUTOMATED COUNT: 15.8 % (ref 11–15)
HCT VFR BLD AUTO: 28.8 % (ref 35–48)
HGB BLD-MCNC: 9.5 G/DL (ref 12–16)
IMM GRANULOCYTES # BLD AUTO: 0.02 X10(3) UL (ref 0–1)
IMM GRANULOCYTES NFR BLD: 0.6 %
LYMPHOCYTES # BLD AUTO: 0.96 X10(3) UL (ref 1–4)
LYMPHOCYTES NFR BLD AUTO: 29.8 %
MCH RBC QN AUTO: 31.5 PG (ref 26–34)
MCHC RBC AUTO-ENTMCNC: 33 G/DL (ref 31–37)
MCV RBC AUTO: 95.4 FL (ref 80–100)
MONOCYTES # BLD AUTO: 0.26 X10(3) UL (ref 0.1–1)
MONOCYTES NFR BLD AUTO: 8.1 %
NEUTROPHILS # BLD AUTO: 1.89 X10 (3) UL (ref 1.5–7.7)
NEUTROPHILS # BLD AUTO: 1.89 X10(3) UL (ref 1.5–7.7)
NEUTROPHILS NFR BLD AUTO: 58.7 %
PLATELET # BLD AUTO: 232 10(3)UL (ref 150–450)
PLATELET MORPHOLOGY: NORMAL
RBC # BLD AUTO: 3.02 X10(6)UL (ref 3.8–5.3)
WBC # BLD AUTO: 3.2 X10(3) UL (ref 4–11)

## 2020-01-09 PROCEDURE — 99215 OFFICE O/P EST HI 40 MIN: CPT | Performed by: INTERNAL MEDICINE

## 2020-01-09 PROCEDURE — 85025 COMPLETE CBC W/AUTO DIFF WBC: CPT

## 2020-01-09 PROCEDURE — 36591 DRAW BLOOD OFF VENOUS DEVICE: CPT

## 2020-01-09 RX ORDER — HEPARIN SODIUM (PORCINE) LOCK FLUSH IV SOLN 100 UNIT/ML 100 UNIT/ML
5 SOLUTION INTRAVENOUS ONCE
Status: CANCELLED | OUTPATIENT
Start: 2020-01-09

## 2020-01-09 RX ORDER — HEPARIN SODIUM (PORCINE) LOCK FLUSH IV SOLN 100 UNIT/ML 100 UNIT/ML
5 SOLUTION INTRAVENOUS ONCE
Status: COMPLETED | OUTPATIENT
Start: 2020-01-09 | End: 2020-01-09

## 2020-01-09 RX ORDER — DIPHENHYDRAMINE HCL 25 MG
25 CAPSULE ORAL EVERY 6 HOURS PRN
Status: CANCELLED
Start: 2020-01-09

## 2020-01-09 RX ORDER — 0.9 % SODIUM CHLORIDE 0.9 %
VIAL (ML) INJECTION
Status: DISCONTINUED
Start: 2020-01-09 | End: 2020-01-09

## 2020-01-09 RX ORDER — FAMOTIDINE 10 MG/ML
20 INJECTION, SOLUTION INTRAVENOUS ONCE
Status: CANCELLED | OUTPATIENT
Start: 2020-01-09

## 2020-01-09 RX ORDER — 0.9 % SODIUM CHLORIDE 0.9 %
10 VIAL (ML) INJECTION ONCE
Status: CANCELLED | OUTPATIENT
Start: 2020-01-09

## 2020-01-09 RX ADMIN — HEPARIN SODIUM (PORCINE) LOCK FLUSH IV SOLN 100 UNIT/ML 500 UNITS: 100 SOLUTION INTRAVENOUS at 14:06:00

## 2020-01-09 NOTE — PROGRESS NOTES
Cancer Center Progress Note    Patient Name: Radha Sandoval   YOB: 1955   Medical Record Number: [id number]   Attending Physician: Cecile Islas M.D.      Chief Complaint:  Breast cancer (PCP: Dr Jagruti Alvares)    Oncology History:  60year old pos of malignancy in the dermal lymphatics or the overlying skin.   [place] lymph node was positive with extracapsular extension she proceeded to have axillary dissection which showed 2 of 22 regional lymph nodes had metastatic cancer without extracapsular ext Medications:    Current Outpatient Medications:   •  loratadine 10 MG Oral Tab, Take 1 tablet (10 mg total) by mouth daily. , Disp: 90 tablet, Rfl: 3  •  LIDOCAINE-PRILOCAINE 2.5-2.5 % External Cream, APPLY TO SITE 1 HOUR PRIOR TO PORT A CATH NEEDLE INSERTI Sitting)   Pulse 100   Temp 98.2 °F (36.8 °C) (Oral)   Resp 16   Ht 1.626 m (5' 4\")   Wt 83 kg (183 lb)   LMP 07/14/2006   SpO2 97%   BMI 31.41 kg/m²     Physical Examination:  Performance Status:  General: Patient is alert and oriented x 3, not in acute No biliary ductal dilatation. 6.  Small hiatal hernia.    Dictated by (CST): Yuriy Palma MD on 6/29/2019 at 12:17     Approved by (CST): Yuriy Palma MD on 6/29/2019 at 12:35          Impression and Plan:  Cancer Staging  Malignant neoplasm of overlapping

## 2020-01-10 ENCOUNTER — OFFICE VISIT (OUTPATIENT)
Dept: HEMATOLOGY/ONCOLOGY | Facility: HOSPITAL | Age: 65
End: 2020-01-10
Attending: INTERNAL MEDICINE
Payer: COMMERCIAL

## 2020-01-10 VITALS
HEART RATE: 100 BPM | DIASTOLIC BLOOD PRESSURE: 68 MMHG | TEMPERATURE: 98 F | RESPIRATION RATE: 16 BRPM | SYSTOLIC BLOOD PRESSURE: 133 MMHG | OXYGEN SATURATION: 97 %

## 2020-01-10 DIAGNOSIS — C50.812 MALIGNANT NEOPLASM OF OVERLAPPING SITES OF LEFT BREAST IN FEMALE, ESTROGEN RECEPTOR POSITIVE (HCC): Primary | ICD-10-CM

## 2020-01-10 DIAGNOSIS — Z45.2 ENCOUNTER FOR CENTRAL LINE CARE: ICD-10-CM

## 2020-01-10 DIAGNOSIS — Z17.0 MALIGNANT NEOPLASM OF OVERLAPPING SITES OF LEFT BREAST IN FEMALE, ESTROGEN RECEPTOR POSITIVE (HCC): Primary | ICD-10-CM

## 2020-01-10 DIAGNOSIS — Z79.899 ENCOUNTER FOR MEDICATION MANAGEMENT: ICD-10-CM

## 2020-01-10 PROCEDURE — 96375 TX/PRO/DX INJ NEW DRUG ADDON: CPT

## 2020-01-10 PROCEDURE — S0028 INJECTION, FAMOTIDINE, 20 MG: HCPCS

## 2020-01-10 PROCEDURE — 96413 CHEMO IV INFUSION 1 HR: CPT

## 2020-01-10 RX ORDER — HEPARIN SODIUM (PORCINE) LOCK FLUSH IV SOLN 100 UNIT/ML 100 UNIT/ML
5 SOLUTION INTRAVENOUS ONCE
Status: COMPLETED | OUTPATIENT
Start: 2020-01-10 | End: 2020-01-10

## 2020-01-10 RX ORDER — HEPARIN SODIUM (PORCINE) LOCK FLUSH IV SOLN 100 UNIT/ML 100 UNIT/ML
5 SOLUTION INTRAVENOUS ONCE
Status: CANCELLED | OUTPATIENT
Start: 2020-01-10

## 2020-01-10 RX ORDER — DIPHENHYDRAMINE HCL 25 MG
CAPSULE ORAL
Status: COMPLETED
Start: 2020-01-10 | End: 2020-01-10

## 2020-01-10 RX ORDER — DIPHENHYDRAMINE HCL 25 MG
25 CAPSULE ORAL EVERY 6 HOURS PRN
Status: DISCONTINUED | OUTPATIENT
Start: 2020-01-10 | End: 2020-01-10

## 2020-01-10 RX ORDER — FAMOTIDINE 10 MG/ML
20 INJECTION, SOLUTION INTRAVENOUS ONCE
Status: COMPLETED | OUTPATIENT
Start: 2020-01-10 | End: 2020-01-10

## 2020-01-10 RX ORDER — 0.9 % SODIUM CHLORIDE 0.9 %
10 VIAL (ML) INJECTION ONCE
Status: CANCELLED | OUTPATIENT
Start: 2020-01-10

## 2020-01-10 RX ADMIN — FAMOTIDINE 20 MG: 10 INJECTION, SOLUTION INTRAVENOUS at 15:01:00

## 2020-01-10 RX ADMIN — HEPARIN SODIUM (PORCINE) LOCK FLUSH IV SOLN 100 UNIT/ML 500 UNITS: 100 SOLUTION INTRAVENOUS at 16:31:00

## 2020-01-10 RX ADMIN — DIPHENHYDRAMINE HCL 25 MG: 25 MG CAPSULE ORAL at 14:55:00

## 2020-01-10 NOTE — PROGRESS NOTES
Pt here for C7D15 Taxol. Arrives Ambulating independently    Modifications in dose or schedule: Yes, dose was reduced to 70 mg/m2 due to neuropathy in her feet. Patient reports she is well, reports she feels a \"fog\" until the day before chemo.  Repor

## 2020-01-16 ENCOUNTER — NURSE ONLY (OUTPATIENT)
Dept: HEMATOLOGY/ONCOLOGY | Facility: HOSPITAL | Age: 65
End: 2020-01-16
Attending: INTERNAL MEDICINE
Payer: COMMERCIAL

## 2020-01-16 ENCOUNTER — OFFICE VISIT (OUTPATIENT)
Dept: PHYSICAL THERAPY | Facility: HOSPITAL | Age: 65
End: 2020-01-16
Attending: SURGERY
Payer: COMMERCIAL

## 2020-01-16 VITALS
HEIGHT: 64 IN | HEART RATE: 97 BPM | RESPIRATION RATE: 16 BRPM | DIASTOLIC BLOOD PRESSURE: 75 MMHG | OXYGEN SATURATION: 97 % | WEIGHT: 184 LBS | SYSTOLIC BLOOD PRESSURE: 128 MMHG | BODY MASS INDEX: 31.41 KG/M2 | TEMPERATURE: 98 F

## 2020-01-16 DIAGNOSIS — Z51.11 CHEMOTHERAPY MANAGEMENT, ENCOUNTER FOR: ICD-10-CM

## 2020-01-16 DIAGNOSIS — Z45.2 ENCOUNTER FOR CENTRAL LINE CARE: ICD-10-CM

## 2020-01-16 DIAGNOSIS — Z79.899 ENCOUNTER FOR MEDICATION MANAGEMENT: ICD-10-CM

## 2020-01-16 DIAGNOSIS — Z17.0 MALIGNANT NEOPLASM OF OVERLAPPING SITES OF LEFT BREAST IN FEMALE, ESTROGEN RECEPTOR POSITIVE (HCC): Primary | ICD-10-CM

## 2020-01-16 DIAGNOSIS — K12.1 ULCER (TRAUMATIC) OF ORAL MUCOSA: ICD-10-CM

## 2020-01-16 DIAGNOSIS — I89.0 LYMPHEDEMA OF ARM: ICD-10-CM

## 2020-01-16 DIAGNOSIS — C50.812 MALIGNANT NEOPLASM OF OVERLAPPING SITES OF LEFT BREAST IN FEMALE, ESTROGEN RECEPTOR POSITIVE (HCC): Primary | ICD-10-CM

## 2020-01-16 DIAGNOSIS — L27.1 PALMAR PLANTAR ERYTHRODYSAESTHESIA: ICD-10-CM

## 2020-01-16 LAB
ALBUMIN SERPL-MCNC: 3.5 G/DL (ref 3.4–5)
ALBUMIN/GLOB SERPL: 1 {RATIO} (ref 1–2)
ALP LIVER SERPL-CCNC: 67 U/L (ref 50–130)
ALT SERPL-CCNC: 48 U/L (ref 13–56)
ANION GAP SERPL CALC-SCNC: 4 MMOL/L (ref 0–18)
AST SERPL-CCNC: 36 U/L (ref 15–37)
BASOPHILS # BLD AUTO: 0.05 X10(3) UL (ref 0–0.2)
BASOPHILS NFR BLD AUTO: 1.4 %
BILIRUB SERPL-MCNC: 0.5 MG/DL (ref 0.1–2)
BUN BLD-MCNC: 22 MG/DL (ref 7–18)
BUN/CREAT SERPL: 36.7 (ref 10–20)
CALCIUM BLD-MCNC: 9 MG/DL (ref 8.5–10.1)
CHLORIDE SERPL-SCNC: 107 MMOL/L (ref 98–112)
CO2 SERPL-SCNC: 28 MMOL/L (ref 21–32)
CREAT BLD-MCNC: 0.6 MG/DL (ref 0.55–1.02)
DEPRECATED RDW RBC AUTO: 53.1 FL (ref 35.1–46.3)
EOSINOPHIL # BLD AUTO: 0.02 X10(3) UL (ref 0–0.7)
EOSINOPHIL NFR BLD AUTO: 0.6 %
ERYTHROCYTE [DISTWIDTH] IN BLOOD BY AUTOMATED COUNT: 15 % (ref 11–15)
GLOBULIN PLAS-MCNC: 3.5 G/DL (ref 2.8–4.4)
GLUCOSE BLD-MCNC: 102 MG/DL (ref 70–99)
HCT VFR BLD AUTO: 29.4 % (ref 35–48)
HGB BLD-MCNC: 10 G/DL (ref 12–16)
IMM GRANULOCYTES # BLD AUTO: 0.03 X10(3) UL (ref 0–1)
IMM GRANULOCYTES NFR BLD: 0.8 %
LYMPHOCYTES # BLD AUTO: 0.97 X10(3) UL (ref 1–4)
LYMPHOCYTES NFR BLD AUTO: 27.2 %
M PROTEIN MFR SERPL ELPH: 7 G/DL (ref 6.4–8.2)
MCH RBC QN AUTO: 33.1 PG (ref 26–34)
MCHC RBC AUTO-ENTMCNC: 34 G/DL (ref 31–37)
MCV RBC AUTO: 97.4 FL (ref 80–100)
MONOCYTES # BLD AUTO: 0.3 X10(3) UL (ref 0.1–1)
MONOCYTES NFR BLD AUTO: 8.4 %
NEUTROPHILS # BLD AUTO: 2.2 X10 (3) UL (ref 1.5–7.7)
NEUTROPHILS # BLD AUTO: 2.2 X10(3) UL (ref 1.5–7.7)
NEUTROPHILS NFR BLD AUTO: 61.6 %
OSMOLALITY SERPL CALC.SUM OF ELEC: 292 MOSM/KG (ref 275–295)
PATIENT FASTING Y/N/NP: NO
PLATELET # BLD AUTO: 247 10(3)UL (ref 150–450)
POTASSIUM SERPL-SCNC: 3.9 MMOL/L (ref 3.5–5.1)
RBC # BLD AUTO: 3.02 X10(6)UL (ref 3.8–5.3)
SODIUM SERPL-SCNC: 139 MMOL/L (ref 136–145)
WBC # BLD AUTO: 3.6 X10(3) UL (ref 4–11)

## 2020-01-16 PROCEDURE — 85025 COMPLETE CBC W/AUTO DIFF WBC: CPT

## 2020-01-16 PROCEDURE — 36591 DRAW BLOOD OFF VENOUS DEVICE: CPT

## 2020-01-16 PROCEDURE — 97110 THERAPEUTIC EXERCISES: CPT

## 2020-01-16 PROCEDURE — 80053 COMPREHEN METABOLIC PANEL: CPT

## 2020-01-16 PROCEDURE — 99215 OFFICE O/P EST HI 40 MIN: CPT | Performed by: INTERNAL MEDICINE

## 2020-01-16 PROCEDURE — 97140 MANUAL THERAPY 1/> REGIONS: CPT

## 2020-01-16 RX ORDER — 0.9 % SODIUM CHLORIDE 0.9 %
VIAL (ML) INJECTION
Status: DISCONTINUED
Start: 2020-01-16 | End: 2020-01-16

## 2020-01-16 RX ORDER — DIPHENHYDRAMINE HCL 25 MG
25 CAPSULE ORAL EVERY 6 HOURS PRN
Status: CANCELLED
Start: 2020-01-16

## 2020-01-16 RX ORDER — HEPARIN SODIUM (PORCINE) LOCK FLUSH IV SOLN 100 UNIT/ML 100 UNIT/ML
5 SOLUTION INTRAVENOUS ONCE
Status: CANCELLED | OUTPATIENT
Start: 2020-01-16

## 2020-01-16 RX ORDER — FAMOTIDINE 10 MG/ML
20 INJECTION, SOLUTION INTRAVENOUS ONCE
Status: CANCELLED | OUTPATIENT
Start: 2020-01-16

## 2020-01-16 RX ORDER — HEPARIN SODIUM (PORCINE) LOCK FLUSH IV SOLN 100 UNIT/ML 100 UNIT/ML
5 SOLUTION INTRAVENOUS ONCE
Status: COMPLETED | OUTPATIENT
Start: 2020-01-16 | End: 2020-01-16

## 2020-01-16 RX ORDER — 0.9 % SODIUM CHLORIDE 0.9 %
10 VIAL (ML) INJECTION ONCE
Status: CANCELLED | OUTPATIENT
Start: 2020-01-16

## 2020-01-16 RX ADMIN — HEPARIN SODIUM (PORCINE) LOCK FLUSH IV SOLN 100 UNIT/ML 500 UNITS: 100 SOLUTION INTRAVENOUS at 14:07:00

## 2020-01-16 NOTE — PROGRESS NOTES
Referring Physician: Dr. Jigna Larsen  Diagnosis:  Malignant neoplasm of overlapping sites of left breast in female, estrogen receptor [facility] (C50.812,Z17.0)  History of lymph node dissection of left axilla (O55.591)  History of mastectomy, bilateral (Z9 session.   11/20/2019  AROM RUE Flex 160, abduction 160  AROM LUE flexion 160, abduction 160  AROM BUE ER 80, IR (reach behind back) T10  *slightly improved B shoulder flexion by 5 deg this session      Lymphedema Calculations 11/20/2019 7/24/2019   DATE ME watch extremely tight on L wrist causing a tourniquet and swelling.     10/1/2019  AROM RUE Flex 155, abduction 160  AROM LUE flexion 145, abduction 150  AROM BUE ER 80, IR (reach behind back) T10  BUE Shoulder MME 4+/5      Evaluation Measurements: (8/20/2 provided STM w/ MLD techniques to B chest, axilla, and trunk and entire L arm. STM to L axilla/UE (cording still palpable in L axilla). 0 cavitation during STM to L axilla.    STM and MLD provided to decrease swelling and  to improve tissue mobility LUE in trunk    Compression:  Assisted pt donning L compression sleeve- good fit Manual therapy (40minutes)    STM: provided STM w/ MLD techniques to B chest, axilla, and trunk and entire L arm. STM to L axilla/UE (cording still palpable in L axilla).  2 small ca shoulder abduction with 2# weights 2x10  Side lying shoulder circles 0# x 10 CW/CCW each     *did not progress exercises as pt was starting to feel ill again at end of session. She had a chemo infusion on Friday.   There ex (15 minutes)  PROM of L shoulder

## 2020-01-16 NOTE — PROGRESS NOTES
Cancer Center Progress Note    Patient Name: Claudio Schrader   YOB: 1955   Medical Record Number: [id number]   Attending Physician: Nidia Anthony M.D.      Chief Complaint:  Breast cancer (PCP: Dr Minal Pagan)    Oncology History:  60year old pos of malignancy in the dermal lymphatics or the overlying skin.   [place] lymph node was positive with extracapsular extension she proceeded to have axillary dissection which showed 2 of 22 regional lymph nodes had metastatic cancer without extracapsular ext MG Oral Tab, Take 1 tablet (10 mg total) by mouth daily. , Disp: 90 tablet, Rfl: 3  •  LIDOCAINE-PRILOCAINE 2.5-2.5 % External Cream, APPLY TO SITE 1 HOUR PRIOR TO PORT A CATH NEEDLE INSERTION, Disp: 30 g, Rfl: 0  •  mucositis mixture Oral Suspension, Swish (Oral)   Resp 16   Ht 1.626 m (5' 4\")   Wt 83.5 kg (184 lb)   LMP 07/14/2006   SpO2 97%   BMI 31.58 kg/m²     Physical Examination:  Performance Status:  General: Patient is alert and oriented x 3, not in acute distress.   HEENT: right lateral tongue ulcer dilatation. 6.  Small hiatal hernia.    Dictated by (CST): Sandi Bee MD on 6/29/2019 at 12:17     Approved by (CST): Sandi Bee MD on 6/29/2019 at 12:35          Impression and Plan:  Cancer Staging  Malignant neoplasm of overlapping sites of left [place]

## 2020-01-17 ENCOUNTER — OFFICE VISIT (OUTPATIENT)
Dept: HEMATOLOGY/ONCOLOGY | Facility: HOSPITAL | Age: 65
End: 2020-01-17
Attending: INTERNAL MEDICINE
Payer: COMMERCIAL

## 2020-01-17 VITALS
DIASTOLIC BLOOD PRESSURE: 82 MMHG | OXYGEN SATURATION: 99 % | TEMPERATURE: 99 F | HEART RATE: 110 BPM | SYSTOLIC BLOOD PRESSURE: 131 MMHG | RESPIRATION RATE: 16 BRPM

## 2020-01-17 DIAGNOSIS — Z17.0 MALIGNANT NEOPLASM OF OVERLAPPING SITES OF LEFT BREAST IN FEMALE, ESTROGEN RECEPTOR POSITIVE (HCC): Primary | ICD-10-CM

## 2020-01-17 DIAGNOSIS — Z79.899 ENCOUNTER FOR MEDICATION MANAGEMENT: ICD-10-CM

## 2020-01-17 DIAGNOSIS — Z45.2 ENCOUNTER FOR CENTRAL LINE CARE: ICD-10-CM

## 2020-01-17 DIAGNOSIS — C50.812 MALIGNANT NEOPLASM OF OVERLAPPING SITES OF LEFT BREAST IN FEMALE, ESTROGEN RECEPTOR POSITIVE (HCC): Primary | ICD-10-CM

## 2020-01-17 PROCEDURE — 96413 CHEMO IV INFUSION 1 HR: CPT

## 2020-01-17 PROCEDURE — 96375 TX/PRO/DX INJ NEW DRUG ADDON: CPT

## 2020-01-17 PROCEDURE — S0028 INJECTION, FAMOTIDINE, 20 MG: HCPCS

## 2020-01-17 RX ORDER — DIPHENHYDRAMINE HCL 25 MG
CAPSULE ORAL
Status: COMPLETED
Start: 2020-01-17 | End: 2020-01-17

## 2020-01-17 RX ORDER — 0.9 % SODIUM CHLORIDE 0.9 %
VIAL (ML) INJECTION
Status: DISCONTINUED
Start: 2020-01-17 | End: 2020-01-17

## 2020-01-17 RX ORDER — HEPARIN SODIUM (PORCINE) LOCK FLUSH IV SOLN 100 UNIT/ML 100 UNIT/ML
SOLUTION INTRAVENOUS
Status: COMPLETED
Start: 2020-01-17 | End: 2020-01-17

## 2020-01-17 RX ORDER — DIPHENHYDRAMINE HCL 25 MG
25 CAPSULE ORAL EVERY 6 HOURS PRN
Status: DISCONTINUED | OUTPATIENT
Start: 2020-01-17 | End: 2020-01-17

## 2020-01-17 RX ORDER — FAMOTIDINE 10 MG/ML
20 INJECTION, SOLUTION INTRAVENOUS ONCE
Status: COMPLETED | OUTPATIENT
Start: 2020-01-17 | End: 2020-01-17

## 2020-01-17 RX ORDER — 0.9 % SODIUM CHLORIDE 0.9 %
10 VIAL (ML) INJECTION ONCE
Status: CANCELLED | OUTPATIENT
Start: 2020-01-17

## 2020-01-17 RX ORDER — HEPARIN SODIUM (PORCINE) LOCK FLUSH IV SOLN 100 UNIT/ML 100 UNIT/ML
5 SOLUTION INTRAVENOUS ONCE
Status: CANCELLED | OUTPATIENT
Start: 2020-01-17

## 2020-01-17 RX ORDER — HEPARIN SODIUM (PORCINE) LOCK FLUSH IV SOLN 100 UNIT/ML 100 UNIT/ML
5 SOLUTION INTRAVENOUS ONCE
Status: COMPLETED | OUTPATIENT
Start: 2020-01-17 | End: 2020-01-17

## 2020-01-17 RX ORDER — FAMOTIDINE 10 MG/ML
INJECTION, SOLUTION INTRAVENOUS
Status: COMPLETED
Start: 2020-01-17 | End: 2020-01-17

## 2020-01-17 RX ADMIN — DIPHENHYDRAMINE HCL 25 MG: 25 MG CAPSULE ORAL at 12:12:00

## 2020-01-17 RX ADMIN — FAMOTIDINE 20 MG: 10 INJECTION, SOLUTION INTRAVENOUS at 12:19:00

## 2020-01-17 RX ADMIN — HEPARIN SODIUM (PORCINE) LOCK FLUSH IV SOLN 100 UNIT/ML 500 UNITS: 100 SOLUTION INTRAVENOUS at 13:54:00

## 2020-01-17 NOTE — PROGRESS NOTES
Pt here for C8D1 Taxol. Arrives Ambulating independently    Modifications in dose or schedule: No, dose was previously  reduced to 70 mg/m2 due to neuropathy in her feet. Patient reports she is well, complains of some sinus pain in her nose.  Reports s

## 2020-01-20 ENCOUNTER — APPOINTMENT (OUTPATIENT)
Dept: PHYSICAL THERAPY | Facility: HOSPITAL | Age: 65
End: 2020-01-20
Attending: INTERNAL MEDICINE
Payer: COMMERCIAL

## 2020-01-20 ENCOUNTER — TELEPHONE (OUTPATIENT)
Dept: HEMATOLOGY/ONCOLOGY | Facility: HOSPITAL | Age: 65
End: 2020-01-20

## 2020-01-23 ENCOUNTER — OFFICE VISIT (OUTPATIENT)
Dept: PHYSICAL THERAPY | Facility: HOSPITAL | Age: 65
End: 2020-01-23
Attending: SURGERY
Payer: COMMERCIAL

## 2020-01-23 ENCOUNTER — OFFICE VISIT (OUTPATIENT)
Dept: HEMATOLOGY/ONCOLOGY | Facility: HOSPITAL | Age: 65
End: 2020-01-23
Attending: INTERNAL MEDICINE
Payer: COMMERCIAL

## 2020-01-23 VITALS
SYSTOLIC BLOOD PRESSURE: 147 MMHG | HEIGHT: 64 IN | WEIGHT: 183 LBS | DIASTOLIC BLOOD PRESSURE: 89 MMHG | RESPIRATION RATE: 16 BRPM | BODY MASS INDEX: 31.24 KG/M2 | OXYGEN SATURATION: 98 % | TEMPERATURE: 98 F | HEART RATE: 111 BPM

## 2020-01-23 DIAGNOSIS — Z51.11 CHEMOTHERAPY MANAGEMENT, ENCOUNTER FOR: ICD-10-CM

## 2020-01-23 DIAGNOSIS — C50.812 MALIGNANT NEOPLASM OF OVERLAPPING SITES OF LEFT BREAST IN FEMALE, ESTROGEN RECEPTOR POSITIVE (HCC): Primary | ICD-10-CM

## 2020-01-23 DIAGNOSIS — Z17.0 MALIGNANT NEOPLASM OF OVERLAPPING SITES OF LEFT BREAST IN FEMALE, ESTROGEN RECEPTOR POSITIVE (HCC): Primary | ICD-10-CM

## 2020-01-23 DIAGNOSIS — Z45.2 ENCOUNTER FOR CENTRAL LINE CARE: ICD-10-CM

## 2020-01-23 DIAGNOSIS — Z79.899 ENCOUNTER FOR MEDICATION MANAGEMENT: ICD-10-CM

## 2020-01-23 LAB
BASOPHILS # BLD AUTO: 0.07 X10(3) UL (ref 0–0.2)
BASOPHILS NFR BLD AUTO: 1.7 %
DEPRECATED RDW RBC AUTO: 50.3 FL (ref 35.1–46.3)
EOSINOPHIL # BLD AUTO: 0.03 X10(3) UL (ref 0–0.7)
EOSINOPHIL NFR BLD AUTO: 0.7 %
ERYTHROCYTE [DISTWIDTH] IN BLOOD BY AUTOMATED COUNT: 14.5 % (ref 11–15)
HCT VFR BLD AUTO: 30.5 % (ref 35–48)
HGB BLD-MCNC: 10.5 G/DL (ref 12–16)
IMM GRANULOCYTES # BLD AUTO: 0.03 X10(3) UL (ref 0–1)
IMM GRANULOCYTES NFR BLD: 0.7 %
LYMPHOCYTES # BLD AUTO: 1.13 X10(3) UL (ref 1–4)
LYMPHOCYTES NFR BLD AUTO: 27.7 %
MCH RBC QN AUTO: 33.2 PG (ref 26–34)
MCHC RBC AUTO-ENTMCNC: 34.4 G/DL (ref 31–37)
MCV RBC AUTO: 96.5 FL (ref 80–100)
MONOCYTES # BLD AUTO: 0.28 X10(3) UL (ref 0.1–1)
MONOCYTES NFR BLD AUTO: 6.9 %
NEUTROPHILS # BLD AUTO: 2.54 X10 (3) UL (ref 1.5–7.7)
NEUTROPHILS # BLD AUTO: 2.54 X10(3) UL (ref 1.5–7.7)
NEUTROPHILS NFR BLD AUTO: 62.3 %
PLATELET # BLD AUTO: 287 10(3)UL (ref 150–450)
RBC # BLD AUTO: 3.16 X10(6)UL (ref 3.8–5.3)
WBC # BLD AUTO: 4.1 X10(3) UL (ref 4–11)

## 2020-01-23 PROCEDURE — 36591 DRAW BLOOD OFF VENOUS DEVICE: CPT

## 2020-01-23 PROCEDURE — 85025 COMPLETE CBC W/AUTO DIFF WBC: CPT

## 2020-01-23 PROCEDURE — 99215 OFFICE O/P EST HI 40 MIN: CPT | Performed by: NURSE PRACTITIONER

## 2020-01-23 RX ORDER — GABAPENTIN 100 MG/1
100 CAPSULE ORAL NIGHTLY
Qty: 60 CAPSULE | Refills: 1 | Status: SHIPPED | OUTPATIENT
Start: 2020-01-23 | End: 2020-04-02

## 2020-01-23 RX ORDER — LORATADINE 10 MG/1
10 TABLET ORAL DAILY
Qty: 90 TABLET | Refills: 3 | Status: SHIPPED | OUTPATIENT
Start: 2020-01-23 | End: 2021-02-08

## 2020-01-23 RX ORDER — FAMOTIDINE 10 MG/ML
20 INJECTION, SOLUTION INTRAVENOUS ONCE
Status: CANCELLED | OUTPATIENT
Start: 2020-01-23

## 2020-01-23 RX ORDER — LIDOCAINE AND PRILOCAINE 25; 25 MG/G; MG/G
CREAM TOPICAL
Qty: 30 G | Refills: 0 | Status: SHIPPED | OUTPATIENT
Start: 2020-01-23 | End: 2020-10-06

## 2020-01-23 RX ORDER — HEPARIN SODIUM (PORCINE) LOCK FLUSH IV SOLN 100 UNIT/ML 100 UNIT/ML
5 SOLUTION INTRAVENOUS ONCE
Status: CANCELLED | OUTPATIENT
Start: 2020-01-23

## 2020-01-23 RX ORDER — HEPARIN SODIUM (PORCINE) LOCK FLUSH IV SOLN 100 UNIT/ML 100 UNIT/ML
5 SOLUTION INTRAVENOUS ONCE
Status: COMPLETED | OUTPATIENT
Start: 2020-01-23 | End: 2020-01-23

## 2020-01-23 RX ORDER — 0.9 % SODIUM CHLORIDE 0.9 %
VIAL (ML) INJECTION
Status: DISCONTINUED
Start: 2020-01-23 | End: 2020-01-23

## 2020-01-23 RX ORDER — 0.9 % SODIUM CHLORIDE 0.9 %
10 VIAL (ML) INJECTION ONCE
Status: CANCELLED | OUTPATIENT
Start: 2020-01-23

## 2020-01-23 RX ORDER — DIPHENHYDRAMINE HCL 25 MG
25 CAPSULE ORAL EVERY 6 HOURS PRN
Status: CANCELLED
Start: 2020-01-23

## 2020-01-23 RX ADMIN — HEPARIN SODIUM (PORCINE) LOCK FLUSH IV SOLN 100 UNIT/ML 500 UNITS: 100 SOLUTION INTRAVENOUS at 13:54:00

## 2020-01-23 NOTE — PROGRESS NOTES
Referring Physician: Dr. Kurtis Win  Diagnosis:  Malignant neoplasm of overlapping sites of left breast in female, estrogen receptor positive ([facility] (C50.812,Z17.0)  History of lymph node dissection of left axilla (Y28.658)  History of mastectomy, bilateral (Z9 VOLUME 350 345   MEASUREMENT A 16.3 16   MEASUREMENT B 17 16.8   MEASUREMENT C 21.8 22   MEASUREMENT D 24.7 24.7   MEASUREMENT E 25.7 26   MEASUREMENT F 28.8 27.4   MEASUREMENT G 30.5 29.4   MEASUREMENT H 32 32.2   MEASUREMENT I 37 35    TOTAL VOLUME  2232 48               R shld AROM flex 75, abd 75     Edema/Tissue Observations:   Patient arrived wearing binder across chest, pulled tight, causing swelling up into B axilla and pect tendon region  L:  Marked swelling chest, trunk, axilla, and upper arm.  Davied So better fitting bras which has improved the tissue integrity of L trunk, decreased swelling to this area as well. Pt brought in all of her new compression sleeves (4 of them) to try on. Good fit noted with all.   Discussed with pt to make sure she washes therapy (45 minutes)    STM: provided STM w/ MLD techniques to B chest, axilla, and trunk and entire L arm. STM to L axilla/UE cording no longer palpable but pt has tightness down arm with OH reach.   Extra time spent with pt in R side lying to work on L t Assessment:   PUMP DEMO ONLY today      Goals:   Goals (discussed and planned with patient involvement):       To be met in 10 visits:  1) Decrease swelling to be less than 5 % greater than unaffected arm to allow patient to be fitted for com

## 2020-01-23 NOTE — PROGRESS NOTES
Christiane Nicole is a 60year old here today for follow up of Malignant neoplasm of overlapping sites of left breast in female, estrogen receptor positive (hcc)  (primary encounter diagnosis)  Chemotherapy management, encounter for    Oncology History: and no evidence of malignancy in the dermal lymphatics or the overlying skin.   [place] lymph node was positive with extracapsular extension she proceeded to have axillary dissection which showed 2 of 22 regional lymph nodes had metastatic cancer without e History:  Marital status:   :   of cancer  Smoking: Non-smoker  ETOH: Denies  She has 1 child with congenital mental heart disease who was in her 35s and lives with akialfonso who is a nurse    Current Medications:    Current Outpatient [address] [place] [address] , Disp: , Rfl:     Allergies:  No Known Allergies     Review of Systems:  All other systems reviewed and negative x12 except as listed above    Vital Signs:  /89 (BP Location: Right arm, Patient Position: Sitting, Cuff Size: adult)   Pulse 111   Tem Absolute      0.10 - 1.00 x10(3) uL 0.28 0.30 0.26   Eosinophils Absolute      0.00 - 0.70 x10(3) uL 0.03 0.02 0.03   Basophils Absolute      0.00 - 0.20 x10(3) uL 0.07 0.05 0.06   Immature Granulocyte Absolute      0.00 - 1.00 x10(3) uL 0.03 0.03 0.02   N reactive lymph node could potentially also give this appearance. Recommend further assessment with EUS or MRI. 3.  Coronary atherosclerosis. 4.  Nonobstructing left renal stone. 5.  Cholelithiasis. No biliary ductal dilatation. 6.  Small hiatal hernia.

## 2020-01-24 ENCOUNTER — OFFICE VISIT (OUTPATIENT)
Dept: HEMATOLOGY/ONCOLOGY | Facility: HOSPITAL | Age: 65
End: 2020-01-24
Attending: INTERNAL MEDICINE
Payer: COMMERCIAL

## 2020-01-24 VITALS
RESPIRATION RATE: 16 BRPM | OXYGEN SATURATION: 96 % | TEMPERATURE: 99 F | DIASTOLIC BLOOD PRESSURE: 70 MMHG | HEART RATE: 96 BPM | SYSTOLIC BLOOD PRESSURE: 142 MMHG

## 2020-01-24 DIAGNOSIS — Z17.0 MALIGNANT NEOPLASM OF OVERLAPPING SITES OF LEFT BREAST IN FEMALE, ESTROGEN RECEPTOR POSITIVE (HCC): Primary | ICD-10-CM

## 2020-01-24 DIAGNOSIS — C50.812 MALIGNANT NEOPLASM OF OVERLAPPING SITES OF LEFT BREAST IN FEMALE, ESTROGEN RECEPTOR POSITIVE (HCC): Primary | ICD-10-CM

## 2020-01-24 DIAGNOSIS — Z79.899 ENCOUNTER FOR MEDICATION MANAGEMENT: ICD-10-CM

## 2020-01-24 DIAGNOSIS — Z45.2 ENCOUNTER FOR CENTRAL LINE CARE: ICD-10-CM

## 2020-01-24 PROCEDURE — 96367 TX/PROPH/DG ADDL SEQ IV INF: CPT

## 2020-01-24 PROCEDURE — 96413 CHEMO IV INFUSION 1 HR: CPT

## 2020-01-24 PROCEDURE — S0028 INJECTION, FAMOTIDINE, 20 MG: HCPCS

## 2020-01-24 PROCEDURE — 96375 TX/PRO/DX INJ NEW DRUG ADDON: CPT

## 2020-01-24 RX ORDER — 0.9 % SODIUM CHLORIDE 0.9 %
VIAL (ML) INJECTION
Status: DISCONTINUED
Start: 2020-01-24 | End: 2020-01-24

## 2020-01-24 RX ORDER — FAMOTIDINE 10 MG/ML
INJECTION, SOLUTION INTRAVENOUS
Status: COMPLETED
Start: 2020-01-24 | End: 2020-01-24

## 2020-01-24 RX ORDER — FAMOTIDINE 10 MG/ML
20 INJECTION, SOLUTION INTRAVENOUS ONCE
Status: COMPLETED | OUTPATIENT
Start: 2020-01-24 | End: 2020-01-24

## 2020-01-24 RX ORDER — DIPHENHYDRAMINE HCL 25 MG
25 CAPSULE ORAL EVERY 6 HOURS PRN
Status: DISCONTINUED | OUTPATIENT
Start: 2020-01-24 | End: 2020-01-24

## 2020-01-24 RX ORDER — HEPARIN SODIUM (PORCINE) LOCK FLUSH IV SOLN 100 UNIT/ML 100 UNIT/ML
5 SOLUTION INTRAVENOUS ONCE
Status: COMPLETED | OUTPATIENT
Start: 2020-01-24 | End: 2020-01-24

## 2020-01-24 RX ORDER — HEPARIN SODIUM (PORCINE) LOCK FLUSH IV SOLN 100 UNIT/ML 100 UNIT/ML
SOLUTION INTRAVENOUS
Status: COMPLETED
Start: 2020-01-24 | End: 2020-01-24

## 2020-01-24 RX ORDER — 0.9 % SODIUM CHLORIDE 0.9 %
10 VIAL (ML) INJECTION ONCE
Status: DISCONTINUED | OUTPATIENT
Start: 2020-01-24 | End: 2020-01-24

## 2020-01-24 RX ORDER — DIPHENHYDRAMINE HCL 25 MG
CAPSULE ORAL
Status: COMPLETED
Start: 2020-01-24 | End: 2020-01-24

## 2020-01-24 RX ORDER — 0.9 % SODIUM CHLORIDE 0.9 %
10 VIAL (ML) INJECTION ONCE
Status: CANCELLED | OUTPATIENT
Start: 2020-01-24

## 2020-01-24 RX ORDER — HEPARIN SODIUM (PORCINE) LOCK FLUSH IV SOLN 100 UNIT/ML 100 UNIT/ML
5 SOLUTION INTRAVENOUS ONCE
Status: CANCELLED | OUTPATIENT
Start: 2020-01-24

## 2020-01-24 RX ADMIN — HEPARIN SODIUM (PORCINE) LOCK FLUSH IV SOLN 100 UNIT/ML 500 UNITS: 100 SOLUTION INTRAVENOUS at 16:36:00

## 2020-01-24 RX ADMIN — FAMOTIDINE 20 MG: 10 INJECTION, SOLUTION INTRAVENOUS at 15:06:00

## 2020-01-24 RX ADMIN — DIPHENHYDRAMINE HCL 25 MG: 25 MG CAPSULE ORAL at 15:06:00

## 2020-01-24 NOTE — PROGRESS NOTES
Kim to infusion today for C8 D8 Taxol. Arrives Ambulating independently. Patient reports fatigue as a major factor. Positive for PN, states \"my feet feel like blocks, like I'm walking on blocks sometimes\".  Wears cold gloves during infusion, states it

## 2020-01-27 ENCOUNTER — APPOINTMENT (OUTPATIENT)
Dept: PHYSICAL THERAPY | Facility: HOSPITAL | Age: 65
End: 2020-01-27
Attending: INTERNAL MEDICINE
Payer: COMMERCIAL

## 2020-01-27 ENCOUNTER — TELEPHONE (OUTPATIENT)
Dept: SURGERY | Facility: CLINIC | Age: 65
End: 2020-01-27

## 2020-01-27 RX ORDER — SENNOSIDES, DOCUSATE SODIUM 50; 8.6 MG/1; MG/1
TABLET, FILM COATED ORAL
Qty: 60 TABLET | Refills: 1 | Status: SHIPPED | OUTPATIENT
Start: 2020-01-27 | End: 2020-06-10

## 2020-01-27 NOTE — TELEPHONE ENCOUNTER
Signed letter, and order faxed to [facility] for flexitouch. Confirmed with PT, that it is recommended for this patient.

## 2020-01-30 ENCOUNTER — OFFICE VISIT (OUTPATIENT)
Dept: PHYSICAL THERAPY | Facility: HOSPITAL | Age: 65
End: 2020-01-30
Attending: SURGERY
Payer: COMMERCIAL

## 2020-01-30 ENCOUNTER — NURSE ONLY (OUTPATIENT)
Dept: HEMATOLOGY/ONCOLOGY | Facility: HOSPITAL | Age: 65
End: 2020-01-30
Attending: INTERNAL MEDICINE
Payer: COMMERCIAL

## 2020-01-30 VITALS
OXYGEN SATURATION: 98 % | HEIGHT: 64 IN | HEART RATE: 97 BPM | SYSTOLIC BLOOD PRESSURE: 144 MMHG | RESPIRATION RATE: 16 BRPM | WEIGHT: 185.38 LBS | TEMPERATURE: 98 F | DIASTOLIC BLOOD PRESSURE: 76 MMHG | BODY MASS INDEX: 31.65 KG/M2

## 2020-01-30 DIAGNOSIS — Z17.0 MALIGNANT NEOPLASM OF OVERLAPPING SITES OF LEFT BREAST IN FEMALE, ESTROGEN RECEPTOR POSITIVE (HCC): Primary | ICD-10-CM

## 2020-01-30 DIAGNOSIS — C50.812 MALIGNANT NEOPLASM OF OVERLAPPING SITES OF LEFT BREAST IN FEMALE, ESTROGEN RECEPTOR POSITIVE (HCC): Primary | ICD-10-CM

## 2020-01-30 DIAGNOSIS — L27.1 PALMAR PLANTAR ERYTHRODYSAESTHESIA: ICD-10-CM

## 2020-01-30 DIAGNOSIS — Z45.2 ENCOUNTER FOR CENTRAL LINE CARE: ICD-10-CM

## 2020-01-30 DIAGNOSIS — T45.1X5A CHEMOTHERAPY-INDUCED NEUROPATHY (HCC): ICD-10-CM

## 2020-01-30 DIAGNOSIS — G62.0 CHEMOTHERAPY-INDUCED NEUROPATHY (HCC): ICD-10-CM

## 2020-01-30 DIAGNOSIS — Z79.899 ENCOUNTER FOR MEDICATION MANAGEMENT: ICD-10-CM

## 2020-01-30 DIAGNOSIS — Z51.11 CHEMOTHERAPY MANAGEMENT, ENCOUNTER FOR: ICD-10-CM

## 2020-01-30 LAB
BASOPHILS # BLD AUTO: 0.04 X10(3) UL (ref 0–0.2)
BASOPHILS NFR BLD AUTO: 1 %
DEPRECATED RDW RBC AUTO: 49 FL (ref 35.1–46.3)
EOSINOPHIL # BLD AUTO: 0.04 X10(3) UL (ref 0–0.7)
EOSINOPHIL NFR BLD AUTO: 1 %
ERYTHROCYTE [DISTWIDTH] IN BLOOD BY AUTOMATED COUNT: 14.2 % (ref 11–15)
HCT VFR BLD AUTO: 29.6 % (ref 35–48)
HGB BLD-MCNC: 10.1 G/DL (ref 12–16)
IMM GRANULOCYTES # BLD AUTO: 0.02 X10(3) UL (ref 0–1)
IMM GRANULOCYTES NFR BLD: 0.5 %
LYMPHOCYTES # BLD AUTO: 1.15 X10(3) UL (ref 1–4)
LYMPHOCYTES NFR BLD AUTO: 28.3 %
MCH RBC QN AUTO: 32.6 PG (ref 26–34)
MCHC RBC AUTO-ENTMCNC: 34.1 G/DL (ref 31–37)
MCV RBC AUTO: 95.5 FL (ref 80–100)
MONOCYTES # BLD AUTO: 0.35 X10(3) UL (ref 0.1–1)
MONOCYTES NFR BLD AUTO: 8.6 %
NEUTROPHILS # BLD AUTO: 2.46 X10 (3) UL (ref 1.5–7.7)
NEUTROPHILS # BLD AUTO: 2.46 X10(3) UL (ref 1.5–7.7)
NEUTROPHILS NFR BLD AUTO: 60.6 %
PLATELET # BLD AUTO: 287 10(3)UL (ref 150–450)
RBC # BLD AUTO: 3.1 X10(6)UL (ref 3.8–5.3)
WBC # BLD AUTO: 4.1 X10(3) UL (ref 4–11)

## 2020-01-30 PROCEDURE — 99215 OFFICE O/P EST HI 40 MIN: CPT | Performed by: INTERNAL MEDICINE

## 2020-01-30 PROCEDURE — 97140 MANUAL THERAPY 1/> REGIONS: CPT

## 2020-01-30 PROCEDURE — 36591 DRAW BLOOD OFF VENOUS DEVICE: CPT

## 2020-01-30 PROCEDURE — 85025 COMPLETE CBC W/AUTO DIFF WBC: CPT

## 2020-01-30 RX ORDER — FUROSEMIDE 20 MG/1
20 TABLET ORAL DAILY
Qty: 30 TABLET | Refills: 3 | Status: SHIPPED | OUTPATIENT
Start: 2020-01-30 | End: 2020-05-28

## 2020-01-30 RX ORDER — 0.9 % SODIUM CHLORIDE 0.9 %
10 VIAL (ML) INJECTION ONCE
Status: CANCELLED | OUTPATIENT
Start: 2020-01-30

## 2020-01-30 RX ORDER — 0.9 % SODIUM CHLORIDE 0.9 %
VIAL (ML) INJECTION
Status: DISCONTINUED
Start: 2020-01-30 | End: 2020-01-30

## 2020-01-30 RX ORDER — HEPARIN SODIUM (PORCINE) LOCK FLUSH IV SOLN 100 UNIT/ML 100 UNIT/ML
5 SOLUTION INTRAVENOUS ONCE
Status: CANCELLED | OUTPATIENT
Start: 2020-01-30

## 2020-01-30 RX ORDER — DIPHENHYDRAMINE HCL 25 MG
25 CAPSULE ORAL EVERY 6 HOURS PRN
Status: CANCELLED
Start: 2020-01-30

## 2020-01-30 RX ORDER — FAMOTIDINE 10 MG/ML
20 INJECTION, SOLUTION INTRAVENOUS ONCE
Status: CANCELLED | OUTPATIENT
Start: 2020-01-30

## 2020-01-30 RX ORDER — HEPARIN SODIUM (PORCINE) LOCK FLUSH IV SOLN 100 UNIT/ML 100 UNIT/ML
5 SOLUTION INTRAVENOUS ONCE
Status: COMPLETED | OUTPATIENT
Start: 2020-01-30 | End: 2020-01-30

## 2020-01-30 RX ADMIN — HEPARIN SODIUM (PORCINE) LOCK FLUSH IV SOLN 100 UNIT/ML 500 UNITS: 100 SOLUTION INTRAVENOUS at 14:05:00

## 2020-01-30 NOTE — PROGRESS NOTES
Referring Physician: Dr. Hester Im  Diagnosis:  Malignant neoplasm of overlapping sites of left breast in female, estrogen receptor positive ([facility] [address] (C50.812,Z17.0)  History of lymph node dissection of left axilla (H80.011)  History of mastectomy, bilateral (Z9 T10  *slightly improved B shoulder flexion by 5 deg this session      Lymphedema Calculations 11/20/2019 7/24/2019   DATE MEASURED 11/20/2019 7/24/2019   LOCATION/MEASUREMENTS [facility]   PATIENT POSITION  supine with 1 pillow and bolster under knees supine 145, abduction 150  AROM BUE ER 80, IR (reach behind back) T10  BUE Shoulder MME 4+/5      Evaluation Measurements: (8/20/2019)     Posture:                Significant guarding BUE, especially the left               Tightness of B upper trap and levator.  I axilla/UE (cording still palpable in L axilla). 0 cavitation during STM to L axilla. STM and MLD provided to decrease swelling and  to improve tissue mobility LUE in supine and R side lying. Provided STM to R axilla, breast, pect to decrease tightness. (40minutes)    STM: provided STM w/ MLD techniques to B chest, axilla, and trunk and entire L arm. STM to L axilla/UE (cording still palpable in L axilla). 2 small cavitation during STM to medial upper arm.     Extra time spent with pt in R side lying to w fit UE only L2 2F/2B  Stand at wall, lower trap lift off one arm alternating x 10 each  Wall push up x 10  Back to wall OH flexion thumbs to touch wall x 10 with 2 #  W's at wall, hand maintain contact with wall, slides hand overhead x 10 (difficult and fa discharged -   3) Increase shoulder AROM R flex 155, abd 165; L flex 145, abd 150; B ER to 80;  B IR to T10 to improve ease of dressing/bathing/and reaching overhead -MET  4) Increase UE strength to at least 4/5 to improve ease of lifting and performing zee

## 2020-01-30 NOTE — PROGRESS NOTES
Cancer Center Progress Note    Patient Name: Claudio Schrader   YOB: 1955   Medical Record Number: [id number]   Attending Physician: Nidia Anthony M.D.      Chief Complaint:  Breast cancer (PCP: Dr Minal Pagan)    Oncology History:  60year old pos of malignancy in the dermal lymphatics or the overlying skin.   [place] lymph node was positive with extracapsular extension she proceeded to have axillary dissection which showed 2 of 22 regional lymph nodes had metastatic cancer without extracapsular ext Medications:    Current Outpatient Medications:   •  DOK PLUS 50-8.6 MG Oral Tab, TAKE 2 TABLETS BY MOUTH NIGHTLY, Disp: 60 tablet, Rfl: 1  •  loratadine 10 MG Oral Tab, Take 1 tablet (10 mg total) by mouth daily. , Disp: 90 tablet, Rfl: 3  •  lidocaine-blanca listed above    Vital Signs:  /76 (BP Location: Right arm, Patient Position: Sitting, Cuff Size: large)   Pulse 97   Temp 98.2 °F (36.8 °C) (Oral)   Resp 16   Ht 1.626 m (5' 4\")   Wt 84.1 kg (185 lb 6.4 oz)   LMP 07/14/2006   SpO2 98%   BMI 31.82 kg this appearance. Recommend further assessment with EUS or MRI. 3.  Coronary atherosclerosis. 4.  Nonobstructing left renal stone. 5.  Cholelithiasis. No biliary ductal dilatation. 6.  Small hiatal hernia.    Dictated by (CST): Gatito Goel MD on 6/29/2019

## 2020-01-31 ENCOUNTER — OFFICE VISIT (OUTPATIENT)
Dept: HEMATOLOGY/ONCOLOGY | Facility: HOSPITAL | Age: 65
End: 2020-01-31
Attending: INTERNAL MEDICINE
Payer: COMMERCIAL

## 2020-01-31 VITALS
DIASTOLIC BLOOD PRESSURE: 75 MMHG | HEART RATE: 103 BPM | TEMPERATURE: 98 F | OXYGEN SATURATION: 97 % | SYSTOLIC BLOOD PRESSURE: 143 MMHG | RESPIRATION RATE: 16 BRPM

## 2020-01-31 DIAGNOSIS — Z17.0 MALIGNANT NEOPLASM OF OVERLAPPING SITES OF LEFT BREAST IN FEMALE, ESTROGEN RECEPTOR POSITIVE (HCC): Primary | ICD-10-CM

## 2020-01-31 DIAGNOSIS — C50.812 MALIGNANT NEOPLASM OF OVERLAPPING SITES OF LEFT BREAST IN FEMALE, ESTROGEN RECEPTOR POSITIVE (HCC): Primary | ICD-10-CM

## 2020-01-31 PROCEDURE — 96413 CHEMO IV INFUSION 1 HR: CPT

## 2020-01-31 PROCEDURE — 96375 TX/PRO/DX INJ NEW DRUG ADDON: CPT

## 2020-01-31 PROCEDURE — S0028 INJECTION, FAMOTIDINE, 20 MG: HCPCS

## 2020-01-31 RX ORDER — HEPARIN SODIUM (PORCINE) LOCK FLUSH IV SOLN 100 UNIT/ML 100 UNIT/ML
SOLUTION INTRAVENOUS
Status: DISCONTINUED
Start: 2020-01-31 | End: 2020-01-31

## 2020-01-31 RX ORDER — FAMOTIDINE 10 MG/ML
INJECTION, SOLUTION INTRAVENOUS
Status: COMPLETED
Start: 2020-01-31 | End: 2020-01-31

## 2020-01-31 RX ORDER — FAMOTIDINE 10 MG/ML
20 INJECTION, SOLUTION INTRAVENOUS ONCE
Status: COMPLETED | OUTPATIENT
Start: 2020-01-31 | End: 2020-01-31

## 2020-01-31 RX ORDER — DIPHENHYDRAMINE HCL 25 MG
CAPSULE ORAL
Status: COMPLETED
Start: 2020-01-31 | End: 2020-01-31

## 2020-01-31 RX ORDER — 0.9 % SODIUM CHLORIDE 0.9 %
VIAL (ML) INJECTION
Status: DISCONTINUED
Start: 2020-01-31 | End: 2020-01-31

## 2020-01-31 RX ORDER — DIPHENHYDRAMINE HCL 25 MG
25 CAPSULE ORAL ONCE AS NEEDED
Status: COMPLETED | OUTPATIENT
Start: 2020-01-31 | End: 2020-01-31

## 2020-01-31 RX ADMIN — FAMOTIDINE 20 MG: 10 INJECTION, SOLUTION INTRAVENOUS at 15:02:00

## 2020-01-31 RX ADMIN — DIPHENHYDRAMINE HCL 25 MG: 25 MG CAPSULE ORAL at 14:55:00

## 2020-01-31 NOTE — PROGRESS NOTES
Pt here for C8D15 Taxol. Arrives Ambulating independently    Modifications in dose or schedule: Yes, dose was reduced 65 mg/m2    Patient reports she is well, complains of fatigue. Port accessed using sterile technique, positive blood return noted.  Wing Reed

## 2020-02-04 ENCOUNTER — OFFICE VISIT (OUTPATIENT)
Dept: PHYSICAL THERAPY | Facility: HOSPITAL | Age: 65
End: 2020-02-04
Attending: SURGERY
Payer: COMMERCIAL

## 2020-02-04 PROCEDURE — 97140 MANUAL THERAPY 1/> REGIONS: CPT

## 2020-02-04 NOTE — PROGRESS NOTES
Referring Physician: Dr. Hester Im  Diagnosis:  Malignant neoplasm of overlapping sites of left breast in female, estrogen receptor positive ([facility] (C50.812,Z17.0)  History of lymph node dissection of left axilla (K47.839)  History of mastectomy, bilateral (Z9 chemotherapy.     Objective:  2/4/2020  Trunk circumference 101.0cm   L lateral trunk visually and measurable smaller by 4.5cm  Cording visible and palpable in L axilla into medial upper arm    1/30/2020  Trunk circumference 105.5 cm  12/30/19:    AROM B sh DIFFERENCE -2.37115 -0.45528     Evaluation Measurements: (8/20/2019)     Posture:                Significant guarding BUE, especially the left               Tightness of B upper trap and levator.  Indurations noted B upper trap from previous bra straps recommended that she wears them daily. Pt to continue with ROM exercises as able to maintain gains in UE mobility. There Ex (5 minutes)  Reviewed prone exercise. Reinforced importance for pt to work on her HEP.   Pt admits that she has not been doing he

## 2020-02-07 ENCOUNTER — OFFICE VISIT (OUTPATIENT)
Dept: RADIATION ONCOLOGY | Facility: HOSPITAL | Age: 65
End: 2020-02-07
Attending: INTERNAL MEDICINE
Payer: COMMERCIAL

## 2020-02-07 VITALS
RESPIRATION RATE: 18 BRPM | BODY MASS INDEX: 31.58 KG/M2 | WEIGHT: 185 LBS | HEART RATE: 100 BPM | HEIGHT: 64 IN | DIASTOLIC BLOOD PRESSURE: 84 MMHG | TEMPERATURE: 98 F | SYSTOLIC BLOOD PRESSURE: 136 MMHG

## 2020-02-07 PROCEDURE — 99212 OFFICE O/P EST SF 10 MIN: CPT

## 2020-02-07 NOTE — CONSULTS
RADIATION ONCOLOGY NOTE    DATE OF VISIT: 2/7/2020    DIAGNOSIS:  IB Grade 2 pT3, N1a. Left lobular cancer, s/p mastectomy 3/23 LN positive with THERESE, receptor positive, s/p chemotherapy, for adjuvant XRT to left chest wall and draining LN.       As you reca malignancy. pT3, N1a. She proceeded with chemo as below and worked during chemotherapy. She has the expected side effects of PN and fatigue. Chemotherapy        Some values may be hidden.  Unless noted otherwise, only the newest values recorded on loratadine 10 MG Oral Tab Take 1 tablet (10 mg total) by mouth daily. 90 tablet 3   • lidocaine-prilocaine 2.5-2.5 % External Cream Apply as needed to port 30 g 0   • gabapentin 100 MG Oral Cap Take 1 capsule (100 mg total) by mouth nightly.  May increase a Pseudocholinesterase deficiency, Sleep apnea, or Type 1 diabetes mellitus ([facility] [address] [address]     PAST SURGICAL HISTORY:   has a past surgical history that includes travis biopsy stereo nodule 1 site left (cpt=19081); travis biopsy stereo nodule 1 site right (cpt=19081) (07/0 studies. ASSESSMENT/PLAN    62 yo with IB Grade 2 pT3, N1a. Left lobular cancer, s/p mastectomy 3/23 LN positive with THERESE, receptor positive, s/p chemotherapy, for adjuvant XRT to left chest wall and draining LN.       I have reviewed with pt her high ri F) - Breast, right, 6. right breast skin                                                             G) - Breast, left, 7.  left breast skin                                                     Addendum 1      Per request of Dr. Pratima Morgan, infiltrating identified. · Prominent tumor-associated microcalcifications are present in association with infiltrating and in situ tumor components.   · All inked examined external specimen margins are free of infiltrating and in situ tumor components with the tumor sh associated microcalcifications.   · Previous site of biopsy in close proximity to [facility] shows embedded foreign body radiologic marker material, accompanying mild to moderate chronic inflammatory infiltrates, surrounding dense fibrosis with areas of mild react (Leica, monoclonal, clone 16) status: 98% (Positive, strong intensity)  HER-2/sergio (Leica, monoclonal, clone CB11) status:  1+ (Negative)  Ki-67 (Leica, monoclonal, clone MM1) status:  25% (Unfavorable)     Repeat HER2/sergio by Immunohistochemistry/manual mor Greatest dimension of largest invasive focus in Millimeters (mm): 62 Millimeters (mm)   Additional Dimension in Millimeters (mm)  50 Millimeters (mm)     48 Millimeters (mm)   Ductal Carcinoma In Situ (DCIS)  Not identified    Lobular Carcinoma In Situ (LC C50.812, Z17.0 Malignant Neoplasm Of Overlapping Sites Of Left Breast In Female, Estrogen Receptor Positive (Hcc).      Gross Description    Specimen A is submitted without fixative and labeled \"Johnnie Hoffman, left sentinel lymph node biopsy #1\" consist the lymph node are submitted for intraoperative interpretation.  Frozen section and touch preparation diagnosis per Dr. Dejon Jaquez as conveyed to Dr. Lowry Drilling is \"Frozen level sections from one-half of the lymph node show no evidence of metastatic malignancy in l examination only. Remaining parenchyma is yellow and fatty with approximately 10% fibrous tissue. No additional mass are identified.  Representative sections are submitted as follows: C1, entire nipple, trisected; C2, skin, W9-J9, mass (C3 slice 4); N6-U0, with approximately 10% fibrous tissue. No metallic clips or discrete masses are identified. Representative sections are submitted as follows: E1, entire nipple, trisected; E2, skin, E3-E4, upper inner quadrant (E3, slice 3, E4, slice 5);  E5-E6, upper outer

## 2020-02-07 NOTE — PROGRESS NOTES
Nursing Consultation Note  Patient: Christiane Nicole  YOB: 1955  Age: 63year old  Radiation Oncologist: Dr. Dennis Camacho  Referring Physician: Les Thakkar  Diagnosis:No diagnosis found.   Consult Date: 2/7/2020      Chemotherapy: yes, comple capsule 1   • mucositis mixture Oral Suspension Swish and spit 10 mL 3 (three) times daily. 500 mL 2   • Fluticasone Propionate 50 MCG/ACT Nasal Suspension 2 sprays by Nasal route daily. 1 Bottle 0   • Misc.  Devices Does not apply Misc For hair prosthesis MAIN OR   • BREAST RECONSTRUCTION/ IMMEDIATE/EXPANDER Bilateral 8/8/2019    Performed by Faizan Herrera MD at [facility] OR   • BREAST SENTINEL LYMPH NODE BIOPSY Left 8/8/2019    Performed by Melody Lincoln MD at [facility] OR   • EMBER BIOPSY STEREO NODULE activity: Not on file    Other Topics      Concerns:        Not on file    Social History Narrative      Not on file      ECOG:  Grade 0 - Fully active, able to carry on all predisease activities without restrictions. Education:  y    Are ADL's met? to:    Radiation therapy    Interventions:  Assess patient knowledge level  Assess knowledge needs  Instruct on treatment planning  Instruct on radiation therapy appointment scheduling  Instruct on basic skin care  Instruct on purpose of radiation therapy

## 2020-02-12 ENCOUNTER — OFFICE VISIT (OUTPATIENT)
Dept: PHYSICAL THERAPY | Facility: HOSPITAL | Age: 65
End: 2020-02-12
Attending: SURGERY
Payer: COMMERCIAL

## 2020-02-12 ENCOUNTER — OFFICE VISIT (OUTPATIENT)
Dept: HEMATOLOGY/ONCOLOGY | Facility: HOSPITAL | Age: 65
End: 2020-02-12
Attending: INTERNAL MEDICINE
Payer: COMMERCIAL

## 2020-02-12 VITALS
DIASTOLIC BLOOD PRESSURE: 63 MMHG | HEIGHT: 64 IN | WEIGHT: 186 LBS | BODY MASS INDEX: 31.76 KG/M2 | RESPIRATION RATE: 18 BRPM | TEMPERATURE: 98 F | HEART RATE: 98 BPM | OXYGEN SATURATION: 97 % | SYSTOLIC BLOOD PRESSURE: 147 MMHG

## 2020-02-12 DIAGNOSIS — C50.812 MALIGNANT NEOPLASM OF OVERLAPPING SITES OF LEFT BREAST IN FEMALE, ESTROGEN RECEPTOR POSITIVE (HCC): Primary | ICD-10-CM

## 2020-02-12 DIAGNOSIS — Z17.0 MALIGNANT NEOPLASM OF OVERLAPPING SITES OF LEFT BREAST IN FEMALE, ESTROGEN RECEPTOR POSITIVE (HCC): Primary | ICD-10-CM

## 2020-02-12 DIAGNOSIS — Z79.899 ENCOUNTER FOR MEDICATION MANAGEMENT: ICD-10-CM

## 2020-02-12 DIAGNOSIS — Z45.2 ENCOUNTER FOR CENTRAL LINE CARE: ICD-10-CM

## 2020-02-12 LAB
ALBUMIN SERPL-MCNC: 3.6 G/DL (ref 3.4–5)
ALBUMIN/GLOB SERPL: 1 {RATIO} (ref 1–2)
ALP LIVER SERPL-CCNC: 72 U/L (ref 50–130)
ALT SERPL-CCNC: 38 U/L (ref 13–56)
ANION GAP SERPL CALC-SCNC: 6 MMOL/L (ref 0–18)
AST SERPL-CCNC: 35 U/L (ref 15–37)
BASOPHILS # BLD AUTO: 0.07 X10(3) UL (ref 0–0.2)
BASOPHILS NFR BLD AUTO: 1 %
BILIRUB SERPL-MCNC: 0.3 MG/DL (ref 0.1–2)
BUN BLD-MCNC: 22 MG/DL (ref 7–18)
BUN/CREAT SERPL: 31.9 (ref 10–20)
CALCIUM BLD-MCNC: 9 MG/DL (ref 8.5–10.1)
CHLORIDE SERPL-SCNC: 106 MMOL/L (ref 98–112)
CO2 SERPL-SCNC: 28 MMOL/L (ref 21–32)
CREAT BLD-MCNC: 0.69 MG/DL (ref 0.55–1.02)
DEPRECATED RDW RBC AUTO: 49.5 FL (ref 35.1–46.3)
EOSINOPHIL # BLD AUTO: 0.11 X10(3) UL (ref 0–0.7)
EOSINOPHIL NFR BLD AUTO: 1.6 %
ERYTHROCYTE [DISTWIDTH] IN BLOOD BY AUTOMATED COUNT: 14.1 % (ref 11–15)
GLOBULIN PLAS-MCNC: 3.5 G/DL (ref 2.8–4.4)
GLUCOSE BLD-MCNC: 111 MG/DL (ref 70–99)
HCT VFR BLD AUTO: 32.6 % (ref 35–48)
HGB BLD-MCNC: 11 G/DL (ref 12–16)
IMM GRANULOCYTES # BLD AUTO: 0.05 X10(3) UL (ref 0–1)
IMM GRANULOCYTES NFR BLD: 0.7 %
LYMPHOCYTES # BLD AUTO: 1.52 X10(3) UL (ref 1–4)
LYMPHOCYTES NFR BLD AUTO: 21.6 %
M PROTEIN MFR SERPL ELPH: 7.1 G/DL (ref 6.4–8.2)
MCH RBC QN AUTO: 32.5 PG (ref 26–34)
MCHC RBC AUTO-ENTMCNC: 33.7 G/DL (ref 31–37)
MCV RBC AUTO: 96.4 FL (ref 80–100)
MONOCYTES # BLD AUTO: 1.06 X10(3) UL (ref 0.1–1)
MONOCYTES NFR BLD AUTO: 15 %
NEUTROPHILS # BLD AUTO: 4.24 X10 (3) UL (ref 1.5–7.7)
NEUTROPHILS # BLD AUTO: 4.24 X10(3) UL (ref 1.5–7.7)
NEUTROPHILS NFR BLD AUTO: 60.1 %
OSMOLALITY SERPL CALC.SUM OF ELEC: 294 MOSM/KG (ref 275–295)
PLATELET # BLD AUTO: 256 10(3)UL (ref 150–450)
POTASSIUM SERPL-SCNC: 3.8 MMOL/L (ref 3.5–5.1)
RBC # BLD AUTO: 3.38 X10(6)UL (ref 3.8–5.3)
SODIUM SERPL-SCNC: 140 MMOL/L (ref 136–145)
WBC # BLD AUTO: 7.1 X10(3) UL (ref 4–11)

## 2020-02-12 PROCEDURE — 85025 COMPLETE CBC W/AUTO DIFF WBC: CPT

## 2020-02-12 PROCEDURE — 99214 OFFICE O/P EST MOD 30 MIN: CPT | Performed by: INTERNAL MEDICINE

## 2020-02-12 PROCEDURE — 97140 MANUAL THERAPY 1/> REGIONS: CPT

## 2020-02-12 PROCEDURE — 36591 DRAW BLOOD OFF VENOUS DEVICE: CPT

## 2020-02-12 PROCEDURE — 97110 THERAPEUTIC EXERCISES: CPT

## 2020-02-12 PROCEDURE — 80053 COMPREHEN METABOLIC PANEL: CPT

## 2020-02-12 RX ORDER — 0.9 % SODIUM CHLORIDE 0.9 %
VIAL (ML) INJECTION
Status: DISCONTINUED
Start: 2020-02-12 | End: 2020-02-12

## 2020-02-12 RX ORDER — ANASTROZOLE 1 MG/1
1 TABLET ORAL DAILY
Qty: 30 TABLET | Refills: 0 | Status: SHIPPED | OUTPATIENT
Start: 2020-02-12 | End: 2020-03-23

## 2020-02-12 RX ORDER — HEPARIN SODIUM (PORCINE) LOCK FLUSH IV SOLN 100 UNIT/ML 100 UNIT/ML
5 SOLUTION INTRAVENOUS ONCE
Status: COMPLETED | OUTPATIENT
Start: 2020-02-12 | End: 2020-02-12

## 2020-02-12 RX ORDER — 0.9 % SODIUM CHLORIDE 0.9 %
10 VIAL (ML) INJECTION ONCE
Status: CANCELLED | OUTPATIENT
Start: 2020-02-12

## 2020-02-12 RX ORDER — HEPARIN SODIUM (PORCINE) LOCK FLUSH IV SOLN 100 UNIT/ML 100 UNIT/ML
5 SOLUTION INTRAVENOUS ONCE
Status: CANCELLED | OUTPATIENT
Start: 2020-02-12

## 2020-02-12 RX ADMIN — HEPARIN SODIUM (PORCINE) LOCK FLUSH IV SOLN 100 UNIT/ML 500 UNITS: 100 SOLUTION INTRAVENOUS at 14:45:00

## 2020-02-12 NOTE — PROGRESS NOTES
Cancer Center Progress Note    Patient Name: Eder Cantu   YOB: 1955   Medical Record Number: [id number]   Attending Physician: Firman Cockayne, M.D.      Chief Complaint:  Breast cancer (PCP: Dr Yolande Acuña)    Oncology History:   6/11/2019: Caro Menendez with extracapsular extension she proceeded to have axillary dissection which showed 2 of 22 regional lymph nodes had metastatic cancer without extracapsular extension. Right breast mastectomy showed no evidence of malignancy. pT3, N1a.       Chemotherapy MD at [address] [place] [address] MAIN OR   • BREAST RECONSTRUCTION/ IMMEDIATE/EXPANDER Bilateral 8/8/2019    Performed by Peter Byrne MD at [address] [place] [address] OR   • BREAST SENTINEL LYMPH NODE BIOPSY Left 8/8/2019    Performed by Allan Emanuel MD at [address] [place] [address] OR   • EMBER BIOPSY STER Oral Tab, Take 400 mg by mouth every 6 (six) hours as needed for Pain., Disp: , Rfl:   •  Prochlorperazine Maleate (COMPAZINE) 10 mg tablet, Take 1 tablet (10 mg total) by mouth every 6 (six) hours as needed for Nausea., Disp: 60 tablet, Rfl: 2  •  Ondanse AST 36 01/16/2020    ALT 48 01/16/2020    BILT 0.5 01/16/2020    TP 7.0 01/16/2020    ALB 3.5 01/16/2020    GLOBULIN 3.5 01/16/2020     01/16/2020    K 3.9 01/16/2020     01/16/2020    CO2 28.0 01/16/2020       Radiology assessed and reviewed: to asses SE profile. - Bone density in two years. Start Ca/Vit D    Grade 1-2 neuropathy- stable to better. Gabapentin at night low dose. Continue for now. Palmar erythodysaesthesia-resolved    Facial rash- possible rosacea.     Pancreatic mass  -MRCP r

## 2020-02-12 NOTE — PROGRESS NOTES
Referring Physician: Dr. Francine Mark  Diagnosis:  Malignant neoplasm of overlapping sites of left breast in female, estrogen receptor positive [facility] (C50.812,Z17.0)  History of lymph node dissection of left axilla (Y05.050)  History of mastectomy, bilateral (Z9 lateral trunk visually and measurable smaller by 4.5cm  Cording visible and palpable in L axilla into medial upper arm    1/30/2020  Trunk circumference 105.5 cm  12/30/19:    AROM B shld flex and abd 170 degrees  Cording noted L axilla/inner arm at end ra (8/20/2019)     Posture:                Significant guarding BUE, especially the left               Tightness of B upper trap and levator.  Indurations noted B upper trap from previous bra straps               Protracted scapula and forward head  AROM/Stren recommended that she wears them daily. Manual therapy (35 minutes)    STM provided to trunk, axilla, upper arm, elbow and forearm. Cording noted in axilla to elbow today, limiting ROM. Pt wearing compression sleeve and foam pad for trunk compression.   ML starts radiation therapy to ensure that she can get into position needed.     Cont to increase ROM/strength, decrease swelling    Charges:MM2, Ex1 Total Timed Treatment: 45 min  Total Treatment Time: 45 min

## 2020-02-25 ENCOUNTER — OFFICE VISIT (OUTPATIENT)
Dept: PHYSICAL THERAPY | Facility: HOSPITAL | Age: 65
End: 2020-02-25
Attending: SURGERY
Payer: COMMERCIAL

## 2020-02-25 PROCEDURE — 97140 MANUAL THERAPY 1/> REGIONS: CPT

## 2020-02-25 PROCEDURE — 97110 THERAPEUTIC EXERCISES: CPT

## 2020-02-25 NOTE — PROGRESS NOTES
Referring Physician: Dr. Janee Galindo  Diagnosis:  Malignant neoplasm of overlapping sites of left breast in female, estrogen receptor positive [facility] (C50.812,Z17.0)  History of lymph node dissection of left axilla (E05.421)  History of mastectomy, bilateral (Z9 swelling L trunk       12/23/2019  No significant change noted to ROM and strength. L chest tissue softer to the touch, swelling has decreased visually.     12/4/2019  AROM RUE Flex 160, abduction 160  AROM LUE flexion 160, abduction 160  AROM BUE ER 80, I arrived wearing binder across chest, pulled tight, causing swelling up into B axilla and pect tendon region  L:  Marked swelling chest, trunk, axilla, and upper arm. Bruising of chest. L trunk w/ blister that had scabbed over.  Onset of axillary cording int trunk foam pad. Pt may benefit from mastectomy pad for L side. Pt needs to contact vendor re: pump delivery and set up.  Manual therapy (40 minutes)    MLD provided for LUE: Neck sequence, abdominal sequence, deep breathing, R axilla, L axilla, A-A, L ingu exercises, and donning/doffing compression bandages/garments to facilitate swelling reduction and to be independent at self management once discharged -   3) Increase shoulder AROM R flex 155, abd 165; L flex 145, abd 150; B ER to 80;  B IR to T10 to improv

## 2020-02-26 ENCOUNTER — APPOINTMENT (OUTPATIENT)
Dept: PHYSICAL THERAPY | Facility: HOSPITAL | Age: 65
End: 2020-02-26
Attending: INTERNAL MEDICINE
Payer: COMMERCIAL

## 2020-02-27 ENCOUNTER — TELEPHONE (OUTPATIENT)
Dept: INTERNAL MEDICINE CLINIC | Facility: CLINIC | Age: 65
End: 2020-02-27

## 2020-02-27 NOTE — TELEPHONE ENCOUNTER
Shyann Barfield/[facility] called in to verify information for patient's wellness. She states that she needs to know last wellness exam, last height/weight and vital check. Please advise.

## 2020-03-03 NOTE — TELEPHONE ENCOUNTER
Attempt to call the company [facility] to retrieve more information   2 nd attempt calling left detail voicemail.

## 2020-03-04 ENCOUNTER — NURSE ONLY (OUTPATIENT)
Dept: HEMATOLOGY/ONCOLOGY | Facility: HOSPITAL | Age: 65
End: 2020-03-04
Attending: INTERNAL MEDICINE
Payer: COMMERCIAL

## 2020-03-04 ENCOUNTER — TELEPHONE (OUTPATIENT)
Dept: INTERNAL MEDICINE CLINIC | Facility: CLINIC | Age: 65
End: 2020-03-04

## 2020-03-04 VITALS
RESPIRATION RATE: 18 BRPM | DIASTOLIC BLOOD PRESSURE: 76 MMHG | BODY MASS INDEX: 31.07 KG/M2 | OXYGEN SATURATION: 97 % | HEART RATE: 97 BPM | WEIGHT: 182 LBS | SYSTOLIC BLOOD PRESSURE: 122 MMHG | HEIGHT: 64 IN | TEMPERATURE: 99 F

## 2020-03-04 DIAGNOSIS — Z45.2 ENCOUNTER FOR CENTRAL LINE CARE: ICD-10-CM

## 2020-03-04 DIAGNOSIS — Z51.81 VISIT FOR MONITORING ARIMIDEX THERAPY: ICD-10-CM

## 2020-03-04 DIAGNOSIS — Z90.13 ABSENCE OF BREAST, BILATERAL: ICD-10-CM

## 2020-03-04 DIAGNOSIS — C50.812 MALIGNANT NEOPLASM OF OVERLAPPING SITES OF LEFT BREAST IN FEMALE, ESTROGEN RECEPTOR POSITIVE (HCC): Primary | ICD-10-CM

## 2020-03-04 DIAGNOSIS — Z79.811 VISIT FOR MONITORING ARIMIDEX THERAPY: ICD-10-CM

## 2020-03-04 DIAGNOSIS — Z17.0 MALIGNANT NEOPLASM OF OVERLAPPING SITES OF LEFT BREAST IN FEMALE, ESTROGEN RECEPTOR POSITIVE (HCC): Primary | ICD-10-CM

## 2020-03-04 DIAGNOSIS — T45.1X5A CHEMOTHERAPY-INDUCED NEUROPATHY (HCC): ICD-10-CM

## 2020-03-04 DIAGNOSIS — G62.0 CHEMOTHERAPY-INDUCED NEUROPATHY (HCC): ICD-10-CM

## 2020-03-04 DIAGNOSIS — Z79.899 ENCOUNTER FOR MEDICATION MANAGEMENT: Primary | ICD-10-CM

## 2020-03-04 LAB
ALBUMIN SERPL-MCNC: 3.7 G/DL (ref 3.4–5)
ALBUMIN/GLOB SERPL: 1 {RATIO} (ref 1–2)
ALP LIVER SERPL-CCNC: 81 U/L (ref 50–130)
ALT SERPL-CCNC: 44 U/L (ref 13–56)
ANION GAP SERPL CALC-SCNC: 9 MMOL/L (ref 0–18)
AST SERPL-CCNC: 30 U/L (ref 15–37)
BASOPHILS # BLD AUTO: 0.07 X10(3) UL (ref 0–0.2)
BASOPHILS NFR BLD AUTO: 0.8 %
BILIRUB SERPL-MCNC: 0.4 MG/DL (ref 0.1–2)
BUN BLD-MCNC: 20 MG/DL (ref 7–18)
BUN/CREAT SERPL: 30.8 (ref 10–20)
CALCIUM BLD-MCNC: 8.8 MG/DL (ref 8.5–10.1)
CHLORIDE SERPL-SCNC: 105 MMOL/L (ref 98–112)
CO2 SERPL-SCNC: 25 MMOL/L (ref 21–32)
CREAT BLD-MCNC: 0.65 MG/DL (ref 0.55–1.02)
DEPRECATED RDW RBC AUTO: 45.5 FL (ref 35.1–46.3)
EOSINOPHIL # BLD AUTO: 0.27 X10(3) UL (ref 0–0.7)
EOSINOPHIL NFR BLD AUTO: 2.9 %
ERYTHROCYTE [DISTWIDTH] IN BLOOD BY AUTOMATED COUNT: 13.6 % (ref 11–15)
GLOBULIN PLAS-MCNC: 3.8 G/DL (ref 2.8–4.4)
GLUCOSE BLD-MCNC: 88 MG/DL (ref 70–99)
HCT VFR BLD AUTO: 35.6 % (ref 35–48)
HGB BLD-MCNC: 12.1 G/DL (ref 12–16)
IMM GRANULOCYTES # BLD AUTO: 0.02 X10(3) UL (ref 0–1)
IMM GRANULOCYTES NFR BLD: 0.2 %
LYMPHOCYTES # BLD AUTO: 1.46 X10(3) UL (ref 1–4)
LYMPHOCYTES NFR BLD AUTO: 15.9 %
M PROTEIN MFR SERPL ELPH: 7.5 G/DL (ref 6.4–8.2)
MCH RBC QN AUTO: 31.3 PG (ref 26–34)
MCHC RBC AUTO-ENTMCNC: 34 G/DL (ref 31–37)
MCV RBC AUTO: 92.2 FL (ref 80–100)
MONOCYTES # BLD AUTO: 0.83 X10(3) UL (ref 0.1–1)
MONOCYTES NFR BLD AUTO: 9 %
NEUTROPHILS # BLD AUTO: 6.54 X10 (3) UL (ref 1.5–7.7)
NEUTROPHILS # BLD AUTO: 6.54 X10(3) UL (ref 1.5–7.7)
NEUTROPHILS NFR BLD AUTO: 71.2 %
OSMOLALITY SERPL CALC.SUM OF ELEC: 290 MOSM/KG (ref 275–295)
PATIENT FASTING Y/N/NP: NO
PLATELET # BLD AUTO: 248 10(3)UL (ref 150–450)
POTASSIUM SERPL-SCNC: 3.8 MMOL/L (ref 3.5–5.1)
RBC # BLD AUTO: 3.86 X10(6)UL (ref 3.8–5.3)
SODIUM SERPL-SCNC: 139 MMOL/L (ref 136–145)
WBC # BLD AUTO: 9.2 X10(3) UL (ref 4–11)

## 2020-03-04 PROCEDURE — 85025 COMPLETE CBC W/AUTO DIFF WBC: CPT

## 2020-03-04 PROCEDURE — 36591 DRAW BLOOD OFF VENOUS DEVICE: CPT

## 2020-03-04 PROCEDURE — 80053 COMPREHEN METABOLIC PANEL: CPT

## 2020-03-04 PROCEDURE — 99214 OFFICE O/P EST MOD 30 MIN: CPT | Performed by: INTERNAL MEDICINE

## 2020-03-04 RX ORDER — HEPARIN SODIUM (PORCINE) LOCK FLUSH IV SOLN 100 UNIT/ML 100 UNIT/ML
5 SOLUTION INTRAVENOUS ONCE
Status: DISCONTINUED | OUTPATIENT
Start: 2020-03-04 | End: 2020-03-04

## 2020-03-04 RX ORDER — 0.9 % SODIUM CHLORIDE 0.9 %
10 VIAL (ML) INJECTION ONCE
Status: CANCELLED | OUTPATIENT
Start: 2020-03-04

## 2020-03-04 RX ORDER — HEPARIN SODIUM (PORCINE) LOCK FLUSH IV SOLN 100 UNIT/ML 100 UNIT/ML
5 SOLUTION INTRAVENOUS ONCE
Status: CANCELLED | OUTPATIENT
Start: 2020-03-04

## 2020-03-04 RX ORDER — 0.9 % SODIUM CHLORIDE 0.9 %
VIAL (ML) INJECTION
Status: DISCONTINUED
Start: 2020-03-04 | End: 2020-03-04

## 2020-03-04 NOTE — PROGRESS NOTES
Cancer Center Progress Note    Patient Name: Carlos Mena   YOB: 1955   Medical Record Number: [id number]   Attending Physician: Sp Dodge M.D.      Chief Complaint:  Breast cancer (PCP: Dr Jose Patel)    Oncology History:   6/11/2019: Magnus Monique with extracapsular extension she proceeded to have axillary dissection which showed 2 of 22 regional lymph nodes had metastatic cancer without extracapsular extension. Right breast mastectomy showed no evidence of malignancy. pT3, N1a.       Chemotherapy BREAST SENTINEL LYMPH NODE BIOPSY Left 8/8/2019    Performed by Juan Eaton MD at [facility] MAIN OR   • EMBER BIOPSY STEREO NODULE 1 SITE LEFT (CPT=19081)     • EMBER BIOPSY STEREO NODULE 1 SITE RIGHT (CPT=19081)  07/01/2019    KODAK    • MASTECTOMY LEFT     • tablet, Take 1 tablet (10 mg total) by mouth every 6 (six) hours as needed for Nausea., Disp: 60 tablet, Rfl: 2  •  Ondansetron HCl (ZOFRAN) 8 MG tablet, Take 1 tablet (8 mg total) by mouth every 8 (eight) hours as needed for Nausea., Disp: 30 tablet, Rfl: 02/12/2020    ALB 3.6 02/12/2020    GLOBULIN 3.5 02/12/2020     02/12/2020    K 3.8 02/12/2020     02/12/2020    CO2 28.0 02/12/2020       Radiology assessed and reviewed:  Ct Chest+abdomen+pelvis(all Cntrst Only)(cpt=71260/71860)         CONCL feet  - Increase gabapentin to 300mg overnight    Nail changes- recommend podiatry     Pancreatic mass  -MRCP reviewed, noted for a separate LN, no mass. Disussed with patient. Monitor.      Silvia Lea MD

## 2020-03-05 NOTE — TELEPHONE ENCOUNTER
Spoke to [employer] from [place], she wanted to do some care gap mgmt updates on her end. Asked for ht, wt, BP, med recon, when was last Px, pap, colonoscopy, and glaucoma exam. Gave insurance staff this info. No colonoscopy or glaucoma reports.  Per [employer],

## 2020-03-07 NOTE — TELEPHONE ENCOUNTER
Called and left voice message advising patient of need to follow up with GI for colonoscopy and to  fecal occult kit at . Kit placed at  with patient's name on it. Left office number if patient has any questions.

## 2020-03-11 ENCOUNTER — OFFICE VISIT (OUTPATIENT)
Dept: PHYSICAL THERAPY | Facility: HOSPITAL | Age: 65
End: 2020-03-11
Attending: INTERNAL MEDICINE
Payer: COMMERCIAL

## 2020-03-11 PROCEDURE — 97140 MANUAL THERAPY 1/> REGIONS: CPT

## 2020-03-11 NOTE — PROGRESS NOTES
Referring Physician: Dr. Janee Galindo  Diagnosis:  Malignant neoplasm of overlapping sites of left breast in female, estrogen receptor positive ([facility] [address] (C50.812,Z17.0)  History of lymph node dissection of left axilla (B22.419)  History of mastectomy, bilateral (Z9 (Pointed out visual differences to patient and she was surprised)  AROM WFL  Cording still palpable and visible but ROM has maintained.     2/4/2020  Trunk circumference 101.0cm   L lateral trunk visually and measurable smaller by 4.5cm  Cording visible and G 29.9 29.8   MEASUREMENT H 35 33.8   MEASUREMENT I 37.4 35    TOTAL VOLUME  8771.03840 2178.83502   % DIFFERENCE -2.18575 -0.52117     Evaluation Measurements: (8/20/2019)     Posture:                Significant guarding BUE, especially the left techniques to L chest and L axilla with LUE stretching.     *discussed importance of calling vendor to schedule an appointment for them to come to her house to set up her home compression pump*    Compression:  Discussed with patient that wearing her compre UE strength to at least 4/5 to improve ease of lifting and performing household chores -MET  5) (revised) Increase LUE strength to at least 4+/5 without upper trap compensation to be able to reach overhead without difficulty.        Plan:   -pt to contact

## 2020-03-23 ENCOUNTER — OFFICE VISIT (OUTPATIENT)
Dept: PHYSICAL THERAPY | Facility: HOSPITAL | Age: 65
End: 2020-03-23
Attending: SURGERY
Payer: COMMERCIAL

## 2020-03-23 PROCEDURE — 97140 MANUAL THERAPY 1/> REGIONS: CPT | Performed by: PHYSICAL THERAPIST

## 2020-03-23 RX ORDER — ANASTROZOLE 1 MG/1
TABLET ORAL
Qty: 90 TABLET | Refills: 0 | Status: SHIPPED | OUTPATIENT
Start: 2020-03-23 | End: 2020-06-02

## 2020-03-23 NOTE — PROGRESS NOTES
Referring Physician: Dr. Beth Polanco  Diagnosis:  Malignant neoplasm of overlapping sites of left breast in female, estrogen receptor positive ([facility] [address] (C50.812,Z17.0)  History of lymph node dissection of left axilla (B74.274)  History of mastectomy, bilateral (Z9 D 26.7 24.7 24.7   MEASUREMENT E 28 25.7 26   MEASUREMENT F 29.8 28.8 27.4   MEASUREMENT G 31.3 30.5 29.4   MEASUREMENT H 34.6 32 32.2   MEASUREMENT I 36.5 37 35    TOTAL VOLUME  2508.23640 9100.79866 2162.5441   DATE MEASURED 3/23/2020 7/24/2019 7/24/2019 160  AROM LUE flexion 160, abduction 160  AROM BUE ER 80, IR (reach behind back) T10  About the same as previous session.   11/20/2019  AROM RUE Flex 160, abduction 160  AROM LUE flexion 160, abduction 160  AROM BUE ER 80, IR (reach behind back) T10  *sligh that had scabbed over. Onset of axillary cording into inner arm, down to the elbow. R: Moderate swelling of chest and trunk.        Cert dates: 1/2/4598 thru 4/28/2020    Treatment:    2/25/2020   3/11/2020   3/23/2020   32/40 (pn sent 2/4/2020) 33/40  34/ breathing, R axilla, L axilla, A-A, L inguinal, A-I, LUE. Lotion used for LUE. * discussed importance of wearing compression sleeve.  Reviewed w/ pt risks of untreated lymphedema: increased risk of infection, skin changes, wounds, increase in adipose ti 1x/week to decrease swelling.     Cont to increase ROM/strength, decrease swelling    Charges:MM3 Total Timed Treatment:  50 min  Total Treatment Time:  50 min

## 2020-03-25 ENCOUNTER — APPOINTMENT (OUTPATIENT)
Dept: PHYSICAL THERAPY | Facility: HOSPITAL | Age: 65
End: 2020-03-25
Attending: INTERNAL MEDICINE
Payer: COMMERCIAL

## 2020-03-31 ENCOUNTER — APPOINTMENT (OUTPATIENT)
Dept: RADIATION ONCOLOGY | Facility: HOSPITAL | Age: 65
End: 2020-03-31
Attending: INTERNAL MEDICINE
Payer: COMMERCIAL

## 2020-03-31 PROCEDURE — 77290 THER RAD SIMULAJ FIELD CPLX: CPT | Performed by: RADIOLOGY

## 2020-03-31 PROCEDURE — 77334 RADIATION TREATMENT AID(S): CPT | Performed by: RADIOLOGY

## 2020-04-01 ENCOUNTER — APPOINTMENT (OUTPATIENT)
Dept: RADIATION ONCOLOGY | Facility: HOSPITAL | Age: 65
End: 2020-04-01
Attending: INTERNAL MEDICINE
Payer: COMMERCIAL

## 2020-04-02 ENCOUNTER — OFFICE VISIT (OUTPATIENT)
Dept: PHYSICAL THERAPY | Facility: HOSPITAL | Age: 65
End: 2020-04-02
Attending: SURGERY
Payer: COMMERCIAL

## 2020-04-02 PROCEDURE — 97140 MANUAL THERAPY 1/> REGIONS: CPT | Performed by: PHYSICAL THERAPIST

## 2020-04-02 NOTE — PROGRESS NOTES
Referring Physician: Dr. Hilton Dear  Diagnosis:  Malignant neoplasm of overlapping sites of left breast in female, estrogen receptor positive [facility] (C50.812,Z17.0)  History of lymph node dissection of left axilla (G11.942)  History of mastectomy, bilateral (Z9 24. 7   MEASUREMENT E 28 25.7 26   MEASUREMENT F 29.8 28.8 27.4   MEASUREMENT G 31.3 30.5 29.4   MEASUREMENT H 34.6 32 32.2   MEASUREMENT I 36.5 37 35    TOTAL VOLUME  2508.42015 4938.24289 2162.5441   DATE MEASURED 3/23/2020 7/24/2019 7/24/2019   LOCATION/ LUE flexion 160, abduction 160  AROM BUE ER 80, IR (reach behind back) T10  About the same as previous session.   11/20/2019  AROM RUE Flex 160, abduction 160  AROM LUE flexion 160, abduction 160  AROM BUE ER 80, IR (reach behind back) T10  *slightly improv 75     Edema/Tissue Observations:   Patient arrived wearing binder across chest, pulled tight, causing swelling up into B axilla and pect tendon region  L:  Marked swelling chest, trunk, axilla, and upper arm.  Bruising of chest. L trunk w/ blister that had shoulder all planes  *Pt is independent with exercises, joins class setting, walks and rides a stationery bike.  Therex (5 minutes)    - instr pt to cont w/ ROM exercises, walking                         Assessment:   Swelling LUE does appear less than last

## 2020-04-08 ENCOUNTER — APPOINTMENT (OUTPATIENT)
Dept: PHYSICAL THERAPY | Facility: HOSPITAL | Age: 65
End: 2020-04-08
Attending: INTERNAL MEDICINE
Payer: COMMERCIAL

## 2020-04-08 PROCEDURE — 77334 RADIATION TREATMENT AID(S): CPT | Performed by: RADIOLOGY

## 2020-04-08 PROCEDURE — 77300 RADIATION THERAPY DOSE PLAN: CPT | Performed by: RADIOLOGY

## 2020-04-08 PROCEDURE — 77295 3-D RADIOTHERAPY PLAN: CPT | Performed by: RADIOLOGY

## 2020-04-09 ENCOUNTER — OFFICE VISIT (OUTPATIENT)
Dept: PHYSICAL THERAPY | Facility: HOSPITAL | Age: 65
End: 2020-04-09
Attending: INTERNAL MEDICINE
Payer: COMMERCIAL

## 2020-04-09 PROCEDURE — 97140 MANUAL THERAPY 1/> REGIONS: CPT | Performed by: PHYSICAL THERAPIST

## 2020-04-09 NOTE — PROGRESS NOTES
Referring Physician: Dr. Essence Gonzales  Diagnosis:  Malignant neoplasm of overlapping sites of left breast in female, estrogen receptor positive ([facility] (C50.812,Z17.0)  History of lymph node dissection of left axilla (P84.456)  History of mastectomy, bilateral (Z9 abduction   STARTING POINT 17cm from 3rd cuticle 17cm from 3rd cuticle 17cm from 3rd cuticle   LEFT HAND VOLUME - 350 345   MEASUREMENT A 17.3 16.3 16   MEASUREMENT B 19.8 17 16.8   MEASUREMENT C 23.3 21.8 22   MEASUREMENT D 26.7 24.7 24.7   MEASUREMENT E (compensation)  Tenderness L upper trap  Mild swelling L trunk       12/23/2019  No significant change noted to ROM and strength. L chest tissue softer to the touch, swelling has decreased visually.     12/4/2019  AROM RUE Flex 160, abduction 160  AROM LUE Tightness of B upper trap and levator.  Indurations noted B upper trap from previous bra straps               Protracted scapula and forward head  AROM/Strength:                 L shld AROM flex 65, abd 50               R shld AROM flex 75, abd 75     Edema Goals (discussed and planned with patient involvement):       To be met in 10 visits:  1) Decrease swelling to be less than 5 % greater than unaffected arm to allow patient to be fitted for compression garments due to the higher risk of lymphedema - NOT M

## 2020-04-16 ENCOUNTER — OFFICE VISIT (OUTPATIENT)
Dept: PHYSICAL THERAPY | Facility: HOSPITAL | Age: 65
End: 2020-04-16
Attending: SURGERY
Payer: COMMERCIAL

## 2020-04-16 PROCEDURE — 97140 MANUAL THERAPY 1/> REGIONS: CPT | Performed by: PHYSICAL THERAPIST

## 2020-04-16 NOTE — PROGRESS NOTES
Referring Physician: Dr. Cornejo Held  Diagnosis:  Malignant neoplasm of overlapping sites of left breast in female, estrogen receptor positive [facility] (C50.812,Z17.0)  History of lymph node dissection of left axilla (Q50.823)  History of mastectomy, bilateral (Z9 with 1 pillow and bolster under knees supine with 1 pillow and bolster under knees   LIMB POSITION 75 deg abduction 75 deg abduction 75 deg abduction   STARTING POINT 17cm from 3rd cuticle 17cm from 3rd cuticle 17cm from 3rd cuticle   LEFT HAND VOLUME - 35 degrees  Cording noted L axilla/inner arm at end range  Strength B shld 4/5  * over firing L upper trap w/ testing strength (compensation)  Tenderness L upper trap  Mild swelling L trunk       12/23/2019  No significant change noted to ROM and strength.   L Evaluation Measurements: (8/20/2019)     Posture:                Significant guarding BUE, especially the left               Tightness of B upper trap and levator.  Indurations noted B upper trap from previous bra straps               Protracted scapula night garment                              Assessment:   Decreased swelling noted today. Patient anxious about starting radiation      Goals:   Goals (discussed and planned with patient involvement):       To be met in 10 visits:  1) Decrease swelling to be

## 2020-04-17 ENCOUNTER — APPOINTMENT (OUTPATIENT)
Dept: RADIATION ONCOLOGY | Facility: HOSPITAL | Age: 65
End: 2020-04-17
Attending: INTERNAL MEDICINE
Payer: COMMERCIAL

## 2020-04-17 PROCEDURE — 77280 THER RAD SIMULAJ FIELD SMPL: CPT | Performed by: RADIOLOGY

## 2020-04-17 PROCEDURE — 77332 RADIATION TREATMENT AID(S): CPT | Performed by: RADIOLOGY

## 2020-04-20 ENCOUNTER — OFFICE VISIT (OUTPATIENT)
Dept: RADIATION ONCOLOGY | Facility: HOSPITAL | Age: 65
End: 2020-04-20
Attending: INTERNAL MEDICINE
Payer: COMMERCIAL

## 2020-04-20 VITALS
OXYGEN SATURATION: 99 % | TEMPERATURE: 99 F | RESPIRATION RATE: 18 BRPM | SYSTOLIC BLOOD PRESSURE: 146 MMHG | DIASTOLIC BLOOD PRESSURE: 82 MMHG | HEART RATE: 96 BPM

## 2020-04-20 DIAGNOSIS — C50.812 MALIGNANT NEOPLASM OF OVERLAPPING SITES OF LEFT BREAST IN FEMALE, ESTROGEN RECEPTOR POSITIVE (HCC): Primary | ICD-10-CM

## 2020-04-20 DIAGNOSIS — Z17.0 MALIGNANT NEOPLASM OF OVERLAPPING SITES OF LEFT BREAST IN FEMALE, ESTROGEN RECEPTOR POSITIVE (HCC): Primary | ICD-10-CM

## 2020-04-20 PROCEDURE — 77387 GUIDANCE FOR RADJ TX DLVR: CPT | Performed by: RADIOLOGY

## 2020-04-20 PROCEDURE — 77412 RADIATION TX DELIVERY LVL 3: CPT | Performed by: RADIOLOGY

## 2020-04-20 NOTE — PROGRESS NOTES
[facility] Radiation Treatment Management Note 1-5    Patient:  Judah Lazaro  Age:  63year old  Visit Diagnosis:  L breast ILC, pT3 (6.2 cm) N1a (2/21) cM0, grade 2, ER/IL+  Primary Rad/Onc:  Dr. Domonique Handley

## 2020-04-21 PROCEDURE — 77412 RADIATION TX DELIVERY LVL 3: CPT | Performed by: RADIOLOGY

## 2020-04-21 PROCEDURE — 77387 GUIDANCE FOR RADJ TX DLVR: CPT | Performed by: RADIOLOGY

## 2020-04-22 PROCEDURE — 77331 SPECIAL RADIATION DOSIMETRY: CPT | Performed by: RADIOLOGY

## 2020-04-22 PROCEDURE — 77387 GUIDANCE FOR RADJ TX DLVR: CPT | Performed by: RADIOLOGY

## 2020-04-22 PROCEDURE — 77412 RADIATION TX DELIVERY LVL 3: CPT | Performed by: RADIOLOGY

## 2020-04-23 ENCOUNTER — OFFICE VISIT (OUTPATIENT)
Dept: PHYSICAL THERAPY | Facility: HOSPITAL | Age: 65
End: 2020-04-23
Attending: SURGERY
Payer: COMMERCIAL

## 2020-04-23 PROCEDURE — 97140 MANUAL THERAPY 1/> REGIONS: CPT | Performed by: PHYSICAL THERAPIST

## 2020-04-23 PROCEDURE — 77387 GUIDANCE FOR RADJ TX DLVR: CPT | Performed by: RADIOLOGY

## 2020-04-23 PROCEDURE — 77412 RADIATION TX DELIVERY LVL 3: CPT | Performed by: RADIOLOGY

## 2020-04-23 NOTE — PROGRESS NOTES
Referring Physician: Dr. Jazzy Washington  Diagnosis:  Malignant neoplasm of overlapping sites of left breast in female, estrogen receptor positive [facility] (C50.812,Z17.0)  History of lymph node dissection of left axilla (E13.318)  History of mastectomy, bilateral (Z9 0/10    Subjective:   \"the new sleeves are tight\"      Objective:    4/23/2020;    - pt received new compression garments (custom sleeve/gauntlet and night product [employer])   - slight redness L trunk      4/16/2020:  - mild swelling L forearm (improv 27 27.5 26.3   MEASUREMENT G 29.3 29.9 29.8   MEASUREMENT H 34.6 35 33.8   MEASUREMENT I 36.8 37.4 35    TOTAL VOLUME  2251.0239 1998.13759 2178.88586   % DIFFERENCE 11.64223 -2.56080 -0.98132            3/11/2020  LUE AROM in standing 170 flex and abducti pillow and bolster under knees supine with 1 pillow and bolster under knees supine with 1 pillow and bolster under knees   LIMB POSITION 75 deg abduction 75 deg abduction 75 deg abduction   STARTING POINT 17cm from 3rd cuticle 17cm from 3rd cuticle 17cm fr 37/82  Certification From: 3/0/8431      To: 8/5/2020  PN sent     Manual therapy (45 minutes)      MLD provided for LUE: Neck sequence, abdominal sequence, deep breathing, R axilla, L axilla, A-A, L inguinal, A-I, LUE.  Additional time spent on trunk (A-I) attempting but did require encouragement and VC's to td. Instr pt to wash/wear all her garments several times \"to break\" in and will need to re-assess fit in approx 2 weeks.  Applied prosthetic fitting lotion to arm which did improve ease of donning the

## 2020-04-24 PROCEDURE — 77336 RADIATION PHYSICS CONSULT: CPT | Performed by: RADIOLOGY

## 2020-04-24 PROCEDURE — 77412 RADIATION TX DELIVERY LVL 3: CPT | Performed by: RADIOLOGY

## 2020-04-24 PROCEDURE — 77387 GUIDANCE FOR RADJ TX DLVR: CPT | Performed by: RADIOLOGY

## 2020-04-27 ENCOUNTER — OFFICE VISIT (OUTPATIENT)
Dept: RADIATION ONCOLOGY | Facility: HOSPITAL | Age: 65
End: 2020-04-27
Attending: INTERNAL MEDICINE
Payer: COMMERCIAL

## 2020-04-27 VITALS
RESPIRATION RATE: 18 BRPM | DIASTOLIC BLOOD PRESSURE: 72 MMHG | SYSTOLIC BLOOD PRESSURE: 123 MMHG | TEMPERATURE: 98 F | HEART RATE: 87 BPM

## 2020-04-27 DIAGNOSIS — C50.812 MALIGNANT NEOPLASM OF OVERLAPPING SITES OF LEFT BREAST IN FEMALE, ESTROGEN RECEPTOR POSITIVE (HCC): Primary | ICD-10-CM

## 2020-04-27 DIAGNOSIS — Z17.0 MALIGNANT NEOPLASM OF OVERLAPPING SITES OF LEFT BREAST IN FEMALE, ESTROGEN RECEPTOR POSITIVE (HCC): Primary | ICD-10-CM

## 2020-04-27 PROCEDURE — 77412 RADIATION TX DELIVERY LVL 3: CPT | Performed by: RADIOLOGY

## 2020-04-27 PROCEDURE — 77387 GUIDANCE FOR RADJ TX DLVR: CPT | Performed by: RADIOLOGY

## 2020-04-27 RX ORDER — MOMETASONE FUROATE 1 MG/G
1 CREAM TOPICAL DAILY
Qty: 50 G | Refills: 3 | Status: SHIPPED | OUTPATIENT
Start: 2020-04-27 | End: 2020-10-06

## 2020-04-27 NOTE — PROGRESS NOTES
[facility] Radiation Treatment Management Note 6-10    Patient:  Nancie Basurto  Age:  63year old  Visit Diagnosis:    1.  Malignant neoplasm of overlapping sites of left breast in female, estrogen receptor positive ([facility]      P

## 2020-04-28 PROCEDURE — 77412 RADIATION TX DELIVERY LVL 3: CPT | Performed by: RADIOLOGY

## 2020-04-28 PROCEDURE — 77387 GUIDANCE FOR RADJ TX DLVR: CPT | Performed by: RADIOLOGY

## 2020-04-29 PROCEDURE — 77387 GUIDANCE FOR RADJ TX DLVR: CPT | Performed by: RADIOLOGY

## 2020-04-29 PROCEDURE — 77412 RADIATION TX DELIVERY LVL 3: CPT | Performed by: RADIOLOGY

## 2020-04-30 PROCEDURE — 77412 RADIATION TX DELIVERY LVL 3: CPT | Performed by: RADIOLOGY

## 2020-04-30 PROCEDURE — 77387 GUIDANCE FOR RADJ TX DLVR: CPT | Performed by: RADIOLOGY

## 2020-05-01 ENCOUNTER — APPOINTMENT (OUTPATIENT)
Dept: RADIATION ONCOLOGY | Facility: HOSPITAL | Age: 65
End: 2020-05-01
Attending: INTERNAL MEDICINE
Payer: COMMERCIAL

## 2020-05-01 PROCEDURE — 77336 RADIATION PHYSICS CONSULT: CPT | Performed by: RADIOLOGY

## 2020-05-01 PROCEDURE — 77387 GUIDANCE FOR RADJ TX DLVR: CPT | Performed by: RADIOLOGY

## 2020-05-01 PROCEDURE — 77412 RADIATION TX DELIVERY LVL 3: CPT | Performed by: RADIOLOGY

## 2020-05-04 ENCOUNTER — OFFICE VISIT (OUTPATIENT)
Dept: RADIATION ONCOLOGY | Facility: HOSPITAL | Age: 65
End: 2020-05-04
Attending: INTERNAL MEDICINE
Payer: COMMERCIAL

## 2020-05-04 VITALS
RESPIRATION RATE: 18 BRPM | DIASTOLIC BLOOD PRESSURE: 84 MMHG | TEMPERATURE: 97 F | SYSTOLIC BLOOD PRESSURE: 140 MMHG | HEART RATE: 80 BPM

## 2020-05-04 DIAGNOSIS — C50.812 MALIGNANT NEOPLASM OF OVERLAPPING SITES OF LEFT BREAST IN FEMALE, ESTROGEN RECEPTOR POSITIVE (HCC): Primary | ICD-10-CM

## 2020-05-04 DIAGNOSIS — Z17.0 MALIGNANT NEOPLASM OF OVERLAPPING SITES OF LEFT BREAST IN FEMALE, ESTROGEN RECEPTOR POSITIVE (HCC): Primary | ICD-10-CM

## 2020-05-04 PROCEDURE — 77387 GUIDANCE FOR RADJ TX DLVR: CPT | Performed by: RADIOLOGY

## 2020-05-04 PROCEDURE — 77412 RADIATION TX DELIVERY LVL 3: CPT | Performed by: RADIOLOGY

## 2020-05-04 NOTE — PROGRESS NOTES
[facility] Radiation Treatment Management Note 11-15    Patient:  Cherl Breath  Age:  63year old  Visit Diagnosis:  L breast ILC, pT3 (6.2 cm) N1a (3/23, +THERESE) cM0, grade 2, ER/IN+ s/p adjuvant chemotherapy  Primary Rad/Onc:

## 2020-05-05 PROCEDURE — 77412 RADIATION TX DELIVERY LVL 3: CPT | Performed by: RADIOLOGY

## 2020-05-05 PROCEDURE — 77387 GUIDANCE FOR RADJ TX DLVR: CPT | Performed by: RADIOLOGY

## 2020-05-06 PROCEDURE — 77387 GUIDANCE FOR RADJ TX DLVR: CPT | Performed by: RADIOLOGY

## 2020-05-06 PROCEDURE — 77412 RADIATION TX DELIVERY LVL 3: CPT | Performed by: RADIOLOGY

## 2020-05-07 ENCOUNTER — OFFICE VISIT (OUTPATIENT)
Dept: PHYSICAL THERAPY | Facility: HOSPITAL | Age: 65
End: 2020-05-07
Attending: SURGERY
Payer: COMMERCIAL

## 2020-05-07 PROCEDURE — 77387 GUIDANCE FOR RADJ TX DLVR: CPT | Performed by: RADIOLOGY

## 2020-05-07 PROCEDURE — 77412 RADIATION TX DELIVERY LVL 3: CPT | Performed by: RADIOLOGY

## 2020-05-07 PROCEDURE — 97140 MANUAL THERAPY 1/> REGIONS: CPT | Performed by: PHYSICAL THERAPIST

## 2020-05-07 NOTE — PROGRESS NOTES
Referring Physician: Dr. Ambar Dimas  Diagnosis:  Malignant neoplasm of overlapping sites of left breast in female, estrogen receptor positive [facility] (C50.812,Z17.0)  History of lymph node dissection of left axilla (P30.116)  History of mastectomy, bilateral (Z9 wearing trunk compression pad, but was not wearing UE compression    Lymphedema Calculations 3/23/2020 11/20/2019 7/24/2019   DATE MEASURED 3/23/2020 11/20/2019 7/24/2019   LOCATION/MEASUREMENTS LUE LUE LUE   PATIENT POSITION  supine with 1 pillow and bols visible but ROM has maintained.     2/4/2020  Trunk circumference 101.0cm   L lateral trunk visually and measurable smaller by 4.5cm  Cording visible and palpable in L axilla into medial upper arm    1/30/2020  Trunk circumference 105.5 cm  12/30/19:    Eloisa Ochoa MEASUREMENT D 24.7 24.5 24.7   MEASUREMENT E 25.6 25.4 25.6   MEASUREMENT F 27 27.5 26.3   MEASUREMENT G 29.3 29.9 29.8   MEASUREMENT H 34.6 35 33.8   MEASUREMENT I 36.8 37.4 35    TOTAL VOLUME  2251.0239 7677.98320 9582.70052   % DIFFERENCE 11.75300 -2. treatment. Discussed cont w/ home pump but if chest becomes irritated from radiation to only do the arm portion of the home pump     Compression:  - pt brought in night garment, unable to td herself.  Assisted in donning garment- garment appears to fit      Plan:   - pt to cont to attempt self management  - pt to call if need to be seen for swelling or pain      Charges:MM3 Total Timed Treatment: 45 min  Total Treatment Time:  45 min

## 2020-05-08 PROCEDURE — 77412 RADIATION TX DELIVERY LVL 3: CPT | Performed by: RADIOLOGY

## 2020-05-08 PROCEDURE — 77336 RADIATION PHYSICS CONSULT: CPT | Performed by: RADIOLOGY

## 2020-05-08 PROCEDURE — 77387 GUIDANCE FOR RADJ TX DLVR: CPT | Performed by: RADIOLOGY

## 2020-05-11 ENCOUNTER — OFFICE VISIT (OUTPATIENT)
Dept: RADIATION ONCOLOGY | Facility: HOSPITAL | Age: 65
End: 2020-05-11
Attending: INTERNAL MEDICINE
Payer: COMMERCIAL

## 2020-05-11 VITALS
TEMPERATURE: 98 F | RESPIRATION RATE: 18 BRPM | HEART RATE: 96 BPM | SYSTOLIC BLOOD PRESSURE: 133 MMHG | DIASTOLIC BLOOD PRESSURE: 76 MMHG

## 2020-05-11 DIAGNOSIS — Z17.0 MALIGNANT NEOPLASM OF OVERLAPPING SITES OF LEFT BREAST IN FEMALE, ESTROGEN RECEPTOR POSITIVE (HCC): Primary | ICD-10-CM

## 2020-05-11 DIAGNOSIS — C50.812 MALIGNANT NEOPLASM OF OVERLAPPING SITES OF LEFT BREAST IN FEMALE, ESTROGEN RECEPTOR POSITIVE (HCC): Primary | ICD-10-CM

## 2020-05-11 PROCEDURE — 77387 GUIDANCE FOR RADJ TX DLVR: CPT | Performed by: RADIOLOGY

## 2020-05-11 PROCEDURE — 77412 RADIATION TX DELIVERY LVL 3: CPT | Performed by: RADIOLOGY

## 2020-05-11 NOTE — PROGRESS NOTES
[facility] Radiation Treatment Management Note 16-20    Patient:  Jarod Driver  Age:  63year old  Visit Diagnosis:    1.  Malignant neoplasm of overlapping sites of left breast in female, estrogen receptor positive ([facility]

## 2020-05-12 PROCEDURE — 77412 RADIATION TX DELIVERY LVL 3: CPT | Performed by: RADIOLOGY

## 2020-05-12 PROCEDURE — 77387 GUIDANCE FOR RADJ TX DLVR: CPT | Performed by: RADIOLOGY

## 2020-05-13 PROCEDURE — 77387 GUIDANCE FOR RADJ TX DLVR: CPT | Performed by: RADIOLOGY

## 2020-05-13 PROCEDURE — 77412 RADIATION TX DELIVERY LVL 3: CPT | Performed by: RADIOLOGY

## 2020-05-13 RX ORDER — GABAPENTIN 100 MG/1
CAPSULE ORAL
Qty: 60 CAPSULE | Refills: 1 | Status: SHIPPED | OUTPATIENT
Start: 2020-05-13 | End: 2020-10-06

## 2020-05-14 PROCEDURE — 77387 GUIDANCE FOR RADJ TX DLVR: CPT | Performed by: RADIOLOGY

## 2020-05-14 PROCEDURE — 77412 RADIATION TX DELIVERY LVL 3: CPT | Performed by: RADIOLOGY

## 2020-05-15 PROCEDURE — 77387 GUIDANCE FOR RADJ TX DLVR: CPT | Performed by: RADIOLOGY

## 2020-05-15 PROCEDURE — 77412 RADIATION TX DELIVERY LVL 3: CPT | Performed by: RADIOLOGY

## 2020-05-15 PROCEDURE — 77336 RADIATION PHYSICS CONSULT: CPT | Performed by: RADIOLOGY

## 2020-05-18 ENCOUNTER — OFFICE VISIT (OUTPATIENT)
Dept: RADIATION ONCOLOGY | Facility: HOSPITAL | Age: 65
End: 2020-05-18
Attending: INTERNAL MEDICINE
Payer: COMMERCIAL

## 2020-05-18 VITALS
HEART RATE: 84 BPM | SYSTOLIC BLOOD PRESSURE: 123 MMHG | RESPIRATION RATE: 18 BRPM | TEMPERATURE: 97 F | DIASTOLIC BLOOD PRESSURE: 82 MMHG

## 2020-05-18 DIAGNOSIS — Z17.0 MALIGNANT NEOPLASM OF OVERLAPPING SITES OF LEFT BREAST IN FEMALE, ESTROGEN RECEPTOR POSITIVE (HCC): Primary | ICD-10-CM

## 2020-05-18 DIAGNOSIS — C50.812 MALIGNANT NEOPLASM OF OVERLAPPING SITES OF LEFT BREAST IN FEMALE, ESTROGEN RECEPTOR POSITIVE (HCC): Primary | ICD-10-CM

## 2020-05-18 PROCEDURE — 77412 RADIATION TX DELIVERY LVL 3: CPT | Performed by: RADIOLOGY

## 2020-05-18 PROCEDURE — 77387 GUIDANCE FOR RADJ TX DLVR: CPT | Performed by: RADIOLOGY

## 2020-05-18 NOTE — PROGRESS NOTES
[facility] Radiation Treatment Management Note 21-25    Patient:  Yovana Gannon  Age:  63year old  Visit Diagnosis:  L breast ILC, pT3 (6.2 cm) N1a (3/23, +THERESE) cM0, grade 2, ER/NV+ s/p adjuvant chemotherapy  Primary Rad/Onc:

## 2020-05-19 PROCEDURE — 77412 RADIATION TX DELIVERY LVL 3: CPT | Performed by: RADIOLOGY

## 2020-05-19 PROCEDURE — 77387 GUIDANCE FOR RADJ TX DLVR: CPT | Performed by: RADIOLOGY

## 2020-05-20 PROCEDURE — 77387 GUIDANCE FOR RADJ TX DLVR: CPT | Performed by: RADIOLOGY

## 2020-05-20 PROCEDURE — 77412 RADIATION TX DELIVERY LVL 3: CPT | Performed by: RADIOLOGY

## 2020-05-21 PROCEDURE — 77412 RADIATION TX DELIVERY LVL 3: CPT | Performed by: RADIOLOGY

## 2020-05-21 PROCEDURE — 77307 TELETHX ISODOSE PLAN CPLX: CPT | Performed by: RADIOLOGY

## 2020-05-21 PROCEDURE — 77334 RADIATION TREATMENT AID(S): CPT | Performed by: RADIOLOGY

## 2020-05-21 PROCEDURE — 77290 THER RAD SIMULAJ FIELD CPLX: CPT | Performed by: RADIOLOGY

## 2020-05-22 PROCEDURE — 77387 GUIDANCE FOR RADJ TX DLVR: CPT | Performed by: RADIOLOGY

## 2020-05-22 PROCEDURE — 77336 RADIATION PHYSICS CONSULT: CPT | Performed by: RADIOLOGY

## 2020-05-22 PROCEDURE — 77412 RADIATION TX DELIVERY LVL 3: CPT | Performed by: RADIOLOGY

## 2020-05-26 ENCOUNTER — APPOINTMENT (OUTPATIENT)
Dept: RADIATION ONCOLOGY | Facility: HOSPITAL | Age: 65
End: 2020-05-26
Attending: INTERNAL MEDICINE
Payer: COMMERCIAL

## 2020-05-26 PROCEDURE — 77280 THER RAD SIMULAJ FIELD SMPL: CPT | Performed by: RADIOLOGY

## 2020-05-26 PROCEDURE — 77412 RADIATION TX DELIVERY LVL 3: CPT | Performed by: RADIOLOGY

## 2020-05-27 ENCOUNTER — OFFICE VISIT (OUTPATIENT)
Dept: RADIATION ONCOLOGY | Facility: HOSPITAL | Age: 65
End: 2020-05-27
Attending: INTERNAL MEDICINE
Payer: COMMERCIAL

## 2020-05-27 VITALS
DIASTOLIC BLOOD PRESSURE: 78 MMHG | TEMPERATURE: 98 F | SYSTOLIC BLOOD PRESSURE: 104 MMHG | RESPIRATION RATE: 18 BRPM | HEART RATE: 94 BPM

## 2020-05-27 DIAGNOSIS — C50.812 MALIGNANT NEOPLASM OF OVERLAPPING SITES OF LEFT BREAST IN FEMALE, ESTROGEN RECEPTOR POSITIVE (HCC): Primary | ICD-10-CM

## 2020-05-27 DIAGNOSIS — Z17.0 MALIGNANT NEOPLASM OF OVERLAPPING SITES OF LEFT BREAST IN FEMALE, ESTROGEN RECEPTOR POSITIVE (HCC): Primary | ICD-10-CM

## 2020-05-27 PROCEDURE — 77387 GUIDANCE FOR RADJ TX DLVR: CPT | Performed by: RADIOLOGY

## 2020-05-27 PROCEDURE — 77412 RADIATION TX DELIVERY LVL 3: CPT | Performed by: RADIOLOGY

## 2020-05-27 NOTE — PATIENT INSTRUCTIONS
Continue to moisturize for the next 2-3 weeks. Please call with any questions or concerns to RN number [phone]    SPF 27 or greater when exposed to the sun, as your skin will remain sun sensitive for months, even years.      Follow up with Dr Eve Grijalva in

## 2020-05-27 NOTE — PROGRESS NOTES
[facility] Radiation Treatment Management Note 26-30    Patient:  Domingo Herrera  Age:  63year old  Visit Diagnosis:    1.  Malignant neoplasm of overlapping sites of left breast in female, estrogen receptor positive ([facility]

## 2020-05-28 DIAGNOSIS — G62.0 CHEMOTHERAPY-INDUCED PERIPHERAL NEUROPATHY (HCC): ICD-10-CM

## 2020-05-28 DIAGNOSIS — T45.1X5A CHEMOTHERAPY-INDUCED PERIPHERAL NEUROPATHY (HCC): ICD-10-CM

## 2020-05-28 PROCEDURE — 77412 RADIATION TX DELIVERY LVL 3: CPT | Performed by: RADIOLOGY

## 2020-05-28 PROCEDURE — 77387 GUIDANCE FOR RADJ TX DLVR: CPT | Performed by: RADIOLOGY

## 2020-05-28 RX ORDER — FUROSEMIDE 20 MG/1
TABLET ORAL
Qty: 30 TABLET | Refills: 3 | Status: SHIPPED | OUTPATIENT
Start: 2020-05-28 | End: 2021-10-07 | Stop reason: ALTCHOICE

## 2020-05-28 RX ORDER — GABAPENTIN 300 MG/1
CAPSULE ORAL
Qty: 30 CAPSULE | Refills: 1 | Status: SHIPPED | OUTPATIENT
Start: 2020-05-28 | End: 2020-07-27

## 2020-05-29 PROCEDURE — 77387 GUIDANCE FOR RADJ TX DLVR: CPT | Performed by: RADIOLOGY

## 2020-05-29 PROCEDURE — 77336 RADIATION PHYSICS CONSULT: CPT | Performed by: RADIOLOGY

## 2020-05-29 PROCEDURE — 77412 RADIATION TX DELIVERY LVL 3: CPT | Performed by: RADIOLOGY

## 2020-06-01 ENCOUNTER — APPOINTMENT (OUTPATIENT)
Dept: RADIATION ONCOLOGY | Facility: HOSPITAL | Age: 65
End: 2020-06-01
Attending: INTERNAL MEDICINE
Payer: COMMERCIAL

## 2020-06-01 ENCOUNTER — TELEPHONE (OUTPATIENT)
Dept: PHYSICAL THERAPY | Facility: HOSPITAL | Age: 65
End: 2020-06-01

## 2020-06-01 PROCEDURE — 77387 GUIDANCE FOR RADJ TX DLVR: CPT | Performed by: RADIOLOGY

## 2020-06-01 PROCEDURE — 77412 RADIATION TX DELIVERY LVL 3: CPT | Performed by: RADIOLOGY

## 2020-06-01 NOTE — PROGRESS NOTES
RADIATION ONCOLOGY COMPLETION SUMMARY NOTE    DIAGNOSIS:  IB Grade 2 pT3, N1a. Left lobular cancer, s/p mastectomy 3/23 LN positive with THERESE, receptor positive, s/p chemotherapy, now s/p adjuvant XRT to left chest wall and draining LN on 6/2/2020.     As yo malignancy. pT3, N1a. She proceeded with chemo and various management options were discussed and pt opted for a course of XRT as outlined below.       SUMMARY OF RADIATION THERAPY    Treatment Summary: Course: 1 L CW + SCF    Treatment Site Energy Dose/ 150 mg 80 mg/m2  = 150 mg 80 mg/m2  = 150 mg 80 mg/m2  = 150 mg 80 mg/m2  = 150 mg 80 mg/m2  = 150 mg 80 mg/m2  = 150 mg 75 mg/m2  = 138 mg 70 mg/m2  = 132 mg 70 mg/m2  = 132 mg 70 mg/m2  = 132 mg 65 mg/m2  = 120 mg

## 2020-06-01 NOTE — TELEPHONE ENCOUNTER
Contacted pt to assess how she is doing w/ self management. Patient stating she has had increased swelling during the radiation but has but using the home pump and wearing the compression sleeve. Patient's last day of radiation is today.  Instr pt to cont w

## 2020-06-02 ENCOUNTER — OFFICE VISIT (OUTPATIENT)
Dept: HEMATOLOGY/ONCOLOGY | Facility: HOSPITAL | Age: 65
End: 2020-06-02
Attending: INTERNAL MEDICINE
Payer: COMMERCIAL

## 2020-06-02 VITALS
HEART RATE: 81 BPM | SYSTOLIC BLOOD PRESSURE: 135 MMHG | TEMPERATURE: 97 F | HEIGHT: 64 IN | DIASTOLIC BLOOD PRESSURE: 79 MMHG | OXYGEN SATURATION: 98 % | BODY MASS INDEX: 31.41 KG/M2 | RESPIRATION RATE: 18 BRPM | WEIGHT: 184 LBS

## 2020-06-02 DIAGNOSIS — G62.0 CHEMOTHERAPY-INDUCED NEUROPATHY (HCC): ICD-10-CM

## 2020-06-02 DIAGNOSIS — C50.812 MALIGNANT NEOPLASM OF OVERLAPPING SITES OF LEFT BREAST IN FEMALE, ESTROGEN RECEPTOR POSITIVE (HCC): Primary | ICD-10-CM

## 2020-06-02 DIAGNOSIS — L58.9 RADIATION DERMATITIS: ICD-10-CM

## 2020-06-02 DIAGNOSIS — Z79.899 ENCOUNTER FOR MEDICATION MANAGEMENT: Primary | ICD-10-CM

## 2020-06-02 DIAGNOSIS — Z45.2 ENCOUNTER FOR CENTRAL LINE CARE: ICD-10-CM

## 2020-06-02 DIAGNOSIS — T45.1X5A CHEMOTHERAPY-INDUCED NEUROPATHY (HCC): ICD-10-CM

## 2020-06-02 DIAGNOSIS — C50.812 MALIGNANT NEOPLASM OF OVERLAPPING SITES OF LEFT BREAST IN FEMALE, ESTROGEN RECEPTOR POSITIVE (HCC): ICD-10-CM

## 2020-06-02 DIAGNOSIS — Z17.0 MALIGNANT NEOPLASM OF OVERLAPPING SITES OF LEFT BREAST IN FEMALE, ESTROGEN RECEPTOR POSITIVE (HCC): ICD-10-CM

## 2020-06-02 DIAGNOSIS — Z17.0 MALIGNANT NEOPLASM OF OVERLAPPING SITES OF LEFT BREAST IN FEMALE, ESTROGEN RECEPTOR POSITIVE (HCC): Primary | ICD-10-CM

## 2020-06-02 DIAGNOSIS — Z79.811 VISIT FOR MONITORING ARIMIDEX THERAPY: ICD-10-CM

## 2020-06-02 DIAGNOSIS — Z51.81 VISIT FOR MONITORING ARIMIDEX THERAPY: ICD-10-CM

## 2020-06-02 PROBLEM — K12.30 MUCOSITIS: Status: RESOLVED | Noted: 2019-11-21 | Resolved: 2020-06-02

## 2020-06-02 PROBLEM — F41.9 ANXIETY: Status: RESOLVED | Noted: 2019-06-26 | Resolved: 2020-06-02

## 2020-06-02 PROBLEM — Z51.11 CHEMOTHERAPY MANAGEMENT, ENCOUNTER FOR: Status: RESOLVED | Noted: 2019-09-19 | Resolved: 2020-06-02

## 2020-06-02 PROBLEM — F19.982 DRUG-INDUCED INSOMNIA (HCC): Status: RESOLVED | Noted: 2019-11-27 | Resolved: 2020-06-02

## 2020-06-02 PROBLEM — K12.1 ULCER (TRAUMATIC) OF ORAL MUCOSA: Status: RESOLVED | Noted: 2019-12-05 | Resolved: 2020-06-02

## 2020-06-02 PROBLEM — I89.0 LYMPHEDEMA OF ARM: Status: RESOLVED | Noted: 2019-09-23 | Resolved: 2020-06-02

## 2020-06-02 PROBLEM — L27.1 PALMAR PLANTAR ERYTHRODYSAESTHESIA: Status: RESOLVED | Noted: 2019-11-21 | Resolved: 2020-06-02

## 2020-06-02 PROBLEM — R11.2 CHEMOTHERAPY INDUCED NAUSEA AND VOMITING: Status: RESOLVED | Noted: 2019-09-23 | Resolved: 2020-06-02

## 2020-06-02 PROCEDURE — 36591 DRAW BLOOD OFF VENOUS DEVICE: CPT

## 2020-06-02 PROCEDURE — 80053 COMPREHEN METABOLIC PANEL: CPT

## 2020-06-02 PROCEDURE — 99214 OFFICE O/P EST MOD 30 MIN: CPT | Performed by: INTERNAL MEDICINE

## 2020-06-02 PROCEDURE — 77336 RADIATION PHYSICS CONSULT: CPT | Performed by: RADIOLOGY

## 2020-06-02 PROCEDURE — 85025 COMPLETE CBC W/AUTO DIFF WBC: CPT

## 2020-06-02 RX ORDER — ANASTROZOLE 1 MG/1
1 TABLET ORAL DAILY
Qty: 90 TABLET | Refills: 3 | Status: SHIPPED | OUTPATIENT
Start: 2020-06-02 | End: 2020-10-06

## 2020-06-02 RX ORDER — HEPARIN SODIUM (PORCINE) LOCK FLUSH IV SOLN 100 UNIT/ML 100 UNIT/ML
5 SOLUTION INTRAVENOUS ONCE
Status: CANCELLED | OUTPATIENT
Start: 2020-06-02

## 2020-06-02 RX ORDER — 0.9 % SODIUM CHLORIDE 0.9 %
VIAL (ML) INJECTION
Status: DISPENSED
Start: 2020-06-02 | End: 2020-06-02

## 2020-06-02 RX ORDER — HEPARIN SODIUM (PORCINE) LOCK FLUSH IV SOLN 100 UNIT/ML 100 UNIT/ML
5 SOLUTION INTRAVENOUS ONCE
Status: COMPLETED | OUTPATIENT
Start: 2020-06-02 | End: 2020-06-02

## 2020-06-02 RX ORDER — 0.9 % SODIUM CHLORIDE 0.9 %
10 VIAL (ML) INJECTION ONCE
Status: CANCELLED | OUTPATIENT
Start: 2020-06-02

## 2020-06-02 RX ADMIN — HEPARIN SODIUM (PORCINE) LOCK FLUSH IV SOLN 100 UNIT/ML 500 UNITS: 100 SOLUTION INTRAVENOUS at 09:58:00

## 2020-06-02 NOTE — PROGRESS NOTES
Cancer Center Progress Note    Patient Name: Rogelio Rainey   YOB: 1955   Medical Record Number: [id number]   Attending Physician: Shane Scheuermann, M.D.      Chief Complaint:  Breast cancer (PCP: Dr Nikki Chavez)    Oncology History:   6/11/2019: Isis Olivier with extracapsular extension she proceeded to have axillary dissection which showed 2 of 22 regional lymph nodes had metastatic cancer without extracapsular extension. Right breast mastectomy showed no evidence of malignancy. pT3, N1a.         Chemotherapy Performed by Sherill Peabody, MD at [address] [place] [address] OR   • BREAST MASTECTOMY WITH PLASTIC SURGEON RECONSTRUCTION Bilateral 8/8/2019    Performed by Sherill Peabody, MD at [address] [place] [address] OR   • BREAST RECONSTRUCTION/ IMMEDIATE/EXPANDER Bilateral 8/8/2019    Perfo Disp: 90 tablet, Rfl: 3  •  lidocaine-prilocaine 2.5-2.5 % External Cream, Apply as needed to port, Disp: 30 g, Rfl: 0  •  Fluticasone Propionate 50 MCG/ACT Nasal Suspension, 2 sprays by Nasal route daily. , Disp: 1 Bottle, Rfl: 0  •  Misc.  Devices Does not appropriate gait  Psych: appropriate mood and affect      Laboratory assessed and reviewed:  Lab Results   Component Value Date    GLU 88 03/04/2020    BUN 20 (H) 03/04/2020    BUNCREA 30.8 (H) 03/04/2020    CREATSERUM 0.65 03/04/2020    ANIONGAP 9 03/04/2 G2, ER+, NC+, HER2-) - Signed by ARLEN Hunter on 9/23/2019    1. left invasive mammary carcinoma with features of lobular and ductal, ER/NC >90% HER2 neg, cT2N0, pT3N1 (anatomic stage IIIA)  - mammaprint: luminal B, high risk, recurrence at 29%  -

## 2020-06-10 ENCOUNTER — OFFICE VISIT (OUTPATIENT)
Dept: HEMATOLOGY/ONCOLOGY | Facility: HOSPITAL | Age: 65
End: 2020-06-10
Attending: NURSE PRACTITIONER
Payer: COMMERCIAL

## 2020-06-10 DIAGNOSIS — Z71.9 COUNSELING, UNSPECIFIED: Primary | ICD-10-CM

## 2020-06-10 DIAGNOSIS — Z85.3 PERSONAL HISTORY OF BREAST CANCER: ICD-10-CM

## 2020-06-10 DIAGNOSIS — Z08 ENCOUNTER FOR FOLLOW-UP EXAMINATION AFTER COMPLETED TREATMENT FOR MALIGNANT NEOPLASM: ICD-10-CM

## 2020-06-10 PROCEDURE — 99215 OFFICE O/P EST HI 40 MIN: CPT | Performed by: NURSE PRACTITIONER

## 2020-06-10 RX ORDER — PYRIDOXINE HCL (VITAMIN B6) 100 MG
TABLET ORAL
COMMUNITY

## 2020-06-10 NOTE — PROGRESS NOTES
I met with Ms. Galina Gilman for a Survivorship Clinic visit to provide a survivorship care plan (SCP) and information related to post-treatment care. She came to the visit alone.   She has a diagnosis of Stage IB lobular carcinoma of the left breast and daughter, but has had many stressful events just prior to her breast cancer diagnosis and she attributes her anger to these issues and recent isolation from cancer diagnosis and pandemic. She plans to retire in the fall and is happy with this idea.   We d \"grief\" over her physical and emotional losses including changes in body image. I encouraged her to include this topic in counseling and to consider breast specific support in 1:1 session or group.    A variety of survivorship resources were provided for that time was spent counseling regarding survivorship education and all of the information contained in the Oncology Treatment Summary SCP and in the additional resources provided.   ARLEN Patricio

## 2020-06-15 ENCOUNTER — HOSPITAL ENCOUNTER (OUTPATIENT)
Dept: BONE DENSITY | Facility: HOSPITAL | Age: 65
Discharge: HOME | End: 2020-06-15
Attending: INTERNAL MEDICINE
Payer: COMMERCIAL

## 2020-06-15 DIAGNOSIS — C50.812 MALIGNANT NEOPLASM OF OVERLAPPING SITES OF LEFT BREAST IN FEMALE, ESTROGEN RECEPTOR POSITIVE (HCC): ICD-10-CM

## 2020-06-15 DIAGNOSIS — Z17.0 MALIGNANT NEOPLASM OF OVERLAPPING SITES OF LEFT BREAST IN FEMALE, ESTROGEN RECEPTOR POSITIVE (HCC): ICD-10-CM

## 2020-06-15 PROCEDURE — 77080 DXA BONE DENSITY AXIAL: CPT | Performed by: INTERNAL MEDICINE

## 2020-07-09 ENCOUNTER — TELEPHONE (OUTPATIENT)
Dept: PHYSICAL THERAPY | Facility: HOSPITAL | Age: 65
End: 2020-07-09

## 2020-07-24 NOTE — TELEPHONE ENCOUNTER
Left message for pt to call back to discuss if further PT is needed or if she is able to self manage.

## 2020-07-26 DIAGNOSIS — G62.0 CHEMOTHERAPY-INDUCED PERIPHERAL NEUROPATHY: ICD-10-CM

## 2020-07-26 DIAGNOSIS — T45.1X5A CHEMOTHERAPY-INDUCED PERIPHERAL NEUROPATHY: ICD-10-CM

## 2020-07-27 RX ORDER — GABAPENTIN 300 MG/1
CAPSULE ORAL
Qty: 90 CAPSULE | Refills: 1 | Status: SHIPPED | OUTPATIENT
Start: 2020-07-27 | End: 2020-10-06

## 2020-08-06 ENCOUNTER — OFFICE VISIT (OUTPATIENT)
Dept: PHYSICAL THERAPY | Facility: HOSPITAL | Age: 65
End: 2020-08-06
Attending: SURGERY
Payer: COMMERCIAL

## 2020-08-06 ENCOUNTER — TELEPHONE (OUTPATIENT)
Dept: HEMATOLOGY/ONCOLOGY | Facility: HOSPITAL | Age: 65
End: 2020-08-06

## 2020-08-06 PROCEDURE — 97110 THERAPEUTIC EXERCISES: CPT | Performed by: PHYSICAL THERAPIST

## 2020-08-06 PROCEDURE — 97140 MANUAL THERAPY 1/> REGIONS: CPT | Performed by: PHYSICAL THERAPIST

## 2020-08-06 NOTE — TELEPHONE ENCOUNTER
Olga moseley nurse  from [facility] called asking to go over the patient's treatment plan.  Her fax number is [phone]

## 2020-08-06 NOTE — PROGRESS NOTES
Referring Physician: Dr. Denise Miller  Diagnosis:  Malignant neoplasm of overlapping sites of left breast in female, estrogen receptor positive ([facility] (C50.812,Z17.0)  History of lymph node dissection of left axilla (D00.561)  History of mastectomy, bilateral (Z9 weeks  Rehab Potential:  Good   Certification From: 0/2/3056 to 4/28/2020      Medication Changes since last visit?: No      Pain level: no pain    Subjective:   \"It's been doing really good, just a little flare up now and then, when it is I wear the slee -2.79749 -7.35496              5/7/2020:    - Pt arrived wearing compression sleeve- but sleeve was on backwards  - swelling left forearm  - tenderness L thoracic paraspinals      4/23/2020;    - pt received new compression garments (custom sleeve/gauntlet 17. 2   MEASUREMENT C 21.5 22 21.5   MEASUREMENT D 24.7 24.5 24.7   MEASUREMENT E 25.6 25.4 25.6   MEASUREMENT F 27 27.5 26.3   MEASUREMENT G 29.3 29.9 29.8   MEASUREMENT H 34.6 35 33.8   MEASUREMENT I 36.8 37.4 35    TOTAL VOLUME  0331.0239 8157.53551 2176 MEASURED 3/23/2020 11/20/2019 7/24/2019   LOCATION/MEASUREMENTS HAROON PATIÑO   PATIENT POSITION  supine with 1 pillow and bolster under knees supine with 1 pillow and bolster under knees supine with 1 pillow and bolster under knees   LIMB POSITION 75 deg ab Treatment:    4/16/2020 4/23/2020 5/7/2020 8/6/2020   60/87 69/03  Certification From: 8/8/3886      To: 8/5/2020  PN sent   18/08  Certification From: 1/9/0210      To: 6/3/2175 37/55  Certification From: 1/2/6432      To: 11/4/2020      Manual Ther (60 min)     RE-assessment    MLD provided for LUE: Neck sequence, abdominal sequence, deep breathing, R R axilla, A-A, L inguinal, A-I, L axilla LUE    STM to L pect and lat    Compression:  - re-forced to pt importance of wearing compression garment ever min

## 2020-08-11 ENCOUNTER — APPOINTMENT (OUTPATIENT)
Dept: PHYSICAL THERAPY | Facility: HOSPITAL | Age: 65
End: 2020-08-11
Attending: SURGERY
Payer: COMMERCIAL

## 2020-08-25 ENCOUNTER — APPOINTMENT (OUTPATIENT)
Dept: PHYSICAL THERAPY | Facility: HOSPITAL | Age: 65
End: 2020-08-25
Attending: SURGERY
Payer: COMMERCIAL

## 2020-09-01 ENCOUNTER — OFFICE VISIT (OUTPATIENT)
Dept: PHYSICAL THERAPY | Facility: HOSPITAL | Age: 65
End: 2020-09-01
Attending: SURGERY
Payer: COMMERCIAL

## 2020-09-01 ENCOUNTER — TELEPHONE (OUTPATIENT)
Dept: INTERVENTIONAL RADIOLOGY/VASCULAR | Facility: HOSPITAL | Age: 65
End: 2020-09-01

## 2020-09-01 PROCEDURE — 97110 THERAPEUTIC EXERCISES: CPT

## 2020-09-01 PROCEDURE — 97140 MANUAL THERAPY 1/> REGIONS: CPT

## 2020-09-01 NOTE — PROGRESS NOTES
Referring Physician: Dr. Cornejo Held  Diagnosis:  Malignant neoplasm of overlapping sites of left breast in female, [facility] (C50.812,Z17.0)  History of lymph node dissection of left axilla (C35.320)  History of mastectomy, bilateral (Z9 3rd cuticle 17cm from 3rd cuticle 17cm from 3rd cuticle 17cm from 3rd cuticle   LEFT HAND VOLUME - - 350 345   MEASUREMENT A 17.4 17.3 16.3 16   MEASUREMENT B 19.4 19.8 17 16.8   MEASUREMENT C 23.6 23.3 21.8 22   MEASUREMENT D 26.2 26.7 24.7 24.7   MEASURE she will be better about wearing her compression sleeve. (Pt ordered several custom compression sleeves and gloves). Most swelling noted to L forearm and medial elbow.      There Ex (10 min)  - pulleys flex and abd x 20 each  - lat stretch in standing (se

## 2020-09-15 ENCOUNTER — APPOINTMENT (OUTPATIENT)
Dept: PHYSICAL THERAPY | Facility: HOSPITAL | Age: 65
End: 2020-09-15
Attending: SURGERY
Payer: COMMERCIAL

## 2020-09-16 ENCOUNTER — TELEPHONE (OUTPATIENT)
Dept: PHYSICAL THERAPY | Facility: HOSPITAL | Age: 65
End: 2020-09-16

## 2020-09-16 ENCOUNTER — APPOINTMENT (OUTPATIENT)
Dept: PHYSICAL THERAPY | Facility: HOSPITAL | Age: 65
End: 2020-09-16
Attending: INTERNAL MEDICINE
Payer: COMMERCIAL

## 2020-09-17 ENCOUNTER — OFFICE VISIT (OUTPATIENT)
Dept: RADIATION ONCOLOGY | Facility: HOSPITAL | Age: 65
End: 2020-09-17
Attending: RADIOLOGY
Payer: COMMERCIAL

## 2020-09-17 VITALS
BODY MASS INDEX: 31.41 KG/M2 | WEIGHT: 184 LBS | HEIGHT: 64 IN | DIASTOLIC BLOOD PRESSURE: 76 MMHG | TEMPERATURE: 98 F | SYSTOLIC BLOOD PRESSURE: 126 MMHG | RESPIRATION RATE: 18 BRPM | HEART RATE: 85 BPM

## 2020-09-17 PROCEDURE — 99211 OFF/OP EST MAY X REQ PHY/QHP: CPT

## 2020-09-17 NOTE — PROGRESS NOTES
Pt seen in 4 month follow up with Dr Gabrielle Ferrara, having completed radiation to the L CW 6/1/20. Taking anastrozole and tolerating well at this time. C/o generalized body aches. C/o increased stress at work, and plans to retire in 21 days.  Also has to close her Mom

## 2020-09-17 NOTE — PROGRESS NOTES
RADIATION ONCOLOGY NOTE    DATE OF VISIT: 9/17/2020    DIAGNOSIS:  IB Grade 2 pT3, N1a. Left lobular cancer, s/p mastectomy 3/23 LN positive with THERESE, receptor positive, s/p chemotherapy, now s/p adjuvant XRT to left chest wall and draining LN on 6/2/2020, breast mastectomy showed no evidence of malignancy. pT3, N1a. She proceeded with chemo and various management options were discussed and pt opted for a course of XRT which she tolerated well.       Overall she is doing well and is most bothered by hot fla (TYLENOL 8 HOUR) 650 MG Oral Tab CR Take 650-1,300 mg by mouth every 8 (eight) hours as needed for Pain. • Multiple Vitamins-Minerals (EYE VITAMINS OR) Take 1 tablet by mouth daily.        • Bioflavonoid Products (AZIZA C OR) Take 1 tablet by mouth da 18, height 1.626 m (5' 4\"), weight 83.5 kg (184 lb), last menstrual period 07/14/2006, not currently breastfeeding. PERFORMANCE STATUS  0, 2/10 PAIN  GENERAL:  Pt is alert and oriented times three in no acute distress.     HEENT:  PERRLA, EOMI, oropharynx

## 2020-09-22 ENCOUNTER — OFFICE VISIT (OUTPATIENT)
Dept: PHYSICAL THERAPY | Facility: HOSPITAL | Age: 65
End: 2020-09-22
Attending: INTERNAL MEDICINE
Payer: COMMERCIAL

## 2020-09-22 PROCEDURE — 97110 THERAPEUTIC EXERCISES: CPT

## 2020-09-22 PROCEDURE — 97140 MANUAL THERAPY 1/> REGIONS: CPT

## 2020-09-22 NOTE — PROGRESS NOTES
Referring Physician: Dr. Nicol Young  Diagnosis:  Malignant neoplasm of overlapping sites of left breast in female, estrogen receptor positive [facility] (C50.812,Z17.0)  History of lymph node dissection of left axilla (I34.985)  History of mastectomy, bilateral (Z9 PATIENT POSITION  supine with 1 pillow and bolster under knees supine with 1 pillow and bolster under knees supine with 1 pillow and bolster under knees supine with 1 pillow and bolster under knees   LIMB POSITION 75 deg abduction 75 deg abduction 75 deg sequence, deep breathing, R R axilla, A-A, L inguinal, A-I, L axilla LUE    STM to L pect and Lat    Compression; Discussed with pt the importance of wearing her compression sleeve.   Visual comparison pointed out of L arm compared to R arm and areas of sw (discussed and planned with patient involvement):       To be met in 10 visits:  1) Decrease swelling to be less than 5 % greater than unaffected arm to allow patient to be fitted for compression garments due to the higher risk of lymphedema - in progress

## 2020-10-02 ENCOUNTER — TELEPHONE (OUTPATIENT)
Dept: INTERVENTIONAL RADIOLOGY/VASCULAR | Facility: HOSPITAL | Age: 65
End: 2020-10-02

## 2020-10-06 ENCOUNTER — OFFICE VISIT (OUTPATIENT)
Dept: PHYSICAL THERAPY | Facility: HOSPITAL | Age: 65
End: 2020-10-06
Attending: SURGERY
Payer: COMMERCIAL

## 2020-10-06 ENCOUNTER — OFFICE VISIT (OUTPATIENT)
Dept: HEMATOLOGY/ONCOLOGY | Facility: HOSPITAL | Age: 65
End: 2020-10-06
Attending: NURSE PRACTITIONER
Payer: COMMERCIAL

## 2020-10-06 ENCOUNTER — NURSE ONLY (OUTPATIENT)
Dept: HEMATOLOGY/ONCOLOGY | Facility: HOSPITAL | Age: 65
End: 2020-10-06
Attending: INTERNAL MEDICINE
Payer: COMMERCIAL

## 2020-10-06 VITALS
BODY MASS INDEX: 32.61 KG/M2 | OXYGEN SATURATION: 95 % | HEIGHT: 64 IN | SYSTOLIC BLOOD PRESSURE: 157 MMHG | WEIGHT: 191 LBS | DIASTOLIC BLOOD PRESSURE: 93 MMHG | RESPIRATION RATE: 18 BRPM | TEMPERATURE: 98 F | HEART RATE: 103 BPM

## 2020-10-06 DIAGNOSIS — Z85.3 PERSONAL HISTORY OF BREAST CANCER: Primary | ICD-10-CM

## 2020-10-06 DIAGNOSIS — Z90.13 ABSENCE OF BREAST, BILATERAL: ICD-10-CM

## 2020-10-06 DIAGNOSIS — Z17.0 MALIGNANT NEOPLASM OF OVERLAPPING SITES OF LEFT BREAST IN FEMALE, ESTROGEN RECEPTOR POSITIVE (HCC): ICD-10-CM

## 2020-10-06 DIAGNOSIS — C50.812 MALIGNANT NEOPLASM OF OVERLAPPING SITES OF LEFT BREAST IN FEMALE, ESTROGEN RECEPTOR POSITIVE (HCC): ICD-10-CM

## 2020-10-06 DIAGNOSIS — Z45.2 ENCOUNTER FOR CENTRAL LINE CARE: ICD-10-CM

## 2020-10-06 DIAGNOSIS — Z79.811 VISIT FOR MONITORING ARIMIDEX THERAPY: ICD-10-CM

## 2020-10-06 DIAGNOSIS — Z51.81 VISIT FOR MONITORING ARIMIDEX THERAPY: ICD-10-CM

## 2020-10-06 DIAGNOSIS — Z17.0 MALIGNANT NEOPLASM OF OVERLAPPING SITES OF LEFT BREAST IN FEMALE, ESTROGEN RECEPTOR POSITIVE (HCC): Primary | ICD-10-CM

## 2020-10-06 DIAGNOSIS — C50.812 MALIGNANT NEOPLASM OF OVERLAPPING SITES OF LEFT BREAST IN FEMALE, ESTROGEN RECEPTOR POSITIVE (HCC): Primary | ICD-10-CM

## 2020-10-06 DIAGNOSIS — Z79.899 ENCOUNTER FOR MEDICATION MANAGEMENT: ICD-10-CM

## 2020-10-06 PROBLEM — G62.0 CHEMOTHERAPY-INDUCED NEUROPATHY: Status: RESOLVED | Noted: 2020-06-02 | Resolved: 2020-10-06

## 2020-10-06 PROBLEM — G62.0 CHEMOTHERAPY-INDUCED NEUROPATHY  (HCC): Status: RESOLVED | Noted: 2020-06-02 | Resolved: 2020-10-06

## 2020-10-06 PROBLEM — T45.1X5A CHEMOTHERAPY-INDUCED NEUROPATHY: Status: RESOLVED | Noted: 2020-06-02 | Resolved: 2020-10-06

## 2020-10-06 PROBLEM — T45.1X5A CHEMOTHERAPY-INDUCED NEUROPATHY  (HCC): Status: RESOLVED | Noted: 2020-06-02 | Resolved: 2020-10-06

## 2020-10-06 PROBLEM — T45.1X5A CHEMOTHERAPY-INDUCED NEUROPATHY (HCC): Status: RESOLVED | Noted: 2020-06-02 | Resolved: 2020-10-06

## 2020-10-06 PROBLEM — G62.0 CHEMOTHERAPY-INDUCED NEUROPATHY (HCC): Status: RESOLVED | Noted: 2020-06-02 | Resolved: 2020-10-06

## 2020-10-06 PROCEDURE — 80053 COMPREHEN METABOLIC PANEL: CPT

## 2020-10-06 PROCEDURE — 99214 OFFICE O/P EST MOD 30 MIN: CPT | Performed by: INTERNAL MEDICINE

## 2020-10-06 PROCEDURE — 97140 MANUAL THERAPY 1/> REGIONS: CPT

## 2020-10-06 PROCEDURE — 36591 DRAW BLOOD OFF VENOUS DEVICE: CPT

## 2020-10-06 PROCEDURE — 85025 COMPLETE CBC W/AUTO DIFF WBC: CPT

## 2020-10-06 RX ORDER — EXEMESTANE 25 MG/1
25 TABLET ORAL DAILY
Qty: 30 TABLET | Refills: 3 | Status: SHIPPED | OUTPATIENT
Start: 2020-10-06 | End: 2021-01-11

## 2020-10-06 RX ORDER — 0.9 % SODIUM CHLORIDE 0.9 %
10 VIAL (ML) INJECTION ONCE
Status: CANCELLED | OUTPATIENT
Start: 2020-10-06

## 2020-10-06 RX ORDER — HEPARIN SODIUM (PORCINE) LOCK FLUSH IV SOLN 100 UNIT/ML 100 UNIT/ML
5 SOLUTION INTRAVENOUS ONCE
Status: COMPLETED | OUTPATIENT
Start: 2020-10-06 | End: 2020-10-06

## 2020-10-06 RX ORDER — 0.9 % SODIUM CHLORIDE 0.9 %
VIAL (ML) INJECTION
Status: DISCONTINUED
Start: 2020-10-06 | End: 2020-10-06

## 2020-10-06 RX ORDER — HEPARIN SODIUM (PORCINE) LOCK FLUSH IV SOLN 100 UNIT/ML 100 UNIT/ML
5 SOLUTION INTRAVENOUS ONCE
Status: CANCELLED | OUTPATIENT
Start: 2020-10-06

## 2020-10-06 RX ADMIN — HEPARIN SODIUM (PORCINE) LOCK FLUSH IV SOLN 100 UNIT/ML 500 UNITS: 100 SOLUTION INTRAVENOUS at 13:30:00

## 2020-10-06 NOTE — PROGRESS NOTES
Referring Physician: Dr. Nicol Young  Diagnosis:  Malignant neoplasm of overlapping sites of left breast in female, estrogen receptor positive ([facility] (C50.812,Z17.0)  History of lymph node dissection of left axilla (I94.869)  History of mastectomy, bilateral (Z9 10/6/2020 8/6/2020 3/23/2020   % DIFFERENCE 51.37815 8.56952 22.31368          9/22/2020  -pt wearing her custom compression sleeve and gauntlet.   Needs reinforcement for proper donning using darts in garment to be at elbow.  -pt's watch not donned so randy 33. 8   MEASUREMENT I 36.9 36.8 37.4 35    TOTAL VOLUME  2242.93927 2251.0239 7056.26425 9021.31626   % DIFFERENCE 9.85242 11.50182 -2.16112 -0.69302          8/6/2020 9/1/2020 9/22/2020 47/3/2553   56/13  Certification From: 2/6/8251      To: 11/4/2020 provided to L upper trap, pect, lat and medial border of scap in side lying    Compression:  Pt not wearing her compression sleeve today. Instructed that pt should wear compression daily given that her LUE remains to be greater than 10% larger than RUE. without upper trap compensation to be able to reach overhead without difficulty.  - MET       Plan: DISCHARGE FROM PT      Charges:MM4  Total Timed Treatment: 55 min  Total Treatment Time:  55 min

## 2020-10-06 NOTE — PROGRESS NOTES
Cancer Center Progress Note    Patient Name: Aishwarya Spring   YOB: 1955   Medical Record Number: [id number]   Attending Physician: Rosa May M.D.      Chief Complaint:  Breast cancer (PCP: Dr Justin Yang)    Oncology History:   6/11/2019: Jennifer Astorga with extracapsular extension she proceeded to have axillary dissection which showed 2 of 22 regional lymph nodes had metastatic cancer without extracapsular extension. Right breast mastectomy showed no evidence of malignancy.  pT3, N1a.      10/2019– 2/202 Aortic Regurgitation/AVR   • Other (PVD) Mother      Social History:  Marital status:   :   of cancer  Smoking: Non-smoker  ETOH: Denies  She has 1 child with congenital mental heart disease who was in her 35s and lives with clover who is DIRECTED (Patient not taking: Reported on 6/10/2020), Disp: 60 capsule, Rfl: 1  •  Mometasone Furoate 0.1 % External Cream, Apply 1 Application topically daily.  (Patient not taking: Reported on 9/17/2020 ), Disp: 50 g, Rfl: 3    Allergies:  No Known Allerg the possibility of a pancreatic neuroendocrine tumor. A prominent vascular malformation or reactive lymph node could potentially also give this appearance. Recommend further assessment with EUS or MRI. 3.  Coronary atherosclerosis.  4.  Nonobstructing lef

## 2020-11-30 ENCOUNTER — TELEPHONE (OUTPATIENT)
Dept: HEMATOLOGY/ONCOLOGY | Facility: HOSPITAL | Age: 65
End: 2020-11-30

## 2020-11-30 NOTE — TELEPHONE ENCOUNTER
Albert Kuhn, a nurse  with [facility], called. She is calling to confirm the plan of care and the PCP for this patient. Please call.

## 2021-01-06 ENCOUNTER — NURSE ONLY (OUTPATIENT)
Dept: HEMATOLOGY/ONCOLOGY | Facility: HOSPITAL | Age: 66
End: 2021-01-06
Attending: INTERNAL MEDICINE
Payer: COMMERCIAL

## 2021-01-06 ENCOUNTER — OFFICE VISIT (OUTPATIENT)
Dept: HEMATOLOGY/ONCOLOGY | Facility: HOSPITAL | Age: 66
End: 2021-01-06
Attending: NURSE PRACTITIONER
Payer: COMMERCIAL

## 2021-01-06 VITALS
WEIGHT: 194 LBS | SYSTOLIC BLOOD PRESSURE: 147 MMHG | DIASTOLIC BLOOD PRESSURE: 77 MMHG | HEART RATE: 88 BPM | HEIGHT: 64.02 IN | OXYGEN SATURATION: 94 % | BODY MASS INDEX: 33.12 KG/M2 | RESPIRATION RATE: 16 BRPM | TEMPERATURE: 98 F

## 2021-01-06 DIAGNOSIS — C50.812 MALIGNANT NEOPLASM OF OVERLAPPING SITES OF LEFT BREAST IN FEMALE, ESTROGEN RECEPTOR POSITIVE (HCC): Primary | ICD-10-CM

## 2021-01-06 DIAGNOSIS — Z17.0 MALIGNANT NEOPLASM OF OVERLAPPING SITES OF LEFT BREAST IN FEMALE, ESTROGEN RECEPTOR POSITIVE (HCC): Primary | ICD-10-CM

## 2021-01-06 DIAGNOSIS — Z90.13 ABSENCE OF BREAST, BILATERAL: ICD-10-CM

## 2021-01-06 DIAGNOSIS — Z79.811 VISIT FOR MONITORING ARIMIDEX THERAPY: ICD-10-CM

## 2021-01-06 DIAGNOSIS — Z45.2 ENCOUNTER FOR CENTRAL LINE CARE: ICD-10-CM

## 2021-01-06 DIAGNOSIS — Z51.81 VISIT FOR MONITORING ARIMIDEX THERAPY: ICD-10-CM

## 2021-01-06 DIAGNOSIS — Z85.3 PERSONAL HISTORY OF BREAST CANCER: ICD-10-CM

## 2021-01-06 DIAGNOSIS — Z79.899 ENCOUNTER FOR MEDICATION MANAGEMENT: Primary | ICD-10-CM

## 2021-01-06 LAB
ALBUMIN SERPL-MCNC: 3.7 G/DL (ref 3.4–5)
ALBUMIN/GLOB SERPL: 0.9 {RATIO} (ref 1–2)
ALP LIVER SERPL-CCNC: 80 U/L
ALT SERPL-CCNC: 55 U/L
ANION GAP SERPL CALC-SCNC: 5 MMOL/L (ref 0–18)
AST SERPL-CCNC: 31 U/L (ref 15–37)
BASOPHILS # BLD AUTO: 0.04 X10(3) UL (ref 0–0.2)
BASOPHILS NFR BLD AUTO: 0.6 %
BILIRUB SERPL-MCNC: 0.5 MG/DL (ref 0.1–2)
BUN BLD-MCNC: 18 MG/DL (ref 7–18)
BUN/CREAT SERPL: 28.6 (ref 10–20)
CALCIUM BLD-MCNC: 9.4 MG/DL (ref 8.5–10.1)
CHLORIDE SERPL-SCNC: 107 MMOL/L (ref 98–112)
CO2 SERPL-SCNC: 28 MMOL/L (ref 21–32)
CREAT BLD-MCNC: 0.63 MG/DL
DEPRECATED RDW RBC AUTO: 40.1 FL (ref 35.1–46.3)
EOSINOPHIL # BLD AUTO: 0.13 X10(3) UL (ref 0–0.7)
EOSINOPHIL NFR BLD AUTO: 2 %
ERYTHROCYTE [DISTWIDTH] IN BLOOD BY AUTOMATED COUNT: 12.3 % (ref 11–15)
GLOBULIN PLAS-MCNC: 3.9 G/DL (ref 2.8–4.4)
GLUCOSE BLD-MCNC: 86 MG/DL (ref 70–99)
HCT VFR BLD AUTO: 39.7 %
HGB BLD-MCNC: 13.6 G/DL
IMM GRANULOCYTES # BLD AUTO: 0.05 X10(3) UL (ref 0–1)
IMM GRANULOCYTES NFR BLD: 0.8 %
LYMPHOCYTES # BLD AUTO: 1.51 X10(3) UL (ref 1–4)
LYMPHOCYTES NFR BLD AUTO: 22.9 %
M PROTEIN MFR SERPL ELPH: 7.6 G/DL (ref 6.4–8.2)
MCH RBC QN AUTO: 30.6 PG (ref 26–34)
MCHC RBC AUTO-ENTMCNC: 34.3 G/DL (ref 31–37)
MCV RBC AUTO: 89.2 FL
MONOCYTES # BLD AUTO: 0.62 X10(3) UL (ref 0.1–1)
MONOCYTES NFR BLD AUTO: 9.4 %
NEUTROPHILS # BLD AUTO: 4.25 X10 (3) UL (ref 1.5–7.7)
NEUTROPHILS # BLD AUTO: 4.25 X10(3) UL (ref 1.5–7.7)
NEUTROPHILS NFR BLD AUTO: 64.3 %
OSMOLALITY SERPL CALC.SUM OF ELEC: 291 MOSM/KG (ref 275–295)
PLATELET # BLD AUTO: 212 10(3)UL (ref 150–450)
POTASSIUM SERPL-SCNC: 3.9 MMOL/L (ref 3.5–5.1)
RBC # BLD AUTO: 4.45 X10(6)UL
SODIUM SERPL-SCNC: 140 MMOL/L (ref 136–145)
WBC # BLD AUTO: 6.6 X10(3) UL (ref 4–11)

## 2021-01-06 PROCEDURE — 36591 DRAW BLOOD OFF VENOUS DEVICE: CPT

## 2021-01-06 PROCEDURE — 99214 OFFICE O/P EST MOD 30 MIN: CPT | Performed by: INTERNAL MEDICINE

## 2021-01-06 PROCEDURE — 85025 COMPLETE CBC W/AUTO DIFF WBC: CPT

## 2021-01-06 PROCEDURE — 80053 COMPREHEN METABOLIC PANEL: CPT

## 2021-01-06 RX ORDER — HEPARIN SODIUM (PORCINE) LOCK FLUSH IV SOLN 100 UNIT/ML 100 UNIT/ML
5 SOLUTION INTRAVENOUS ONCE
Status: COMPLETED | OUTPATIENT
Start: 2021-01-06 | End: 2021-01-06

## 2021-01-06 RX ORDER — 0.9 % SODIUM CHLORIDE 0.9 %
10 VIAL (ML) INJECTION ONCE
Status: CANCELLED | OUTPATIENT
Start: 2021-01-06

## 2021-01-06 RX ORDER — HEPARIN SODIUM (PORCINE) LOCK FLUSH IV SOLN 100 UNIT/ML 100 UNIT/ML
5 SOLUTION INTRAVENOUS ONCE
Status: CANCELLED | OUTPATIENT
Start: 2021-01-06

## 2021-01-06 RX ORDER — SODIUM CHLORIDE 9 MG/ML
INJECTION INTRAVENOUS
Status: DISCONTINUED
Start: 2021-01-06 | End: 2021-01-06

## 2021-01-06 RX ORDER — LORAZEPAM 1 MG/1
1 TABLET ORAL EVERY 6 HOURS PRN
Qty: 5 TABLET | Refills: 0 | Status: SHIPPED | OUTPATIENT
Start: 2021-01-06 | End: 2021-04-08

## 2021-01-06 RX ADMIN — HEPARIN SODIUM (PORCINE) LOCK FLUSH IV SOLN 100 UNIT/ML 500 UNITS: 100 SOLUTION INTRAVENOUS at 13:15:00

## 2021-01-06 NOTE — PROGRESS NOTES
Cancer Center Progress Note    Patient Name: Romy Vasquez   YOB: 1955   Medical Record Number: [id number]   Attending Physician: Helene Echavarria M.D.      Chief Complaint:  Breast cancer (PCP: Dr Yuki Ortiz)    Oncology History:   6/11/2019: Zabrina Ibrahim with extracapsular extension she proceeded to have axillary dissection which showed 2 of 22 regional lymph nodes had metastatic cancer without extracapsular extension. Right breast mastectomy showed no evidence of malignancy.  pT3, N1a.      10/2019– 2/202 was in her 35s and lives with clover who is a nurse. Recently retired    Current Medications:    Current Outpatient Medications:   •  LORAZEPAM 1 MG Oral Tab, Take 1 tablet (1 mg total) by mouth every 6 (six) hours as needed for Anxiety. , Disp: 5 tablet, bilateral axilla.    Abdomen: Soft nontender  Extremities: no edema or cyanosis  Neurological: motor strength grossly intact, appropriate gait  Psych: appropriate mood and affect      Laboratory assessed and reviewed:  Lab Results   Component Value Date

## 2021-01-11 RX ORDER — EXEMESTANE 25 MG/1
TABLET ORAL
Qty: 30 TABLET | Refills: 3 | Status: SHIPPED | OUTPATIENT
Start: 2021-01-11 | End: 2021-07-22

## 2021-01-23 ENCOUNTER — LAB ENCOUNTER (OUTPATIENT)
Dept: LAB | Facility: HOSPITAL | Age: 66
End: 2021-01-23
Attending: RADIOLOGY
Payer: COMMERCIAL

## 2021-01-23 DIAGNOSIS — Z01.818 PRE-OP TESTING: ICD-10-CM

## 2021-01-24 LAB — SARS-COV-2 RNA RESP QL NAA+PROBE: NOT DETECTED

## 2021-01-26 ENCOUNTER — HOSPITAL ENCOUNTER (OUTPATIENT)
Dept: INTERVENTIONAL RADIOLOGY/VASCULAR | Facility: HOSPITAL | Age: 66
Discharge: HOME | End: 2021-01-26
Attending: INTERNAL MEDICINE | Admitting: RADIOLOGY
Payer: COMMERCIAL

## 2021-01-26 VITALS
DIASTOLIC BLOOD PRESSURE: 74 MMHG | WEIGHT: 194 LBS | RESPIRATION RATE: 16 BRPM | SYSTOLIC BLOOD PRESSURE: 124 MMHG | BODY MASS INDEX: 33 KG/M2 | OXYGEN SATURATION: 96 % | HEART RATE: 88 BPM

## 2021-01-26 DIAGNOSIS — C50.812 MALIGNANT NEOPLASM OF OVERLAPPING SITES OF LEFT BREAST IN FEMALE, ESTROGEN RECEPTOR POSITIVE (HCC): ICD-10-CM

## 2021-01-26 DIAGNOSIS — Z17.0 MALIGNANT NEOPLASM OF OVERLAPPING SITES OF LEFT BREAST IN FEMALE, ESTROGEN RECEPTOR POSITIVE (HCC): ICD-10-CM

## 2021-01-26 DIAGNOSIS — Z01.818 PRE-OP TESTING: Primary | ICD-10-CM

## 2021-01-26 PROCEDURE — 36590 REMOVAL TUNNELED CV CATH: CPT

## 2021-01-26 PROCEDURE — 36415 COLL VENOUS BLD VENIPUNCTURE: CPT

## 2021-01-26 PROCEDURE — 0JPT0WZ REMOVAL OF TOTALLY IMPLANTABLE VASCULAR ACCESS DEVICE FROM TRUNK SUBCUTANEOUS TISSUE AND FASCIA, OPEN APPROACH: ICD-10-PCS | Performed by: RADIOLOGY

## 2021-01-26 PROCEDURE — 99152 MOD SED SAME PHYS/QHP 5/>YRS: CPT

## 2021-01-26 PROCEDURE — 02PY33Z REMOVAL OF INFUSION DEVICE FROM GREAT VESSEL, PERCUTANEOUS APPROACH: ICD-10-PCS | Performed by: RADIOLOGY

## 2021-01-26 RX ORDER — SODIUM CHLORIDE 9 MG/ML
INJECTION, SOLUTION INTRAVENOUS CONTINUOUS
Status: DISCONTINUED | OUTPATIENT
Start: 2021-01-26 | End: 2021-01-26

## 2021-01-26 RX ORDER — MIDAZOLAM HYDROCHLORIDE 1 MG/ML
INJECTION INTRAMUSCULAR; INTRAVENOUS
Status: COMPLETED
Start: 2021-01-26 | End: 2021-01-26

## 2021-01-26 RX ORDER — LIDOCAINE HYDROCHLORIDE 20 MG/ML
INJECTION, SOLUTION EPIDURAL; INFILTRATION; INTRACAUDAL; PERINEURAL
Status: COMPLETED
Start: 2021-01-26 | End: 2021-01-26

## 2021-01-26 NOTE — IVS NOTE
DISCHARGE NOTE     Pt is able to sit up and ambulate without difficulty. Pt voided and tolerated fluids and food. Procedural site remains dry and intactNo signs and symptoms of bleeding/hematoma noted.    Instruction provided, patient/family verbalizes

## 2021-01-28 NOTE — INTERVAL H&P NOTE
The above referenced H&P was reviewed by Leydi Moore MD on 1/28/2021, the patient was examined and no significant changes have occurred in the patient's condition since the H&P was performed.   Risks, benefits, alternative treatments and consequences of no

## 2021-02-08 RX ORDER — LORATADINE 10 MG/1
TABLET ORAL
Qty: 90 TABLET | Refills: 3 | Status: SHIPPED | OUTPATIENT
Start: 2021-02-08 | End: 2022-02-09

## 2021-03-09 ENCOUNTER — OFFICE VISIT (OUTPATIENT)
Dept: INTEGRATIVE MEDICINE | Facility: CLINIC | Age: 66
End: 2021-03-09

## 2021-03-09 PROCEDURE — 97124 MASSAGE THERAPY: CPT

## 2021-03-09 NOTE — PROGRESS NOTES
Aceshawn Every came in today to get relief from upper and lower back pain as well as foot pain and IT band discomfort. She received a full body massage during her two 30 minute sessions back to back addressing her problem areas.   She said she felt better after the

## 2021-03-09 NOTE — PROGRESS NOTES
Paulette Holland came in today to get relief from upper and lower back pain as well as foot pain and IT band discomfort. She received a full body massage during her two 30 minute sessions back to back addressing her problem areas.   She said she felt better after the

## 2021-03-20 DIAGNOSIS — Z23 NEED FOR VACCINATION: ICD-10-CM

## 2021-04-08 ENCOUNTER — OFFICE VISIT (OUTPATIENT)
Dept: INTERNAL MEDICINE CLINIC | Facility: CLINIC | Age: 66
End: 2021-04-08
Payer: COMMERCIAL

## 2021-04-08 VITALS
OXYGEN SATURATION: 98 % | DIASTOLIC BLOOD PRESSURE: 85 MMHG | SYSTOLIC BLOOD PRESSURE: 128 MMHG | HEART RATE: 102 BPM | HEIGHT: 64 IN | BODY MASS INDEX: 33.12 KG/M2 | WEIGHT: 194 LBS

## 2021-04-08 DIAGNOSIS — K62.5 RECTAL BLEED: ICD-10-CM

## 2021-04-08 DIAGNOSIS — R19.7 DIARRHEA, UNSPECIFIED TYPE: Primary | ICD-10-CM

## 2021-04-08 PROCEDURE — 3079F DIAST BP 80-89 MM HG: CPT | Performed by: INTERNAL MEDICINE

## 2021-04-08 PROCEDURE — 36415 COLL VENOUS BLD VENIPUNCTURE: CPT | Performed by: INTERNAL MEDICINE

## 2021-04-08 PROCEDURE — 99213 OFFICE O/P EST LOW 20 MIN: CPT | Performed by: INTERNAL MEDICINE

## 2021-04-08 PROCEDURE — 3008F BODY MASS INDEX DOCD: CPT | Performed by: INTERNAL MEDICINE

## 2021-04-08 PROCEDURE — 3074F SYST BP LT 130 MM HG: CPT | Performed by: INTERNAL MEDICINE

## 2021-04-08 NOTE — PROGRESS NOTES
HPI/Subjective:     Patient ID: Cynthia Thibodeaux is a 61year old female.     HPI she came in today with complaint of diarrhea according to the patient Saturday afternoon she ate chicken and she woke up middle of the night with cramping abdominal pain an headaches. Hematological: Negative for adenopathy. Does not bruise/bleed easily. Psychiatric/Behavioral: Negative for agitation, behavioral problems, confusion, hallucinations and sleep disturbance. The patient is not nervous/anxious.       Current Outp BREAST SENTINEL LYMPH NODE BIOPSY Left 8/8/2019    Performed by Lisy Moore MD at [facility] MAIN OR   • EMBER BIOPSY STEREO NODULE 1 SITE LEFT (CPT=19081)     • EMBER BIOPSY STEREO NODULE 1 SITE RIGHT (CPT=19081)  07/01/2019    KODAK    • MASTECTOMY LEFT     • Trachea: No tracheal tenderness or tracheal deviation. Cardiovascular:      Rate and Rhythm: Normal rate and regular rhythm. Heart sounds: Normal heart sounds. No murmur heard. No friction rub. No gallop.     Pulmonary:      Effort: Pulmonary effor

## 2021-04-08 NOTE — PATIENT INSTRUCTIONS
Diarrhea/ bloody - will check cbc ,bmp , referal for gi , advised her to advance her diet slowly, I did explain to her if she feels lightheaded dizzy any further diarhea  continues she needs to go to emergency room.   I also explained to her if her hemoglob

## 2021-04-12 ENCOUNTER — NURSE ONLY (OUTPATIENT)
Dept: HEMATOLOGY/ONCOLOGY | Facility: HOSPITAL | Age: 66
End: 2021-04-12
Attending: NURSE PRACTITIONER
Payer: COMMERCIAL

## 2021-04-12 VITALS
OXYGEN SATURATION: 98 % | HEART RATE: 91 BPM | HEIGHT: 64 IN | BODY MASS INDEX: 32.95 KG/M2 | SYSTOLIC BLOOD PRESSURE: 148 MMHG | TEMPERATURE: 98 F | DIASTOLIC BLOOD PRESSURE: 84 MMHG | WEIGHT: 193 LBS | RESPIRATION RATE: 16 BRPM

## 2021-04-12 DIAGNOSIS — Z17.0 MALIGNANT NEOPLASM OF OVERLAPPING SITES OF LEFT BREAST IN FEMALE, ESTROGEN RECEPTOR POSITIVE (HCC): Primary | ICD-10-CM

## 2021-04-12 DIAGNOSIS — C50.812 MALIGNANT NEOPLASM OF OVERLAPPING SITES OF LEFT BREAST IN FEMALE, ESTROGEN RECEPTOR POSITIVE (HCC): ICD-10-CM

## 2021-04-12 DIAGNOSIS — C50.812 MALIGNANT NEOPLASM OF OVERLAPPING SITES OF LEFT BREAST IN FEMALE, ESTROGEN RECEPTOR POSITIVE (HCC): Primary | ICD-10-CM

## 2021-04-12 DIAGNOSIS — Z51.81 VISIT FOR MONITORING ARIMIDEX THERAPY: ICD-10-CM

## 2021-04-12 DIAGNOSIS — Z17.0 MALIGNANT NEOPLASM OF OVERLAPPING SITES OF LEFT BREAST IN FEMALE, ESTROGEN RECEPTOR POSITIVE (HCC): ICD-10-CM

## 2021-04-12 DIAGNOSIS — M54.50 LUMBAR PAIN: Primary | ICD-10-CM

## 2021-04-12 DIAGNOSIS — Z79.811 VISIT FOR MONITORING ARIMIDEX THERAPY: ICD-10-CM

## 2021-04-12 PROCEDURE — 99214 OFFICE O/P EST MOD 30 MIN: CPT | Performed by: INTERNAL MEDICINE

## 2021-04-12 PROCEDURE — 80053 COMPREHEN METABOLIC PANEL: CPT

## 2021-04-12 PROCEDURE — 36415 COLL VENOUS BLD VENIPUNCTURE: CPT

## 2021-04-12 NOTE — PROGRESS NOTES
Cancer Center Progress Note    Patient Name: Boston Claudio   YOB: 1955   Medical Record Number: [id number]   Attending Physician: Zoraida Crespo M.D.      Chief Complaint:  Breast cancer (PCP: Dr Guillermina Monzon)    Oncology History:   6/11/2019: Annie Snatiago with extracapsular extension she proceeded to have axillary dissection which showed 2 of 22 regional lymph nodes had metastatic cancer without extracapsular extension. Right breast mastectomy showed no evidence of malignancy.  pT3, N1a.      10/2019– 2/202 • Heart Disease Mother 80   • Heart Disorder Mother         Aortic Regurgitation/AVR   • Other (PVD) Mother      Social History:  Marital status:   :   of cancer  Smoking: Non-smoker  ETOH: Denies  She has 1 child with congenital mental he without murmurs  Chest:  nonlabored breathing, symmetric expansion. CTA B  Breast: bilateral mastectomies. Normal bilateral axilla.    Abdomen: Soft nontender  Extremities: no edema or cyanosis  Neurological: motor strength grossly intact, appropriate gai calcium vitamin D. Pancreatic mass  -MRCP  noted for a separate LN, no mass. She has follow-up with GI before due to the diarrhea and bloody stool. Will discuss with Dr. Zari Woods again as well. She likely needs colonoscopy as she is due.     Adolfo Banegas

## 2021-05-04 ENCOUNTER — OFFICE VISIT (OUTPATIENT)
Dept: GASTROENTEROLOGY | Facility: CLINIC | Age: 66
End: 2021-05-04
Payer: COMMERCIAL

## 2021-05-04 ENCOUNTER — TELEPHONE (OUTPATIENT)
Dept: GASTROENTEROLOGY | Facility: CLINIC | Age: 66
End: 2021-05-04

## 2021-05-04 VITALS
BODY MASS INDEX: 33.06 KG/M2 | HEIGHT: 64 IN | DIASTOLIC BLOOD PRESSURE: 84 MMHG | SYSTOLIC BLOOD PRESSURE: 126 MMHG | HEART RATE: 102 BPM | WEIGHT: 193.63 LBS | TEMPERATURE: 99 F

## 2021-05-04 DIAGNOSIS — Z12.11 COLON CANCER SCREENING: ICD-10-CM

## 2021-05-04 DIAGNOSIS — R93.89 ABNORMAL CT SCAN: ICD-10-CM

## 2021-05-04 DIAGNOSIS — K62.5 BRIGHT RED BLOOD PER RECTUM: Primary | ICD-10-CM

## 2021-05-04 DIAGNOSIS — K62.5 BRBPR (BRIGHT RED BLOOD PER RECTUM): Primary | ICD-10-CM

## 2021-05-04 DIAGNOSIS — R19.7 DIARRHEA, UNSPECIFIED TYPE: ICD-10-CM

## 2021-05-04 DIAGNOSIS — R12 HEARTBURN: ICD-10-CM

## 2021-05-04 PROCEDURE — 3079F DIAST BP 80-89 MM HG: CPT | Performed by: NURSE PRACTITIONER

## 2021-05-04 PROCEDURE — 3008F BODY MASS INDEX DOCD: CPT | Performed by: NURSE PRACTITIONER

## 2021-05-04 PROCEDURE — 99244 OFF/OP CNSLTJ NEW/EST MOD 40: CPT | Performed by: NURSE PRACTITIONER

## 2021-05-04 PROCEDURE — 3074F SYST BP LT 130 MM HG: CPT | Performed by: NURSE PRACTITIONER

## 2021-05-04 RX ORDER — SODIUM, POTASSIUM,MAG SULFATES 17.5-3.13G
SOLUTION, RECONSTITUTED, ORAL ORAL
Qty: 1 BOTTLE | Refills: 0 | Status: ON HOLD | OUTPATIENT
Start: 2021-05-04 | End: 2021-06-15

## 2021-05-04 NOTE — H&P
[address] [facility] [address] Gastroenterology                                                                                                               Reason for consult: rectal bleeding    Requesting physician or provider: Anthony Jackson odynophagia and/or globus. she denies abdominal pain. she denies nausea and/or vomiting since event above. she denies recent change in appetite and/or unintentional weight loss.     NSAIDS: not recently  Tobacco: no  Alcohol: occasional  Illicit drugs: no MOUTH DAILY 90 tablet 3   • EXEMESTANE 25 MG Oral Tab TAKE 1 TABLET(25 MG) BY MOUTH DAILY 30 tablet 3   • NON FORMULARY      • Calcium 500-125 MG-UNIT Oral Tab Take by mouth. • VITAMIN D, CHOLECALCIFEROL, OR Take by mouth.      • FUROSEMIDE 20 MG Oral T masses, no hepatomegaly  MSK: No redness, no warmth, no swelling of joints  SKIN: No jaundice, no erythema, no rashes  HEMATOLOGIC: No bleeding, no bruising  NEURO: Alert and interactive, normal gait  RECTAL: EXT erythema in gluteal fold, hemorrhoids INT n displayed. A complete set of results can be found in Results Review.      Recent Results (from the past 4380 hour(s))   COMP METABOLIC PANEL (14)    Collection Time: 04/12/21 10:09 AM   Result Value Ref Range    Glucose 95 70 - 99 mg/dL    Sodium 140 136 - now asymptomatic. Rectal exam remarkable for erythema in gluteal fold, hemorrhoids. Suggested limit wiping to no more than 3-5x, moistened toilette w/ wiping, barrier cream such as desitin or A&D, use squatty potty, start benefiber once daily.    She has no done by notifying the pharmacy or calling our office. Orders This Visit:  No orders of the defined types were placed in this encounter.       Meds This Visit:  Requested Prescriptions      No prescriptions requested or ordered in this encounter       Im

## 2021-05-04 NOTE — PATIENT INSTRUCTIONS
-pepcid as needed  -reflux diet modification  -ctm for need for abd imaging  -limit wiping to no more than 3-5x  -moistened toilette w/ wiping, barrier cream such as desitin or A&D  -use squatty potty  -start benefiber once daily    1.  Schedule colonoscopy Stop smoking     Sonam last reviewed this educational content on 6/1/2019  © 4276-1466 [employer]. [address] [facility] [place] [facility] [place]. All rights reserved.  This information is not intended as a substitute for professional medical c

## 2021-05-04 NOTE — TELEPHONE ENCOUNTER
Scheduled for:  Colonoscopy [address]  Provider Name:  Dr. Georgie Bailey  Date:  6/15/21  Location:  [facility]  Sedation:  MAC  Time:  2:45pm (pt is aware to arrive at 1:45pm)  Prep:  Suprep  Meds/Allergies Reconciled?:  Linda/NP reviewed. Diagnosis with codes:   B

## 2021-05-11 ENCOUNTER — HOSPITAL ENCOUNTER (OUTPATIENT)
Dept: MRI IMAGING | Facility: HOSPITAL | Age: 66
Discharge: HOME | End: 2021-05-11
Attending: INTERNAL MEDICINE
Payer: COMMERCIAL

## 2021-05-11 DIAGNOSIS — M54.50 LUMBAR PAIN: ICD-10-CM

## 2021-05-11 PROCEDURE — 72158 MRI LUMBAR SPINE W/O & W/DYE: CPT | Performed by: INTERNAL MEDICINE

## 2021-05-11 PROCEDURE — A9575 INJ GADOTERATE MEGLUMI 0.1ML: HCPCS | Performed by: INTERNAL MEDICINE

## 2021-05-25 ENCOUNTER — TELEPHONE (OUTPATIENT)
Dept: HEMATOLOGY/ONCOLOGY | Facility: HOSPITAL | Age: 66
End: 2021-05-25

## 2021-05-25 NOTE — TELEPHONE ENCOUNTER
Patient called and I made an appointment for a follow up with Dr. Socorro Prince. Patient also asked for LABS.

## 2021-06-12 ENCOUNTER — LAB ENCOUNTER (OUTPATIENT)
Dept: LAB | Facility: HOSPITAL | Age: 66
End: 2021-06-12
Attending: INTERNAL MEDICINE
Payer: COMMERCIAL

## 2021-06-12 DIAGNOSIS — Z01.818 PRE-OP TESTING: ICD-10-CM

## 2021-06-15 ENCOUNTER — ANESTHESIA EVENT (OUTPATIENT)
Dept: ENDOSCOPY | Age: 66
End: 2021-06-15
Payer: COMMERCIAL

## 2021-06-15 ENCOUNTER — HOSPITAL ENCOUNTER (OUTPATIENT)
Age: 66
Setting detail: HOSPITAL OUTPATIENT SURGERY
Discharge: HOME | End: 2021-06-15
Attending: INTERNAL MEDICINE | Admitting: INTERNAL MEDICINE
Payer: COMMERCIAL

## 2021-06-15 ENCOUNTER — ANESTHESIA (OUTPATIENT)
Dept: ENDOSCOPY | Age: 66
End: 2021-06-15
Payer: COMMERCIAL

## 2021-06-15 VITALS
RESPIRATION RATE: 14 BRPM | DIASTOLIC BLOOD PRESSURE: 70 MMHG | BODY MASS INDEX: 32.1 KG/M2 | TEMPERATURE: 98 F | WEIGHT: 188 LBS | OXYGEN SATURATION: 99 % | HEIGHT: 64 IN | HEART RATE: 80 BPM | SYSTOLIC BLOOD PRESSURE: 113 MMHG

## 2021-06-15 DIAGNOSIS — R19.7 DIARRHEA, UNSPECIFIED TYPE: ICD-10-CM

## 2021-06-15 DIAGNOSIS — K62.5 BRIGHT RED BLOOD PER RECTUM: ICD-10-CM

## 2021-06-15 DIAGNOSIS — Z01.818 PRE-OP TESTING: Primary | ICD-10-CM

## 2021-06-15 PROCEDURE — 88305 TISSUE EXAM BY PATHOLOGIST: CPT | Performed by: INTERNAL MEDICINE

## 2021-06-15 PROCEDURE — 45385 COLONOSCOPY W/LESION REMOVAL: CPT | Performed by: INTERNAL MEDICINE

## 2021-06-15 RX ORDER — LIDOCAINE HYDROCHLORIDE 10 MG/ML
INJECTION, SOLUTION EPIDURAL; INFILTRATION; INTRACAUDAL; PERINEURAL AS NEEDED
Status: DISCONTINUED | OUTPATIENT
Start: 2021-06-15 | End: 2021-06-15 | Stop reason: SURG

## 2021-06-15 RX ORDER — SODIUM CHLORIDE, SODIUM LACTATE, POTASSIUM CHLORIDE, CALCIUM CHLORIDE 600; 310; 30; 20 MG/100ML; MG/100ML; MG/100ML; MG/100ML
INJECTION, SOLUTION INTRAVENOUS CONTINUOUS
Status: DISCONTINUED | OUTPATIENT
Start: 2021-06-15 | End: 2021-06-15

## 2021-06-15 RX ORDER — NALOXONE HYDROCHLORIDE 0.4 MG/ML
80 INJECTION, SOLUTION INTRAMUSCULAR; INTRAVENOUS; SUBCUTANEOUS AS NEEDED
Status: DISCONTINUED | OUTPATIENT
Start: 2021-06-15 | End: 2021-06-15

## 2021-06-15 RX ADMIN — SODIUM CHLORIDE, SODIUM LACTATE, POTASSIUM CHLORIDE, CALCIUM CHLORIDE: 600; 310; 30; 20 INJECTION, SOLUTION INTRAVENOUS at 14:17:00

## 2021-06-15 RX ADMIN — SODIUM CHLORIDE, SODIUM LACTATE, POTASSIUM CHLORIDE, CALCIUM CHLORIDE: 600; 310; 30; 20 INJECTION, SOLUTION INTRAVENOUS at 14:11:00

## 2021-06-15 RX ADMIN — SODIUM CHLORIDE, SODIUM LACTATE, POTASSIUM CHLORIDE, CALCIUM CHLORIDE: 600; 310; 30; 20 INJECTION, SOLUTION INTRAVENOUS at 14:35:00

## 2021-06-15 RX ADMIN — LIDOCAINE HYDROCHLORIDE 40 MG: 10 INJECTION, SOLUTION EPIDURAL; INFILTRATION; INTRACAUDAL; PERINEURAL at 14:13:00

## 2021-06-15 NOTE — H&P
History & Physical Examination    Patient Name: Gloria City  MRN: [id number]  CSN: [id number]  YOB: 1955    Diagnosis: colorectal cancer screening    LORATADINE 10 MG Oral Tab, TAKE 1 TABLET BY MOUTH DAILY, Disp: 90 tablet, Rfl: 3, 6/1 NODULE 1 SITE RIGHT (CPT=19081)  07/01/2019    KODAK    • MASTECTOMY LEFT     • MASTECTOMY RIGHT     • MASTECTOMY,SIMPLE Bilateral 08/08/2019    Bilateral mastectomies w L SLNB and immediate reconstruction with tissue expander and acellular dermal matrix an

## 2021-06-15 NOTE — ANESTHESIA PREPROCEDURE EVALUATION
Anesthesia PreOp Note    HPI:     Deonna Hooks is a 61year old female who presents for preoperative consultation requested by: Laura Dang MD    Date of Surgery: 6/15/2021    Procedure(s):  COLONOSCOPY  Indication: Bright red blood per rect 6/10/2021  VITAMIN D, CHOLECALCIFEROL, OR, Take by mouth., Disp: , Rfl: , 6/10/2021  ibuprofen (ADVIL) 200 MG Oral Tab, Take 400 mg by mouth every 6 (six) hours as needed for Pain., Disp: , Rfl: , 6/10/2021  LORazepam 1 MG Oral Tab, Take 1 tablet (1 mg tot on file    Other Topics      Concerns:        Not on file    Social History Narrative      Not on file    Social Determinants of Health  Financial Resource Strain:       Difficulty of Paying Living Expenses:   Food Insecurity:       Worried About Running O FB  Neck ROM: full  Dental - normal exam     Pulmonary - negative ROS and normal exam   Cardiovascular - negative ROS and normal exam    Neuro/Psych      GI/Hepatic/Renal      Endo/Other - negative ROS   Abdominal                Anesthesia Plan:   ASA:  1

## 2021-06-15 NOTE — OPERATIVE REPORT
Colonoscopy Report    Jeffrey Marc     1955 Age 61year old   PCP Jose De Jesus Nair MD Endoscopist Berto Glover MD     Date of procedure: 06/15/21    Procedure: Colonoscopy w/ snare polypectomy    Pre-operative diagnosis: colorectal cancer minutes. Air was then withdrawn and the endoscope was removed. The patient tolerated the procedure well. There were no immediate postoperative complications. The patient’s vital signs were monitored throughout the procedure and remained stable.     Estim

## 2021-06-15 NOTE — ANESTHESIA POSTPROCEDURE EVALUATION
Patient: Jeffrey Marc    Procedure Summary     Date: 06/15/21 Room / Location: [facility] ENDOSCOPY 01 / [facility] ENDO    Anesthesia Start: 0214 Anesthesia Stop: 1480    Procedure: COLONOSCOPY (N/A ) Diagnosis:       Bright red blood per rectum      Diarrhe

## 2021-06-17 ENCOUNTER — TELEPHONE (OUTPATIENT)
Dept: GASTROENTEROLOGY | Facility: CLINIC | Age: 66
End: 2021-06-17

## 2021-06-17 NOTE — TELEPHONE ENCOUNTER
Results letter mailed to patient per Stefano Myles recall entered for repeat in 3 yrs,6/15/2024  Colon done in 6/15/2021  HM Updated and Patient Outreach was placed for Colon recall. Tavares Ceballos MD  P Em Gi Clinical Staff  GI staff: please kimberly

## 2021-07-02 NOTE — LETTER
AUTHORIZATION FOR SURGICAL OPERATION OR OTHER PROCEDURE    1. I hereby authorize Dr. Weaver and the [facility] staff assigned to my case to perform the following operation and/or procedure at the [facility]:    Ultrasound guided left hip joint injection with coricosteroid     2.  My physician has explained the nature and purpose of the operation or other procedure, possible alternative methods of treatment, the risks involved, and the possibility of complication to me.  I acknowledge that no guarantee has been made as to the result that may be obtained.  3.  I recognize that, during the course of this operation, or other procedure, unforseen conditions may necessitate additional or different procedure than those listed above.  I, therefore, further authorize and request that the above named physician, his/her physician assistants or designees perform such procedures as are, in his/her professional opinion, necessary and desirable.  4.  Any tissue or organs removed in the operation or other procedure may be disposed of by and at the discretion of the [facility] staff and [facility].  5.  I understand that in the event of a medical emergency, I will be transported by local paramedics to [facility] or other hospital emergency department.  6.  I certify that I have read and fully understand the above consent to operation and/or other procedure.    7.  I acknowledge that my physician has explained sedation/analgesia administration to me including the risks and benefits.  I consent to the administration of sedation/analgesia as may be necessary or desirable in the judgement of my physician.    Witness signature: ___________________________________________________ Date:  ______/______/_____                    Time:  ________ A.M.  P.M.       Patient Name: Kim Hoffman  2/2/1955  [id number]       Patient signature:  ___________________________________________________.    Statement of  DOS: 7/2/2021     HPI: 52-year-old male with history of polysubstance use disorder including alcohol, cocaine, and marijuana who is admitted to the ICU for management of acute sympathomimetic intoxication with concomitant alcohol withdrawal delirium. He presented to the ED today under unclear circumstances, though per report it seems he presented for evaluation of a general unwell feeling and a sensation of \"crawling out of [his] skin.\" He was reportedly conversational on arrival and noted to the EM physician that he'd consumed a fifth of vodka earlier in the morning to help calm himself down, but also reported using both cocaine and marijuana concomitantly. Over the ensuing hour he became progressively hyperadrenergic and encephalopathic, developing an associated diaphoresis and hyperactivity. He received 12 mg of lorazepam without significant improvement in either his agitated delirium or his hemodynamics, and so he was restrained. A 5 mg/kg phenobarbital loading dose was ordered and had just been hung at the time of my evaluation.    The patient was only able to tell me that he felt \"like shit\" and that he regularly consumed alcohol and cocaine. Aside from that he would repeatedly speak incomprehensibly and move all four extremities within his restraints but would not follow commands.    PMH:  has a past medical history of Anxiety, Depression, DANG (obstructive sleep apnea) (2020), and Substance abuse (CMS/[facility]). He also has no past medical history of Difficult intubation, Failed moderate sedation during procedure, Malignant hyperthermia, PONV (postoperative nausea and vomiting), or Spinal headache.  PSH:  has a past surgical history that includes anes arthroscopy knee,surg (Left, 1992) and lasik surgery (2000).   SH:  reports that he has been smoking cigarettes. He has a 15.00 pack-year smoking history. He has quit using smokeless tobacco.  His smokeless tobacco use included chew. He reports previous alcohol use.  He reports previous drug use. Drugs: Other and Marijuana.  MEDS: Please see inpatient record  ALL: NKDA    Physical Exam:  GEN: Lying in bed, restrained, agitated, and diaphoretic  HEENT: Mucus membranes dry  CV: Tachycardic with a regular rhythm  PULM: Tachypneic with no use of accessory muscles  ABDO: Soft, nontender, and nondistended  SKIN: Diaphoretic with some piloerection in the bilateral upper extremities  EXT: No significant swelling  NEURO: Eyes open spontaneously. Speech is pressured and incomprehensible. Moving all four extremities vigorously. Pupils are 6 mm and reactive bilaterally.    Assessment and Plan:  52-year-old male with history of polysubstance use disorder including alcohol, cocaine, and marijuana who is admitted to the ICU for management of acute sympathomimetic intoxication with concomitant alcohol withdrawal delirium..    1. Acute alcohol withdrawal delirium with associated sympathomimetic intoxication: He ultimately required deep sedation and invasive mechanical ventilation. We'll continue the propofol infusion and we'll add an additional 10 mg/kg phenobarbital dose above the 5 mg/kg dose he's already received. He may even require additional doses thereafter. We'll start thiamine and folic acid supplementation as well. We'll also initiate a supplemental fentanyl infusion. He does localize to pain and his CTH was unrevealing of any acute pathology.    Prophylaxis: I've ordered enoxaparin for VTE prophylaxis, famotidine for CRUZ prophylaxis, and chlorhexidine for VAP prophylaxis  LTD: PIVs + ETT (7/2) + OGT (7/2)  CODE STATUS: Full    I spent 35 minutes personally attending to this patient's critical care needs for the aforementioned issues with complex decision making and high risk of physiologic decline. This time excludes time spent on separately billable procedures or time spent teaching.   Physician  My signature below affirms that prior to the time of the procedure, I have explained to the patient and/or his/her guardian, the risks and benefits involved in the proposed treatment and any reasonable alternative to the proposed treatment.  I have also explained the risks and benefits involved in the refusal of the proposed treatment and have answered the patient's questions.                        Date:  ______/______/_______  Provider                      Signature:  __________________________________________________________       Time:  ___________ A.M    P.M.

## 2021-07-07 ENCOUNTER — NURSE ONLY (OUTPATIENT)
Dept: HEMATOLOGY/ONCOLOGY | Facility: HOSPITAL | Age: 66
End: 2021-07-07
Attending: INTERNAL MEDICINE
Payer: COMMERCIAL

## 2021-07-07 VITALS
HEIGHT: 64.02 IN | HEART RATE: 93 BPM | WEIGHT: 188 LBS | DIASTOLIC BLOOD PRESSURE: 72 MMHG | TEMPERATURE: 97 F | SYSTOLIC BLOOD PRESSURE: 127 MMHG | OXYGEN SATURATION: 97 % | BODY MASS INDEX: 32.1 KG/M2 | RESPIRATION RATE: 18 BRPM

## 2021-07-07 DIAGNOSIS — M54.32 SCIATIC PAIN, LEFT: ICD-10-CM

## 2021-07-07 DIAGNOSIS — R79.89 LFTS ABNORMAL: ICD-10-CM

## 2021-07-07 DIAGNOSIS — C50.812 MALIGNANT NEOPLASM OF OVERLAPPING SITES OF LEFT BREAST IN FEMALE, ESTROGEN RECEPTOR POSITIVE (HCC): ICD-10-CM

## 2021-07-07 DIAGNOSIS — Z17.0 MALIGNANT NEOPLASM OF OVERLAPPING SITES OF LEFT BREAST IN FEMALE, ESTROGEN RECEPTOR POSITIVE (HCC): ICD-10-CM

## 2021-07-07 DIAGNOSIS — M79.605 PAIN IN LEFT LEG: Primary | ICD-10-CM

## 2021-07-07 LAB
ALBUMIN SERPL-MCNC: 4 G/DL (ref 3.4–5)
ALBUMIN/GLOB SERPL: 1 {RATIO} (ref 1–2)
ALP LIVER SERPL-CCNC: 90 U/L
ALT SERPL-CCNC: 61 U/L
ANION GAP SERPL CALC-SCNC: 7 MMOL/L (ref 0–18)
AST SERPL-CCNC: 38 U/L (ref 15–37)
BASOPHILS # BLD AUTO: 0.06 X10(3) UL (ref 0–0.2)
BASOPHILS NFR BLD AUTO: 1 %
BILIRUB SERPL-MCNC: 0.5 MG/DL (ref 0.1–2)
BUN BLD-MCNC: 13 MG/DL (ref 7–18)
BUN/CREAT SERPL: 19.1 (ref 10–20)
CALCIUM BLD-MCNC: 9.8 MG/DL (ref 8.5–10.1)
CHLORIDE SERPL-SCNC: 107 MMOL/L (ref 98–112)
CO2 SERPL-SCNC: 26 MMOL/L (ref 21–32)
CREAT BLD-MCNC: 0.68 MG/DL
DEPRECATED RDW RBC AUTO: 40.8 FL (ref 35.1–46.3)
EOSINOPHIL # BLD AUTO: 0.12 X10(3) UL (ref 0–0.7)
EOSINOPHIL NFR BLD AUTO: 2 %
ERYTHROCYTE [DISTWIDTH] IN BLOOD BY AUTOMATED COUNT: 12.2 % (ref 11–15)
GLOBULIN PLAS-MCNC: 4.2 G/DL (ref 2.8–4.4)
GLUCOSE BLD-MCNC: 112 MG/DL (ref 70–99)
HCT VFR BLD AUTO: 43 %
HGB BLD-MCNC: 14.4 G/DL
IMM GRANULOCYTES # BLD AUTO: 0.02 X10(3) UL (ref 0–1)
IMM GRANULOCYTES NFR BLD: 0.3 %
LYMPHOCYTES # BLD AUTO: 1.71 X10(3) UL (ref 1–4)
LYMPHOCYTES NFR BLD AUTO: 29.1 %
M PROTEIN MFR SERPL ELPH: 8.2 G/DL (ref 6.4–8.2)
MCH RBC QN AUTO: 30.4 PG (ref 26–34)
MCHC RBC AUTO-ENTMCNC: 33.5 G/DL (ref 31–37)
MCV RBC AUTO: 90.9 FL
MONOCYTES # BLD AUTO: 0.47 X10(3) UL (ref 0.1–1)
MONOCYTES NFR BLD AUTO: 8 %
NEUTROPHILS # BLD AUTO: 3.5 X10 (3) UL (ref 1.5–7.7)
NEUTROPHILS # BLD AUTO: 3.5 X10(3) UL (ref 1.5–7.7)
NEUTROPHILS NFR BLD AUTO: 59.6 %
OSMOLALITY SERPL CALC.SUM OF ELEC: 291 MOSM/KG (ref 275–295)
PLATELET # BLD AUTO: 224 10(3)UL (ref 150–450)
POTASSIUM SERPL-SCNC: 4.1 MMOL/L (ref 3.5–5.1)
RBC # BLD AUTO: 4.73 X10(6)UL
SODIUM SERPL-SCNC: 140 MMOL/L (ref 136–145)
WBC # BLD AUTO: 5.9 X10(3) UL (ref 4–11)

## 2021-07-07 PROCEDURE — 36415 COLL VENOUS BLD VENIPUNCTURE: CPT

## 2021-07-07 PROCEDURE — 85025 COMPLETE CBC W/AUTO DIFF WBC: CPT

## 2021-07-07 PROCEDURE — 80053 COMPREHEN METABOLIC PANEL: CPT

## 2021-07-07 PROCEDURE — 99214 OFFICE O/P EST MOD 30 MIN: CPT | Performed by: INTERNAL MEDICINE

## 2021-07-07 NOTE — PROGRESS NOTES
Cancer Center Progress Note    Patient Name: Sweetie Mckeon   YOB: 1955   Medical Record Number: [id number]   Attending Physician: Ebony Matos M.D.      Chief Complaint:  Breast cancer  AI monitoring    Oncology History:   6/11/2019: left Surgeon: Raheem Moreno MD;  Location: [facility] ENDO   • EMBER BIOPSY STEREO NODULE 1 SITE LEFT (CPT=19081)     • EMBER BIOPSY STEREO NODULE 1 SITE RIGHT (CPT=19081)  07/01/2019    KODAK    • MASTECTOMY LEFT     • MASTECTOMY RIGHT     • MASTECTOMY,SIMPLE Bilat Reported on 7/7/2021), Disp: 30 tablet, Rfl: 3  •  Multiple Vitamins-Minerals (EYE VITAMINS OR), Take 1 tablet by mouth daily.   , Disp: , Rfl:     Allergies:  No Known Allergies     Review of Systems:  All other systems reviewed and negative x12 except as cT2N0, pT3N1  - mammaprint: luminal B, high risk, recurrence at 29%  - s/p bilateral mastectomy, AC-T chemotherapy and PMRT   -Unable to tolerate Arimidex, currently on exemestane with better tolerance. - Continue calcium and vitamin D.       Low back pa

## 2021-07-22 RX ORDER — EXEMESTANE 25 MG/1
TABLET ORAL
Qty: 30 TABLET | Refills: 3 | Status: SHIPPED | OUTPATIENT
Start: 2021-07-22 | End: 2021-11-11

## 2021-08-11 ENCOUNTER — TELEPHONE (OUTPATIENT)
Dept: INTERNAL MEDICINE CLINIC | Facility: CLINIC | Age: 66
End: 2021-08-11

## 2021-10-07 ENCOUNTER — NURSE ONLY (OUTPATIENT)
Dept: HEMATOLOGY/ONCOLOGY | Facility: HOSPITAL | Age: 66
End: 2021-10-07
Attending: NURSE PRACTITIONER
Payer: COMMERCIAL

## 2021-10-07 VITALS
TEMPERATURE: 99 F | BODY MASS INDEX: 32.44 KG/M2 | RESPIRATION RATE: 16 BRPM | HEART RATE: 91 BPM | WEIGHT: 190 LBS | DIASTOLIC BLOOD PRESSURE: 80 MMHG | OXYGEN SATURATION: 97 % | HEIGHT: 64 IN | SYSTOLIC BLOOD PRESSURE: 129 MMHG

## 2021-10-07 DIAGNOSIS — Z17.0 MALIGNANT NEOPLASM OF OVERLAPPING SITES OF LEFT BREAST IN FEMALE, ESTROGEN RECEPTOR POSITIVE (HCC): ICD-10-CM

## 2021-10-07 DIAGNOSIS — Z79.811 VISIT FOR MONITORING ARIMIDEX THERAPY: Primary | ICD-10-CM

## 2021-10-07 DIAGNOSIS — C50.812 MALIGNANT NEOPLASM OF OVERLAPPING SITES OF LEFT BREAST IN FEMALE, ESTROGEN RECEPTOR POSITIVE (HCC): ICD-10-CM

## 2021-10-07 DIAGNOSIS — R79.89 LFTS ABNORMAL: ICD-10-CM

## 2021-10-07 DIAGNOSIS — Z51.81 VISIT FOR MONITORING ARIMIDEX THERAPY: Primary | ICD-10-CM

## 2021-10-07 PROCEDURE — 36415 COLL VENOUS BLD VENIPUNCTURE: CPT

## 2021-10-07 PROCEDURE — 99214 OFFICE O/P EST MOD 30 MIN: CPT | Performed by: INTERNAL MEDICINE

## 2021-10-07 PROCEDURE — 85025 COMPLETE CBC W/AUTO DIFF WBC: CPT

## 2021-10-07 PROCEDURE — 80053 COMPREHEN METABOLIC PANEL: CPT

## 2021-10-07 RX ORDER — MV-MN/OM3/DHA/EPA/FISH/LUT/ZEA 250-5-1 MG
1 CAPSULE ORAL DAILY
COMMUNITY

## 2021-10-07 NOTE — PROGRESS NOTES
Cancer Center Progress Note    Patient Name: Eder Cantu   YOB: 1955   Medical Record Number: [id number]   Attending Physician: Firman Cockayne, M.D.      Chief Complaint:  Breast cancer  AI monitoring    Oncology History:   6/11/2019: left Location: [facility] ENDO   • EMBER BIOPSY STEREO NODULE 1 SITE LEFT (CPT=19081)     • EMBER BIOPSY STEREO NODULE 1 SITE RIGHT (CPT=19081)  07/01/2019    KODAK    • MASTECTOMY LEFT     • MASTECTOMY RIGHT     • MASTECTOMY,SIMPLE Bilateral 08/08/2019    Bilateral maste Bioflavonoid Products (AZIZA C OR), Take 1 tablet by mouth daily.   , Disp: , Rfl:     Allergies:  No Known Allergies     Review of Systems:  All other systems reviewed and negative x12 except as listed above    Vital Signs:   10/07/21  0923   BP: 129/80 bilateral mastectomy, AC-T chemotherapy and PMRT   -Unable to tolerate Arimidex, currently on exemestane with better tolerance. - Continue calcium and vitamin D. Osteopenia  DEXA 6/2020: fracture risk of 17%;   Discussed growing evidence of iDFS in

## 2021-10-21 ENCOUNTER — HOSPITAL ENCOUNTER (OUTPATIENT)
Dept: GENERAL RADIOLOGY | Facility: HOSPITAL | Age: 66
Discharge: HOME | End: 2021-10-21
Attending: PHYSICAL MEDICINE & REHABILITATION
Payer: COMMERCIAL

## 2021-10-21 ENCOUNTER — OFFICE VISIT (OUTPATIENT)
Dept: PHYSICAL MEDICINE AND REHAB | Facility: CLINIC | Age: 66
End: 2021-10-21
Payer: COMMERCIAL

## 2021-10-21 VITALS
SYSTOLIC BLOOD PRESSURE: 148 MMHG | BODY MASS INDEX: 32.44 KG/M2 | DIASTOLIC BLOOD PRESSURE: 94 MMHG | HEIGHT: 64 IN | WEIGHT: 190 LBS

## 2021-10-21 DIAGNOSIS — G62.0 CHEMOTHERAPY-INDUCED PERIPHERAL NEUROPATHY (HCC): ICD-10-CM

## 2021-10-21 DIAGNOSIS — M16.12 PRIMARY OSTEOARTHRITIS OF LEFT HIP: ICD-10-CM

## 2021-10-21 DIAGNOSIS — M54.16 LEFT LUMBAR RADICULOPATHY: ICD-10-CM

## 2021-10-21 DIAGNOSIS — M16.12 PRIMARY OSTEOARTHRITIS OF LEFT HIP: Primary | ICD-10-CM

## 2021-10-21 DIAGNOSIS — T45.1X5A CHEMOTHERAPY-INDUCED PERIPHERAL NEUROPATHY (HCC): ICD-10-CM

## 2021-10-21 PROCEDURE — 99244 OFF/OP CNSLTJ NEW/EST MOD 40: CPT | Performed by: PHYSICAL MEDICINE & REHABILITATION

## 2021-10-21 PROCEDURE — 3077F SYST BP >= 140 MM HG: CPT | Performed by: PHYSICAL MEDICINE & REHABILITATION

## 2021-10-21 PROCEDURE — 73502 X-RAY EXAM HIP UNI 2-3 VIEWS: CPT | Performed by: PHYSICAL MEDICINE & REHABILITATION

## 2021-10-21 PROCEDURE — 3080F DIAST BP >= 90 MM HG: CPT | Performed by: PHYSICAL MEDICINE & REHABILITATION

## 2021-10-21 PROCEDURE — 3008F BODY MASS INDEX DOCD: CPT | Performed by: PHYSICAL MEDICINE & REHABILITATION

## 2021-10-21 NOTE — PATIENT INSTRUCTIONS
-Think about Lyrica twice daily  -Start PT and home exercises  -Ice/heat as much as tolerated  -Follow up in 4 weeks

## 2021-10-25 PROBLEM — T45.1X5A CHEMOTHERAPY-INDUCED PERIPHERAL NEUROPATHY  (HCC): Status: ACTIVE | Noted: 2021-10-25

## 2021-10-25 PROBLEM — G62.0 CHEMOTHERAPY-INDUCED PERIPHERAL NEUROPATHY (HCC): Status: ACTIVE | Noted: 2021-10-25

## 2021-10-25 PROBLEM — G62.0 CHEMOTHERAPY-INDUCED PERIPHERAL NEUROPATHY  (HCC): Status: ACTIVE | Noted: 2021-10-25

## 2021-10-25 PROBLEM — M54.16 LEFT LUMBAR RADICULOPATHY: Status: ACTIVE | Noted: 2021-10-25

## 2021-10-25 PROBLEM — T45.1X5A CHEMOTHERAPY-INDUCED PERIPHERAL NEUROPATHY (HCC): Status: ACTIVE | Noted: 2021-10-25

## 2021-10-25 PROBLEM — M16.12 PRIMARY OSTEOARTHRITIS OF LEFT HIP: Status: ACTIVE | Noted: 2021-10-25

## 2021-10-25 PROBLEM — G62.0 CHEMOTHERAPY-INDUCED PERIPHERAL NEUROPATHY: Status: ACTIVE | Noted: 2021-10-25

## 2021-10-25 PROBLEM — T45.1X5A CHEMOTHERAPY-INDUCED PERIPHERAL NEUROPATHY: Status: ACTIVE | Noted: 2021-10-25

## 2021-10-25 NOTE — PROGRESS NOTES
[address] [facility]  NEW PATIENT EVALUATION    Consultation as a request of Dr. Christopher Peraza    Chief Complaint: back pain.     HISTORY OF PRESENT ILLNESS:   Patient presents with:  Low Back Pain: Patient presents today c/o s Anxiety state    • Breast cancer [facility])    • Calculus of kidney    • Cancer [facility])     breast   • Esophageal reflux    • Problems with swallowing     partial dentures upper and lower   • Serrated adenoma of colon 06/15/2021         PAST SURGICAL HISTORY: of Onset   • Hypertension Father    • Heart Disease Father    • Diabetes Father    • Heart Disease Mother 80   • Heart Disorder Mother         Aortic Regurgitation/AVR   • Other (PVD) Mother           SOCIAL HISTORY:   Social History    Tobacco Use      Sm Skin: No lesions noted   Cognition: alert & oriented x 3, attentive, able to follow 2 step commands, comprehention intact, spontaneous speech intact  Psychiatric: Mood and affect appropriate    Gait Normal  Able to toe walk and heel walk without any diff [id number]    IMAGING:   Xray Left Hip 10/21/21    I personally reviewed x-ray imaging which is notable for moderate left hip joint osteoarthritis and mild right hip joint osteoarthritis    MRI LUMBAR SPINE dated May 11, 2021 revealed:   I personally reviewe

## 2021-11-11 RX ORDER — EXEMESTANE 25 MG/1
TABLET ORAL
Qty: 30 TABLET | Refills: 3 | Status: SHIPPED | OUTPATIENT
Start: 2021-11-11

## 2021-11-23 ENCOUNTER — TELEPHONE (OUTPATIENT)
Dept: PHYSICAL MEDICINE AND REHAB | Facility: CLINIC | Age: 66
End: 2021-11-23

## 2021-11-23 ENCOUNTER — OFFICE VISIT (OUTPATIENT)
Dept: PHYSICAL MEDICINE AND REHAB | Facility: CLINIC | Age: 66
End: 2021-11-23
Payer: COMMERCIAL

## 2021-11-23 DIAGNOSIS — M16.12 PRIMARY OSTEOARTHRITIS OF LEFT HIP: Primary | ICD-10-CM

## 2021-11-23 DIAGNOSIS — T45.1X5A CHEMOTHERAPY-INDUCED PERIPHERAL NEUROPATHY (HCC): ICD-10-CM

## 2021-11-23 DIAGNOSIS — G62.0 CHEMOTHERAPY-INDUCED PERIPHERAL NEUROPATHY (HCC): ICD-10-CM

## 2021-11-23 DIAGNOSIS — M54.16 LEFT LUMBAR RADICULOPATHY: ICD-10-CM

## 2021-11-23 PROCEDURE — 99214 OFFICE O/P EST MOD 30 MIN: CPT | Performed by: PHYSICAL MEDICINE & REHABILITATION

## 2021-11-23 PROCEDURE — 3078F DIAST BP <80 MM HG: CPT | Performed by: PHYSICAL MEDICINE & REHABILITATION

## 2021-11-23 PROCEDURE — 3008F BODY MASS INDEX DOCD: CPT | Performed by: PHYSICAL MEDICINE & REHABILITATION

## 2021-11-23 PROCEDURE — 3074F SYST BP LT 130 MM HG: CPT | Performed by: PHYSICAL MEDICINE & REHABILITATION

## 2021-11-23 RX ORDER — PREGABALIN 50 MG/1
50 CAPSULE ORAL 2 TIMES DAILY
Qty: 60 CAPSULE | Refills: 0 | Status: SHIPPED | OUTPATIENT
Start: 2021-11-23

## 2021-11-23 NOTE — PROGRESS NOTES
[address]  FOLLOW UP EVALUATION      Chief Complaint: hip pain. HISTORY OF PRESENT ILLNESS:   Patient presents with:  Hip Pain: LOV 10/21/2021. Left hip. She [facility]  sh in and out of the car or going up and down the stairs as well. Pain in the groin seems to be the worst of her problems as the sciatic pain is not as severe as before. She does see a chiropractor as well.       PAST MEDICAL HISTORY:     Past Medical Histor Take 650-1,300 mg by mouth every 8 (eight) hours as needed for Pain. • Bioflavonoid Products (AZIZA C OR) Take 1 tablet by mouth daily. • TURMERIC OR Take 1 tablet by mouth daily.  (Patient not taking: Reported on 11/23/2021)     • ibuprofen 200 BMI 33.99 kg/m²   General: No immediate distress  Head: Normocephalic/ Atraumatic  Eyes: Extra-occular movements intact. Ears: No auricular hematoma or deformities  Mouth: No lesions or ulcerations  Heart: peripheral pulses intact.  Normal capillary ref 298 (H) 10/07/2021    ALKPHO 87 10/07/2021    AST 37 10/07/2021    ALT 76 (H) 10/07/2021    BILT 0.4 10/07/2021    TP 8.0 10/07/2021    ALB 3.6 10/07/2021    GLOBULIN 4.4 10/07/2021     10/07/2021    K 4.2 10/07/2021     10/07/2021    CO2 28.0 questions/concerns were addressed and there were no barriers to learning. Joellen BONILLA. [address] [facility]  Physical Medicine and Rehabilitation/Sports Medicine

## 2021-11-23 NOTE — PATIENT INSTRUCTIONS
-Continue PT and home exercises  -Think about Lyrica and Injection  -Think about weight loss clinic  -Follow up 2 months

## 2021-11-23 NOTE — TELEPHONE ENCOUNTER
Pt called and was seen today- Please correct the Weight to 193.8.  Please notify thru my chart once completed

## 2021-11-24 VITALS
BODY MASS INDEX: 33.09 KG/M2 | SYSTOLIC BLOOD PRESSURE: 124 MMHG | HEART RATE: 106 BPM | HEIGHT: 64 IN | WEIGHT: 193.81 LBS | OXYGEN SATURATION: 94 % | DIASTOLIC BLOOD PRESSURE: 68 MMHG

## 2021-11-30 ENCOUNTER — HOSPITAL ENCOUNTER (OUTPATIENT)
Dept: BONE DENSITY | Facility: HOSPITAL | Age: 66
Discharge: HOME | End: 2021-11-30
Attending: INTERNAL MEDICINE
Payer: COMMERCIAL

## 2021-11-30 DIAGNOSIS — Z17.0 MALIGNANT NEOPLASM OF OVERLAPPING SITES OF LEFT BREAST IN FEMALE, ESTROGEN RECEPTOR POSITIVE (HCC): ICD-10-CM

## 2021-11-30 DIAGNOSIS — C50.812 MALIGNANT NEOPLASM OF OVERLAPPING SITES OF LEFT BREAST IN FEMALE, ESTROGEN RECEPTOR POSITIVE (HCC): ICD-10-CM

## 2021-11-30 PROCEDURE — 77080 DXA BONE DENSITY AXIAL: CPT | Performed by: INTERNAL MEDICINE

## 2022-01-07 NOTE — TELEPHONE ENCOUNTER
She does have referral for GI since last year but she never did. We can call the patient and let her know that cy needs to follow-up with GI for colonoscopy.   I will put the fit test as well., she needs to  form the office None

## 2022-01-10 ENCOUNTER — APPOINTMENT (OUTPATIENT)
Dept: HEMATOLOGY/ONCOLOGY | Facility: HOSPITAL | Age: 67
End: 2022-01-10
Attending: NURSE PRACTITIONER
Payer: COMMERCIAL

## 2022-01-10 ENCOUNTER — APPOINTMENT (OUTPATIENT)
Dept: HEMATOLOGY/ONCOLOGY | Facility: HOSPITAL | Age: 67
End: 2022-01-10
Attending: INTERNAL MEDICINE
Payer: COMMERCIAL

## 2022-01-17 ENCOUNTER — NURSE ONLY (OUTPATIENT)
Dept: HEMATOLOGY/ONCOLOGY | Facility: HOSPITAL | Age: 67
End: 2022-01-17
Attending: NURSE PRACTITIONER
Payer: COMMERCIAL

## 2022-01-17 ENCOUNTER — OFFICE VISIT (OUTPATIENT)
Dept: HEMATOLOGY/ONCOLOGY | Facility: HOSPITAL | Age: 67
End: 2022-01-17
Attending: INTERNAL MEDICINE
Payer: COMMERCIAL

## 2022-01-17 VITALS
SYSTOLIC BLOOD PRESSURE: 154 MMHG | OXYGEN SATURATION: 98 % | TEMPERATURE: 99 F | HEART RATE: 84 BPM | DIASTOLIC BLOOD PRESSURE: 78 MMHG | RESPIRATION RATE: 16 BRPM

## 2022-01-17 DIAGNOSIS — C50.812 MALIGNANT NEOPLASM OF OVERLAPPING SITES OF LEFT BREAST IN FEMALE, ESTROGEN RECEPTOR POSITIVE (HCC): ICD-10-CM

## 2022-01-17 DIAGNOSIS — T45.1X5A CHEMOTHERAPY-INDUCED PERIPHERAL NEUROPATHY (HCC): ICD-10-CM

## 2022-01-17 DIAGNOSIS — C50.812 MALIGNANT NEOPLASM OF OVERLAPPING SITES OF LEFT BREAST IN FEMALE, ESTROGEN RECEPTOR POSITIVE (HCC): Primary | ICD-10-CM

## 2022-01-17 DIAGNOSIS — Z17.0 MALIGNANT NEOPLASM OF OVERLAPPING SITES OF LEFT BREAST IN FEMALE, ESTROGEN RECEPTOR POSITIVE (HCC): ICD-10-CM

## 2022-01-17 DIAGNOSIS — M54.16 LEFT LUMBAR RADICULOPATHY: ICD-10-CM

## 2022-01-17 DIAGNOSIS — G62.0 CHEMOTHERAPY-INDUCED PERIPHERAL NEUROPATHY (HCC): ICD-10-CM

## 2022-01-17 DIAGNOSIS — Z17.0 MALIGNANT NEOPLASM OF OVERLAPPING SITES OF LEFT BREAST IN FEMALE, ESTROGEN RECEPTOR POSITIVE (HCC): Primary | ICD-10-CM

## 2022-01-17 LAB
ALBUMIN SERPL-MCNC: 3.8 G/DL (ref 3.4–5)
ALBUMIN/GLOB SERPL: 0.9 {RATIO} (ref 1–2)
ALP LIVER SERPL-CCNC: 83 U/L
ALT SERPL-CCNC: 76 U/L
ANION GAP SERPL CALC-SCNC: 7 MMOL/L (ref 0–18)
AST SERPL-CCNC: 37 U/L (ref 15–37)
BASOPHILS # BLD AUTO: 0.06 X10(3) UL (ref 0–0.2)
BASOPHILS NFR BLD AUTO: 1 %
BILIRUB SERPL-MCNC: 0.6 MG/DL (ref 0.1–2)
BUN BLD-MCNC: 15 MG/DL (ref 7–18)
BUN/CREAT SERPL: 21.4 (ref 10–20)
CALCIUM BLD-MCNC: 9.3 MG/DL (ref 8.5–10.1)
CHLORIDE SERPL-SCNC: 106 MMOL/L (ref 98–112)
CO2 SERPL-SCNC: 27 MMOL/L (ref 21–32)
CREAT BLD-MCNC: 0.7 MG/DL
DEPRECATED RDW RBC AUTO: 39.8 FL (ref 35.1–46.3)
EOSINOPHIL # BLD AUTO: 0.16 X10(3) UL (ref 0–0.7)
EOSINOPHIL NFR BLD AUTO: 2.7 %
ERYTHROCYTE [DISTWIDTH] IN BLOOD BY AUTOMATED COUNT: 11.9 % (ref 11–15)
GLOBULIN PLAS-MCNC: 4.3 G/DL (ref 2.8–4.4)
GLUCOSE BLD-MCNC: 103 MG/DL (ref 70–99)
HCT VFR BLD AUTO: 44.2 %
HGB BLD-MCNC: 15.1 G/DL
IMM GRANULOCYTES # BLD AUTO: 0.02 X10(3) UL (ref 0–1)
IMM GRANULOCYTES NFR BLD: 0.3 %
LYMPHOCYTES # BLD AUTO: 1.57 X10(3) UL (ref 1–4)
LYMPHOCYTES NFR BLD AUTO: 26.6 %
MCH RBC QN AUTO: 31.3 PG (ref 26–34)
MCHC RBC AUTO-ENTMCNC: 34.2 G/DL (ref 31–37)
MCV RBC AUTO: 91.7 FL
MONOCYTES # BLD AUTO: 0.5 X10(3) UL (ref 0.1–1)
MONOCYTES NFR BLD AUTO: 8.5 %
NEUTROPHILS # BLD AUTO: 3.59 X10 (3) UL (ref 1.5–7.7)
NEUTROPHILS # BLD AUTO: 3.59 X10(3) UL (ref 1.5–7.7)
NEUTROPHILS NFR BLD AUTO: 60.9 %
OSMOLALITY SERPL CALC.SUM OF ELEC: 291 MOSM/KG (ref 275–295)
PLATELET # BLD AUTO: 214 10(3)UL (ref 150–450)
POTASSIUM SERPL-SCNC: 4 MMOL/L (ref 3.5–5.1)
PROT SERPL-MCNC: 8.1 G/DL (ref 6.4–8.2)
RBC # BLD AUTO: 4.82 X10(6)UL
SODIUM SERPL-SCNC: 140 MMOL/L (ref 136–145)
WBC # BLD AUTO: 5.9 X10(3) UL (ref 4–11)

## 2022-01-17 PROCEDURE — 85025 COMPLETE CBC W/AUTO DIFF WBC: CPT

## 2022-01-17 PROCEDURE — 99214 OFFICE O/P EST MOD 30 MIN: CPT | Performed by: INTERNAL MEDICINE

## 2022-01-17 PROCEDURE — 80053 COMPREHEN METABOLIC PANEL: CPT

## 2022-01-17 PROCEDURE — 36415 COLL VENOUS BLD VENIPUNCTURE: CPT

## 2022-01-17 NOTE — PROGRESS NOTES
Cancer Center Progress Note    Patient Name: Virginia Horton   YOB: 1955   Medical Record Number: [id number]   Attending Physician: Veronica Torres M.D.      Chief Complaint:  Breast cancer  AI monitoring    Oncology History:  6/11/2019: Hector Lancaster Medications:    Current Outpatient Medications:   •  pregabalin (LYRICA) 50 MG Oral Cap, Take 1 capsule (50 mg total) by mouth 2 (two) times daily. , Disp: 60 capsule, Rfl: 0  •  EXEMESTANE 25 MG Oral Tab, TAKE 1 TABLET(25 MG) BY MOUTH DAILY, Disp: 30 table breathing, symmetric expansion. CTAB  Breast: bilateral mastectomies. Normal bilateral axilla.    Abdomen: Soft, obese  Extremities: no edema or cyanosis  Neurological: motor strength grossly intact,   Psych: appropriate mood and affect      Laboratory as currently, repeat in 3 mo    MDM: Mod, review of tests, drug management, chronic illness with side effect of treatment     Urban Gonzalez MD

## 2022-01-18 ENCOUNTER — MED REC SCAN ONLY (OUTPATIENT)
Dept: PHYSICAL MEDICINE AND REHAB | Facility: CLINIC | Age: 67
End: 2022-01-18

## 2022-01-25 ENCOUNTER — OFFICE VISIT (OUTPATIENT)
Dept: PHYSICAL MEDICINE AND REHAB | Facility: CLINIC | Age: 67
End: 2022-01-25
Payer: COMMERCIAL

## 2022-01-25 VITALS
HEIGHT: 64 IN | DIASTOLIC BLOOD PRESSURE: 86 MMHG | OXYGEN SATURATION: 97 % | SYSTOLIC BLOOD PRESSURE: 142 MMHG | WEIGHT: 190 LBS | BODY MASS INDEX: 32.44 KG/M2 | HEART RATE: 101 BPM

## 2022-01-25 DIAGNOSIS — G62.0 CHEMOTHERAPY-INDUCED PERIPHERAL NEUROPATHY (HCC): ICD-10-CM

## 2022-01-25 DIAGNOSIS — T45.1X5A CHEMOTHERAPY-INDUCED PERIPHERAL NEUROPATHY (HCC): ICD-10-CM

## 2022-01-25 DIAGNOSIS — M16.12 PRIMARY OSTEOARTHRITIS OF LEFT HIP: Primary | ICD-10-CM

## 2022-01-25 PROCEDURE — 3079F DIAST BP 80-89 MM HG: CPT | Performed by: PHYSICAL MEDICINE & REHABILITATION

## 2022-01-25 PROCEDURE — 99214 OFFICE O/P EST MOD 30 MIN: CPT | Performed by: PHYSICAL MEDICINE & REHABILITATION

## 2022-01-25 PROCEDURE — 3077F SYST BP >= 140 MM HG: CPT | Performed by: PHYSICAL MEDICINE & REHABILITATION

## 2022-01-25 PROCEDURE — 3008F BODY MASS INDEX DOCD: CPT | Performed by: PHYSICAL MEDICINE & REHABILITATION

## 2022-01-25 NOTE — PATIENT INSTRUCTIONS
-Continue home exercises  -Follow up in 2 to 3 months  -Finish up PT  -Glucosamine/Chondroitin 1500/1200mg daily  -continue turmeric

## 2022-01-26 ENCOUNTER — TELEPHONE (OUTPATIENT)
Dept: PHYSICAL MEDICINE AND REHAB | Facility: CLINIC | Age: 67
End: 2022-01-26

## 2022-01-26 NOTE — PROGRESS NOTES
[address] [facility]  FOLLOW UP EVALUATION      Chief Complaint: hip pain.     HISTORY OF PRESENT ILLNESS:   Patient presents with:  Hip Pain: LOV: 11/23/21 Patient f/u L glute pain that radiates into groin area, fran sensation. She admits some unsteadiness due to neuropathy as well. Pain in the low back is sharp shooting in nature worse with standing ambulation increased activity. She has had MRI of the lumbar spine completed as noted below.   Patient states her pain OR Take by mouth. • ibuprofen 200 MG Oral Tab Take 400 mg by mouth every 6 (six) hours as needed for Pain. • LORazepam 1 MG Oral Tab Take 1 tablet (1 mg total) by mouth every 8 (eight) hours as needed for Anxiety.  90 tablet 0   • Ascorbic Acid (VIT Skin  Open Sores: denies  Nodules or Lumps: denies  Rash: denies   Neurological  Loss of Strength Since last Visit: admits  Tingling/Numbness: admits  Balance: admits   Psychiatric  Anxiety: admits  Depressed Mood: admits       PHYSICAL EXAM:   /86 01/17/2022    MCHC 34.2 01/17/2022    RDW 11.9 01/17/2022    .0 01/17/2022     Lab Results   Component Value Date     (H) 01/17/2022    BUN 15 01/17/2022    BUNCREA 21.4 (H) 01/17/2022    CREATSERUM 0.70 01/17/2022    ANIONGAP 7 01/17/2022 now.  We discussed continuing home exercises and oral supplements including turmeric as well as glucosamine/chondroitin. The patient verbalized understanding with the plan and was in agreement.  All questions/concerns were addressed and there were no bar

## 2022-02-09 RX ORDER — LORATADINE 10 MG/1
TABLET ORAL
Qty: 90 TABLET | Refills: 3 | Status: SHIPPED | OUTPATIENT
Start: 2022-02-09

## 2022-02-14 ENCOUNTER — MED REC SCAN ONLY (OUTPATIENT)
Dept: PHYSICAL MEDICINE AND REHAB | Facility: CLINIC | Age: 67
End: 2022-02-14

## 2022-04-15 ENCOUNTER — TELEPHONE (OUTPATIENT)
Dept: HEMATOLOGY/ONCOLOGY | Facility: HOSPITAL | Age: 67
End: 2022-04-15

## 2022-04-18 ENCOUNTER — APPOINTMENT (OUTPATIENT)
Dept: HEMATOLOGY/ONCOLOGY | Facility: HOSPITAL | Age: 67
End: 2022-04-18
Attending: NURSE PRACTITIONER
Payer: MEDICARE

## 2022-04-18 ENCOUNTER — TELEPHONE (OUTPATIENT)
Dept: HEMATOLOGY/ONCOLOGY | Facility: HOSPITAL | Age: 67
End: 2022-04-18

## 2022-04-18 ENCOUNTER — APPOINTMENT (OUTPATIENT)
Dept: HEMATOLOGY/ONCOLOGY | Facility: HOSPITAL | Age: 67
End: 2022-04-18
Attending: INTERNAL MEDICINE
Payer: MEDICARE

## 2022-04-18 RX ORDER — EXEMESTANE 25 MG/1
TABLET ORAL
Qty: 30 TABLET | Refills: 3 | Status: SHIPPED | OUTPATIENT
Start: 2022-04-18

## 2022-04-18 NOTE — TELEPHONE ENCOUNTER
Jesseradha Thompsonevre reports that she did not sleep well and woke up with a sore, scratchy throat. She has rescheduled her labs and follow up appointment with Dr Mariposa Sage. I encouraged her to reach out to her PCP if her symptoms become worse. She agreed.

## 2022-04-18 NOTE — TELEPHONE ENCOUNTER
Reyna Mcfarlane at [phone] has a sore and scratchy throat, she is cancelling her Labs at 1000 and Follow Up at 1045 with Dr Tre Jaffe today4/18/22 at Dr. Tre Jaffe pt.

## 2022-04-18 NOTE — TELEPHONE ENCOUNTER
Patient has a sore and scratchy throat, she is cancelling her Labs at 1000 and Follow Up at 1045 with Dr Logan Six today 4/18/22, she rescheduled for 5/3/22

## 2022-05-03 ENCOUNTER — OFFICE VISIT (OUTPATIENT)
Dept: HEMATOLOGY/ONCOLOGY | Facility: HOSPITAL | Age: 67
End: 2022-05-03
Attending: NURSE PRACTITIONER
Payer: MEDICARE

## 2022-05-03 VITALS
DIASTOLIC BLOOD PRESSURE: 86 MMHG | OXYGEN SATURATION: 97 % | HEIGHT: 64 IN | BODY MASS INDEX: 32.78 KG/M2 | HEART RATE: 92 BPM | TEMPERATURE: 99 F | SYSTOLIC BLOOD PRESSURE: 155 MMHG | RESPIRATION RATE: 16 BRPM | WEIGHT: 192 LBS

## 2022-05-03 DIAGNOSIS — Z17.0 MALIGNANT NEOPLASM OF OVERLAPPING SITES OF LEFT BREAST IN FEMALE, ESTROGEN RECEPTOR POSITIVE (HCC): ICD-10-CM

## 2022-05-03 DIAGNOSIS — Z17.0 MALIGNANT NEOPLASM OF OVERLAPPING SITES OF LEFT BREAST IN FEMALE, ESTROGEN RECEPTOR POSITIVE (HCC): Primary | ICD-10-CM

## 2022-05-03 DIAGNOSIS — C50.812 MALIGNANT NEOPLASM OF OVERLAPPING SITES OF LEFT BREAST IN FEMALE, ESTROGEN RECEPTOR POSITIVE (HCC): Primary | ICD-10-CM

## 2022-05-03 DIAGNOSIS — Z79.811 LONG TERM (CURRENT) USE OF AROMATASE INHIBITORS: ICD-10-CM

## 2022-05-03 DIAGNOSIS — C50.812 MALIGNANT NEOPLASM OF OVERLAPPING SITES OF LEFT BREAST IN FEMALE, ESTROGEN RECEPTOR POSITIVE (HCC): ICD-10-CM

## 2022-05-03 LAB
ALBUMIN SERPL-MCNC: 3.9 G/DL (ref 3.4–5)
ALBUMIN/GLOB SERPL: 0.9 {RATIO} (ref 1–2)
ALP LIVER SERPL-CCNC: 81 U/L
ALT SERPL-CCNC: 92 U/L
ANION GAP SERPL CALC-SCNC: 7 MMOL/L (ref 0–18)
AST SERPL-CCNC: 45 U/L (ref 15–37)
BASOPHILS # BLD AUTO: 0.05 X10(3) UL (ref 0–0.2)
BASOPHILS NFR BLD AUTO: 0.9 %
BILIRUB SERPL-MCNC: 0.5 MG/DL (ref 0.1–2)
BUN BLD-MCNC: 22 MG/DL (ref 7–18)
BUN/CREAT SERPL: 28.9 (ref 10–20)
CALCIUM BLD-MCNC: 9.8 MG/DL (ref 8.5–10.1)
CHLORIDE SERPL-SCNC: 107 MMOL/L (ref 98–112)
CO2 SERPL-SCNC: 29 MMOL/L (ref 21–32)
CREAT BLD-MCNC: 0.76 MG/DL
DEPRECATED RDW RBC AUTO: 39.8 FL (ref 35.1–46.3)
EOSINOPHIL # BLD AUTO: 0.15 X10(3) UL (ref 0–0.7)
EOSINOPHIL NFR BLD AUTO: 2.6 %
ERYTHROCYTE [DISTWIDTH] IN BLOOD BY AUTOMATED COUNT: 11.8 % (ref 11–15)
GLOBULIN PLAS-MCNC: 4.2 G/DL (ref 2.8–4.4)
GLUCOSE BLD-MCNC: 95 MG/DL (ref 70–99)
HCT VFR BLD AUTO: 44.1 %
HGB BLD-MCNC: 15.1 G/DL
IMM GRANULOCYTES # BLD AUTO: 0.01 X10(3) UL (ref 0–1)
IMM GRANULOCYTES NFR BLD: 0.2 %
LYMPHOCYTES # BLD AUTO: 1.94 X10(3) UL (ref 1–4)
LYMPHOCYTES NFR BLD AUTO: 34 %
MCH RBC QN AUTO: 31.5 PG (ref 26–34)
MCHC RBC AUTO-ENTMCNC: 34.2 G/DL (ref 31–37)
MCV RBC AUTO: 92.1 FL
MONOCYTES # BLD AUTO: 0.54 X10(3) UL (ref 0.1–1)
MONOCYTES NFR BLD AUTO: 9.5 %
NEUTROPHILS # BLD AUTO: 3.01 X10 (3) UL (ref 1.5–7.7)
NEUTROPHILS # BLD AUTO: 3.01 X10(3) UL (ref 1.5–7.7)
NEUTROPHILS NFR BLD AUTO: 52.8 %
OSMOLALITY SERPL CALC.SUM OF ELEC: 299 MOSM/KG (ref 275–295)
PLATELET # BLD AUTO: 205 10(3)UL (ref 150–450)
POTASSIUM SERPL-SCNC: 4.2 MMOL/L (ref 3.5–5.1)
PROT SERPL-MCNC: 8.1 G/DL (ref 6.4–8.2)
RBC # BLD AUTO: 4.79 X10(6)UL
SODIUM SERPL-SCNC: 143 MMOL/L (ref 136–145)
WBC # BLD AUTO: 5.7 X10(3) UL (ref 4–11)

## 2022-05-03 PROCEDURE — 99214 OFFICE O/P EST MOD 30 MIN: CPT | Performed by: INTERNAL MEDICINE

## 2022-05-03 PROCEDURE — 85025 COMPLETE CBC W/AUTO DIFF WBC: CPT

## 2022-05-03 PROCEDURE — 36415 COLL VENOUS BLD VENIPUNCTURE: CPT

## 2022-05-03 PROCEDURE — 80053 COMPREHEN METABOLIC PANEL: CPT

## 2022-05-04 RX ORDER — LORAZEPAM 1 MG/1
1 TABLET ORAL EVERY 6 HOURS PRN
Qty: 7 TABLET | Refills: 0 | Status: SHIPPED | OUTPATIENT
Start: 2022-05-04 | End: 2022-05-14

## 2022-09-07 ENCOUNTER — OFFICE VISIT (OUTPATIENT)
Dept: HEMATOLOGY/ONCOLOGY | Facility: HOSPITAL | Age: 67
End: 2022-09-07
Attending: INTERNAL MEDICINE
Payer: MEDICARE

## 2022-09-07 VITALS
HEART RATE: 92 BPM | OXYGEN SATURATION: 97 % | SYSTOLIC BLOOD PRESSURE: 150 MMHG | HEIGHT: 64 IN | TEMPERATURE: 99 F | WEIGHT: 194.63 LBS | DIASTOLIC BLOOD PRESSURE: 85 MMHG | RESPIRATION RATE: 16 BRPM | BODY MASS INDEX: 33.23 KG/M2

## 2022-09-07 DIAGNOSIS — C50.812 MALIGNANT NEOPLASM OF OVERLAPPING SITES OF LEFT BREAST IN FEMALE, ESTROGEN RECEPTOR POSITIVE (HCC): ICD-10-CM

## 2022-09-07 DIAGNOSIS — Z17.0 MALIGNANT NEOPLASM OF OVERLAPPING SITES OF LEFT BREAST IN FEMALE, ESTROGEN RECEPTOR POSITIVE (HCC): ICD-10-CM

## 2022-09-07 DIAGNOSIS — Z79.811 LONG TERM (CURRENT) USE OF AROMATASE INHIBITORS: ICD-10-CM

## 2022-09-07 DIAGNOSIS — Z79.811 VISIT FOR MONITORING ARIMIDEX THERAPY: Primary | ICD-10-CM

## 2022-09-07 DIAGNOSIS — Z51.81 VISIT FOR MONITORING ARIMIDEX THERAPY: Primary | ICD-10-CM

## 2022-09-07 LAB
ALBUMIN SERPL-MCNC: 3.8 G/DL (ref 3.4–5)
ALBUMIN/GLOB SERPL: 0.9 {RATIO} (ref 1–2)
ALP LIVER SERPL-CCNC: 94 U/L
ALT SERPL-CCNC: 108 U/L
ANION GAP SERPL CALC-SCNC: 7 MMOL/L (ref 0–18)
AST SERPL-CCNC: 52 U/L (ref 15–37)
BASOPHILS # BLD AUTO: 0.06 X10(3) UL (ref 0–0.2)
BASOPHILS NFR BLD AUTO: 1 %
BILIRUB SERPL-MCNC: 0.6 MG/DL (ref 0.1–2)
BUN BLD-MCNC: 18 MG/DL (ref 7–18)
BUN/CREAT SERPL: 23.1 (ref 10–20)
CALCIUM BLD-MCNC: 9.7 MG/DL (ref 8.5–10.1)
CHLORIDE SERPL-SCNC: 106 MMOL/L (ref 98–112)
CO2 SERPL-SCNC: 26 MMOL/L (ref 21–32)
CREAT BLD-MCNC: 0.78 MG/DL
DEPRECATED RDW RBC AUTO: 41 FL (ref 35.1–46.3)
EOSINOPHIL # BLD AUTO: 0.13 X10(3) UL (ref 0–0.7)
EOSINOPHIL NFR BLD AUTO: 2.2 %
ERYTHROCYTE [DISTWIDTH] IN BLOOD BY AUTOMATED COUNT: 11.9 % (ref 11–15)
GFR SERPLBLD BASED ON 1.73 SQ M-ARVRAT: 84 ML/MIN/1.73M2 (ref 60–?)
GLOBULIN PLAS-MCNC: 4.4 G/DL (ref 2.8–4.4)
GLUCOSE BLD-MCNC: 111 MG/DL (ref 70–99)
HCT VFR BLD AUTO: 45.3 %
HGB BLD-MCNC: 15.3 G/DL
IMM GRANULOCYTES # BLD AUTO: 0.03 X10(3) UL (ref 0–1)
IMM GRANULOCYTES NFR BLD: 0.5 %
LYMPHOCYTES # BLD AUTO: 1.87 X10(3) UL (ref 1–4)
LYMPHOCYTES NFR BLD AUTO: 31.1 %
MCH RBC QN AUTO: 31.4 PG (ref 26–34)
MCHC RBC AUTO-ENTMCNC: 33.8 G/DL (ref 31–37)
MCV RBC AUTO: 93 FL
MONOCYTES # BLD AUTO: 0.52 X10(3) UL (ref 0.1–1)
MONOCYTES NFR BLD AUTO: 8.6 %
NEUTROPHILS # BLD AUTO: 3.41 X10 (3) UL (ref 1.5–7.7)
NEUTROPHILS # BLD AUTO: 3.41 X10(3) UL (ref 1.5–7.7)
NEUTROPHILS NFR BLD AUTO: 56.6 %
OSMOLALITY SERPL CALC.SUM OF ELEC: 291 MOSM/KG (ref 275–295)
PLATELET # BLD AUTO: 232 10(3)UL (ref 150–450)
POTASSIUM SERPL-SCNC: 4.1 MMOL/L (ref 3.5–5.1)
PROT SERPL-MCNC: 8.2 G/DL (ref 6.4–8.2)
RBC # BLD AUTO: 4.87 X10(6)UL
SODIUM SERPL-SCNC: 139 MMOL/L (ref 136–145)
VIT D+METAB SERPL-MCNC: 62.2 NG/ML (ref 30–100)
WBC # BLD AUTO: 6 X10(3) UL (ref 4–11)

## 2022-09-07 PROCEDURE — 99214 OFFICE O/P EST MOD 30 MIN: CPT | Performed by: INTERNAL MEDICINE

## 2022-09-07 PROCEDURE — 82306 VITAMIN D 25 HYDROXY: CPT

## 2022-09-07 PROCEDURE — 85025 COMPLETE CBC W/AUTO DIFF WBC: CPT

## 2022-09-07 PROCEDURE — 80053 COMPREHEN METABOLIC PANEL: CPT

## 2022-09-07 PROCEDURE — 36415 COLL VENOUS BLD VENIPUNCTURE: CPT

## 2022-09-21 ENCOUNTER — HOSPITAL ENCOUNTER (OUTPATIENT)
Dept: ULTRASOUND IMAGING | Age: 67
Discharge: HOME | End: 2022-09-21
Attending: INTERNAL MEDICINE

## 2022-09-21 DIAGNOSIS — Z51.81 VISIT FOR MONITORING ARIMIDEX THERAPY: ICD-10-CM

## 2022-09-21 DIAGNOSIS — Z79.811 VISIT FOR MONITORING ARIMIDEX THERAPY: ICD-10-CM

## 2022-09-21 DIAGNOSIS — C50.812 MALIGNANT NEOPLASM OF OVERLAPPING SITES OF LEFT BREAST IN FEMALE, ESTROGEN RECEPTOR POSITIVE (HCC): ICD-10-CM

## 2022-09-21 DIAGNOSIS — Z17.0 MALIGNANT NEOPLASM OF OVERLAPPING SITES OF LEFT BREAST IN FEMALE, ESTROGEN RECEPTOR POSITIVE (HCC): ICD-10-CM

## 2022-09-21 PROCEDURE — 76705 ECHO EXAM OF ABDOMEN: CPT | Performed by: INTERNAL MEDICINE

## 2022-09-27 ENCOUNTER — NURSE ONLY (OUTPATIENT)
Dept: HEMATOLOGY/ONCOLOGY | Facility: HOSPITAL | Age: 67
End: 2022-09-27
Attending: INTERNAL MEDICINE

## 2022-09-27 VITALS
BODY MASS INDEX: 33.4 KG/M2 | DIASTOLIC BLOOD PRESSURE: 88 MMHG | SYSTOLIC BLOOD PRESSURE: 145 MMHG | HEIGHT: 64 IN | RESPIRATION RATE: 16 BRPM | TEMPERATURE: 98 F | OXYGEN SATURATION: 95 % | WEIGHT: 195.63 LBS | HEART RATE: 98 BPM

## 2022-09-27 DIAGNOSIS — Z17.0 MALIGNANT NEOPLASM OF OVERLAPPING SITES OF LEFT BREAST IN FEMALE, ESTROGEN RECEPTOR POSITIVE (HCC): ICD-10-CM

## 2022-09-27 DIAGNOSIS — C50.812 MALIGNANT NEOPLASM OF OVERLAPPING SITES OF LEFT BREAST IN FEMALE, ESTROGEN RECEPTOR POSITIVE (HCC): ICD-10-CM

## 2022-09-27 DIAGNOSIS — Z79.811 LONG TERM (CURRENT) USE OF AROMATASE INHIBITORS: ICD-10-CM

## 2022-09-27 DIAGNOSIS — Z51.81 VISIT FOR MONITORING ARIMIDEX THERAPY: Primary | ICD-10-CM

## 2022-09-27 DIAGNOSIS — Z79.811 VISIT FOR MONITORING ARIMIDEX THERAPY: Primary | ICD-10-CM

## 2022-09-27 LAB
ALBUMIN SERPL-MCNC: 3.9 G/DL (ref 3.4–5)
ALBUMIN/GLOB SERPL: 0.9 {RATIO} (ref 1–2)
ALP LIVER SERPL-CCNC: 96 U/L
ALT SERPL-CCNC: 100 U/L
ANION GAP SERPL CALC-SCNC: 6 MMOL/L (ref 0–18)
AST SERPL-CCNC: 56 U/L (ref 15–37)
BASOPHILS # BLD AUTO: 0.04 X10(3) UL (ref 0–0.2)
BASOPHILS NFR BLD AUTO: 0.7 %
BILIRUB SERPL-MCNC: 0.6 MG/DL (ref 0.1–2)
BUN BLD-MCNC: 14 MG/DL (ref 7–18)
BUN/CREAT SERPL: 18.9 (ref 10–20)
CALCIUM BLD-MCNC: 9.6 MG/DL (ref 8.5–10.1)
CHLORIDE SERPL-SCNC: 105 MMOL/L (ref 98–112)
CO2 SERPL-SCNC: 28 MMOL/L (ref 21–32)
CREAT BLD-MCNC: 0.74 MG/DL
DEPRECATED RDW RBC AUTO: 39 FL (ref 35.1–46.3)
EOSINOPHIL # BLD AUTO: 0.11 X10(3) UL (ref 0–0.7)
EOSINOPHIL NFR BLD AUTO: 1.9 %
ERYTHROCYTE [DISTWIDTH] IN BLOOD BY AUTOMATED COUNT: 11.9 % (ref 11–15)
GFR SERPLBLD BASED ON 1.73 SQ M-ARVRAT: 90 ML/MIN/1.73M2 (ref 60–?)
GLOBULIN PLAS-MCNC: 4.3 G/DL (ref 2.8–4.4)
GLUCOSE BLD-MCNC: 107 MG/DL (ref 70–99)
HCT VFR BLD AUTO: 44.1 %
HGB BLD-MCNC: 15.2 G/DL
IMM GRANULOCYTES # BLD AUTO: 0.01 X10(3) UL (ref 0–1)
IMM GRANULOCYTES NFR BLD: 0.2 %
LYMPHOCYTES # BLD AUTO: 1.9 X10(3) UL (ref 1–4)
LYMPHOCYTES NFR BLD AUTO: 32.1 %
MCH RBC QN AUTO: 31.1 PG (ref 26–34)
MCHC RBC AUTO-ENTMCNC: 34.5 G/DL (ref 31–37)
MCV RBC AUTO: 90.2 FL
MONOCYTES # BLD AUTO: 0.49 X10(3) UL (ref 0.1–1)
MONOCYTES NFR BLD AUTO: 8.3 %
NEUTROPHILS # BLD AUTO: 3.37 X10 (3) UL (ref 1.5–7.7)
NEUTROPHILS # BLD AUTO: 3.37 X10(3) UL (ref 1.5–7.7)
NEUTROPHILS NFR BLD AUTO: 56.8 %
OSMOLALITY SERPL CALC.SUM OF ELEC: 289 MOSM/KG (ref 275–295)
PLATELET # BLD AUTO: 206 10(3)UL (ref 150–450)
POTASSIUM SERPL-SCNC: 4.1 MMOL/L (ref 3.5–5.1)
PROT SERPL-MCNC: 8.2 G/DL (ref 6.4–8.2)
RBC # BLD AUTO: 4.89 X10(6)UL
SODIUM SERPL-SCNC: 139 MMOL/L (ref 136–145)
WBC # BLD AUTO: 5.9 X10(3) UL (ref 4–11)

## 2022-09-27 PROCEDURE — 36415 COLL VENOUS BLD VENIPUNCTURE: CPT

## 2022-09-27 PROCEDURE — 85025 COMPLETE CBC W/AUTO DIFF WBC: CPT

## 2022-09-27 PROCEDURE — 99214 OFFICE O/P EST MOD 30 MIN: CPT | Performed by: INTERNAL MEDICINE

## 2022-09-27 PROCEDURE — 80053 COMPREHEN METABOLIC PANEL: CPT

## 2022-09-27 RX ORDER — LETROZOLE 2.5 MG/1
TABLET, FILM COATED ORAL
Qty: 90 TABLET | Refills: 0 | Status: SHIPPED | OUTPATIENT
Start: 2022-09-27 | End: 2023-01-18

## 2022-09-27 RX ORDER — LETROZOLE 2.5 MG/1
2.5 TABLET, FILM COATED ORAL DAILY
Qty: 30 TABLET | Refills: 0 | Status: SHIPPED | OUTPATIENT
Start: 2022-09-27 | End: 2022-09-27

## 2022-11-08 ENCOUNTER — APPOINTMENT (OUTPATIENT)
Dept: HEMATOLOGY/ONCOLOGY | Facility: HOSPITAL | Age: 67
End: 2022-11-08
Attending: INTERNAL MEDICINE
Payer: MEDICARE

## 2022-11-21 ENCOUNTER — NURSE ONLY (OUTPATIENT)
Dept: HEMATOLOGY/ONCOLOGY | Facility: HOSPITAL | Age: 67
End: 2022-11-21
Attending: INTERNAL MEDICINE
Payer: MEDICARE

## 2022-11-21 ENCOUNTER — OFFICE VISIT (OUTPATIENT)
Dept: HEMATOLOGY/ONCOLOGY | Facility: HOSPITAL | Age: 67
End: 2022-11-21
Attending: INTERNAL MEDICINE

## 2022-11-21 VITALS
TEMPERATURE: 99 F | HEIGHT: 64 IN | BODY MASS INDEX: 32.61 KG/M2 | HEART RATE: 101 BPM | RESPIRATION RATE: 16 BRPM | OXYGEN SATURATION: 95 % | WEIGHT: 191 LBS | DIASTOLIC BLOOD PRESSURE: 86 MMHG | SYSTOLIC BLOOD PRESSURE: 140 MMHG

## 2022-11-21 DIAGNOSIS — Z51.81 VISIT FOR MONITORING ARIMIDEX THERAPY: Primary | ICD-10-CM

## 2022-11-21 DIAGNOSIS — Z17.0 MALIGNANT NEOPLASM OF OVERLAPPING SITES OF LEFT BREAST IN FEMALE, ESTROGEN RECEPTOR POSITIVE (HCC): ICD-10-CM

## 2022-11-21 DIAGNOSIS — Z79.811 VISIT FOR MONITORING ARIMIDEX THERAPY: Primary | ICD-10-CM

## 2022-11-21 DIAGNOSIS — Z51.81 VISIT FOR MONITORING ARIMIDEX THERAPY: ICD-10-CM

## 2022-11-21 DIAGNOSIS — R79.89 LFTS ABNORMAL: ICD-10-CM

## 2022-11-21 DIAGNOSIS — Z79.811 VISIT FOR MONITORING ARIMIDEX THERAPY: ICD-10-CM

## 2022-11-21 DIAGNOSIS — C50.812 MALIGNANT NEOPLASM OF OVERLAPPING SITES OF LEFT BREAST IN FEMALE, ESTROGEN RECEPTOR POSITIVE (HCC): ICD-10-CM

## 2022-11-21 LAB
ALBUMIN SERPL-MCNC: 3.7 G/DL (ref 3.4–5)
ALBUMIN/GLOB SERPL: 0.8 {RATIO} (ref 1–2)
ALP LIVER SERPL-CCNC: 94 U/L
ALT SERPL-CCNC: 103 U/L
ANION GAP SERPL CALC-SCNC: 6 MMOL/L (ref 0–18)
AST SERPL-CCNC: 55 U/L (ref 15–37)
BASOPHILS # BLD AUTO: 0.05 X10(3) UL (ref 0–0.2)
BASOPHILS NFR BLD AUTO: 0.7 %
BILIRUB SERPL-MCNC: 0.7 MG/DL (ref 0.1–2)
BUN BLD-MCNC: 14 MG/DL (ref 7–18)
BUN/CREAT SERPL: 16.7 (ref 10–20)
CALCIUM BLD-MCNC: 9.3 MG/DL (ref 8.5–10.1)
CHLORIDE SERPL-SCNC: 104 MMOL/L (ref 98–112)
CO2 SERPL-SCNC: 30 MMOL/L (ref 21–32)
CREAT BLD-MCNC: 0.84 MG/DL
DEPRECATED RDW RBC AUTO: 39 FL (ref 35.1–46.3)
EOSINOPHIL # BLD AUTO: 0.11 X10(3) UL (ref 0–0.7)
EOSINOPHIL NFR BLD AUTO: 1.6 %
ERYTHROCYTE [DISTWIDTH] IN BLOOD BY AUTOMATED COUNT: 11.6 % (ref 11–15)
FASTING STATUS PATIENT QL REPORTED: NO
GFR SERPLBLD BASED ON 1.73 SQ M-ARVRAT: 77 ML/MIN/1.73M2 (ref 60–?)
GLOBULIN PLAS-MCNC: 4.6 G/DL (ref 2.8–4.4)
GLUCOSE BLD-MCNC: 92 MG/DL (ref 70–99)
HCT VFR BLD AUTO: 44.8 %
HGB BLD-MCNC: 15.5 G/DL
IMM GRANULOCYTES # BLD AUTO: 0.02 X10(3) UL (ref 0–1)
IMM GRANULOCYTES NFR BLD: 0.3 %
LYMPHOCYTES # BLD AUTO: 1.63 X10(3) UL (ref 1–4)
LYMPHOCYTES NFR BLD AUTO: 24.1 %
MCH RBC QN AUTO: 31.7 PG (ref 26–34)
MCHC RBC AUTO-ENTMCNC: 34.6 G/DL (ref 31–37)
MCV RBC AUTO: 91.6 FL
MONOCYTES # BLD AUTO: 0.6 X10(3) UL (ref 0.1–1)
MONOCYTES NFR BLD AUTO: 8.9 %
NEUTROPHILS # BLD AUTO: 4.36 X10 (3) UL (ref 1.5–7.7)
NEUTROPHILS # BLD AUTO: 4.36 X10(3) UL (ref 1.5–7.7)
NEUTROPHILS NFR BLD AUTO: 64.4 %
OSMOLALITY SERPL CALC.SUM OF ELEC: 290 MOSM/KG (ref 275–295)
PLATELET # BLD AUTO: 207 10(3)UL (ref 150–450)
POTASSIUM SERPL-SCNC: 4 MMOL/L (ref 3.5–5.1)
PROT SERPL-MCNC: 8.3 G/DL (ref 6.4–8.2)
RBC # BLD AUTO: 4.89 X10(6)UL
SODIUM SERPL-SCNC: 140 MMOL/L (ref 136–145)
WBC # BLD AUTO: 6.8 X10(3) UL (ref 4–11)

## 2022-11-21 PROCEDURE — 99214 OFFICE O/P EST MOD 30 MIN: CPT | Performed by: INTERNAL MEDICINE

## 2022-11-21 PROCEDURE — 80053 COMPREHEN METABOLIC PANEL: CPT

## 2022-11-21 PROCEDURE — 85025 COMPLETE CBC W/AUTO DIFF WBC: CPT

## 2022-11-21 PROCEDURE — 36415 COLL VENOUS BLD VENIPUNCTURE: CPT

## 2022-11-21 RX ORDER — DULOXETIN HYDROCHLORIDE 20 MG/1
20 CAPSULE, DELAYED RELEASE ORAL DAILY
Qty: 30 CAPSULE | Refills: 3 | Status: SHIPPED | OUTPATIENT
Start: 2022-11-21 | End: 2022-12-21

## 2022-12-05 ENCOUNTER — OFFICE VISIT (OUTPATIENT)
Dept: FAMILY MEDICINE CLINIC | Facility: CLINIC | Age: 67
End: 2022-12-05
Payer: MEDICARE

## 2022-12-05 VITALS
OXYGEN SATURATION: 97 % | HEART RATE: 110 BPM | WEIGHT: 189 LBS | RESPIRATION RATE: 16 BRPM | HEIGHT: 64 IN | DIASTOLIC BLOOD PRESSURE: 86 MMHG | SYSTOLIC BLOOD PRESSURE: 148 MMHG | BODY MASS INDEX: 32.27 KG/M2

## 2022-12-05 DIAGNOSIS — F41.9 ANXIETY: ICD-10-CM

## 2022-12-05 DIAGNOSIS — Z17.0 MALIGNANT NEOPLASM OF OVERLAPPING SITES OF LEFT BREAST IN FEMALE, ESTROGEN RECEPTOR POSITIVE (HCC): ICD-10-CM

## 2022-12-05 DIAGNOSIS — C50.812 MALIGNANT NEOPLASM OF OVERLAPPING SITES OF LEFT BREAST IN FEMALE, ESTROGEN RECEPTOR POSITIVE (HCC): ICD-10-CM

## 2022-12-05 DIAGNOSIS — J32.0 LEFT MAXILLARY SINUSITIS: Primary | ICD-10-CM

## 2022-12-05 DIAGNOSIS — H81.10 BENIGN PAROXYSMAL POSITIONAL VERTIGO, UNSPECIFIED LATERALITY: ICD-10-CM

## 2022-12-05 PROCEDURE — 99203 OFFICE O/P NEW LOW 30 MIN: CPT | Performed by: STUDENT IN AN ORGANIZED HEALTH CARE EDUCATION/TRAINING PROGRAM

## 2022-12-05 RX ORDER — AMOXICILLIN AND CLAVULANATE POTASSIUM 875; 125 MG/1; MG/1
1 TABLET, FILM COATED ORAL 2 TIMES DAILY
Qty: 20 TABLET | Refills: 0 | Status: SHIPPED | OUTPATIENT
Start: 2022-12-05 | End: 2022-12-15

## 2023-01-18 RX ORDER — LETROZOLE 2.5 MG/1
TABLET, FILM COATED ORAL
Qty: 90 TABLET | Refills: 0 | Status: SHIPPED | OUTPATIENT
Start: 2023-01-18

## 2023-02-09 RX ORDER — LORATADINE 10 MG/1
TABLET ORAL
Qty: 90 TABLET | Refills: 3 | Status: SHIPPED | OUTPATIENT
Start: 2023-02-09

## 2023-02-21 ENCOUNTER — OFFICE VISIT (OUTPATIENT)
Dept: HEMATOLOGY/ONCOLOGY | Facility: HOSPITAL | Age: 68
End: 2023-02-21
Attending: INTERNAL MEDICINE
Payer: MEDICARE

## 2023-02-21 VITALS
HEART RATE: 114 BPM | RESPIRATION RATE: 20 BRPM | OXYGEN SATURATION: 95 % | HEIGHT: 64 IN | TEMPERATURE: 98 F | WEIGHT: 187.81 LBS | DIASTOLIC BLOOD PRESSURE: 89 MMHG | SYSTOLIC BLOOD PRESSURE: 161 MMHG | BODY MASS INDEX: 32.06 KG/M2

## 2023-02-21 DIAGNOSIS — C50.812 MALIGNANT NEOPLASM OF OVERLAPPING SITES OF LEFT BREAST IN FEMALE, ESTROGEN RECEPTOR POSITIVE (HCC): ICD-10-CM

## 2023-02-21 DIAGNOSIS — Z17.0 MALIGNANT NEOPLASM OF OVERLAPPING SITES OF LEFT BREAST IN FEMALE, ESTROGEN RECEPTOR POSITIVE (HCC): ICD-10-CM

## 2023-02-21 DIAGNOSIS — Z51.81 VISIT FOR MONITORING ARIMIDEX THERAPY: Primary | ICD-10-CM

## 2023-02-21 DIAGNOSIS — Z79.811 VISIT FOR MONITORING ARIMIDEX THERAPY: Primary | ICD-10-CM

## 2023-02-21 PROCEDURE — 99214 OFFICE O/P EST MOD 30 MIN: CPT | Performed by: INTERNAL MEDICINE

## 2023-02-21 RX ORDER — DULOXETIN HYDROCHLORIDE 20 MG/1
20 CAPSULE, DELAYED RELEASE ORAL DAILY
Qty: 30 CAPSULE | Refills: 3 | Status: SHIPPED | OUTPATIENT
Start: 2023-02-21 | End: 2023-03-23

## 2023-02-21 RX ORDER — DULOXETIN HYDROCHLORIDE 20 MG/1
CAPSULE, DELAYED RELEASE ORAL
COMMUNITY
Start: 2022-12-28 | End: 2023-02-21

## 2023-05-23 ENCOUNTER — OFFICE VISIT (OUTPATIENT)
Dept: HEMATOLOGY/ONCOLOGY | Facility: HOSPITAL | Age: 68
End: 2023-05-23
Attending: INTERNAL MEDICINE
Payer: MEDICARE

## 2023-05-23 VITALS
SYSTOLIC BLOOD PRESSURE: 145 MMHG | TEMPERATURE: 99 F | WEIGHT: 185 LBS | HEIGHT: 64 IN | BODY MASS INDEX: 31.58 KG/M2 | HEART RATE: 99 BPM | DIASTOLIC BLOOD PRESSURE: 86 MMHG | RESPIRATION RATE: 18 BRPM | OXYGEN SATURATION: 98 %

## 2023-05-23 DIAGNOSIS — Z51.81 VISIT FOR MONITORING ARIMIDEX THERAPY: ICD-10-CM

## 2023-05-23 DIAGNOSIS — Z51.81 VISIT FOR MONITORING ARIMIDEX THERAPY: Primary | ICD-10-CM

## 2023-05-23 DIAGNOSIS — C50.812 MALIGNANT NEOPLASM OF OVERLAPPING SITES OF LEFT BREAST IN FEMALE, ESTROGEN RECEPTOR POSITIVE (HCC): ICD-10-CM

## 2023-05-23 DIAGNOSIS — Z79.811 VISIT FOR MONITORING ARIMIDEX THERAPY: ICD-10-CM

## 2023-05-23 DIAGNOSIS — Z17.0 MALIGNANT NEOPLASM OF OVERLAPPING SITES OF LEFT BREAST IN FEMALE, ESTROGEN RECEPTOR POSITIVE (HCC): ICD-10-CM

## 2023-05-23 DIAGNOSIS — Z79.811 VISIT FOR MONITORING ARIMIDEX THERAPY: Primary | ICD-10-CM

## 2023-05-23 LAB
ALBUMIN SERPL-MCNC: 3.7 G/DL (ref 3.4–5)
ALBUMIN/GLOB SERPL: 0.9 {RATIO} (ref 1–2)
ALP LIVER SERPL-CCNC: 84 U/L
ALT SERPL-CCNC: 94 U/L
ANION GAP SERPL CALC-SCNC: 6 MMOL/L (ref 0–18)
AST SERPL-CCNC: 58 U/L (ref 15–37)
BASOPHILS # BLD AUTO: 0.07 X10(3) UL (ref 0–0.2)
BASOPHILS NFR BLD AUTO: 1.2 %
BILIRUB SERPL-MCNC: 0.5 MG/DL (ref 0.1–2)
BUN BLD-MCNC: 16 MG/DL (ref 7–18)
BUN/CREAT SERPL: 24.2 (ref 10–20)
CALCIUM BLD-MCNC: 9.4 MG/DL (ref 8.5–10.1)
CHLORIDE SERPL-SCNC: 105 MMOL/L (ref 98–112)
CO2 SERPL-SCNC: 28 MMOL/L (ref 21–32)
CREAT BLD-MCNC: 0.66 MG/DL
DEPRECATED RDW RBC AUTO: 40.4 FL (ref 35.1–46.3)
EOSINOPHIL # BLD AUTO: 0.12 X10(3) UL (ref 0–0.7)
EOSINOPHIL NFR BLD AUTO: 2.1 %
ERYTHROCYTE [DISTWIDTH] IN BLOOD BY AUTOMATED COUNT: 12 % (ref 11–15)
GFR SERPLBLD BASED ON 1.73 SQ M-ARVRAT: 97 ML/MIN/1.73M2 (ref 60–?)
GLOBULIN PLAS-MCNC: 4.2 G/DL (ref 2.8–4.4)
GLUCOSE BLD-MCNC: 107 MG/DL (ref 70–99)
HCT VFR BLD AUTO: 42.3 %
HGB BLD-MCNC: 14.4 G/DL
IMM GRANULOCYTES # BLD AUTO: 0.01 X10(3) UL (ref 0–1)
IMM GRANULOCYTES NFR BLD: 0.2 %
IRON SATN MFR SERPL: 28 %
IRON SERPL-MCNC: 105 UG/DL
LYMPHOCYTES # BLD AUTO: 1.8 X10(3) UL (ref 1–4)
LYMPHOCYTES NFR BLD AUTO: 31.5 %
MCH RBC QN AUTO: 31.4 PG (ref 26–34)
MCHC RBC AUTO-ENTMCNC: 34 G/DL (ref 31–37)
MCV RBC AUTO: 92.2 FL
MONOCYTES # BLD AUTO: 0.56 X10(3) UL (ref 0.1–1)
MONOCYTES NFR BLD AUTO: 9.8 %
NEUTROPHILS # BLD AUTO: 3.15 X10 (3) UL (ref 1.5–7.7)
NEUTROPHILS # BLD AUTO: 3.15 X10(3) UL (ref 1.5–7.7)
NEUTROPHILS NFR BLD AUTO: 55.2 %
OSMOLALITY SERPL CALC.SUM OF ELEC: 290 MOSM/KG (ref 275–295)
PLATELET # BLD AUTO: 230 10(3)UL (ref 150–450)
POTASSIUM SERPL-SCNC: 4.4 MMOL/L (ref 3.5–5.1)
PROT SERPL-MCNC: 7.9 G/DL (ref 6.4–8.2)
RBC # BLD AUTO: 4.59 X10(6)UL
SODIUM SERPL-SCNC: 139 MMOL/L (ref 136–145)
TIBC SERPL-MCNC: 370 UG/DL (ref 240–450)
TRANSFERRIN SERPL-MCNC: 248 MG/DL (ref 200–360)
WBC # BLD AUTO: 5.7 X10(3) UL (ref 4–11)

## 2023-05-23 PROCEDURE — 80053 COMPREHEN METABOLIC PANEL: CPT

## 2023-05-23 PROCEDURE — 36415 COLL VENOUS BLD VENIPUNCTURE: CPT

## 2023-05-23 PROCEDURE — 85025 COMPLETE CBC W/AUTO DIFF WBC: CPT

## 2023-05-23 PROCEDURE — 83540 ASSAY OF IRON: CPT

## 2023-05-23 PROCEDURE — 84466 ASSAY OF TRANSFERRIN: CPT

## 2023-05-23 PROCEDURE — 99214 OFFICE O/P EST MOD 30 MIN: CPT | Performed by: INTERNAL MEDICINE

## 2023-05-23 RX ORDER — DULOXETIN HYDROCHLORIDE 20 MG/1
CAPSULE, DELAYED RELEASE ORAL
COMMUNITY
Start: 2023-05-05

## 2023-06-13 ENCOUNTER — TELEPHONE (OUTPATIENT)
Dept: HEMATOLOGY/ONCOLOGY | Facility: HOSPITAL | Age: 68
End: 2023-06-13

## 2023-06-13 RX ORDER — DULOXETIN HYDROCHLORIDE 30 MG/1
30 CAPSULE, DELAYED RELEASE ORAL DAILY
Qty: 30 CAPSULE | Refills: 3 | Status: SHIPPED | OUTPATIENT
Start: 2023-06-13

## 2023-06-13 NOTE — TELEPHONE ENCOUNTER
Patient called regarding Duloxetine dosage. She said she needs to switch the dosage to 30 mg. Please call her back to discuss. Thank you.

## 2023-06-13 NOTE — TELEPHONE ENCOUNTER
Spoke with Nafisa Logan. She has tried to do the 40 mg of duloxetine for 2 weeks now and all she wants to do is sleep. She stated that it helped with the depression, but every time she sits down, she wants to go right to sleep. She would like to try the 30 mg duloxetine to see if it's any better. Kristen has sent over the prescription. Will call with any issues.

## 2023-06-29 ENCOUNTER — OFFICE VISIT (OUTPATIENT)
Dept: FAMILY MEDICINE CLINIC | Facility: CLINIC | Age: 68
End: 2023-06-29

## 2023-06-29 VITALS
BODY MASS INDEX: 31.58 KG/M2 | HEART RATE: 118 BPM | WEIGHT: 185 LBS | DIASTOLIC BLOOD PRESSURE: 88 MMHG | OXYGEN SATURATION: 97 % | SYSTOLIC BLOOD PRESSURE: 145 MMHG | TEMPERATURE: 99 F | HEIGHT: 64 IN

## 2023-06-29 DIAGNOSIS — R22.41 LOCALIZED SWELLING OF RIGHT FOOT: ICD-10-CM

## 2023-06-29 DIAGNOSIS — G57.01 PIRIFORMIS SYNDROME, RIGHT: Primary | ICD-10-CM

## 2023-06-29 PROCEDURE — 99213 OFFICE O/P EST LOW 20 MIN: CPT | Performed by: STUDENT IN AN ORGANIZED HEALTH CARE EDUCATION/TRAINING PROGRAM

## 2023-06-29 PROCEDURE — 1125F AMNT PAIN NOTED PAIN PRSNT: CPT | Performed by: STUDENT IN AN ORGANIZED HEALTH CARE EDUCATION/TRAINING PROGRAM

## 2023-06-29 RX ORDER — METHOCARBAMOL 500 MG/1
500 TABLET, FILM COATED ORAL 4 TIMES DAILY PRN
Qty: 30 TABLET | Refills: 1 | Status: SHIPPED | OUTPATIENT
Start: 2023-06-29

## 2023-06-29 RX ORDER — PREDNISONE 20 MG/1
40 TABLET ORAL DAILY
Qty: 10 TABLET | Refills: 0 | Status: SHIPPED | OUTPATIENT
Start: 2023-06-29 | End: 2023-07-04

## 2023-08-01 ENCOUNTER — HOSPITAL ENCOUNTER (OUTPATIENT)
Dept: GENERAL RADIOLOGY | Age: 68
Discharge: HOME | End: 2023-08-01
Attending: STUDENT IN AN ORGANIZED HEALTH CARE EDUCATION/TRAINING PROGRAM
Payer: MEDICARE

## 2023-08-01 DIAGNOSIS — R22.41 LOCALIZED SWELLING OF RIGHT FOOT: ICD-10-CM

## 2023-08-01 PROCEDURE — 73630 X-RAY EXAM OF FOOT: CPT | Performed by: STUDENT IN AN ORGANIZED HEALTH CARE EDUCATION/TRAINING PROGRAM

## 2023-09-07 ENCOUNTER — TELEPHONE (OUTPATIENT)
Dept: FAMILY MEDICINE CLINIC | Facility: CLINIC | Age: 68
End: 2023-09-07

## 2023-10-07 NOTE — PROGRESS NOTES
Pt here for C2D1 AC. Arrives Ambulating independently, accompanied by Anayeli Denis         Patient denies possible pregnancy since last therapy cycle: Not Applicable    Modifications in dose or schedule: No.      Patient received post lab and MD visit.  Milena Mosher calendar of future appointments, times adjusted for her work schedule. Pt aware to return tomorrow for Udenyca injection, she already takes daily Claritin for allergies and states this has helped with the bone pain. No

## 2023-10-16 ENCOUNTER — TELEPHONE (OUTPATIENT)
Dept: HEMATOLOGY/ONCOLOGY | Facility: HOSPITAL | Age: 68
End: 2023-10-16

## 2023-10-16 RX ORDER — DULOXETIN HYDROCHLORIDE 30 MG/1
30 CAPSULE, DELAYED RELEASE ORAL DAILY
Qty: 30 CAPSULE | Refills: 3 | Status: SHIPPED | OUTPATIENT
Start: 2023-10-16 | End: 2023-10-16

## 2023-10-16 RX ORDER — DULOXETIN HYDROCHLORIDE 30 MG/1
30 CAPSULE, DELAYED RELEASE ORAL DAILY
Qty: 30 CAPSULE | Refills: 3 | Status: SHIPPED | OUTPATIENT
Start: 2023-10-16

## 2023-10-16 NOTE — TELEPHONE ENCOUNTER
Maxx Santillan [phone] refill on the following medication  has been sent out DULOXETINE 30 MG Oral Cap DR [employer]icles .  Thanks [employer]

## 2023-10-24 ENCOUNTER — OFFICE VISIT (OUTPATIENT)
Dept: HEMATOLOGY/ONCOLOGY | Facility: HOSPITAL | Age: 68
End: 2023-10-24
Attending: PHYSICIAN ASSISTANT

## 2023-10-24 VITALS
HEIGHT: 64.02 IN | TEMPERATURE: 98 F | RESPIRATION RATE: 18 BRPM | BODY MASS INDEX: 32.74 KG/M2 | SYSTOLIC BLOOD PRESSURE: 147 MMHG | WEIGHT: 191.81 LBS | OXYGEN SATURATION: 96 % | HEART RATE: 111 BPM | DIASTOLIC BLOOD PRESSURE: 90 MMHG

## 2023-10-24 DIAGNOSIS — M85.80 OSTEOPENIA, UNSPECIFIED LOCATION: ICD-10-CM

## 2023-10-24 DIAGNOSIS — Z51.81 VISIT FOR MONITORING ARIMIDEX THERAPY: ICD-10-CM

## 2023-10-24 DIAGNOSIS — Z79.811 LONG TERM (CURRENT) USE OF AROMATASE INHIBITORS: ICD-10-CM

## 2023-10-24 DIAGNOSIS — C50.812 MALIGNANT NEOPLASM OF OVERLAPPING SITES OF LEFT BREAST IN FEMALE, ESTROGEN RECEPTOR POSITIVE: Primary | ICD-10-CM

## 2023-10-24 DIAGNOSIS — Z17.0 MALIGNANT NEOPLASM OF OVERLAPPING SITES OF LEFT BREAST IN FEMALE, ESTROGEN RECEPTOR POSITIVE: Primary | ICD-10-CM

## 2023-10-24 DIAGNOSIS — Z79.811 VISIT FOR MONITORING ARIMIDEX THERAPY: ICD-10-CM

## 2023-10-24 LAB
ALBUMIN SERPL-MCNC: 3.7 G/DL (ref 3.4–5)
ALBUMIN/GLOB SERPL: 0.8 {RATIO} (ref 1–2)
ALP LIVER SERPL-CCNC: 100 U/L
ALT SERPL-CCNC: 113 U/L
ANION GAP SERPL CALC-SCNC: 5 MMOL/L (ref 0–18)
AST SERPL-CCNC: 73 U/L (ref 15–37)
BASOPHILS # BLD AUTO: 0.06 X10(3) UL (ref 0–0.2)
BASOPHILS NFR BLD AUTO: 1 %
BILIRUB SERPL-MCNC: 0.5 MG/DL (ref 0.1–2)
BUN BLD-MCNC: 18 MG/DL (ref 7–18)
BUN/CREAT SERPL: 22.5 (ref 10–20)
CALCIUM BLD-MCNC: 9.2 MG/DL (ref 8.5–10.1)
CHLORIDE SERPL-SCNC: 104 MMOL/L (ref 98–112)
CO2 SERPL-SCNC: 30 MMOL/L (ref 21–32)
CREAT BLD-MCNC: 0.8 MG/DL
DEPRECATED RDW RBC AUTO: 40 FL (ref 35.1–46.3)
EGFRCR SERPLBLD CKD-EPI 2021: 81 ML/MIN/1.73M2 (ref 60–?)
EOSINOPHIL # BLD AUTO: 0.11 X10(3) UL (ref 0–0.7)
EOSINOPHIL NFR BLD AUTO: 1.8 %
ERYTHROCYTE [DISTWIDTH] IN BLOOD BY AUTOMATED COUNT: 11.8 % (ref 11–15)
GLOBULIN PLAS-MCNC: 4.4 G/DL (ref 2.8–4.4)
GLUCOSE BLD-MCNC: 181 MG/DL (ref 70–99)
HCT VFR BLD AUTO: 43.3 %
HGB BLD-MCNC: 14.9 G/DL
IMM GRANULOCYTES # BLD AUTO: 0.02 X10(3) UL (ref 0–1)
IMM GRANULOCYTES NFR BLD: 0.3 %
LYMPHOCYTES # BLD AUTO: 1.84 X10(3) UL (ref 1–4)
LYMPHOCYTES NFR BLD AUTO: 30.7 %
MCH RBC QN AUTO: 31.6 PG (ref 26–34)
MCHC RBC AUTO-ENTMCNC: 34.4 G/DL (ref 31–37)
MCV RBC AUTO: 91.9 FL
MONOCYTES # BLD AUTO: 0.4 X10(3) UL (ref 0.1–1)
MONOCYTES NFR BLD AUTO: 6.7 %
NEUTROPHILS # BLD AUTO: 3.56 X10 (3) UL (ref 1.5–7.7)
NEUTROPHILS # BLD AUTO: 3.56 X10(3) UL (ref 1.5–7.7)
NEUTROPHILS NFR BLD AUTO: 59.5 %
OSMOLALITY SERPL CALC.SUM OF ELEC: 294 MOSM/KG (ref 275–295)
PLATELET # BLD AUTO: 228 10(3)UL (ref 150–450)
POTASSIUM SERPL-SCNC: 4.4 MMOL/L (ref 3.5–5.1)
PROT SERPL-MCNC: 8.1 G/DL (ref 6.4–8.2)
RBC # BLD AUTO: 4.71 X10(6)UL
SODIUM SERPL-SCNC: 139 MMOL/L (ref 136–145)
WBC # BLD AUTO: 6 X10(3) UL (ref 4–11)

## 2023-10-24 PROCEDURE — 99214 OFFICE O/P EST MOD 30 MIN: CPT | Performed by: PHYSICIAN ASSISTANT

## 2023-10-24 PROCEDURE — 36415 COLL VENOUS BLD VENIPUNCTURE: CPT

## 2023-10-24 PROCEDURE — 80053 COMPREHEN METABOLIC PANEL: CPT

## 2023-10-24 PROCEDURE — 85025 COMPLETE CBC W/AUTO DIFF WBC: CPT

## 2023-10-24 RX ORDER — ANASTROZOLE 1 MG/1
1 TABLET ORAL DAILY
Qty: 90 TABLET | Refills: 1 | Status: SHIPPED | OUTPATIENT
Start: 2023-10-24

## 2023-10-24 NOTE — PROGRESS NOTES
Cancer Center Progress Note    Patient Name: Karlee Weathers   YOB: 1955   Medical Record Number: [id number]     Chief Complaint:  Breast cancer  AI monitoring    Oncology History:  79year old presented with left breast mass, mammogram with 6cm span of calcs s/p B/l mastectomies 2019: left IDC  + SN with THERESE s/p axillary dissection  PT3N1a. Right breast negative. Mammaprint: luminal B, recurrence at 29%  10/2019- 2020: Adjuvant AC taxol and PMRT  3/2020: started  arimidex and switched to exemestane then letrozole due to intolerance     10/24/23  Interim HPI:  Here for AI monitoring. On cymbalta 30mg with some improvement in joint aches. Gave a drug holiday with performances. Will take about 2-3 weeks off during this duration. R leg sciatica pain. Better w/ PT. Interested in seeing ortho. Needs fu with pcp. Depression- better as well. PMH/PSH: Anxiety. Bilateral mastectomies  Family History: Negative for cancer  Social History: ,   of cancer. 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 , Disp: 30 capsule, Rfl: 3    letrozole 2.5 MG Oral Tab, Take 1 tablet (2.5 mg total) by mouth daily. , Disp: 90 tablet, Rfl: 0    methocarbamol 500 MG Oral Tab, Take 1 tablet (500 mg total) by mouth 4 (four) times daily as needed (pain). , Disp: 30 tablet, Rfl: 1    LORATADINE 10 MG Oral Tab, TAKE 1 TABLET BY MOUTH DAILY, Disp: 90 tablet, Rfl: 3    GLUCOSAMINE-CHONDROITIN OR, Take 1 tablet by mouth daily. , Disp: , Rfl:     Multiple Vitamins-Minerals (OCUVITE ADULT 50+) Oral Cap, Take 1 capsule by mouth daily. centrum, Disp: , Rfl:     Biotin w/ Vitamins C & E (HAIR/SKIN/NAILS OR), Take 1 tablet by mouth daily. , Disp: , Rfl:     Calcium 500-125 MG-UNIT Oral Tab, Take by mouth., Disp: , Rfl:     VITAMIN D, CHOLECALCIFEROL, OR, Take by mouth., Disp: , Rfl:     Bioflavonoid Products (AZIZA C OR), Take 1 tablet by mouth daily. , Disp: , Rfl:     No Known Allergies     Review of Systems: Onc ROS negative besides as per HPI    There were no vitals filed for this visit. Wt Readings from Last 6 Encounters:  06/29/23 : 83.9 kg (185 lb)  05/23/23 : 83.9 kg (185 lb)  02/21/23 : 85.2 kg (187 lb 12.8 oz)  12/05/22 : 85.7 kg (189 lb)  11/21/22 : 86.6 kg (191 lb)  09/27/22 : 88.7 kg (195 lb 9.6 oz)  General: Patient is alert and oriented x 3, not in acute distress. HEENT:no palor  Chest:  nonlabored breathing, symmetric expansion. Breast: bilateral mastectomies. Normal bilateral axilla. Extremities: no edema or cyanosis  Neurological: motor strength grossly intact,   Psych: appropriate mood and affect      Impression and Plan:   Cancer Staging   Malignant neoplasm of overlapping sites of left breast in female, estrogen receptor positive   Staging form: Breast, AJCC 8th Edition  - Clinical stage from 6/20/2019: Stage IIA (cT3(2), cN0, cM0, G1, ER+, SD+, HER2-) - Signed by Bee Phillips MD on 9/5/2019  - Pathologic stage from 8/9/2019: Stage IB (pT3, pN1a(sn), cM0, G2, ER+, SD+, HER2-) - Signed by Petr Gramajo, APRN on 9/23/2019  s/p bilateral mastectomies, chemotherapy and PMRT 2019  - Exemestane continued 3/2020- 8/2022, currently on letrozole with cymbalta, able to toelrate with drug holidays about twice a year. Continue with Cymbalta 30mg daily. - consider trying anastrazole again - script sent - if not - cont w/ letrozole. Bone health- Osteopenia, severe. Recommend zometa, patient is hesitant, continue vit D 4-5000 units. Repeat dexa ordered. Insomnia- cymbalta take at night. Pancreatic mass  -MRCP  noted for a separate LN, no mass. Repeat negative. - CLN: repeat in 3 years (2024)    Mildly elevated LFTs- remain at G1, [address] with fatty liver.  Fu with pcp - consider lipid panel     MDM: Mod,  drug management, chronic illness with side effect of treatment     Vee Bueno PA-C

## 2023-10-31 ENCOUNTER — TELEPHONE (OUTPATIENT)
Dept: FAMILY MEDICINE CLINIC | Facility: CLINIC | Age: 68
End: 2023-10-31

## 2023-11-15 ENCOUNTER — OFFICE VISIT (OUTPATIENT)
Dept: FAMILY MEDICINE CLINIC | Facility: CLINIC | Age: 68
End: 2023-11-15
Payer: MEDICARE

## 2023-11-15 VITALS
BODY MASS INDEX: 32.47 KG/M2 | SYSTOLIC BLOOD PRESSURE: 143 MMHG | HEART RATE: 110 BPM | WEIGHT: 190.19 LBS | TEMPERATURE: 99 F | OXYGEN SATURATION: 96 % | HEIGHT: 64 IN | DIASTOLIC BLOOD PRESSURE: 96 MMHG

## 2023-11-15 DIAGNOSIS — Z11.59 NEED FOR HEPATITIS C SCREENING TEST: ICD-10-CM

## 2023-11-15 DIAGNOSIS — Z23 IMMUNIZATION DUE: ICD-10-CM

## 2023-11-15 DIAGNOSIS — G62.0 CHEMOTHERAPY-INDUCED PERIPHERAL NEUROPATHY: ICD-10-CM

## 2023-11-15 DIAGNOSIS — R03.0 ELEVATED BLOOD PRESSURE READING: ICD-10-CM

## 2023-11-15 DIAGNOSIS — Z00.00 ENCOUNTER FOR ANNUAL HEALTH EXAMINATION: Primary | ICD-10-CM

## 2023-11-15 DIAGNOSIS — T45.1X5A CHEMOTHERAPY-INDUCED PERIPHERAL NEUROPATHY: ICD-10-CM

## 2023-11-15 DIAGNOSIS — Z13.29 SCREENING FOR ENDOCRINE, METABOLIC AND IMMUNITY DISORDER: ICD-10-CM

## 2023-11-15 DIAGNOSIS — Z17.0 MALIGNANT NEOPLASM OF OVERLAPPING SITES OF LEFT BREAST IN FEMALE, ESTROGEN RECEPTOR POSITIVE: ICD-10-CM

## 2023-11-15 DIAGNOSIS — Z13.228 SCREENING FOR ENDOCRINE, METABOLIC AND IMMUNITY DISORDER: ICD-10-CM

## 2023-11-15 DIAGNOSIS — C50.812 MALIGNANT NEOPLASM OF OVERLAPPING SITES OF LEFT BREAST IN FEMALE, ESTROGEN RECEPTOR POSITIVE: ICD-10-CM

## 2023-11-15 DIAGNOSIS — M25.552 CHRONIC LEFT HIP PAIN: ICD-10-CM

## 2023-11-15 DIAGNOSIS — Z13.0 SCREENING FOR ENDOCRINE, METABOLIC AND IMMUNITY DISORDER: ICD-10-CM

## 2023-11-15 DIAGNOSIS — F33.9 DEPRESSION, RECURRENT (HCC): ICD-10-CM

## 2023-11-15 DIAGNOSIS — Z13.6 SCREENING FOR CARDIOVASCULAR CONDITION: ICD-10-CM

## 2023-11-15 DIAGNOSIS — G89.29 CHRONIC LEFT HIP PAIN: ICD-10-CM

## 2023-11-15 PROCEDURE — 99497 ADVNCD CARE PLAN 30 MIN: CPT | Performed by: STUDENT IN AN ORGANIZED HEALTH CARE EDUCATION/TRAINING PROGRAM

## 2023-11-15 PROCEDURE — 90677 PCV20 VACCINE IM: CPT | Performed by: STUDENT IN AN ORGANIZED HEALTH CARE EDUCATION/TRAINING PROGRAM

## 2023-11-15 PROCEDURE — G0438 PPPS, INITIAL VISIT: HCPCS | Performed by: STUDENT IN AN ORGANIZED HEALTH CARE EDUCATION/TRAINING PROGRAM

## 2023-11-15 PROCEDURE — 1125F AMNT PAIN NOTED PAIN PRSNT: CPT | Performed by: STUDENT IN AN ORGANIZED HEALTH CARE EDUCATION/TRAINING PROGRAM

## 2023-11-15 PROCEDURE — G0009 ADMIN PNEUMOCOCCAL VACCINE: HCPCS | Performed by: STUDENT IN AN ORGANIZED HEALTH CARE EDUCATION/TRAINING PROGRAM

## 2023-11-15 RX ORDER — LISINOPRIL 10 MG/1
10 TABLET ORAL DAILY
Qty: 90 TABLET | Refills: 3 | Status: SHIPPED | OUTPATIENT
Start: 2023-11-15 | End: 2024-11-09

## 2023-11-17 ENCOUNTER — HOSPITAL ENCOUNTER (OUTPATIENT)
Dept: GENERAL RADIOLOGY | Age: 68
Discharge: HOME | End: 2023-11-17
Attending: STUDENT IN AN ORGANIZED HEALTH CARE EDUCATION/TRAINING PROGRAM
Payer: MEDICARE

## 2023-11-17 DIAGNOSIS — M25.552 CHRONIC LEFT HIP PAIN: ICD-10-CM

## 2023-11-17 DIAGNOSIS — G89.29 CHRONIC LEFT HIP PAIN: ICD-10-CM

## 2023-11-17 PROCEDURE — 73502 X-RAY EXAM HIP UNI 2-3 VIEWS: CPT | Performed by: STUDENT IN AN ORGANIZED HEALTH CARE EDUCATION/TRAINING PROGRAM

## 2023-11-20 ENCOUNTER — LAB ENCOUNTER (OUTPATIENT)
Dept: LAB | Age: 68
End: 2023-11-20
Attending: STUDENT IN AN ORGANIZED HEALTH CARE EDUCATION/TRAINING PROGRAM
Payer: MEDICARE

## 2023-11-20 DIAGNOSIS — Z11.59 NEED FOR HEPATITIS C SCREENING TEST: ICD-10-CM

## 2023-11-20 DIAGNOSIS — Z13.6 SCREENING FOR CARDIOVASCULAR CONDITION: ICD-10-CM

## 2023-11-20 DIAGNOSIS — Z13.228 SCREENING FOR ENDOCRINE, METABOLIC AND IMMUNITY DISORDER: ICD-10-CM

## 2023-11-20 DIAGNOSIS — Z13.29 SCREENING FOR ENDOCRINE, METABOLIC AND IMMUNITY DISORDER: ICD-10-CM

## 2023-11-20 DIAGNOSIS — Z13.0 SCREENING FOR ENDOCRINE, METABOLIC AND IMMUNITY DISORDER: ICD-10-CM

## 2023-11-20 LAB
CHOLEST SERPL-MCNC: 245 MG/DL (ref ?–200)
FASTING PATIENT LIPID ANSWER: YES
HCV AB SERPL QL IA: NONREACTIVE
HDLC SERPL-MCNC: 53 MG/DL (ref 40–59)
LDLC SERPL CALC-MCNC: 168 MG/DL (ref ?–100)
NONHDLC SERPL-MCNC: 192 MG/DL (ref ?–130)
TRIGL SERPL-MCNC: 134 MG/DL (ref 30–149)
TSI SER-ACNC: 1.17 MIU/ML (ref 0.55–4.78)
VLDLC SERPL CALC-MCNC: 27 MG/DL (ref 0–30)

## 2023-11-20 PROCEDURE — 80061 LIPID PANEL: CPT

## 2023-11-20 PROCEDURE — 86803 HEPATITIS C AB TEST: CPT

## 2023-11-20 PROCEDURE — 84443 ASSAY THYROID STIM HORMONE: CPT

## 2023-11-20 PROCEDURE — 36415 COLL VENOUS BLD VENIPUNCTURE: CPT

## 2024-01-09 ENCOUNTER — HOSPITAL ENCOUNTER (OUTPATIENT)
Dept: BONE DENSITY | Facility: HOSPITAL | Age: 69
Discharge: HOME | End: 2024-01-09
Attending: PHYSICIAN ASSISTANT
Payer: MEDICARE

## 2024-01-09 DIAGNOSIS — C50.812 MALIGNANT NEOPLASM OF OVERLAPPING SITES OF LEFT BREAST IN FEMALE, ESTROGEN RECEPTOR POSITIVE  (HCC): ICD-10-CM

## 2024-01-09 DIAGNOSIS — Z79.811 LONG TERM (CURRENT) USE OF AROMATASE INHIBITORS: ICD-10-CM

## 2024-01-09 DIAGNOSIS — M85.80 OSTEOPENIA, UNSPECIFIED LOCATION: ICD-10-CM

## 2024-01-09 DIAGNOSIS — Z17.0 MALIGNANT NEOPLASM OF OVERLAPPING SITES OF LEFT BREAST IN FEMALE, ESTROGEN RECEPTOR POSITIVE  (HCC): ICD-10-CM

## 2024-01-09 PROCEDURE — 77080 DXA BONE DENSITY AXIAL: CPT | Performed by: PHYSICIAN ASSISTANT

## 2024-02-01 RX ORDER — LORATADINE 10 MG/1
10 TABLET ORAL DAILY
Qty: 90 TABLET | Refills: 3 | OUTPATIENT
Start: 2024-02-01

## 2024-02-09 RX ORDER — DULOXETIN HYDROCHLORIDE 30 MG/1
30 CAPSULE, DELAYED RELEASE ORAL DAILY
Qty: 90 CAPSULE | Refills: 1 | Status: SHIPPED | OUTPATIENT
Start: 2024-02-09

## 2024-02-27 ENCOUNTER — NURSE ONLY (OUTPATIENT)
Dept: HEMATOLOGY/ONCOLOGY | Facility: HOSPITAL | Age: 69
End: 2024-02-27
Attending: PHYSICIAN ASSISTANT
Payer: MEDICARE

## 2024-02-27 VITALS
HEART RATE: 91 BPM | DIASTOLIC BLOOD PRESSURE: 68 MMHG | SYSTOLIC BLOOD PRESSURE: 126 MMHG | HEIGHT: 64 IN | RESPIRATION RATE: 18 BRPM | WEIGHT: 189.63 LBS | BODY MASS INDEX: 32.37 KG/M2 | OXYGEN SATURATION: 97 % | TEMPERATURE: 99 F

## 2024-02-27 DIAGNOSIS — Z92.21 HISTORY OF ANTINEOPLASTIC CHEMOTHERAPY: ICD-10-CM

## 2024-02-27 DIAGNOSIS — T45.1X5A CHEMOTHERAPY-INDUCED PERIPHERAL NEUROPATHY (HCC): ICD-10-CM

## 2024-02-27 DIAGNOSIS — Z17.0 MALIGNANT NEOPLASM OF OVERLAPPING SITES OF LEFT BREAST IN FEMALE, ESTROGEN RECEPTOR POSITIVE (HCC): Primary | ICD-10-CM

## 2024-02-27 DIAGNOSIS — C50.812 MALIGNANT NEOPLASM OF OVERLAPPING SITES OF LEFT BREAST IN FEMALE, ESTROGEN RECEPTOR POSITIVE (HCC): ICD-10-CM

## 2024-02-27 DIAGNOSIS — C50.812 MALIGNANT NEOPLASM OF OVERLAPPING SITES OF LEFT BREAST IN FEMALE, ESTROGEN RECEPTOR POSITIVE (HCC): Primary | ICD-10-CM

## 2024-02-27 DIAGNOSIS — T45.1X5A AROMATASE INHIBITOR-ASSOCIATED ARTHRALGIA: ICD-10-CM

## 2024-02-27 DIAGNOSIS — Z90.13 ABSENCE OF BREAST, BILATERAL: ICD-10-CM

## 2024-02-27 DIAGNOSIS — Z79.811 LONG TERM (CURRENT) USE OF AROMATASE INHIBITORS: ICD-10-CM

## 2024-02-27 DIAGNOSIS — M25.50 AROMATASE INHIBITOR-ASSOCIATED ARTHRALGIA: ICD-10-CM

## 2024-02-27 DIAGNOSIS — R79.89 LFTS ABNORMAL: ICD-10-CM

## 2024-02-27 DIAGNOSIS — J30.2 SEASONAL ALLERGIES: ICD-10-CM

## 2024-02-27 DIAGNOSIS — Z17.0 MALIGNANT NEOPLASM OF OVERLAPPING SITES OF LEFT BREAST IN FEMALE, ESTROGEN RECEPTOR POSITIVE (HCC): ICD-10-CM

## 2024-02-27 DIAGNOSIS — F33.9 DEPRESSION, RECURRENT (HCC): ICD-10-CM

## 2024-02-27 DIAGNOSIS — G62.0 CHEMOTHERAPY-INDUCED PERIPHERAL NEUROPATHY (HCC): ICD-10-CM

## 2024-02-27 DIAGNOSIS — M85.80 OSTEOPENIA DUE TO CANCER THERAPY: ICD-10-CM

## 2024-02-27 LAB
ALBUMIN SERPL-MCNC: 4.5 G/DL (ref 3.2–4.8)
ALBUMIN/GLOB SERPL: 1.3 {RATIO} (ref 1–2)
ALP LIVER SERPL-CCNC: 103 U/L
ALT SERPL-CCNC: 90 U/L
ANION GAP SERPL CALC-SCNC: 7 MMOL/L (ref 0–18)
AST SERPL-CCNC: 65 U/L (ref ?–34)
BASOPHILS # BLD AUTO: 0.06 X10(3) UL (ref 0–0.2)
BASOPHILS NFR BLD AUTO: 0.9 %
BILIRUB SERPL-MCNC: 0.7 MG/DL (ref 0.2–1.1)
BUN BLD-MCNC: 15 MG/DL (ref 9–23)
BUN/CREAT SERPL: 22.7 (ref 10–20)
CALCIUM BLD-MCNC: 9.9 MG/DL (ref 8.7–10.4)
CHLORIDE SERPL-SCNC: 103 MMOL/L (ref 98–112)
CO2 SERPL-SCNC: 26 MMOL/L (ref 21–32)
CREAT BLD-MCNC: 0.66 MG/DL
DEPRECATED RDW RBC AUTO: 38.7 FL (ref 35.1–46.3)
EGFRCR SERPLBLD CKD-EPI 2021: 96 ML/MIN/1.73M2 (ref 60–?)
EOSINOPHIL # BLD AUTO: 0.11 X10(3) UL (ref 0–0.7)
EOSINOPHIL NFR BLD AUTO: 1.6 %
ERYTHROCYTE [DISTWIDTH] IN BLOOD BY AUTOMATED COUNT: 11.9 % (ref 11–15)
GLOBULIN PLAS-MCNC: 3.6 G/DL (ref 2.8–4.4)
GLUCOSE BLD-MCNC: 111 MG/DL (ref 70–99)
HCT VFR BLD AUTO: 41.6 %
HGB BLD-MCNC: 14.5 G/DL
IMM GRANULOCYTES # BLD AUTO: 0.01 X10(3) UL (ref 0–1)
IMM GRANULOCYTES NFR BLD: 0.1 %
LYMPHOCYTES # BLD AUTO: 2 X10(3) UL (ref 1–4)
LYMPHOCYTES NFR BLD AUTO: 30 %
MCH RBC QN AUTO: 30.8 PG (ref 26–34)
MCHC RBC AUTO-ENTMCNC: 34.9 G/DL (ref 31–37)
MCV RBC AUTO: 88.3 FL
MONOCYTES # BLD AUTO: 0.55 X10(3) UL (ref 0.1–1)
MONOCYTES NFR BLD AUTO: 8.2 %
NEUTROPHILS # BLD AUTO: 3.94 X10 (3) UL (ref 1.5–7.7)
NEUTROPHILS # BLD AUTO: 3.94 X10(3) UL (ref 1.5–7.7)
NEUTROPHILS NFR BLD AUTO: 59.2 %
OSMOLALITY SERPL CALC.SUM OF ELEC: 284 MOSM/KG (ref 275–295)
PLATELET # BLD AUTO: 225 10(3)UL (ref 150–450)
POTASSIUM SERPL-SCNC: 4.1 MMOL/L (ref 3.5–5.1)
PROT SERPL-MCNC: 8.1 G/DL (ref 5.7–8.2)
RBC # BLD AUTO: 4.71 X10(6)UL
SODIUM SERPL-SCNC: 136 MMOL/L (ref 136–145)
WBC # BLD AUTO: 6.7 X10(3) UL (ref 4–11)

## 2024-02-27 PROCEDURE — 85025 COMPLETE CBC W/AUTO DIFF WBC: CPT

## 2024-02-27 PROCEDURE — 36415 COLL VENOUS BLD VENIPUNCTURE: CPT

## 2024-02-27 PROCEDURE — 80053 COMPREHEN METABOLIC PANEL: CPT

## 2024-02-27 PROCEDURE — G2211 COMPLEX E/M VISIT ADD ON: HCPCS | Performed by: INTERNAL MEDICINE

## 2024-02-27 PROCEDURE — 99214 OFFICE O/P EST MOD 30 MIN: CPT | Performed by: INTERNAL MEDICINE

## 2024-02-27 RX ORDER — LORATADINE 10 MG/1
10 TABLET ORAL DAILY
Qty: 90 TABLET | Refills: 0 | Status: SHIPPED | OUTPATIENT
Start: 2024-02-27

## 2024-02-27 RX ORDER — LETROZOLE 2.5 MG/1
2.5 TABLET, FILM COATED ORAL DAILY
COMMUNITY
End: 2024-02-27 | Stop reason: ALTCHOICE

## 2024-02-27 RX ORDER — EXEMESTANE 25 MG/1
25 TABLET ORAL DAILY
Qty: 90 TABLET | Refills: 3 | Status: SHIPPED | OUTPATIENT
Start: 2024-02-27

## 2024-02-27 NOTE — PROGRESS NOTES
Hematology Oncology Progress Note    Patient Name: Kim Hoffman   YOB: 1955   Medical Record Number: [id number]     Date of Visit: 24     Chief Complaint:  Breast cancer  AI monitoring    Oncologic History:  69 year old presented with palpable left breast mass, mammogram with 6 cm span of calcs  2019: bilateral mastectomies and left SNB: left IDC ER 92%, DE 98%, HER2 1+, KI 26%. + SN with THERESE s/p axillary dissection pT3N1a. Right breast negative. Mammaprint: luminal B, recurrence at 29%  10/2019: adjuvant AC-T chemo completed 2020: completed PMRT  3/2020: started anastrozole --> switched to exemestane --> then letrozole due to intolerance     Interval History:  Here for 6 months follow up and AI monitoring.   She was last on letrozole, but has been on treatment holiday for the last 4 months.   Still struggling with severe left hip pain due to severe osteoarthritis significantly limiting her daily activities. Never seen ortho for this.   She remains on cymbalta 30mg with some improvement in joint aches and neuropathy of the feet. Also helps her mood.   No chest wall changes or palpable lumps/bumps.   She is requesting to switch back to exemestane due to intolerable hands pain and stiffness with letrozole.       PMH/PSH: Anxiety. Bilateral mastectomies  Family History: Negative for cancer  Social History: ,   of cancer. 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    Current Outpatient Medications:     DULoxetine 30 MG Oral Cap DR Particles, Take 1 capsule (30 mg total) by mouth daily., Disp: 90 capsule, Rfl: 1    lisinopril 10 MG Oral Tab, Take 1 tablet (10 mg total) by mouth daily., Disp: 90 tablet, Rfl: 3    anastrozole 1 MG Oral Tab tab, Take 1 tablet (1 mg total) by mouth daily., Disp: 90 tablet, Rfl: 1    LORATADINE 10 MG Oral Tab, TAKE 1 TABLET BY MOUTH DAILY, Disp: 90 tablet, Rfl: 3    GLUCOSAMINE-CHONDROITIN OR,  Take 1 tablet by mouth daily., Disp: , Rfl:     Multiple Vitamins-Minerals (OCUVITE ADULT 50+) Oral Cap, Take 1 capsule by mouth daily. centrum, Disp: , Rfl:     Biotin w/ Vitamins C & E (HAIR/SKIN/NAILS OR), Take 1 tablet by mouth daily., Disp: , Rfl:     Calcium 500-125 MG-UNIT Oral Tab, Take by mouth., Disp: , Rfl:     VITAMIN D, CHOLECALCIFEROL, OR, Take by mouth., Disp: , Rfl:     Bioflavonoid Products (AZIZA C OR), Take 1 tablet by mouth daily.  , Disp: , Rfl:        Review of Systems: Onc ROS negative besides as per HPI    Vitals:  Vitals:    02/27/24 1124   BP: 126/68   Pulse: 91   Resp: 18   Temp: 98.8 °F (37.1 °C)       Weight:  Wt Readings from Last 6 Encounters:   11/15/23 86.3 kg (190 lb 3.2 oz)   10/24/23 87 kg (191 lb 12.8 oz)   06/29/23 83.9 kg (185 lb)   05/23/23 83.9 kg (185 lb)   02/21/23 85.2 kg (187 lb 12.8 oz)   12/05/22 85.7 kg (189 lb)     Physical Exam:  General: Patient is alert and oriented x 3, not in acute distress.  HEENT: no palor, oropharynx clear.   Chest:  nonlabored breathing, symmetric expansion.   Breast: bilateral mastectomies, no chest wall tenderness or palpable masses. Axilla normal bilaterally.   Abd: soft, non tender, non distended   Extremities: no edema or cyanosis  Neurological: motor strength grossly intact,   Psych: appropriate mood and affect      Labs:  Lab Results   Component Value Date    WBC 6.7 02/27/2024    RBC 4.71 02/27/2024    HGB 14.5 02/27/2024    HCT 41.6 02/27/2024    MCV 88.3 02/27/2024    MCH 30.8 02/27/2024    MCHC 34.9 02/27/2024    RDW 11.9 02/27/2024    .0 02/27/2024     Lab Results   Component Value Date     10/24/2023    K 4.4 10/24/2023     10/24/2023    CO2 30.0 10/24/2023    BUN 18 10/24/2023    CREATSERUM 0.80 10/24/2023     (H) 10/24/2023    CA 9.2 10/24/2023    ALKPHO 100 10/24/2023     (H) 10/24/2023    AST 73 (H) 10/24/2023    BILT 0.5 10/24/2023    ALB 3.7 10/24/2023    TP 8.1 10/24/2023      Imaging:  XR  DEXA BONE DENSITOMETRY (CPT=77080)    Result Date: 1/9/2024  CONCLUSION:  1. Findings in the left femoral neck suggest osteopenia, there may be increased fracture risk.  There has been decrease in the BMD by 4.6% from previous study.  Findings in the total left hip suggest osteopenia, there may be increased fracture risk.  There has been decrease in the BMD by 2.4% from previous study.  The 10 year fracture risk for major osteoporotic fracture is 15%, and for hip fracture is 1.2%. 2. Findings in the total AP lumbar spine suggest osteopenia, there may be increased fracture risk.  There has been decrease in the BMD by 0.2% from previous study.    Dictated by (CST): Dylan Harvey MD on 1/09/2024 at 5:50 PM     Finalized by (CST): Dylan Harvey MD on 1/09/2024 at 5:52 PM               Cancer Staging   Malignant neoplasm of overlapping sites of left breast in female, estrogen receptor positive (HCC)  Staging form: Breast, AJCC 8th Edition  - Clinical stage from 6/20/2019: Stage IIA (cT3(2), cN0, cM0, G1, ER+, WI+, HER2-) - Signed by Jess Macias MD on 9/5/2019  - Pathologic stage from 8/9/2019: Stage IB (pT3, pN1a(sn), cM0, G2, ER+, WI+, HER2-) - Signed by Katie Reynolds APRN on 9/23/2019      Impression and Plan:    Left breast invasive lobular carcinoma, grade 2, ER/WI positive, HER2 negative, Ki-25%, [id number]  - s/p bilateral mastectomies with left axillary dissection, adjuvant AC-T and PMRT all completed 6/2020  - on adjuvant endocrine therapy with difficulty tolerating all AI's, but thinks exemestane was most tolerable one for her.   - discontinue letrozole, switch back to exemestane and continue for at least 5 yrs total. Ok for drug holidays ~ twice a year.   - clinical SHIVANI. No indication for imaging.   - discussed extended ET x7-10 yrs given locally advanced high risk disease, but she is hesitant due to poor AI tolerance. She is agreeable to send for [facility] to evaluate the benefit of extended ET in this  setting.  - also I suggested to try tamoxifen but she declined due to concern for risk of thrombosis and uterine pathology/cancer.     Bone health  Osteopenia, severe  - repeat DEXA 1/2024 with decreasing bone density and worsening osteopenia, fracture risk of 15%  - discussed growing evidence of iDFS in breast cancer with adjuvant bispho (especially ZA)  - given chronic use of AI and above, recommend zometa 4 mg IV q6 months x2-3 yrs and patient is agreeable to proceed.  - cleared by her dentist. Continue exams q6 months.   - continue vit D 4-5000 units.     Chemo induced neuropathy  - mild, limited to feet   - continue cymbalta 30 mg daily     Pancreatic mass  - MRCP  noted for a separate LN, no mass. Repeat negative.   - CLN: repeat in 3 years (2024)    Mildly elevated LFTs- due to fatty liver. Stable     Depression- on cymbalta    Left hip arthritis with severe pain- referred to PT/OT. Recommend ortho eval and consideration of injection/surgery.     Seasonal allergies- on loratadine      Return in about 4 months (around 6/27/2024).       Start Taking               exemestane 25 MG Oral Tab Take 1 tablet (25 mg total) by mouth daily.          These Medications Have Changed       Start Taking Instead of    loratadine 10 MG Oral Tab LORATADINE 10 MG Oral Tab    Dosage:  Take 1 tablet (10 mg total) by mouth daily. - Oral Dosage:  TAKE 1 TABLET BY MOUTH DAILY          Stop Taking                anastrozole 1 MG Oral Tab tab    Take 1 tablet (1 mg total) by mouth daily.     letrozole 2.5 MG Oral Tab    Take 1 tablet (2.5 mg total) by mouth daily.           MDM: Mod, drug management, chronic illness with side effect of treatment       Laurita Sylvester MD  Hematology Oncology

## 2024-03-01 ENCOUNTER — LAB REQUISITION (OUTPATIENT)
Dept: LAB | Facility: HOSPITAL | Age: 69
End: 2024-03-01
Payer: MEDICARE

## 2024-03-01 DIAGNOSIS — C50.812 MALIGNANT NEOPLASM OF OVERLAPPING SITES OF LEFT FEMALE BREAST (HCC): ICD-10-CM

## 2024-03-01 PROCEDURE — 88363 XM ARCHIVE TISSUE MOLEC ANAL: CPT | Performed by: INTERNAL MEDICINE

## 2024-03-04 ENCOUNTER — OFFICE VISIT (OUTPATIENT)
Dept: HEMATOLOGY/ONCOLOGY | Facility: HOSPITAL | Age: 69
End: 2024-03-04
Attending: INTERNAL MEDICINE
Payer: MEDICARE

## 2024-03-04 VITALS
HEART RATE: 108 BPM | HEIGHT: 64 IN | SYSTOLIC BLOOD PRESSURE: 139 MMHG | TEMPERATURE: 98 F | RESPIRATION RATE: 18 BRPM | DIASTOLIC BLOOD PRESSURE: 81 MMHG | OXYGEN SATURATION: 97 % | WEIGHT: 190.13 LBS | BODY MASS INDEX: 32.46 KG/M2

## 2024-03-04 DIAGNOSIS — M85.80 OSTEOPENIA DUE TO CANCER THERAPY: Primary | ICD-10-CM

## 2024-03-04 PROCEDURE — 96374 THER/PROPH/DIAG INJ IV PUSH: CPT

## 2024-03-04 NOTE — PROGRESS NOTES
Pt here for zometa . Pt denies any dental issues and states receiving dental clearance for to this tx.  Procedure explained to pt.  Pt verbalized understanding.  PIV placed and zometa given without incident.     Ordering MD: Nga       Pt tolerated infusion without difficulty or complaint. Reviewed next apt date/time: [facility]      Education Record  Learner:  Patient  Disease / Diagnosis: osteopenia  Barriers / Limitations:  None  Method:  Discussion  General Topics:  Procedure and Plan of care reviewed  Outcome:  Shows understanding

## 2024-03-11 ENCOUNTER — TELEPHONE (OUTPATIENT)
Dept: HEMATOLOGY/ONCOLOGY | Facility: HOSPITAL | Age: 69
End: 2024-03-11

## 2024-03-11 NOTE — TELEPHONE ENCOUNTER
Called patient per Dr. Sylvester- patient is not likely to benefit from extended endocrine therapy- patient stated she saw results on my chart and is very happy.

## 2024-03-15 ENCOUNTER — TELEPHONE (OUTPATIENT)
Dept: FAMILY MEDICINE CLINIC | Facility: CLINIC | Age: 69
End: 2024-03-15

## 2024-04-05 ENCOUNTER — TELEPHONE (OUTPATIENT)
Facility: CLINIC | Age: 69
End: 2024-04-05

## 2024-04-05 DIAGNOSIS — Z86.010 HX OF ADENOMATOUS COLONIC POLYPS: ICD-10-CM

## 2024-04-05 DIAGNOSIS — Z12.11 SPECIAL SCREENING FOR MALIGNANT NEOPLASMS, COLON: Primary | ICD-10-CM

## 2024-04-05 NOTE — TELEPHONE ENCOUNTER
Colon screen.    Medications, pharmacy, and allergies verified with the patient.   Please advise on colonoscopy and bowel prep orders.       › H/W/BMI: 5'4\"/190lbs/32.61    Special comments/notes:  Recall details:     Last Procedure, Date, MD:   Colonoscopy, 6/15/21, TAMMY   Last diagnosis: Serrated adenoma   Recalled for (mth/yrs): 3 years   Sedation used previously: MAC   Last Prep Used: suprep   Quality of Prep: Adequate/good     Telephone colon screening Questionnaires:  Yes No   Are you currently experiencing any new GI symptoms? [] [x]   Any issues with anesthesia? [] [x]   Personal history of Resp. Issues/Oxygen Use/ISIDRO/COPD? [] [x]   History of devices Pacemaker/Defibrillator/Stents? [] [x]   History of Cardiac/CVA issues/(MI/Stroke):  [] [x]     Medication usage:  Yes  No   Anticoagulants:  Anticoagulant (Except Aspirin) ? Route to RN staff to obtain ordering provider orders [] [x]   Diabetic Meds:   PO DM Meds ? Hold day prior and day of procedure  Insulin ? Route to RN clinical staff to obtain provider orders  [] [x]   Weight loss meds (phentermine/Vyvanse/Saxsenda): [] [x]   Iron/Herbal/Multivitamin Supplement (RX/OTC): [] [x]   Usage of marijuana, CBD &/or vape products: [] [x]

## 2024-04-05 NOTE — TELEPHONE ENCOUNTER
Patient outreach message received:    Colon recall entered for repeat in 3 yrs,6/15/2024  Colon done in 6/15/2021    Recall reminder letter mailed out to patient.

## 2024-04-05 NOTE — TELEPHONE ENCOUNTER
Colonoscopy Report           Kim Hoffman      1955 Age 66 year old   PCP ALEXA MCDANIEL MD Endoscopist Misael Lawson MD      Date of procedure: 06/15/21     Procedure: Colonoscopy w/ snare polypectomy     Pre-operative diagnosis: colorectal cancer screening     Post-operative diagnosis: colon polyp, diverticulum, hemorrhoids     Sedation: monitored anesthesia care (MAC)     Consent: We discussed the risks/benefits and alternatives to this procedure, as well as the planned sedation. Informed consent was obtained from the patient after the risks of the procedure were discussed, including but not limited to bleeding, perforation, aspiration, infection, or possibility of a missed lesion as well as the risks of anesthesia including but not limited to cardiopulmonary complications. The patient signed informed consent and elected to proceed with Colonoscopy with intervention [i.e. Biopsy, control of bleeding, dilatation, polypectomy, endoscopic mucosal resection, etc.] as indicated.     Colonoscopy procedure: Once an adequate level of sedation was obtained a digital rectal exam was completed revealing normal tone and no masses palpated. Then the lubricated tip of the [id number] diagnostic video colonoscope was carefully inserted but was unable to be advanced beyond the sigmoid colon due to tortuosity. This colonoscope was withdrawn and a \"pediatric\" colonoscope was advanced with some difficulty due to tortuosity requiring counter pressure to the cecum using the air insufflation technique (only Co2 was used for insufflation). The cecum was identified by localizing the trifold, the appendix and the ileocecal valve. Withdrawal was begun with thorough washing and careful examination of the colonic walls and folds. The ascending colon was viewed twice in the forward view. Photodocumentation was obtained. The bowel prep was adequate in the right colon with washing and good in the left colon.  Withdrawal time was 19 minutes.     Air was then withdrawn and the endoscope was removed. The patient tolerated the procedure well. There were no immediate postoperative complications. The patient’s vital signs were monitored throughout the procedure and remained stable.     Estimated blood loss: insinigicant     Specimens collected:  Colon polyp     Complications: none      Colonoscopy findings:  Terminal ileum: normal mucosa     Cecum: there was a 1.2 cm sessile polyp removed by cold snare polypectomy. Otherwise, normal mucosa and vascular pattern     Ascending colon: normal mucosa and vascular pattern     Transverse colon: normal mucosa and vascular pattern     Descending colon: normal mucosa and vascular pattern     Sigmoid colon: there was a diverticulum. Otherwise, normal mucosa and vascular pattern     Rectum: retroflexed view showed small non-bleeding hemorrhoids. Otherwise, normal mucosa and vascular pattern.     Impression:  1. A 1.2 cm cecal polyp removed  2. A sigmoid colon diverticulum  3. Small non-bleeding hemorrhoids  4. Otherwise, unremarkable colonoscopy to the terminal ileum     Recommend:  1. Await pathology.   2. Repeat colonoscopy interval pending final pathology results. If new signs or symptoms develop, procedure may need to be repeated sooner.   3. Continue your current medications  4. Increase fiber in the diet  5. Follow up with your primary care physician on a routine basis     >>>If biopsies were performed and you have not received your pathology results either by phone or letter within 2 weeks, please call our office at [phone].     Misael Lawson MD  [facility] - Gastroenterology  6/15/2021     Final Diagnosis:      Cecum polyp:    Fragments of sessile serrated adenoma.

## 2024-04-05 NOTE — TELEPHONE ENCOUNTER
Ok to schedule colonoscopy with MAC with split suprep for colorectal cancer screening and history of adenomatous colon polyps at [facility] or [facility] on a Friday    Thanks    Misael Lawson MD  [facility] - Gastroenterology

## 2024-04-09 RX ORDER — SODIUM, POTASSIUM,MAG SULFATES 17.5-3.13G
SOLUTION, RECONSTITUTED, ORAL ORAL
Qty: 1 EACH | Refills: 0 | Status: SHIPPED | OUTPATIENT
Start: 2024-04-09

## 2024-04-09 NOTE — TELEPHONE ENCOUNTER
Scheduled for:  Colonoscopy [id number]  Provider Name:  Dr. Lawson   Date:  6/28/2024  Location: [facility]   Sedation:  MAC  Time:  12:30pm, (pt is aware to arrive at 11:30am)   Prep:  Suprep  Meds/Allergies Reconciled?:  Physician reviewed     Diagnosis with codes:  colorectal cancer screening Z12.11 and history of adenomatous colon polyps Z86.010  Was patient informed to call insurance with codes (Y/N):  Yes, I confirmed MEDICARE insurance with this patient.      Referral sent?:  Referral was sent at the time of electronic surgical scheduling.   EM or [facility] notified?:  I sent an electronic request to Endo Scheduling and received a confirmation today.      Medication Orders:  n/a  Misc Orders:  n/a     Further instructions given by staff:   I discussed the prep instructions with the patient which she verbally understood and is aware that I will mail the instructions today.

## 2024-06-27 ENCOUNTER — OFFICE VISIT (OUTPATIENT)
Dept: HEMATOLOGY/ONCOLOGY | Facility: HOSPITAL | Age: 69
End: 2024-06-27
Attending: INTERNAL MEDICINE

## 2024-06-27 VITALS
SYSTOLIC BLOOD PRESSURE: 128 MMHG | DIASTOLIC BLOOD PRESSURE: 76 MMHG | BODY MASS INDEX: 33.12 KG/M2 | TEMPERATURE: 98 F | RESPIRATION RATE: 18 BRPM | HEART RATE: 111 BPM | WEIGHT: 194 LBS | OXYGEN SATURATION: 97 % | HEIGHT: 64 IN

## 2024-06-27 DIAGNOSIS — C50.812 MALIGNANT NEOPLASM OF OVERLAPPING SITES OF LEFT BREAST IN FEMALE, ESTROGEN RECEPTOR POSITIVE (HCC): Primary | ICD-10-CM

## 2024-06-27 DIAGNOSIS — Z90.13 ABSENCE OF BREAST, BILATERAL: ICD-10-CM

## 2024-06-27 DIAGNOSIS — Z92.21 HISTORY OF ANTINEOPLASTIC CHEMOTHERAPY: ICD-10-CM

## 2024-06-27 DIAGNOSIS — G62.0 CHEMOTHERAPY-INDUCED PERIPHERAL NEUROPATHY (HCC): ICD-10-CM

## 2024-06-27 DIAGNOSIS — M85.80 OSTEOPENIA DUE TO CANCER THERAPY: ICD-10-CM

## 2024-06-27 DIAGNOSIS — M25.50 AROMATASE INHIBITOR-ASSOCIATED ARTHRALGIA: ICD-10-CM

## 2024-06-27 DIAGNOSIS — T45.1X5A AROMATASE INHIBITOR-ASSOCIATED ARTHRALGIA: ICD-10-CM

## 2024-06-27 DIAGNOSIS — R79.89 LFTS ABNORMAL: ICD-10-CM

## 2024-06-27 DIAGNOSIS — Z79.811 LONG TERM (CURRENT) USE OF AROMATASE INHIBITORS: ICD-10-CM

## 2024-06-27 DIAGNOSIS — Z17.0 MALIGNANT NEOPLASM OF OVERLAPPING SITES OF LEFT BREAST IN FEMALE, ESTROGEN RECEPTOR POSITIVE (HCC): Primary | ICD-10-CM

## 2024-06-27 DIAGNOSIS — T45.1X5A CHEMOTHERAPY-INDUCED PERIPHERAL NEUROPATHY (HCC): ICD-10-CM

## 2024-06-27 PROCEDURE — G2211 COMPLEX E/M VISIT ADD ON: HCPCS | Performed by: INTERNAL MEDICINE

## 2024-06-27 PROCEDURE — 99214 OFFICE O/P EST MOD 30 MIN: CPT | Performed by: INTERNAL MEDICINE

## 2024-06-27 NOTE — PROGRESS NOTES
Hematology Oncology Progress Note    Patient Name: Kim Hoffman   YOB: 1955   Medical Record Number: [id number]     Date of Visit: 2024     Chief Complaint:  Breast cancer  AI monitoring    Oncologic History:  69 year old presented with palpable left breast mass, mammogram with 6 cm span of calcs  2019: bilateral mastectomies and left SNB: left IDC ER 92%, W[place] 98%, HER2 1+, KI 26%. + SN with THERESE s/p axillary dissection pT3N1a. Right breast negative. Mammaprint: luminal B, recurrence at 29%  10/2019: adjuvant AC-T chemo completed 2020: completed PMRT  3/2020: started anastrozole --> switched to exemestane --> then letrozole due to intolerance  2024: switched back to exemestane      Interval History:  Here for 6 months follow up and AI monitoring.   Switched back to exemestane after last visit, but on hold since April for a break.   Still struggling with severe left hip and leg pain due to severe osteoarthritis significantly limiting her daily activities. Never seen ortho for this.   Going for PT/OT shortly. Trying to avoid taking NSAIDs.   She remains on cymbalta 30mg with some improvement in joint aches and neuropathy of the feet. Also helps her mood.   No chest wall changes or palpable lumps/bumps.       PMH/PSH: Anxiety. Bilateral mastectomies  Family History: Negative for cancer  Social History: ,   of cancer. 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 centrum, Disp: , Rfl:     Biotin w/ Vitamins C & E  (HAIR/SKIN/NAILS OR), Take 1 tablet by mouth daily., Disp: , Rfl:     Calcium 500-125 MG-UNIT Oral Tab, Take by mouth., Disp: , Rfl:     VITAMIN D, CHOLECALCIFEROL, OR, Take by mouth., Disp: , Rfl:     Bioflavonoid Products (AZIZA C OR), Take 1 tablet by mouth daily.  , Disp: , Rfl:     Na Sulfate-K Sulfate-Mg Sulf (SUPREP BOWEL PREP KIT) 17.5-3.13-1.6 GM/177ML Oral Solution, Take as directed by your Gastroenterology clinic. (Patient not taking: Reported on 6/27/2024), Disp: 1 each, Rfl: 0    exemestane 25 MG Oral Tab, Take 1 tablet (25 mg total) by mouth daily. (Patient not taking: Reported on 6/25/2024), Disp: 90 tablet, Rfl: 3    lisinopril 10 MG Oral Tab, Take 1 tablet (10 mg total) by mouth daily. (Patient not taking: Reported on 6/25/2024), Disp: 90 tablet, Rfl: 3       Review of Systems: Onc ROS negative besides as per HPI    Vitals:  Vitals:    06/27/24 1019   BP: 128/76   Pulse: 111   Resp: 18   Temp: 98.2 °F (36.8 °C)       Weight:  Wt Readings from Last 6 Encounters:   06/27/24 88 kg (194 lb)   03/04/24 86.2 kg (190 lb 1.6 oz)   02/27/24 86 kg (189 lb 9.6 oz)   11/15/23 86.3 kg (190 lb 3.2 oz)   10/24/23 87 kg (191 lb 12.8 oz)   06/29/23 83.9 kg (185 lb)     Physical Exam:  General: Patient is alert and oriented x 3, not in acute distress.  HEENT: no palor, oropharynx clear.   Chest:  nonlabored breathing, symmetric expansion.   Breast: bilateral mastectomies with excess tissue, no chest wall tenderness or palpable masses. Axilla normal bilaterally.   Abd: soft, non tender, non distended   Extremities: no edema or cyanosis  Neurological: motor strength grossly intact,   Psych: appropriate mood and affect      Labs:  Lab Results   Component Value Date    WBC 6.7 02/27/2024    RBC 4.71 02/27/2024    HGB 14.5 02/27/2024    HCT 41.6 02/27/2024    MCV 88.3 02/27/2024    MCH 30.8 02/27/2024    MCHC 34.9 02/27/2024    RDW 11.9 02/27/2024    .0 02/27/2024     Lab Results   Component Value Date      02/27/2024    K 4.1 02/27/2024     02/27/2024    CO2 26.0 02/27/2024    BUN 15 02/27/2024    CREATSERUM 0.66 02/27/2024     (H) 02/27/2024    CA 9.9 02/27/2024    ALKPHO 103 02/27/2024    ALT 90 (H) 02/27/2024    AST 65 (H) 02/27/2024    BILT 0.7 02/27/2024    ALB 4.5 02/27/2024    TP 8.1 02/27/2024      Imaging:  No results found.        Cancer Staging   Malignant neoplasm of overlapping sites of left breast in female, estrogen receptor positive (HCC)  Staging form: Breast, AJCC 8th Edition  - Clinical stage from 6/20/2019: Stage IIA (cT3(2), cN0, cM0, G1, ER+, VT+, HER2-) - Signed by Jess Macias MD on 9/5/2019  - Pathologic stage from 8/9/2019: Stage IB (pT3, pN1a(sn), cM0, G2, ER+, VT+, HER2-) - Signed by Katie Reynolds APRN on 9/23/2019      Impression and Plan:    Left breast invasive lobular carcinoma, grade 2, ER/VT positive, HER2 negative, Ki-25%, [id number]  - s/p bilateral mastectomies with left axillary dissection, adjuvant AC-T and PMRT all completed 6/2020  - on adjuvant endocrine therapy with difficulty tolerating all AI's, but thinks exemestane was most tolerable one for her.   - I suggested to try tamoxifen but she declined due to concern for risk of thrombosis and uterine pathology/cancer.   - switch back to exemestane 2/2024, but takes drug holidays ~ twice a year.   - BCI showed no benefit from extended ET, plan to stop AI at 5 years (3/2025).   - clinical SHIVANI. No indication for imaging.   - resume exemestane now.       Bone health  Osteopenia, severe  - DEXA 1/2024 with decreasing bone density and worsening osteopenia, fracture risk of 15%  - discussed growing evidence of iDFS in breast cancer with adjuvant bispho (especially ZA)  - given chronic use of AI and above, started zometa 4 mg IV q6 months plan x2-3 yrs  - next zometa due in 9/2024. Continue dental exams q6 months.   - continue vit D 4-5000 units.     Chemo induced neuropathy  - mild, limited to feet   - continue cymbalta  30 mg daily     Pancreatic mass  - MRCP  noted for a separate LN, no mass. Repeat negative.   - CLN: repeat in 3 years (2024)    Mildly elevated LFTs- due to fatty liver. Stable     Depression- on cymbalta    Left hip arthritis with severe pain- referred to PT/OT. Recommend ortho eval and consideration of injection/surgery.     Seasonal allergies- on loratadine      Return in about 4 months (around 10/27/2024).       MDM: Mod, drug management, chronic illness with side effect of treatment       Laurita Sylvester MD  Hematology Oncology

## 2024-06-28 ENCOUNTER — HOSPITAL ENCOUNTER (OUTPATIENT)
Age: 69
Setting detail: HOSPITAL OUTPATIENT SURGERY
Discharge: HOME | End: 2024-06-28
Attending: INTERNAL MEDICINE | Admitting: INTERNAL MEDICINE
Payer: MEDICARE

## 2024-06-28 ENCOUNTER — ANESTHESIA (OUTPATIENT)
Dept: ENDOSCOPY | Age: 69
End: 2024-06-28
Payer: MEDICARE

## 2024-06-28 ENCOUNTER — ANESTHESIA EVENT (OUTPATIENT)
Dept: ENDOSCOPY | Age: 69
End: 2024-06-28
Payer: MEDICARE

## 2024-06-28 VITALS
SYSTOLIC BLOOD PRESSURE: 118 MMHG | BODY MASS INDEX: 31.65 KG/M2 | RESPIRATION RATE: 18 BRPM | WEIGHT: 190 LBS | TEMPERATURE: 97 F | HEART RATE: 89 BPM | DIASTOLIC BLOOD PRESSURE: 77 MMHG | OXYGEN SATURATION: 96 % | HEIGHT: 65 IN

## 2024-06-28 DIAGNOSIS — Z12.11 SPECIAL SCREENING FOR MALIGNANT NEOPLASMS, COLON: ICD-10-CM

## 2024-06-28 DIAGNOSIS — Z86.010 HX OF ADENOMATOUS COLONIC POLYPS: ICD-10-CM

## 2024-06-28 PROCEDURE — 88305 TISSUE EXAM BY PATHOLOGIST: CPT | Performed by: INTERNAL MEDICINE

## 2024-06-28 RX ORDER — SODIUM CHLORIDE, SODIUM LACTATE, POTASSIUM CHLORIDE, CALCIUM CHLORIDE 600; 310; 30; 20 MG/100ML; MG/100ML; MG/100ML; MG/100ML
INJECTION, SOLUTION INTRAVENOUS CONTINUOUS
Status: DISCONTINUED | OUTPATIENT
Start: 2024-06-28 | End: 2024-07-03

## 2024-06-28 RX ADMIN — SODIUM CHLORIDE, SODIUM LACTATE, POTASSIUM CHLORIDE, CALCIUM CHLORIDE: 600; 310; 30; 20 INJECTION, SOLUTION INTRAVENOUS at 11:25:00

## 2024-06-28 NOTE — ANESTHESIA POSTPROCEDURE EVALUATION
Patient: Kim Hoffman    Procedure Summary       Date: 06/28/24 Room / Location: [facility] ENDOSCOPY 01 / [facility] ENDO    Anesthesia Start: 1134 Anesthesia Stop:     Procedure: COLONOSCOPY with polypectomy Diagnosis:       Special screening for malignant neoplasms, colon      Hx of adenomatous colonic polyps      (Polyps, Hemorrhoids)    Surgeons: Misael Lawson MD Anesthesiologist: Jose Roberto Simpson MD    Anesthesia Type: MAC ASA Status: 2            Anesthesia Type: MAC    Vitals Value Taken Time   /81 06/28/24 1201   Temp 97.2 °F (36.2 °C) 06/28/24 1201   Pulse 68 06/28/24 1201   Resp 20 06/28/24 1201   SpO2 98 % 06/28/24 1201       [facility] AN Post Evaluation:   Patient Evaluated in PACU  Patient Participation: complete - patient participated  Level of Consciousness: awake and alert  Pain Score: 0  Pain Management: adequate  Airway Patency:  Dental exam unchanged from preop  Yes    Nausea/Vomiting: none  Cardiovascular Status: stable  Respiratory Status: room air  Postoperative Hydration stable      JOSE ROBERTO SIMPSON MD  6/28/2024 12:01 PM

## 2024-06-28 NOTE — DISCHARGE INSTRUCTIONS
Home Care Instructions for Colonoscopy with Sedation    Diet:  - Resume your regular diet as tolerated unless otherwise instructed.  - Start with light meals to minimize bloating.  - Do not drink alcohol today.    Medication:  - If you have questions about resuming your normal medications, please contact your Primary Care Physician.    Activities:  - Take it easy today. Do not return to work today.  - Do not drive today.  - Do not operate any machinery today (including kitchen equipment).    Colonoscopy:  - You may notice some rectal \"spotting\" (a little blood on the toilet tissue) for a day or two after the exam. This is normal.  - If you experience any rectal bleeding (not spotting), persistent tenderness or sharp severe abdominal pains, oral temperature over 100 degrees Fahrenheit, light-headedness or dizziness, or any other problems, contact your doctor.    **If unable to reach your doctor, please go to the [facility] Emergency Room**    - Your referring physician will receive a full report of your examination.  - If you do not hear from your doctor's office within two weeks of your biopsy, please call them for your results.    You may be able to see your laboratory results in Pipette between 4 and 7 business days.  In some cases, your physician may not have viewed the results before they are released to Pipette.  If you have questions regarding your results contact the physician who ordered the test/exam by phone or via Pipette by choosing \"Ask a Medical Question.\"

## 2024-06-28 NOTE — ANESTHESIA PREPROCEDURE EVALUATION
Anesthesia PreOp Note    HPI:     Kim Hoffman is a 69 year old female who presents for preoperative consultation requested by: Misael Lawson MD    Date of Surgery: 6/28/2024    Procedure(s):  COLONOSCOPY  Indication: Special screening for malignant neoplasms, colon   Hx of adenomatous colonic polyps    Relevant Problems   No relevant active problems       NPO:  Last Liquid Consumption Date: 06/28/24  Last Liquid Consumption Time: 0900  Last Solid Consumption Date: 06/27/24  Last Solid Consumption Time: 0900  Last Liquid Consumption Date: 06/28/24          History Review:  Patient Active Problem List    Diagnosis Date Noted    Osteopenia due to cancer therapy 02/27/2024    LFTs abnormal 02/27/2024    Long term (current) use of aromatase inhibitors 02/27/2024    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    History of antineoplastic chemotherapy 02/27/2024    Seasonal allergies 02/27/2024    Depression, recurrent (HCC) 11/15/2023    Primary osteoarthritis of left hip 10/25/2021    Left lumbar radiculopathy 10/25/2021    Chemotherapy-induced peripheral neuropathy (HCC) 10/25/2021    Visit for monitoring Arimidex therapy 03/04/2020    Encounter for medication management 09/06/2019    Encounter for central line care 09/06/2019    Absence of breast, bilateral 08/19/2019    Malignant neoplasm of overlapping sites of left breast in female, estrogen receptor positive (HCC) 06/21/2019       Past Medical History:    Anxiety state    Breast cancer (HCC)    Calculus of kidney    Cancer (HCC)    breast    Esophageal reflux    Problems with swallowing    partial dentures upper and lower    Serrated adenoma of colon       Past Surgical History:   Procedure Laterality Date    Colonoscopy  06/15/2021    Colonoscopy N/A 6/15/2021    Procedure: COLONOSCOPY;  Surgeon: Misael Lawson MD;  [facility]: [facility]    Priscila biopsy stereo nodule 1 site left (cpt=19081)      Priscila biopsy stereo nodule 1 site right  (cwh=63267)  07/01/2019    KODAK     Mastectomy left      Mastectomy right      Mastectomy,simple Bilateral 08/08/2019    Bilateral mastectomies w L SLNB and immediate reconstruction with tissue expander and acellular dermal matrix and L SLNB       Medications Prior to Admission   Medication Sig Dispense Refill Last Dose    loratadine 10 MG Oral Tab Take 1 tablet (10 mg total) by mouth daily. 90 tablet 0     DULoxetine 30 MG Oral Cap DR Particles Take 1 capsule (30 mg total) by mouth daily. 90 capsule 1     GLUCOSAMINE-CHONDROITIN OR Take 1 tablet by mouth daily.       Multiple Vitamins-Minerals (OCUVITE ADULT 50+) Oral Cap Take 1 capsule by mouth daily. centrum       Biotin w/ Vitamins C & E (HAIR/SKIN/NAILS OR) Take 1 tablet by mouth daily.       Calcium 500-125 MG-UNIT Oral Tab Take by mouth.       VITAMIN D, CHOLECALCIFEROL, OR Take by mouth.       Bioflavonoid Products (AZIZA C OR) Take 1 tablet by mouth daily.         Na Sulfate-K Sulfate-Mg Sulf (SUPREP BOWEL PREP KIT) 17.5-3.13-1.6 GM/177ML Oral Solution Take as directed by your Gastroenterology clinic. (Patient not taking: Reported on 6/27/2024) 1 each 0     exemestane 25 MG Oral Tab Take 1 tablet (25 mg total) by mouth daily. (Patient not taking: Reported on 6/25/2024) 90 tablet 3 Not Taking    lisinopril 10 MG Oral Tab Take 1 tablet (10 mg total) by mouth daily. (Patient not taking: Reported on 6/25/2024) 90 tablet 3 Not Taking     Current Facility-Administered Medications Ordered in Epic   Medication Dose Route Frequency Provider Last Rate Last Admin    lactated ringers infusion   Intravenous Continuous Misael Lawson MD         No current [facility]-ordered outpatient medications on file.       No Known Allergies    Family History   Problem Relation Age of Onset    Hypertension Father     Heart Disease Father     Diabetes Father     Heart Disease Mother 88    Heart Disorder Mother         Aortic Regurgitation/AVR    Other (PVD) Mother      Social History      Socioeconomic History    Marital status:    Tobacco Use    Smoking status: Never     Passive exposure: Never    Smokeless tobacco: Never   Vaping Use    Vaping status: Never Used   Substance and Sexual Activity    Alcohol use: Yes     Comment: Socially    Drug use: No       Available pre-op labs reviewed.             Vital Signs:  Body mass index is 31.62 kg/m².   height is 1.651 m (5' 5\") and weight is 86.2 kg (190 lb).   Vitals:    06/25/24 1106   Weight: 86.2 kg (190 lb)   Height: 1.651 m (5' 5\")        Anesthesia Evaluation     Patient summary reviewed and Nursing notes reviewed    Airway   Mallampati: II  TM distance: >3 FB  Neck ROM: full  Dental      Pulmonary - negative ROS and normal exam   Cardiovascular - normal exam  Exercise tolerance: good    NYHA Classification: I  ROS comment: Study Conclusions   1. Left ventricle: The cavity size was normal. Wall thickness was      normal. Systolic function was normal. The estimated ejection      fraction was 55-60%, by biplane method of disks. Wall motion was      normal; there were no regional wall motion abnormalities.      Doppler parameters are consistent with abnormal left ventricular      relaxation (grade 1 diastolic dysfunction).   2. Aortic valve: Mild regurgitation.   No previous study was available for comparison.       Neuro/Psych    (+)  neuromuscular disease, anxiety/panic attacks,        GI/Hepatic/Renal    (+) GERD well controlled  (-) liver disease, renal disease    Endo/Other    (-) diabetes mellitus, hypothyroidism    Comments: Double mastectomy / Ln s R side / rad / chemo   Abdominal   (+) obese                 Anesthesia Plan:   ASA:  2  Plan:   MAC  Informed Consent Plan and Risks Discussed With:  Patient  Discussed plan with:  Attending and surgeon      I have informed Kim Hoffman and/or legal guardian or family member of the nature of the anesthetic plan, benefits, risks including possible dental damage if relevant, major  complications, and any alternative forms of anesthetic management.   All of the patient's questions were answered to the best of my ability. The patient desires the anesthetic management as planned.  JOSE ROBERTO SIMPSON MD  6/28/2024 10:52 AM  Present on Admission:  **None**

## 2024-06-28 NOTE — H&P
History & Physical Examination    Patient Name: Kim Hoffman  MRN: [id number]  CSN: [id number]  YOB: 1955    Diagnosis: colorectal cancer screening, history of adenomatous colon polyps    Last colonoscopy in June 2021 for colorectal cancer screening found to have 1.2 cm adenomatous colon polyp removed    Medications Prior to Admission   Medication Sig Dispense Refill Last Dose    loratadine 10 MG Oral Tab Take 1 tablet (10 mg total) by mouth daily. 90 tablet 0 6/28/2024 at 0630    DULoxetine 30 MG Oral Cap DR Particles Take 1 capsule (30 mg total) by mouth daily. 90 capsule 1 6/27/2024 at 2300    GLUCOSAMINE-CHONDROITIN OR Take 1 tablet by mouth daily.   6/24/2024    Multiple Vitamins-Minerals (OCUVITE ADULT 50+) Oral Cap Take 1 capsule by mouth daily. centrum   6/24/2024    Biotin w/ Vitamins C & E (HAIR/SKIN/NAILS OR) Take 1 tablet by mouth daily.   6/24/2024    Calcium 500-125 MG-UNIT Oral Tab Take by mouth.   6/24/2024    VITAMIN D, CHOLECALCIFEROL, OR Take by mouth.       Bioflavonoid Products (AZIZA C OR) Take 1 tablet by mouth daily.     6/24/2024    Na Sulfate-K Sulfate-Mg Sulf (SUPREP BOWEL PREP KIT) 17.5-3.13-1.6 GM/177ML Oral Solution Take as directed by your Gastroenterology clinic. (Patient not taking: Reported on 6/27/2024) 1 each 0     exemestane 25 MG Oral Tab Take 1 tablet (25 mg total) by mouth daily. (Patient not taking: Reported on 6/25/2024) 90 tablet 3 Not Taking    lisinopril 10 MG Oral Tab Take 1 tablet (10 mg total) by mouth daily. (Patient not taking: Reported on 6/25/2024) 90 tablet 3 Not Taking     Current Facility-Administered Medications   Medication Dose Route Frequency    lactated ringers infusion   Intravenous Continuous       Allergies: No Known Allergies    Past Medical History:    Anxiety state    Breast cancer (HCC)    Calculus of kidney    Cancer (HCC)    breast    Esophageal reflux    High cholesterol    Problems with swallowing    partial dentures upper and  lower    Serrated adenoma of colon     Past Surgical History:   Procedure Laterality Date    Colonoscopy  06/15/2021    Colonoscopy N/A 6/15/2021    Procedure: COLONOSCOPY;  Surgeon: Misael Lawson MD;  Location: [facility] ENDO    Priscila biopsy stereo nodule 1 site left (cpt=19081)      Priscila biopsy stereo nodule 1 site right (cpt=19081)  07/01/2019    KODAK     Mastectomy left      Mastectomy right      Mastectomy,simple Bilateral 08/08/2019    Bilateral mastectomies w L SLNB and immediate reconstruction with tissue expander and acellular dermal matrix and L SLNB     Family History   Problem Relation Age of Onset    Hypertension Father     Heart Disease Father     Diabetes Father     Heart Disease Mother 88    Heart Disorder Mother         Aortic Regurgitation/AVR    Other (PVD) Mother      Social History     Tobacco Use    Smoking status: Never     Passive exposure: Never    Smokeless tobacco: Never   Substance Use Topics    Alcohol use: Yes     Comment: Socially       SYSTEM Check if Physical Exam is Normal If not normal, please explain:   HEENT [ X]    NECK  [ X]    HEART [ X]    LUNGS [ X]    ABDOMEN [ X]    EXTREMITIES [ X]      General:awake, cooperative, no acute distress  HEENT: EOMI, no scleral icterus, MMM; oral pharnyx is without exudates or lesions  Neck: no lymphadenopathy; thyroid is not enlarged and without nodules  CV: RRR  Resp: non-labored breathing  Abd: soft, non-tender, non-distended  Ext: no lower extremity swelling  Neuro: Alert, Oriented X 3  Skin: no rashes, bruises  Psych: normal affect  I have discussed the risks and benefits and alternatives of the procedure with the patient/family.  She understands and agreed to proceed with plan of care.   Misael Lawson MD  [facility] - Gastroenterology  6/28/2024  11:33 AM

## 2024-06-28 NOTE — OPERATIVE REPORT
Colonoscopy Report    Kim Hoffman     1955 Age 69 year old   PCP Nila Castaneda MD Endoscopist Misael Lawson MD     Date of procedure: 24    Procedure: Colonoscopy w/ biopsy and snare polypectomy    Pre-operative diagnosis: colorectal cancer screening, history of adenomatous colon polyps     Last colonoscopy in 2021 for colorectal cancer screening found to have 1.2 cm adenomatous colon polyp removed     Post-operative diagnosis: see impression    Sedation: monitored anesthesia care (MAC)    Consent: We discussed the risks/benefits and alternatives to this procedure, as well as the planned sedation. Informed consent was obtained from the patient after the risks of the procedure were discussed, including but not limited to bleeding, perforation, aspiration, infection, or possibility of a missed lesion as well as the risks of anesthesia including but not limited to cardiopulmonary complications. The patient signed informed consent and elected to proceed with Colonoscopy with intervention [i.e. Biopsy, control of bleeding, dilatation, polypectomy, endoscopic mucosal resection, etc.] as indicated.    Colonoscopy procedure: Once an adequate level of sedation was obtained a digital rectal exam was completed revealing normal tone and no masses palpated. Then the lubricated tip of the [id number] diagnostic video colonoscope was carefully inserted and advanced without difficulty to the cecum using the air insufflation technique (only Co2 was used for insufflation). The cecum was identified by localizing the trifold, the appendix and the ileocecal valve. Withdrawal was begun with thorough washing and careful examination of the colonic walls and folds. The ascending colon was viewed twice in the forward view. Photodocumentation was obtained. The bowel prep was good. Views of the colon were good with washing. Withdrawal time was 14 minutes.    Air was then withdrawn and the endoscope  was removed. The patient tolerated the procedure well. There were no immediate postoperative complications. The patient’s vital signs were monitored throughout the procedure and remained stable.    Estimated blood loss: insignificant    Specimens collected:  colon polyps    Complications: none     Colonoscopy findings:    Cecum: there was a 2 mm polyp removed by cold biopsy forceps. Otherwise, normal mucosa and vascular pattern    Ascending colon: normal mucosa and vascular pattern    Transverse colon: normal mucosa and vascular pattern    Descending colon: there was an 8-9 mm polyp removed by cold snare polypectomy. Otherwise, normal mucosa and vascular pattern    Sigmoid colon: normal mucosa and vascular pattern    Rectum: retroflexed view showed small non-bleeding hemorrhoids. Otherwise, normal mucosa and vascular pattern.    Impression:  1. Two colon polyps removed  2. Small hemorrhoids  3. Otherwise, unremarkable colonoscopy    Recommend:  1. Await pathology.   2. Repeat colonoscopy interval pending final pathology results. If new signs or symptoms develop, procedure may need to be repeated sooner.   3. Continue your current medications  4. Follow up with your primary care physician on a routine basis    >>>If biopsies were performed and you have not received your pathology results either by phone or letter within 2 weeks, please call our office at [phone].    MD Harshil [facility]  6/28/2024

## 2024-07-03 ENCOUNTER — TELEPHONE (OUTPATIENT)
Dept: GASTROENTEROLOGY | Facility: CLINIC | Age: 69
End: 2024-07-03

## 2024-07-03 NOTE — TELEPHONE ENCOUNTER
Misael Lawson MD  [facility] Clinical Staff  GI staff: please place recall for colonoscopy in 5 years        Letter sent on 7/3/2024  [facility] updated   Patient Outreach updated

## 2024-07-05 ENCOUNTER — TELEPHONE (OUTPATIENT)
Dept: PHYSICAL THERAPY | Age: 69
End: 2024-07-05

## 2024-07-24 NOTE — PROGRESS NOTES
PHYSICAL THERAPY EVALUATION:   Referring Physician: Dr. Castaneda  Diagnosis: Chronic left hip pain (M25.552,G89.29)       Date of Order 11/15/23 Date of Service: 7/25/2024     Precautions:  None    Pt verbally consented to be seen for evaluation and treat  PATIENT SUMMARY     Kim Hoffman is a 69 year old female who presents to therapy today with complaints of  L upper thigh area pain. She states that she has been diagnoses with OA on the L hip that has gotten worse.  Pt states that she also has neuropathy on B feet and sciatica on the L side.pt has weakness on LLE    Pain is intermittent, worse in am when she gets up in am, mack with initial step in am, worse when its humid out. Pt states that she relates that sciatica is aggravate by walking and prolonged standing    Pt describes pain level current 5/10, at best 2/10, at worst 9/10 : with medications which she has been taking a lot of :Advil     Current functional limitations include reported  pain and difficulty with : pain meds through out the day,  sleeping is disturb with turning, pain is worse in am, compensation with dressings, ADL , hold for support, car transfers, walking and standing is limited, depended on cane the past 3 weeks, stairs    History of current condition/Previous episodes/treatments and results: pt states that she has been having issues after she had chemo and radiation x 5 years, due to breast cancer. She did walk and exercise some pain prior the diagnosis. Also was limited during Covid restrictions. PT done a year and helped. She has been seeing a chiropractor     Kim describes prior level of function Pt states that she has not used cane for walking x 3 weeks ago. Pt  reports no regular exercises    Social History/Occupation: retired, lives alone, no stairs    Pt goals include .    Lessen left upper leg pain.  Gain strength in left  leg  when needed to climb steps       Past medical history was reviewed with Kim.   Significant  findings include   Past Medical History:    Anxiety state    Breast cancer (HCC)    Calculus of kidney    Cancer (HCC)    breast    Colon adenomas    a tubular adenoma and serrated adenoma    Esophageal reflux    High cholesterol    Problems with swallowing    partial dentures upper and lower    Serrated adenoma of colon          ASSESSMENT    Kim presents to physical therapy evaluation with primary c/o L upper thigh area pain. She states that she has been diagnoses with OA on the L hip that has gotten worse.  Pt states that she also has neuropathy on B feet and sciatica on the L side.pt has weakness on LLE.    Kim reports functional deficits include but are not limited to pain meds through out the day,  sleeping is disturb with turning, pain is worse in am, compensation with dressings, ADL , hold for support, car transfers, walking and standing is limited, depended on cane the past 3 weeks, stairs.      The results of the objective tests and measures as noted below show  impairments of joint mobility, motor function, muscle performance, muscle endurance, range of motion, coordination, balance, and gait significantly affecting LLE     Kim has noted gait deviations and reported  and demonstrated  compensation when managing stairs. Pt uses straight cane for ambulation. Transfers done with mod I, difficulty noted with sit to stand and bed mobility.    Pt noted to have impaired  proprioception as per SLS on BE as well as impaired strength,functional activity tolerance as per  30 sec functional sit to stand test x +7 reps with pain     Upon palpation positive  tenderness /hypertonicity noted on hip area of greater trochanter/piriformis.ITB.    Pt and PT discussed evaluation findings, pathology, POC and HEP.  Pt voiced understanding and performs HEP correctly without reported pain.    Skilled Physical Therapy is medically necessary to address the above impairments and reach functional goals to go back to prior level  of function with minimal to no compensation, with improved ability to manage and decrease symptoms and pain when performing daily/work and community tasks.      OBJECTIVE:   Observation/Transfers : mod I , difficulty with sit to stand, more time, difficulty with bed mobility  Gait:  pt ambulates on level ground with assistive device of SC, antalgia, decreased step length BE, decreased stance phase BE, decreased hip/knee flex or ext BE, and decreased foot clearance BE  Stairs:single step pattern, leading down with good LE  Palpation: 3/4 tenderness upon palpation : L ITB/ L gluteal area      LUMBAR ROM:NT    AROM: (* denotes performed with pain) BE knee and ankle  AROM are  WFL, painfree except L knee flexion due to  pain on L hip  MMT: for B knee extension  and ankle DF are grossly graded:4+/5 WFL , PF at 3/5: assessed in supine     HIP ROM AND MMT as follows     RLE(ROM): LLE (ROM) RLE (MMT) LLE (MMT)   Hip flexion 90 110 4+/5 3/5   Hip extension NT NT NT/5 NT/5   Hip ER/IR 35/20 35/40 4+/5 3-/5   Hip abd 20 40 4+/5 3-3+/5   Others    (* denotes performed with pain)   Right Left Comments    Motor Control Motor Control    Sit to stand  7 reps  Minimal UE   Single Leg Balance 0 sec 1 sec      Flexibility: hams 90/90: R: min loss versus L:min loss      Today’s Treatment and Response:   1.PROM on the L hip x10 x2  2.Tra x 2 reps  3. Supine marches x 1 x10  4. Knee fall out x10  5 supine hip adduction 5 secs x10  6. Supine QS 10 secs x10    Pt education was provided on exam findings, treatment diagnosis, treatment plan, expectations, and prognosis. Pt was also provided recommendations for possible soreness after evaluation and gait .  Patient was instructed in and issued a HEP for: 2-6    Charges: PT Eval Moderate Complexity, theraex x2 (25  mins)      Total Timed Treatment: 25 min     Total Treatment Time: 50 min     Based on clinical rationale and outcome measures, this evaluation involved Moderate Complexity decision  making due to 3+ personal factors/comorbidities, 4+ body structures involved/activity limitations, and evolving symptoms including changing pain levels.  PLAN OF CARE:    Goals: to be met in 10 visits then will reassess   Pt will be I with HEP,its progression , demonstrating proper performance of exercises, >75% of the time to maintain progress in therapy   Pt will demonstrate improved strength on LLE muscles graded below 5/5 to half a grade or better for ease of standing and walking and managing stairs  pt will demonstrate improved  AROM on L  hip to equal that of the contralateral hip for improved daily tasks    Pt will demonstrate increased BE SLS to improve gait on various surfaces and static/dynamic balance at least 5 secs improved from initial test.  Pt will demonstrate ability to ambulate with symmetrical gait, equal stance >75% of the time .   Pt will report decreased in pain and symptoms as well functional limitations 50%  or better to be able to improve community mobility.    Frequency / Duration: Patient will be seen for 1-2 x/week or a total of 10 visits over a 90 day period. Frequency may be changed as appropriate  Treatment plan includes:  Manual Therapy; Therapeutic Exercises; Neuromuscular Re-education; Therapeutic Activity; Gait Training; Patient education; Home exercise program instruction,self care home management,    Education or treatment limitation: None  Rehab Potential:good    LEF test: LEFS Score  LEFS Score: 27.5 % (7/24/2024  3:11 PM)       Patient/Family/Caregiver was advised of these findings, precautions, and treatment options and has agreed to actively participate in planning and for this course of care.    Thank you for your referral. Please co-sign or sign and return this letter via fax as soon as possible to [phone]. If you have any questions, please contact me at Dept: [phone]    Sincerely,  Electronically signed by therapist: Aline Tirado,PT,DPT,CAPP-OB  Physician's  certification required: Yes  I certify the need for these services furnished under this plan of treatment and while under my care.    X___________________________________________________ Date____________________    Certification From: 7/25/2024  To:10/23/2024

## 2024-07-25 ENCOUNTER — OFFICE VISIT (OUTPATIENT)
Dept: PHYSICAL THERAPY | Age: 69
End: 2024-07-25
Attending: STUDENT IN AN ORGANIZED HEALTH CARE EDUCATION/TRAINING PROGRAM
Payer: MEDICARE

## 2024-07-25 DIAGNOSIS — G89.29 CHRONIC LEFT HIP PAIN: Primary | ICD-10-CM

## 2024-07-25 DIAGNOSIS — M25.552 CHRONIC LEFT HIP PAIN: Primary | ICD-10-CM

## 2024-07-25 PROCEDURE — 97162 PT EVAL MOD COMPLEX 30 MIN: CPT

## 2024-07-25 PROCEDURE — 97110 THERAPEUTIC EXERCISES: CPT

## 2024-07-26 NOTE — PROGRESS NOTES
Dx:  Chronic left hip pain (M25.552,G89.29)                 Insurance   MCR           Authorized  # visits by insurance  :  10    Expiration date  of Authorization:    Eval date/latest PN:7/25/24  Initial POC# of visits: 10                POC cert : ***    Authorizing Physician: Dr. Castaneda    Fall Risk: standard         Precautions: Breast CA         Subjective: ***  PAIN LEVEL:***/10  Assessment: ***      Objective:   Gait:  pt ambulates on level ground with assistive device of SC, antalgia, decreased step length BE, decreased stance phase BE, decreased hip/knee flex or ext BE, and decreased foot clearance BE         Goals:   goals addressed this day as noted above  Goals: to be met in 10 visits then will reassess   Pt will be I with HEP,its progression , demonstrating proper performance of exercises, >75% of the time to maintain progress in therapy   Pt will demonstrate improved strength on LLE muscles graded below 5/5 to half a grade or better for ease of standing and walking and managing stairs  pt will demonstrate improved  AROM on L  hip to equal that of the contralateral hip for improved daily tasks    Pt will demonstrate increased BE SLS to improve gait on various surfaces and static/dynamic balance at least 5 secs improved from initial test.  Pt will demonstrate ability to ambulate with symmetrical gait, equal stance >75% of the time .   Pt will report decreased in pain and symptoms as well functional limitations 50%  or better to be able to improve community mobility.     Plan: cont per POC      Date:  TX#: 2/*** Date:              TX#: 3/ Date:        TX#: 4/ Date:               TX#: 5/   Date:   Tx#: 6/   Theraex:        Manual:        Gait/NMRed:        Modalities        HEP see patient instructions tab if with new HEP see patient instructions tab if with new HEP see patient instructions tab if with new HEP see patient instructions tab if with new HEP see patient instructions tab if with new HEP           Charges: ***       Total Timed Treatment: *** min  Total Treatment Time: *** min

## 2024-07-29 ENCOUNTER — NURSE TRIAGE (OUTPATIENT)
Dept: FAMILY MEDICINE CLINIC | Facility: CLINIC | Age: 69
End: 2024-07-29

## 2024-07-29 ENCOUNTER — OFFICE VISIT (OUTPATIENT)
Dept: FAMILY MEDICINE CLINIC | Facility: CLINIC | Age: 69
End: 2024-07-29
Payer: MEDICARE

## 2024-07-29 ENCOUNTER — TELEPHONE (OUTPATIENT)
Dept: PHYSICAL THERAPY | Age: 69
End: 2024-07-29

## 2024-07-29 ENCOUNTER — APPOINTMENT (OUTPATIENT)
Dept: PHYSICAL THERAPY | Age: 69
End: 2024-07-29
Attending: STUDENT IN AN ORGANIZED HEALTH CARE EDUCATION/TRAINING PROGRAM
Payer: MEDICARE

## 2024-07-29 VITALS
WEIGHT: 191.63 LBS | BODY MASS INDEX: 31.93 KG/M2 | SYSTOLIC BLOOD PRESSURE: 138 MMHG | DIASTOLIC BLOOD PRESSURE: 84 MMHG | HEIGHT: 65 IN | HEART RATE: 121 BPM
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
## 2024-07-29 DIAGNOSIS — L50.9 URTICARIA: ICD-10-CM
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
## 2024-07-29 DIAGNOSIS — S80.869A TICK BITE OF LOWER LEG, UNSPECIFIED LATERALITY, INITIAL ENCOUNTER: Primary | ICD-10-CM

## 2024-07-29 RX ORDER — BENZOCAINE/MENTHOL 6 MG-10 MG
1 LOZENGE MUCOUS MEMBRANE 2 TIMES DAILY
Qty: 14 G | Refills: 0 | Status: SHIPPED | OUTPATIENT
Start: 2024-07-29 | End: 2024-08-12

## 2024-07-29 RX ORDER — DOXYCYCLINE HYCLATE 100 MG
100 TABLET ORAL 2 TIMES DAILY
Qty: 20 TABLET | Refills: 0 | Status: SHIPPED | OUTPATIENT
Start: 2024-07-29 | End: 2024-08-08

## 2024-07-29 NOTE — TELEPHONE ENCOUNTER
Action Requested: Summary for Provider     []  Critical Lab, Recommendations Needed  [] Need Additional Advice  []   FYI    []   Need Orders  [] Need Medications Sent to Pharmacy  []  Other     SUMMARY: office visit.     Future Appointments   Date Time Provider Department Center   7/29/2024  1:40 PM Nate Gentile APRN [facility]   7/30/2024 12:30 PM Aline Tirado, PT ADO PT [facility] [place]   8/1/2024 10:45 AM Tasneem Horn PT ADO PT [facility] [place]   8/6/2024  2:00 PM Tasneem Horn, PT ADO PT [facility] [place]   8/8/2024 11:30 AM Tasneem Horn PT ADO PT [facility] [place]   9/3/2024  1:00 PM EM [facility] LAB1 [facility] CHEMO EMO   9/3/2024  2:00 PM Laurita Sylvester MD [facility] HEM ONC EMO   9/3/2024  2:30 PM EM CC INFRN 4 [facility] CHEMO EMO   10/23/2024 10:00 AM Laurita Sylvester MD [facility] HEM ONC EMO     Reason for call: Tick  Onset: Data Unavailable    Patient was in the [facility] last week and came back 7/14/24. Patient states she may have two tick bites on her leg. It has a black spot in the middle and red around it. It is itchy but she denies fever.     Reason for Disposition   Red ring or bull's-eye rash occurs around a deer tick bite    Protocols used: Tick Bite-A-OH

## 2024-07-29 NOTE — PROGRESS NOTES
Subjective:   Kim Hoffman is a 69 year old female who presents for Tick (Past 6 days, itchy, right leg)   Patient is a pleasant 69-year-old female past medical history consistent for anxiety, reflux, hyperlipidemia, and hypertension.  Patient presents to office today for evaluation of possible tick bites.  Review of telephone encounter reveals    \"Patient was in the [facility] last week and came back 7/14/24. Patient states she may have two tick bites on her leg. It has a black spot in the middle and red around it. It is itchy but she denies fever. \"    Patient states she never saw tick. Was staying in cabin in the [facility]. She was not even hiking. She thought itching while at site was secondary to mosquitos and thought nothing of it until she got home until her [facility] significant other that is a nurse saw. She is Not sure what bit her. Unable to convey size of tick or length of time on leg if was present. Will treat with for bug bite with tick coverage as well. Start mupirocin tid x 5 days for MRSA and hydrocortisone bid for itching.              Past Medical History:    Anxiety state    Breast cancer (HCC)    Calculus of kidney    Cancer (HCC)    breast    Colon adenomas    a tubular adenoma and serrated adenoma    Esophageal reflux    High cholesterol    Problems with swallowing    partial dentures upper and lower    Serrated adenoma of colon      Past Surgical History:   Procedure Laterality Date    Colonoscopy  06/15/2021    Colonoscopy N/A 6/15/2021    Procedure: COLONOSCOPY;  Surgeon: Misael Lawson MD;  Location: [facility] ENDO    Colonoscopy N/A 6/28/2024    Procedure: COLONOSCOPY with polypectomy;  Surgeon: Misael Lawson MD;  Location: [facility] ENDO    Priscila biopsy stereo nodule 1 site left (cpt=19081)      Priscila biopsy stereo nodule 1 site right (cpt=19081)  07/01/2019    KODAK     Mastectomy left      Mastectomy right      Mastectomy,simple Bilateral 08/08/2019    Bilateral mastectomies w L  SLNB and immediate reconstruction with tissue expander and acellular dermal matrix and L SLNB        History/Other:    Chief Complaint Reviewed and Verified  Nursing Notes Reviewed and   Verified  Tobacco Reviewed  Allergies Reviewed  Medications Reviewed    Problem List Reviewed  Medical History Reviewed  Surgical History   Reviewed  OB Status Reviewed  Family History Reviewed  Social History   Reviewed         Tobacco:  She has never smoked tobacco.    Current Outpatient Medications   Medication Sig Dispense Refill    Doxycycline Hyclate 100 MG Oral Tab Take 1 tablet (100 mg total) by mouth 2 (two) times daily for 10 days. 20 tablet 0    hydrocortisone 1 % External Cream Apply 1 Application topically 2 (two) times daily for 14 days. 14 g 0    mupirocin 2 % External Ointment Apply to affect lesions tid 30 g 1    loratadine 10 MG Oral Tab Take 1 tablet (10 mg total) by mouth daily. 90 tablet 0    DULoxetine 30 MG Oral Cap DR Particles Take 1 capsule (30 mg total) by mouth daily. 90 capsule 1    GLUCOSAMINE-CHONDROITIN OR Take 1 tablet by mouth daily.      Multiple Vitamins-Minerals (OCUVITE ADULT 50+) Oral Cap Take 1 capsule by mouth daily. centrum      Biotin w/ Vitamins C & E (HAIR/SKIN/NAILS OR) Take 1 tablet by mouth daily.      Calcium 500-125 MG-UNIT Oral Tab Take by mouth.      VITAMIN D, CHOLECALCIFEROL, OR Take by mouth.      Bioflavonoid Products (AZIZA C OR) Take 1 tablet by mouth daily.        exemestane 25 MG Oral Tab Take 1 tablet (25 mg total) by mouth daily. (Patient not taking: Reported on 6/25/2024) 90 tablet 3         Review of Systems:  Review of Systems   Constitutional: Negative.  Negative for activity change, appetite change, chills, fatigue and fever.   HENT: Negative.  Negative for congestion, postnasal drip, rhinorrhea, sore throat, tinnitus and voice change.    Eyes: Negative.    Respiratory: Negative.  Negative for apnea, cough, chest tightness and shortness of breath.     Cardiovascular:  Negative for chest pain and leg swelling.   Gastrointestinal: Negative.  Negative for abdominal pain, anal bleeding and blood in stool.   Genitourinary: Negative.  Negative for difficulty urinating, flank pain and menstrual problem.   Musculoskeletal: Negative.  Negative for joint swelling.   Skin:  Positive for color change, rash and wound.   Neurological: Negative.  Negative for dizziness and headaches.   Psychiatric/Behavioral: Negative.  Negative for agitation.          Objective:   /84   Pulse (!) 121   Ht 5' 5\" (1.651 m)   Wt 191 lb 9.6 oz (86.9 kg)   LMP 07/14/2006   BMI 31.88 kg/m²  Estimated body mass index is 31.88 kg/m² as calculated from the following:    Height as of this encounter: 5' 5\" (1.651 m).    Weight as of this encounter: 191 lb 9.6 oz (86.9 kg).  Physical Exam  Vitals and nursing note reviewed.   Constitutional:       General: She is not in acute distress.     Appearance: She is well-developed.   HENT:      Head: Normocephalic and atraumatic.      Right Ear: Tympanic membrane and ear canal normal.      Left Ear: Tympanic membrane and ear canal normal.   Eyes:      Conjunctiva/sclera: Conjunctivae normal.      Pupils: Pupils are equal, round, and reactive to light.   Neck:      Thyroid: No thyromegaly.   Cardiovascular:      Rate and Rhythm: Normal rate and regular rhythm.      Pulses: Normal pulses.      Heart sounds: Normal heart sounds. No murmur heard.  Pulmonary:      Effort: Pulmonary effort is normal. No respiratory distress.      Breath sounds: Normal breath sounds. No wheezing or rales.   Chest:      Chest wall: No tenderness.   Abdominal:      General: Bowel sounds are normal. There is no distension.      Palpations: Abdomen is soft.      Tenderness: There is no abdominal tenderness.   Musculoskeletal:         General: No tenderness. Normal range of motion.      Cervical back: Normal range of motion and neck supple.   Lymphadenopathy:      Cervical: No  cervical adenopathy.   Skin:     General: Skin is warm and dry.      Capillary Refill: Capillary refill takes less than 2 seconds.      Findings: Erythema, lesion and rash present.      Comments: See media. Warm, red, black center.    Neurological:      Mental Status: She is alert and oriented to person, place, and time.      Coordination: Coordination normal.   Psychiatric:         Behavior: Behavior normal.         Thought Content: Thought content normal.         Judgment: Judgment normal.         Similar post right calf as well. Itching not open. Warm and red.   Assessment & Plan:   1. Tick bite of lower leg, unspecified laterality, initial encounter (Primary)  -     Doxycycline Hyclate; Take 1 tablet (100 mg total) by mouth 2 (two) times daily for 10 days.  Dispense: 20 tablet; Refill: 0  -     Mupirocin; Apply to affect lesions tid  Dispense: 30 g; Refill: 1  2. Bug bite, initial encounter  -     Doxycycline Hyclate; Take 1 tablet (100 mg total) by mouth 2 (two) times daily for 10 days.  Dispense: 20 tablet; Refill: 0  -     Mupirocin; Apply to affect lesions tid  Dispense: 30 g; Refill: 1  3. Urticaria  -     Hydrocortisone; Apply 1 Application topically 2 (two) times daily for 14 days.  Dispense: 14 g; Refill: 0    Start doxycycline for provide twice daily x 10 days cross cover for tick if tick bite.  Hydrocortisone 1% cream twice daily x 14 days for itching  Mupirocin 3 times daily x 5 days  Educated on at home measures for itching and skin care  Follow-up 1 week  Patient aware of plan of care. All questions answered to satisfaction of the patient. Patient instructed to call office or reach out via [employer] if any issues arise. For urgent issues and/or reviewed red flags please proceed to the urgent care or ER.  Also, inform the nurse practitioner with any new symptoms or medication side effects.        Return in about 1 week (around 8/5/2024).    ARLEN Preston, 7/29/2024, 9:42 AM

## 2024-07-30 ENCOUNTER — APPOINTMENT (OUTPATIENT)
Dept: PHYSICAL THERAPY | Age: 69
End: 2024-07-30
Attending: STUDENT IN AN ORGANIZED HEALTH CARE EDUCATION/TRAINING PROGRAM
Payer: MEDICARE

## 2024-07-30 PROCEDURE — 97110 THERAPEUTIC EXERCISES: CPT

## 2024-07-30 PROCEDURE — 97140 MANUAL THERAPY 1/> REGIONS: CPT

## 2024-07-30 NOTE — PROGRESS NOTES
Dx:  Chronic left hip pain (M25.552,G89.29)                 Insurance   MCR           Authorized  # visits by insurance  :  10    Expiration date  of Authorization:    Eval date/latest PN:7/25/24  Initial POC# of visits: 10                POC cert : 10/23/25    Authorizing Physician: Dr. Castaneda    Fall Risk: standard         Precautions: Breast CA         Subjective: pt states that  she had some pain after therapy but states that as the days goes by, pain decreased She is trying to walk with newer shoes. She did discover some bites and wentto IC but was told its not tick bites but was given anti biotics  PAIN LEVEL:5/10 on the L side  Assessment:   Tenderness and hypertonicity noted on initial findings on affected area  L thigh anterior and gluteal  worked on this day with gentle STM  with foam roll anc active movements     PT continued with challenging pt in performing exercises in  different positions / amount of resistance applied/ within new ROM gains  in order to work towards goals          See exercises as logged below       Objective:   Gait:  pt ambulates on level ground with assistive device of SC, antalgia, decreased step length BE, decreased stance phase BE, decreased hip/knee flex or ext BE, and decreased foot clearance BE         Goals:   goals addressed this day as noted above  Goals: to be met in 10 visits then will reassess   Pt will be I with HEP,its progression , demonstrating proper performance of exercises, >75% of the time to maintain progress in therapy   Pt will demonstrate improved strength on LLE muscles graded below 5/5 to half a grade or better for ease of standing and walking and managing stairs  pt will demonstrate improved  AROM on L  hip to equal that of the contralateral hip for improved daily tasks    Pt will demonstrate increased BE SLS to improve gait on various surfaces and static/dynamic balance at least 5 secs improved from initial test.  Pt will demonstrate ability to ambulate  with symmetrical gait, equal stance >75% of the time .   Pt will report decreased in pain and symptoms as well functional limitations 50%  or better to be able to improve community mobility.     Plan: cont per POC      Date: 7/30/24  TX#: 2/10 Date:              TX#: 3/ Date:        TX#: 4/ Date:               TX#: 5/   Date:   Tx#: 6/   Theraex:  X30  Attempted  upright bike but unable  PROM in all planes 2x 10 L hip including SL hip extension  Supine marches x10:pain  Supine hip adduction 5 secs x10  Supine knee fall out x2x10  Supine TKEs 2x10  SL assisted hip extension  Prone quads stretches 20 x3  Prone knee flexion 2x10         Manual: X10 mins  Foam roll  on  on L anterio thigh.gluteal /piriformis area in SL and prone, in prone with gentle ER/IR x2x10       Gait/NMRed:        Modalities        HEP see patient instructions tab if with new HEP  See tab and dicussed pool exercises see patient instructions tab if with new HEP see patient instructions tab if with new HEP see patient instructions tab if with new HEP see patient instructions tab if with new HEP          Charges: theraex x2 , man mob x1       Total Timed Treatment: 40 min  Total Treatment Time: 40 min

## 2024-08-01 ENCOUNTER — APPOINTMENT (OUTPATIENT)
Dept: PHYSICAL THERAPY | Age: 69
End: 2024-08-01
Attending: STUDENT IN AN ORGANIZED HEALTH CARE EDUCATION/TRAINING PROGRAM
Payer: MEDICARE

## 2024-08-01 ENCOUNTER — TELEPHONE (OUTPATIENT)
Dept: PHYSICAL THERAPY | Facility: HOSPITAL | Age: 69
End: 2024-08-01

## 2024-08-02 NOTE — PROGRESS NOTES
Subjective:   Kim Hoffman is a 69 year old female who presents for Follow - Up ( tick or bug bite on right leg: much better, pain in left leg due to physical therapy)   Patient is a pleasant 69-year-old female past medical history consistent for anxiety, reflux, hyperlipidemia, and hypertension.  Patient presents to office today for follow up of possible tick bites on 7/29/24.  She [place] and had bites on posterior of leg. She was unsure of tick, unsure of age, and unsure of duration. She was started on doxycyline bid x 10 days with mupirocin for MRSA coverage. She follows up in office today and states much better. Itching stopped after 2 days. Finishing up antibiotics. No issues. Will follow up if needed.         Past Medical History:    Anxiety state    Breast cancer (HCC)    Calculus of kidney    Cancer (HCC)    breast    Colon adenomas    a tubular adenoma and serrated adenoma    Esophageal reflux    High cholesterol    Problems with swallowing    partial dentures upper and lower    Serrated adenoma of colon      Past Surgical History:   Procedure Laterality Date    Colonoscopy  06/15/2021    Colonoscopy N/A 6/15/2021    Procedure: COLONOSCOPY;  Surgeon: Misael Lawson MD;  Location: [facility] ENDO    Colonoscopy N/A 6/28/2024    Procedure: COLONOSCOPY with polypectomy;  Surgeon: Misael Lawson MD;  Location: [facility] ENDO    Priscila biopsy stereo nodule 1 site left (cpt=19081)      Priscila biopsy stereo nodule 1 site right (cpt=19081)  07/01/2019    KODAK     Mastectomy left      Mastectomy right      Mastectomy,simple Bilateral 08/08/2019    Bilateral mastectomies w L SLNB and immediate reconstruction with tissue expander and acellular dermal matrix and L SLNB        History/Other:    Chief Complaint Reviewed and Verified  Nursing Notes Reviewed and   Verified  Tobacco Reviewed  Allergies Reviewed  Medications Reviewed    Medical History Reviewed  Surgical History Reviewed   OB Status Reviewed    Family History Reviewed  Social History Reviewed         Tobacco:  She has never smoked tobacco.    Current Outpatient Medications   Medication Sig Dispense Refill    Doxycycline Hyclate 100 MG Oral Tab Take 1 tablet (100 mg total) by mouth 2 (two) times daily for 10 days. 20 tablet 0    hydrocortisone 1 % External Cream Apply 1 Application topically 2 (two) times daily for 14 days. 14 g 0    mupirocin 2 % External Ointment Apply to affect lesions tid 30 g 1    loratadine 10 MG Oral Tab Take 1 tablet (10 mg total) by mouth daily. 90 tablet 0    DULoxetine 30 MG Oral Cap DR Particles Take 1 capsule (30 mg total) by mouth daily. 90 capsule 1    GLUCOSAMINE-CHONDROITIN OR Take 1 tablet by mouth daily.      Multiple Vitamins-Minerals (OCUVITE ADULT 50+) Oral Cap Take 1 capsule by mouth daily. centrum      Biotin w/ Vitamins C & E (HAIR/SKIN/NAILS OR) Take 1 tablet by mouth daily.      Calcium 500-125 MG-UNIT Oral Tab Take by mouth.      VITAMIN D, CHOLECALCIFEROL, OR Take by mouth.      Bioflavonoid Products (AZIZA C OR) Take 1 tablet by mouth daily.        exemestane 25 MG Oral Tab Take 1 tablet (25 mg total) by mouth daily. (Patient not taking: Reported on 6/25/2024) 90 tablet 3         Review of Systems:  Review of Systems   Constitutional: Negative.  Negative for activity change and appetite change.   HENT: Negative.  Negative for congestion, postnasal drip, rhinorrhea, sore throat, tinnitus and voice change.    Eyes: Negative.    Respiratory: Negative.  Negative for apnea, cough, chest tightness and shortness of breath.    Cardiovascular:  Negative for chest pain and leg swelling.   Gastrointestinal: Negative.  Negative for abdominal pain, anal bleeding and blood in stool.   Genitourinary: Negative.  Negative for difficulty urinating, flank pain and menstrual problem.   Musculoskeletal: Negative.  Negative for joint swelling.   Skin: Negative.  Negative for color change, pallor, rash and  wound.   Neurological: Negative.  Negative for dizziness and headaches.   Psychiatric/Behavioral: Negative.  Negative for agitation.          Objective:   /83 (BP Location: Right arm, Patient Position: Sitting, Cuff Size: large)   Pulse 111   Ht 5' 5\" (1.651 m)   Wt 190 lb 6.4 oz (86.4 kg)   LMP 07/14/2006   BMI 31.68 kg/m²  Estimated body mass index is 31.68 kg/m² as calculated from the following:    Height as of this encounter: 5' 5\" (1.651 m).    Weight as of this encounter: 190 lb 6.4 oz (86.4 kg).  Physical Exam  Vitals and nursing note reviewed.   Constitutional:       Appearance: Normal appearance.   HENT:      Head: Normocephalic and atraumatic.      Right Ear: External ear normal.      Left Ear: External ear normal.   Cardiovascular:      Rate and Rhythm: Normal rate and regular rhythm.      Pulses: Normal pulses.      Heart sounds: Normal heart sounds.   Pulmonary:      Effort: Pulmonary effort is normal.      Breath sounds: Normal breath sounds.   Musculoskeletal:         General: No swelling or tenderness. Normal range of motion.   Skin:     General: Skin is warm and dry.      Capillary Refill: Capillary refill takes less than 2 seconds.      Findings: No lesion or rash.      Comments: Both sites resolved.  Continue abc until completion   Neurological:      Mental Status: She is alert and oriented to person, place, and time.           Assessment & Plan:   1. Tick bite of lower leg, unspecified laterality, initial encounter (Primary)  2. Bug bite, initial encounter  3. Urticaria    Resolved  No issues  Continue abx to completion  Follow up as needed.    Patient aware of plan of care. All questions answered to satisfaction of the patient. Patient instructed to call office or reach out via Chatty[employer] if any issues arise. For urgent issues and/or reviewed red flags please proceed to the urgent care or ER.  Also, inform the nurse practitioner with any new symptoms or medication side effects.        No  follow-ups on file.    Nate Gentile, APRN, 8/2/2024, 12:37 PM

## 2024-08-05 ENCOUNTER — OFFICE VISIT (OUTPATIENT)
Dept: FAMILY MEDICINE CLINIC | Facility: CLINIC | Age: 69
End: 2024-08-05

## 2024-08-05 VITALS
HEART RATE: 111 BPM | SYSTOLIC BLOOD PRESSURE: 129 MMHG | HEIGHT: 65 IN | WEIGHT: 190.38 LBS | BODY MASS INDEX: 31.72 KG/M2 | DIASTOLIC BLOOD PRESSURE: 83 MMHG
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
## 2024-08-05 DIAGNOSIS — S80.869A TICK BITE OF LOWER LEG, UNSPECIFIED LATERALITY, INITIAL ENCOUNTER: Primary | ICD-10-CM

## 2024-08-05 DIAGNOSIS — L50.9 URTICARIA: ICD-10-CM

## 2024-08-05 PROCEDURE — 99213 OFFICE O/P EST LOW 20 MIN: CPT

## 2024-08-06 ENCOUNTER — OFFICE VISIT (OUTPATIENT)
Dept: PHYSICAL THERAPY | Age: 69
End: 2024-08-06
Attending: STUDENT IN AN ORGANIZED HEALTH CARE EDUCATION/TRAINING PROGRAM
Payer: MEDICARE

## 2024-08-06 PROCEDURE — 97110 THERAPEUTIC EXERCISES: CPT

## 2024-08-06 NOTE — PROGRESS NOTES
Dx:  Chronic left hip pain (M25.552,G89.29)                 Insurance   MCR           Authorized  # visits by insurance  :  10    Expiration date  of Authorization:    Eval date/latest PN:7/25/24  Initial POC# of visits: 10                POC cert : 10/23/25    Authorizing Physician: Dr. Castaneda    Fall Risk: standard         Precautions: Breast CA         Subjective: Pt states she just received a massage earlier this morning which really helped alleviate her pain.    PAIN LEVEL: 4/10 lateral hip  Assessment: Session focus on core stabilization, quad and hamstring strengthening, gluteal exercises as tolerated.  Modified all interventions accordingly and as needed as hip pain can be easily triggered with hip flexion motions.      See exercises as logged below     Objective:   Gait:  pt ambulates on level ground with assistive device of SC, antalgia, decreased step length BE, decreased stance phase BE, decreased hip/knee flex or ext BE, and decreased foot clearance BE         Goals:   goals addressed this day as noted above  Goals: to be met in 10 visits then will reassess   Pt will be I with HEP,its progression , demonstrating proper performance of exercises, >75% of the time to maintain progress in therapy   Pt will demonstrate improved strength on LLE muscles graded below 5/5 to half a grade or better for ease of standing and walking and managing stairs  pt will demonstrate improved  AROM on L  hip to equal that of the contralateral hip for improved daily tasks    Pt will demonstrate increased BE SLS to improve gait on various surfaces and static/dynamic balance at least 5 secs improved from initial test.  Pt will demonstrate ability to ambulate with symmetrical gait, equal stance >75% of the time .   Pt will report decreased in pain and symptoms as well functional limitations 50%  or better to be able to improve community mobility.     Plan: cont per POC      Date: 7/30/24  TX#: 2/10 Date: 8/6/24            TX#:  3/10 Date:        TX#: 4/ Date:               TX#: 5/   Date:   Tx#: 6/   Theraex:  X30  Attempted  upright bike but unable  PROM in all planes 2x 10 L hip including SL hip extension  Supine marches x10:pain  Supine hip adduction 5 secs x10  Supine knee fall out x2x10  Supine TKEs 2x10  SL assisted hip extension  Prone quads stretches 20 x3  Prone knee flexion 2x10   X 36 minutes  PPT 10x  PPT with R/L BKFO 10x ea  Hooklying ball squeezes 10x 3 sec holds  Supine R/L SAQs 10x ea  Supine B gluteal squeezes 5 reps   Prone R hip extension 10x (L - unable due to juliet)  Prone B knee flexion with ball 10x  Prone R/L Knee flexion 10x ea  Prone B knee flexion with isometric hip ER ball squeeze 5 reps  ROMAINE 2 minutes  Standing B gastroc stretch on slant board level 2 3 x 30 sec holds  Standing heel raises 10x  Supine Passive L hip IR/ER grade 1 mob - unable to tolerate - discontinue      Manual: X10 mins  Foam roll  on  on L anterio thigh.gluteal /piriformis area in SL and prone, in prone with gentle ER/IR x2x10       Gait/NMRed:        Modalities        HEP see patient instructions tab if with new HEP  See tab and dicussed pool exercises see patient instructions tab if with new HEP see patient instructions tab if with new HEP see patient instructions tab if with new HEP see patient instructions tab if with new HEP          Charges: theraex x 2     Total Timed Treatment: 36 min  Total Treatment Time: 36 min

## 2024-08-08 ENCOUNTER — OFFICE VISIT (OUTPATIENT)
Dept: PHYSICAL THERAPY | Age: 69
End: 2024-08-08
Attending: STUDENT IN AN ORGANIZED HEALTH CARE EDUCATION/TRAINING PROGRAM
Payer: MEDICARE

## 2024-08-08 PROCEDURE — 97140 MANUAL THERAPY 1/> REGIONS: CPT

## 2024-08-08 PROCEDURE — 97110 THERAPEUTIC EXERCISES: CPT

## 2024-08-08 NOTE — PROGRESS NOTES
Dx:  Chronic left hip pain (M25.552,G89.29)                 Insurance   MCR           Authorized  # visits by insurance  :  10    Expiration date  of Authorization:    Eval date/latest PN:7/25/24  Initial POC# of visits: 10                POC cert : 10/23/25  Authorizing Physician: Dr. Castaneda    Fall Risk: standard         Precautions: Breast CA         Subjective: Pt states she is doing better today.  She woke up feeling well and had an easier time getting out of the car.  Pt reports her hip feels better, but she is having sciatica type pain.   PAIN LEVEL: 3/10 lateral hip  Assessment:  Trial of lumbar spine mobilizations this session and continued with gentle hip and core strengthening as tolerated.    See exercises as logged below     Objective:   Gait:  pt ambulates on level ground with assistive device of SC, antalgia, decreased step length BE, decreased stance phase BE, decreased hip/knee flex or ext BE, and decreased foot clearance BE       Goals:   goals addressed this day as noted above  Goals: to be met in 10 visits then will reassess   Pt will be I with HEP,its progression , demonstrating proper performance of exercises, >75% of the time to maintain progress in therapy   Pt will demonstrate improved strength on LLE muscles graded below 5/5 to half a grade or better for ease of standing and walking and managing stairs  pt will demonstrate improved  AROM on L  hip to equal that of the contralateral hip for improved daily tasks    Pt will demonstrate increased BE SLS to improve gait on various surfaces and static/dynamic balance at least 5 secs improved from initial test.  Pt will demonstrate ability to ambulate with symmetrical gait, equal stance >75% of the time .   Pt will report decreased in pain and symptoms as well functional limitations 50%  or better to be able to improve community mobility.     Plan: cont per POC      Date: 7/30/24  TX#: 2/10 Date: 8/6/24            TX#: 3/10 Date:  8/8/24    TX#:  4/10 Date:               TX#: 5/   Date:   Tx#: 6/   Theraex:  X30  Attempted  upright bike but unable  PROM in all planes 2x 10 L hip including SL hip extension  Supine marches x10:pain  Supine hip adduction 5 secs x10  Supine knee fall out x2x10  Supine TKEs 2x10  SL assisted hip extension  Prone quads stretches 20 x3  Prone knee flexion 2x10   X 36 minutes  PPT 10x  PPT with R/L BKFO 10x ea  Hooklying ball squeezes 10x 3 sec holds  Supine R/L SAQs 10x ea  Supine B gluteal squeezes 5 reps   Prone R hip extension 10x (L - unable due to pain)  Prone B knee flexion with ball 10x  Prone R/L Knee flexion 10x ea  Prone B knee flexion with isometric hip ER ball squeeze 5 reps  ROMAINE 2 minutes  Standing B gastroc stretch on slant board level 2 3 x 30 sec holds  Standing heel raises 10x  Supine Passive L hip IR/ER grade 1 mob - unable to tolerate - discontinue X 35 minutes  PPT 2 x 10  PPT with R/L BKFO 10x ea  Hooklying ball squeezes 2 x 10 3 sec holds  Supine R/L SAQs 2# 2 x 10 ea  Prone B knee flexion with ball 10x  Prone R/L Knee flexion 15x ea  ROMAINE 2 minutes  Standing B gastroc stretch on slant board level 2 3 x 30 sec holds  Standing heel raises 1 x 8 reps (discontinue today due to pain)  Standing L hip extension 10x  Standing L hip abduction 10x  Seated R/L LAQs 10x ea  Seated R sciatic nerve glide 10x       Manual: X10 mins  Foam roll  on  on L anterio thigh.gluteal /piriformis area in SL and prone, in prone with gentle ER/IR x2x10  X 8 mins  Lumbar spine mobilizations grade 2 in prone      Gait/NMRed:        Modalities        HEP see patient instructions tab if with new HEP  See tab and dicussed pool exercises see patient instructions tab if with new HEP see patient instructions tab if with new HEP see patient instructions tab if with new HEP see patient instructions tab if with new HEP      Charges: theraex x 2 Man 1     Total Timed Treatment: 43 min  Total Treatment Time: 43 min

## 2024-08-12 ENCOUNTER — OFFICE VISIT (OUTPATIENT)
Dept: PHYSICAL THERAPY | Age: 69
End: 2024-08-12
Attending: STUDENT IN AN ORGANIZED HEALTH CARE EDUCATION/TRAINING PROGRAM
Payer: MEDICARE

## 2024-08-12 PROCEDURE — 97110 THERAPEUTIC EXERCISES: CPT

## 2024-08-12 NOTE — PROGRESS NOTES
Dx:  Chronic left hip pain (M25.552,G89.29)                 Insurance   MCR           Authorized  # visits by insurance  :  10    Expiration date  of Authorization:    Eval date/latest PN:7/25/24  Initial POC# of visits: 10                POC cert : 10/23/25  Authorizing Physician: Dr. Goodman ref. provider found    Fall Risk: standard         Precautions: Breast CA         Subjective:  Patient reports she went to the [facility] this past Saturday and is in a lot of pain as a result from all the activity.  She reports yesterday she had more than 9/10 pain.    PAIN LEVEL: 9/10 lateral hip region  Assessment: Session modified due to pt's increased pain from aggravating activity over the weekend.  Utilized heat for pain relief.    See exercises as logged below     Objective:   Gait:  pt ambulates on level ground with assistive device of SC, antalgia, decreased step length BE, decreased stance phase BE, decreased hip/knee flex or ext BE, and decreased foot clearance BE       Goals:   goals addressed this day as noted above  Goals: to be met in 10 visits then will reassess   Pt will be I with HEP,its progression , demonstrating proper performance of exercises, >75% of the time to maintain progress in therapy   Pt will demonstrate improved strength on LLE muscles graded below 5/5 to half a grade or better for ease of standing and walking and managing stairs  pt will demonstrate improved  AROM on L  hip to equal that of the contralateral hip for improved daily tasks    Pt will demonstrate increased BE SLS to improve gait on various surfaces and static/dynamic balance at least 5 secs improved from initial test.  Pt will demonstrate ability to ambulate with symmetrical gait, equal stance >75% of the time .   Pt will report decreased in pain and symptoms as well functional limitations 50%  or better to be able to improve community mobility.     Plan: cont per POC      Date: 7/30/24  TX#: 2/10 Date: 8/6/24            TX#:  3/10 Date:  8/8/24    TX#: 4/10 Date: 8/12/24             TX#: 5/10   Date:   Tx#: 6/   Theraex:  X30  Attempted  upright bike but unable  PROM in all planes 2x 10 L hip including SL hip extension  Supine marches x10:pain  Supine hip adduction 5 secs x10  Supine knee fall out x2x10  Supine TKEs 2x10  SL assisted hip extension  Prone quads stretches 20 x3  Prone knee flexion 2x10   X 36 minutes  PPT 10x  PPT with R/L BKFO 10x ea  Hooklying ball squeezes 10x 3 sec holds  Supine R/L SAQs 10x ea  Supine B gluteal squeezes 5 reps   Prone R hip extension 10x (L - unable due to pain)  Prone B knee flexion with ball 10x  Prone R/L Knee flexion 10x ea  Prone B knee flexion with isometric hip ER ball squeeze 5 reps  ROMAINE 2 minutes  Standing B gastroc stretch on slant board level 2 3 x 30 sec holds  Standing heel raises 10x  Supine Passive L hip IR/ER grade 1 mob - unable to tolerate - discontinue X 35 minutes  PPT 2 x 10  PPT with R/L BKFO 10x ea  Hooklying ball squeezes 2 x 10 3 sec holds  Supine R/L SAQs 2# 2 x 10 ea  Prone B knee flexion with ball 10x  Prone R/L Knee flexion 15x ea  ROMAINE 2 minutes  Standing B gastroc stretch on slant board level 2 3 x 30 sec holds  Standing heel raises 1 x 8 reps (discontinue today due to pain)  Standing L hip extension 10x  Standing L hip abduction 10x  Seated R/L LAQs 10x ea  Seated R sciatic nerve glide 10x   X 23 minutes  PPT with R/L heel slides 10x ea  Hooklying OH lat reach with 6# DB 10x  Hooklying ball squeezes 2 x 12 3 sec holds  Supine R/L SAQs 0# 2 x 10 ea  Prone R/L knee flexion 15x ea  Prone lying x 2 minutes    Manual: X10 mins  Foam roll  on  on L anterio thigh.gluteal /piriformis area in SL and prone, in prone with gentle ER/IR x2x10  X 8 mins  Lumbar spine mobilizations grade 2 in prone  X 5 mins  Lumbar spine mobilizations grade 2 in prone     Gait/NMRed:        Modalities    Heat pad to low back and left hip in prone x 10 minutes    HEP see patient instructions tab if  with new HEP  See tab and dicussed pool exercises see patient instructions tab if with new HEP see patient instructions tab if with new HEP see patient instructions tab if with new HEP see patient instructions tab if with new HEP      Charges: theraex x 2     Total Timed Treatment: 28 min  Total Treatment Time: 38 min

## 2024-08-20 ENCOUNTER — OFFICE VISIT (OUTPATIENT)
Dept: PHYSICAL THERAPY | Age: 69
End: 2024-08-20
Attending: STUDENT IN AN ORGANIZED HEALTH CARE EDUCATION/TRAINING PROGRAM
Payer: MEDICARE

## 2024-08-20 PROCEDURE — 97140 MANUAL THERAPY 1/> REGIONS: CPT

## 2024-08-20 PROCEDURE — 97110 THERAPEUTIC EXERCISES: CPT

## 2024-08-20 NOTE — PROGRESS NOTES
Dx:  Chronic left hip pain (M25.552,G89.29)                 Insurance   MCR           Authorized  # visits by insurance  :  10    Expiration date  of Authorization:    Eval date/latest PN:7/25/24  Initial POC# of visits: 10                POC cert : 10/23/25  Authorizing Physician: Dr. Goodman ref. provider found    Fall Risk: standard         Precautions: Breast CA         Subjective:  Patient reports her sciatica is really bothering today.  Patient reports she noticed she slept in one position only overnight and is wondering if this may be contributing to her pain.    PAIN LEVEL: 8/10 left lateral hip region  Assessment:  Initiated gentle hip extensor stretching and pt reporting improved ability to ambulate post interventions.  Progression of exercises limited by high pain levels.      See exercises as logged below     Objective:   Gait:  pt ambulates on level ground with assistive device of SC, antalgia, decreased step length BE, decreased stance phase BE, decreased hip/knee flex or ext BE, and decreased foot clearance BE     8/20/2024:  Notable limping this session, decreased weightbearing on LLE due to pain      Goals:   goals addressed this day as noted above  Goals: to be met in 10 visits then will reassess   Pt will be I with HEP,its progression , demonstrating proper performance of exercises, >75% of the time to maintain progress in therapy   Pt will demonstrate improved strength on LLE muscles graded below 5/5 to half a grade or better for ease of standing and walking and managing stairs  pt will demonstrate improved  AROM on L  hip to equal that of the contralateral hip for improved daily tasks    Pt will demonstrate increased BE SLS to improve gait on various surfaces and static/dynamic balance at least 5 secs improved from initial test.  Pt will demonstrate ability to ambulate with symmetrical gait, equal stance >75% of the time .   Pt will report decreased in pain and symptoms as well functional limitations  50%  or better to be able to improve community mobility.     Plan: cont per POC      Date:  8/8/24    TX#: 4/10 Date: 8/12/24             TX#: 5/10   Date: 8/20/24  Tx#: 6/10   Theraex: X 35 minutes  PPT 2 x 10  PPT with R/L BKFO 10x ea  Hooklying ball squeezes 2 x 10 3 sec holds  Supine R/L SAQs 2# 2 x 10 ea  Prone B knee flexion with ball 10x  Prone R/L Knee flexion 15x ea  ROMAINE 2 minutes  Standing B gastroc stretch on slant board level 2 3 x 30 sec holds  Standing heel raises 1 x 8 reps (discontinue today due to pain)  Standing L hip extension 10x  Standing L hip abduction 10x  Seated R/L LAQs 10x ea  Seated R sciatic nerve glide 10x   X 23 minutes  PPT with R/L heel slides 10x ea  Hooklying OH lat reach with 6# DB 10x  Hooklying ball squeezes 2 x 12 3 sec holds  Supine R/L SAQs 0# 2 x 10 ea  Prone R/L knee flexion 15x ea  Prone lying x 2 minutes X 36 minutes  Hooklying hip abd/ER with RTB above knees 10x ea 5 sec holds  Hooklying ball squeezes 3 x 10 3 sec holds  Standing R/L hip extension at stair 3x 10 sec holds ea  Supine R/L sciatic nerve glide 5x ea  Supine R/L LTR 3x ea (pain)  L hip extensor stretch at stair 5 x 10 sec holds   Standing B heel raises 10x  Seated R/L LAQs 2 x 10 ea  Seated slouch overcorrect 15x   Manual: X 8 mins  Lumbar spine mobilizations grade 2 in prone  X 5 mins  Lumbar spine mobilizations grade 2 in prone  X 8 minutes  STM to left piriformis, left gluteal, left TFL/ITB, quad, hamstring musculature   Gait/NMRed:      Modalities  Heat pad to low back and left hip in prone x 10 minutes Heat pad to left hip in sidelying x 10 minutes   HEP see patient instructions tab if with new HEP see patient instructions tab if with new HEP see patient instructions tab if with new HEP      Charges: theraex x 2 Man 1     Total Timed Treatment: 44 min  Total Treatment Time: 54 min

## 2024-08-27 NOTE — PROGRESS NOTES
Dx:  Chronic left hip pain (M25.552,G89.29)                 Insurance   MCR           Authorized  # visits by insurance  :  10    Expiration date  of Authorization:    Eval date/latest PN:7/25/24  Initial POC# of visits: 10                POC cert : 10/23/25  Authorizing Physician: Dr. Castaneda    Fall Risk: standard         Precautions: Breast CA         Subjective:  pt states that she is 20% better. She has better mobility, getting into bed better. Pt states that she still has sciatic pain on LLE could travel all the way to leg    PAIN LEVEL: 8/10 left lateral hip region  Assessment:    Tenderness and hypertonicity noted on initial findings on affected area  continued to be worked on this day  still demonstrating  irritability upon palpation.       PT modified exercises in the clinic and discussed HEP progression as patient complained of increased pain.  Held off PROM as per second session that caused increased 2 days     See exercises as logged below See exercises as logged below     Objective:     AROM: (* denotes performed with pain) BE knee and ankle  AROM are  WFL, painfree except L knee flexion due to  pain on L hip  MMT: for B knee extension  and ankle DF are grossly graded:4+/5 WFL , PF at 3/5: assessed in supine     HIP ROM AND MMT as follows       RLE(ROM): LLE (ROM) RLE (MMT) LLE (MMT)   Hip flexion 90 110 4+/5 3/5   Hip extension NT NT NT/5 NT/5   Hip ER/IR 35/20 35/40 4+/5 3-/5   Hip abd 20 40 4+/5 3-3+/5       Goals:   goals addressed this day as noted above  Goals: to be met in 10 visits then will reassess   Pt will be I with HEP,its progression , demonstrating proper performance of exercises, >75% of the time to maintain progress in therapy   Pt will demonstrate improved strength on LLE muscles graded below 5/5 to half a grade or better for ease of standing and walking and managing stairs  pt will demonstrate improved  AROM on L  hip to equal that of the contralateral hip for improved daily tasks     Pt will demonstrate increased BE SLS to improve gait on various surfaces and static/dynamic balance at least 5 secs improved from initial test.  Pt will demonstrate ability to ambulate with symmetrical gait, equal stance >75% of the time .   Pt will report decreased in pain and symptoms as well functional limitations 50%  or better to be able to improve community mobility.     Plan: cont per POC      Date:  8/8/24    TX#: 4/10 Date: 8/12/24             TX#: 5/10   Date: 8/20/24  Tx#: 6/10 Date: 8/28/24  Tx: 7/10   Theraex: X 35 minutes  PPT 2 x 10  PPT with R/L BKFO 10x ea  Hooklying ball squeezes 2 x 10 3 sec holds  Supine R/L SAQs 2# 2 x 10 ea  Prone B knee flexion with ball 10x  Prone R/L Knee flexion 15x ea  ROMAINE 2 minutes  Standing B gastroc stretch on slant board level 2 3 x 30 sec holds  Standing heel raises 1 x 8 reps (discontinue today due to pain)  Standing L hip extension 10x  Standing L hip abduction 10x  Seated R/L LAQs 10x ea  Seated R sciatic nerve glide 10x   X 23 minutes  PPT with R/L heel slides 10x ea  Hooklying OH lat reach with 6# DB 10x  Hooklying ball squeezes 2 x 12 3 sec holds  Supine R/L SAQs 0# 2 x 10 ea  Prone R/L knee flexion 15x ea  Prone lying x 2 minutes X 36 minutes  Hooklying hip abd/ER with RTB above knees 10x ea 5 sec holds  Hooklying ball squeezes 3 x 10 3 sec holds  Standing R/L hip extension at stair 3x 10 sec holds ea  Supine R/L sciatic nerve glide 5x ea  Supine R/L LTR 3x ea (pain)  L hip extensor stretch at stair 5 x 10 sec holds   Standing B heel raises 10x  Seated R/L LAQs 2 x 10 ea  Seated slouch overcorrect 15x X 25  Knee fall out x10  Hooklying hip abd/ER with YTB above knees 10x ea 2 sec holds x 2 sets    Hooklying ball squeezes 3 x 10 3 sec holds  Seated LAQ s x10  Standing heel raises 2x10  Standing hip extension R/ L  Standing LLE marches 2x10    L hip extensor stretch at stair 3x 30 reps sec holds   Manual: X 8 mins  Lumbar spine mobilizations grade 2 in prone  X 5  mins  Lumbar spine mobilizations grade 2 in prone  X 8 minutes  STM to left piriformis, left gluteal, left TFL/ITB, quad, hamstring musculature X8 mins  STM to left piriformis, left gluteal, left TFL/ITB, quad, hamstring musculature   Gait/NMRed:     X8 mins  Side steps on bars x3 rounds  Step taps 2x10     Modalities  Heat pad to low back and left hip in prone x 10 minutes Heat pad to left hip in sidelying x 10 minutes Heat pad to left hip in sidelying x 10 minutes   HEP see patient instructions tab if with new HEP see patient instructions tab if with new HEP see patient instructions tab if with new HEP       Charges: theraex x 1, Man 1 , neuro sebastián x1     Total Timed Treatment: 41min  Total Treatment Time: 51 min

## 2024-08-28 ENCOUNTER — OFFICE VISIT (OUTPATIENT)
Dept: PHYSICAL THERAPY | Age: 69
End: 2024-08-28
Attending: STUDENT IN AN ORGANIZED HEALTH CARE EDUCATION/TRAINING PROGRAM
Payer: MEDICARE

## 2024-08-28 PROCEDURE — 97112 NEUROMUSCULAR REEDUCATION: CPT

## 2024-08-28 PROCEDURE — 97110 THERAPEUTIC EXERCISES: CPT

## 2024-08-28 PROCEDURE — 97140 MANUAL THERAPY 1/> REGIONS: CPT

## 2024-08-30 ENCOUNTER — TELEPHONE (OUTPATIENT)
Dept: PHYSICAL THERAPY | Facility: HOSPITAL | Age: 69
End: 2024-08-30

## 2024-08-30 ENCOUNTER — APPOINTMENT (OUTPATIENT)
Dept: PHYSICAL THERAPY | Age: 69
End: 2024-08-30
Attending: STUDENT IN AN ORGANIZED HEALTH CARE EDUCATION/TRAINING PROGRAM
Payer: MEDICARE

## 2024-09-03 ENCOUNTER — OFFICE VISIT (OUTPATIENT)
Dept: HEMATOLOGY/ONCOLOGY | Facility: HOSPITAL | Age: 69
End: 2024-09-03
Attending: INTERNAL MEDICINE
Payer: MEDICARE

## 2024-09-03 VITALS
WEIGHT: 194.31 LBS | SYSTOLIC BLOOD PRESSURE: 161 MMHG | HEART RATE: 106 BPM | TEMPERATURE: 98 F | OXYGEN SATURATION: 97 % | HEIGHT: 64 IN | DIASTOLIC BLOOD PRESSURE: 80 MMHG | RESPIRATION RATE: 18 BRPM | BODY MASS INDEX: 33.17 KG/M2

## 2024-09-03 DIAGNOSIS — Z92.21 HISTORY OF ANTINEOPLASTIC CHEMOTHERAPY: ICD-10-CM

## 2024-09-03 DIAGNOSIS — M85.80 OSTEOPENIA DUE TO CANCER THERAPY: Primary | ICD-10-CM

## 2024-09-03 DIAGNOSIS — T45.1X5A CHEMOTHERAPY-INDUCED PERIPHERAL NEUROPATHY (HCC): ICD-10-CM

## 2024-09-03 DIAGNOSIS — C50.812 MALIGNANT NEOPLASM OF OVERLAPPING SITES OF LEFT BREAST IN FEMALE, ESTROGEN RECEPTOR POSITIVE (HCC): Primary | ICD-10-CM

## 2024-09-03 DIAGNOSIS — G62.0 CHEMOTHERAPY-INDUCED PERIPHERAL NEUROPATHY (HCC): ICD-10-CM

## 2024-09-03 DIAGNOSIS — M54.42 ACUTE BACK PAIN WITH SCIATICA, LEFT: ICD-10-CM

## 2024-09-03 DIAGNOSIS — M25.50 AROMATASE INHIBITOR-ASSOCIATED ARTHRALGIA: ICD-10-CM

## 2024-09-03 DIAGNOSIS — T45.1X5A AROMATASE INHIBITOR-ASSOCIATED ARTHRALGIA: ICD-10-CM

## 2024-09-03 DIAGNOSIS — Z17.0 MALIGNANT NEOPLASM OF OVERLAPPING SITES OF LEFT BREAST IN FEMALE, ESTROGEN RECEPTOR POSITIVE (HCC): Primary | ICD-10-CM

## 2024-09-03 DIAGNOSIS — Z79.811 LONG TERM (CURRENT) USE OF AROMATASE INHIBITORS: ICD-10-CM

## 2024-09-03 DIAGNOSIS — M85.80 OSTEOPENIA DUE TO CANCER THERAPY: ICD-10-CM

## 2024-09-03 DIAGNOSIS — Z90.13 ABSENCE OF BREAST, BILATERAL: ICD-10-CM

## 2024-09-03 LAB
CALCIUM BLD-MCNC: 9.6 MG/DL (ref 8.7–10.4)
CREAT BLD-MCNC: 0.72 MG/DL
EGFRCR SERPLBLD CKD-EPI 2021: 92 ML/MIN/1.73M2 (ref 60–?)

## 2024-09-03 PROCEDURE — 82565 ASSAY OF CREATININE: CPT

## 2024-09-03 PROCEDURE — 96374 THER/PROPH/DIAG INJ IV PUSH: CPT

## 2024-09-03 PROCEDURE — 99214 OFFICE O/P EST MOD 30 MIN: CPT | Performed by: INTERNAL MEDICINE

## 2024-09-03 PROCEDURE — 82310 ASSAY OF CALCIUM: CPT

## 2024-09-03 PROCEDURE — G2211 COMPLEX E/M VISIT ADD ON: HCPCS | Performed by: INTERNAL MEDICINE

## 2024-09-03 RX ORDER — METHYLPREDNISOLONE 4 MG
TABLET, DOSE PACK ORAL
Qty: 21 EACH | Refills: 0 | Status: SHIPPED | OUTPATIENT
Start: 2024-09-03

## 2024-09-03 NOTE — PROGRESS NOTES
Hematology Oncology Progress Note    Patient Name: Kim Hoffman   YOB: 1955   Medical Record Number: [id number]   Attending Physician: Laurita Sylvester MD     Date of Visit: 9/3/2024     Chief Complaint:  Breast cancer  AI monitoring    Oncologic History:  69 year old presented with palpable left breast mass, mammogram with 6 cm span of calcs  2019: bilateral mastectomies and left SNB: left IDC ER 92%, RI 98%, HER2 1+, Ki-26%. + SN with THERESE s/p axillary dissection pT3N1a. Right breast negative. Mammaprint: luminal B, recurrence at 29%  10/2019: adjuvant AC-T chemo completed 2020: completed PMRT  3/2020: started anastrozole --> switched to exemestane --> then letrozole due to intolerance  2024: switched back to exemestane- held 2024     Interval History:  Here for follow up and Zometa infusion.   She has not resumed exemestane yet due to severe osterarthritis and chronic pain.   She was doing PT/OT for her left hip, but thinks it's exacerbated her sciatica. She is having a bad flare up and unable to get comfortable. This is significantly limiting her ADLs and quality of life.    Her pain is severe requiring ibuprofen around the clock. She is considering to see ortho for injections.   She remains on cymbalta 30mg with some improvement in joint aches and neuropathy of the feet. Also helps her mood. No chest wall changes or palpable lumps/bumps.     PMH/PSH: Anxiety. Bilateral mastectomies  Family History: Negative for cancer  Social History: ,   of cancer. 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 centrum, Disp: , Rfl:     Biotin w/ Vitamins C & E (HAIR/SKIN/NAILS OR), Take 1 tablet by mouth daily., Disp: , Rfl:     Calcium 500-125 MG-UNIT Oral Tab, Take by mouth., Disp: , Rfl:     VITAMIN D, CHOLECALCIFEROL, OR, Take by mouth., Disp: , Rfl:     Bioflavonoid Products (AZIZA C OR), Take 1 tablet by mouth daily.  , Disp: , Rfl:     exemestane 25 MG Oral Tab, Take 1 tablet (25 mg total) by mouth daily. (Patient not taking: Reported on 6/25/2024), Disp: 90 tablet, Rfl: 3       Review of Systems: Onc ROS negative besides as per HPI    Vitals:  Vitals:    09/03/24 1408   BP: (!) 161/80   Pulse: 106   Resp: 18   Temp: 98.3 °F (36.8 °C)       Weight:  Wt Readings from Last 6 Encounters:   09/03/24 88.1 kg (194 lb 4.8 oz)   08/05/24 86.4 kg (190 lb 6.4 oz)   07/29/24 86.9 kg (191 lb 9.6 oz)   06/25/24 86.2 kg (190 lb)   06/27/24 88 kg (194 lb)   03/04/24 86.2 kg (190 lb 1.6 oz)     Physical Exam:  General: Patient is alert and oriented x 3, mild distress due to pain.  HEENT: no palor, oropharynx clear.   Chest:  nonlabored breathing, symmetric expansion.   Breast: previously bilateral mastectomies with excess tissue, no chest wall tenderness or palpable masses. Axilla normal bilaterally.   Abd: non distended.   Extremities: no edema.  Neurological: motor strength grossly intact  Psych: appropriate mood and affect      Labs:  Lab Results   Component Value Date    WBC 6.7 02/27/2024    RBC 4.71 02/27/2024    HGB 14.5 02/27/2024    HCT 41.6 02/27/2024    MCV 88.3 02/27/2024    MCH 30.8 02/27/2024    MCHC 34.9 02/27/2024    RDW 11.9 02/27/2024    .0 02/27/2024     Lab Results   Component Value Date     02/27/2024    K 4.1 02/27/2024     02/27/2024    CO2 26.0 02/27/2024    BUN 15 02/27/2024    CREATSERUM 0.72 09/03/2024     (H)  02/27/2024    CA 9.6 09/03/2024    ALKPHO 103 02/27/2024    ALT 90 (H) 02/27/2024    AST 65 (H) 02/27/2024    BILT 0.7 02/27/2024    ALB 4.5 02/27/2024    TP 8.1 02/27/2024      Imaging:  No results found.        Cancer Staging   Malignant neoplasm of overlapping sites of left breast in female, estrogen receptor positive (HCC)  Staging form: Breast, AJCC 8th Edition  - Clinical stage from 6/20/2019: Stage IIA (cT3(2), cN0, cM0, G1, ER+, OR+, HER2-) - Signed by Jess Macias MD on 9/5/2019  - Pathologic stage from 8/9/2019: Stage IB (pT3, pN1a(sn), cM0, G2, ER+, OR+, HER2-) - Signed by Katie Reynolds APRN on 9/23/2019      Impression and Plan:    Left breast invasive lobular carcinoma, grade 2, ER/OR positive, HER2 negative, Ki-25%, [id number]  - s/p bilateral mastectomies with left axillary dissection --> adjuvant AC-T --> PMRT all completed 6/2020  - on adjuvant endocrine therapy with difficulty tolerating all AI's, but thinks exemestane was most tolerable one for her.   - offered tamoxifen but she declined due to concern for risk of thrombosis and uterine pathology/cancer.   - switch back to exemestane 2/2024, took it for 2 months until 4/2024 then stopped it for a 2 months drug holiday (which she takes twice yearly).   - plan was to resume exemestane in June, but she could not due to severe left hip arthritis, and now left sciatica pain.   - BCI showed no benefit from extended ET, plan was to complete 5 years of therapy (3/2025), but she had multiple interruptions.  - clinical SHIVANI. She will try to resume exemestane when her pain is better controlled.   - no indication for imaging.     Bone health  Osteopenia, severe  - DEXA 1/2024 with decreasing bone density and worsening osteopenia, fracture risk of 15%  - discussed growing evidence of iDFS in breast cancer with adjuvant bispho (especially ZA)  - given chronic use of AI and above, started zometa 4 mg IV q6 months plan x2 yrs  - proceed with zometa dose today.  Continue dental exams q6 months and vit D 4-5000 units.     Chemo induced neuropathy  - mild, limited to feet   - continue cymbalta 30 mg daily     Pancreatic mass  - MRCP  noted for a separate LN, no mass. Repeat negative.   - CLN: repeat in 3 years (2024)    Mildly elevated LFTs- due to fatty liver. Stable     Depression- on cymbalta    Left hip arthritis with severe pain- referred to PT/OT. Recommend ortho eval and consideration of injection/surgery.       Left sciatica- start medrol dose pack. Follow up with primary.    Seasonal allergies- on loratadine      RTC 1 month         Laurita Sylvester MD  Hematology Oncology

## 2024-09-03 NOTE — PROGRESS NOTES
Pt here for Zometa . Pt denies any issues or concerns.    Kim denies any recent or planned invasive dental work.  Ordering Provider: Ravin       Pt tolerated infusion without difficulty or complaint. Reviewed next apt date/time: 10/23       Education Record  Learner:  Patient  Disease / Diagnosis: Plan of care for treatment today  Barriers / Limitations:  None  Method:  Brief focused and Discussion  General Topics:  Side effects and symptom management and Plan of care reviewed  Outcome:  Shows understanding

## 2024-09-05 ENCOUNTER — APPOINTMENT (OUTPATIENT)
Dept: PHYSICAL THERAPY | Age: 69
End: 2024-09-05
Attending: STUDENT IN AN ORGANIZED HEALTH CARE EDUCATION/TRAINING PROGRAM
Payer: MEDICARE

## 2024-09-06 NOTE — PROGRESS NOTES
Dx:  Chronic left hip pain (M25.552,G89.29)                 Insurance   MCR           Authorized  # visits by insurance  :  10    Expiration date  of Authorization:    Eval date/latest PN:7/25/24  Initial POC# of visits: 10                POC cert : 10/23/25  Authorizing Physician: Dr. Goodman ref. provider found    Fall Risk: standard         Precautions: Breast CA         Subjective:  pt states that she is 20% better but she still has pain after last session. She was feeling better the past 2 days and she had some Z  pack and helped. She did have a good day yesterday but did more walking. She did have massage today and more sore.    PAIN LEVEL: 8/10 left lateral hip region  Assessment:      Improved hip flexion and no improvement for hip abduction    PT continued to modify exercises in the clinic and applied heat as prior  Objective:     AROM: (* denotes performed with pain) BE knee and ankle  AROM are  WFL, painfree except L knee flexion due to  pain on L hip  MMT: for B knee extension  and ankle DF are grossly graded:4+/5 WFL , PF at 3/5: assessed in supine     HIP ROM AND MMT as follows       RLE(ROM): LLE (ROM) RLE (MMT) LLE (MMT)   Hip flexion 115 110 4+/5 3/5   Hip extension NT NT NT/5 NT/5   Hip ER/IR 35/40 35/20 4+/5 3-/5   Hip abd 40 20 4+/5 3-3+/5       Goals:   goals addressed this day as noted above  Goals: to be met in 10 visits then will reassess   Pt will be I with HEP,its progression , demonstrating proper performance of exercises, >75% of the time to maintain progress in therapy   Pt will demonstrate improved strength on LLE muscles graded below 5/5 to half a grade or better for ease of standing and walking and managing stairs  pt will demonstrate improved  AROM on L  hip to equal that of the contralateral hip for improved daily tasks    Pt will demonstrate increased BE SLS to improve gait on various surfaces and static/dynamic balance at least 5 secs improved from initial test.  Pt will demonstrate  ability to ambulate with symmetrical gait, equal stance >75% of the time .   Pt will report decreased in pain and symptoms as well functional limitations 50%  or better to be able to improve community mobility.     Plan: anticipate discharge after 1 session and will follow up with MD     Date:  8/8/24    TX#: 4/10 Date: 8/12/24             TX#: 5/10   Date: 8/20/24  Tx#: 6/10 Date: 8/28/24  Tx: 7/10 Date: 9/10/24  Tx: 8/10   Theraex: X 35 minutes  PPT 2 x 10  PPT with R/L BKFO 10x ea  Hooklying ball squeezes 2 x 10 3 sec holds  Supine R/L SAQs 2# 2 x 10 ea  Prone B knee flexion with ball 10x  Prone R/L Knee flexion 15x ea  ROMAINE 2 minutes  Standing B gastroc stretch on slant board level 2 3 x 30 sec holds  Standing heel raises 1 x 8 reps (discontinue today due to pain)  Standing L hip extension 10x  Standing L hip abduction 10x  Seated R/L LAQs 10x ea  Seated R sciatic nerve glide 10x   X 23 minutes  PPT with R/L heel slides 10x ea  Hooklying OH lat reach with 6# DB 10x  Hooklying ball squeezes 2 x 12 3 sec holds  Supine R/L SAQs 0# 2 x 10 ea  Prone R/L knee flexion 15x ea  Prone lying x 2 minutes X 36 minutes  Hooklying hip abd/ER with RTB above knees 10x ea 5 sec holds  Hooklying ball squeezes 3 x 10 3 sec holds  Standing R/L hip extension at stair 3x 10 sec holds ea  Supine R/L sciatic nerve glide 5x ea  Supine R/L LTR 3x ea (pain)  L hip extensor stretch at stair 5 x 10 sec holds   Standing B heel raises 10x  Seated R/L LAQs 2 x 10 ea  Seated slouch overcorrect 15x X 25  Knee fall out x10  Hooklying hip abd/ER with YTB above knees 10x ea 2 sec holds x 2 sets    Hooklying ball squeezes 3 x 10 3 sec holds  Seated LAQ s x10  Standing heel raises 2x10  Standing hip extension R/ L  Standing LLE marches 2x10    L hip extensor stretch at stair 3x 30 reps sec holds X30  supine marches x10  Supine knee fall out x10  x2    Hooklying ball squeezes 3 x 10 3 sec holds  Spine swiss ball knee to chest movements 2x10    Swiss  ball  NWB midrange with guide 2x10  Supine TKE with ball 5 secs 2x10  LAQ 1# 2x10    Seated hip flexion 2x10    Standing heel raises x10           Manual: X 8 mins  Lumbar spine mobilizations grade 2 in prone  X 5 mins  Lumbar spine mobilizations grade 2 in prone  X 8 minutes  STM to left piriformis, left gluteal, left TFL/ITB, quad, hamstring musculature X8 mins  STM to left piriformis, left gluteal, left TFL/ITB, quad, hamstring musculature    Gait/NMRed:     X8 mins  Side steps on bars x3 rounds  Step taps 2x10   X 8  Weight shifts x2x10  Step side to side with plinth support   Modalities  Heat pad to low back and left hip in prone x 10 minutes Heat pad to left hip in sidelying x 10 minutes Heat pad to left hip in sidelying x 10 minutes    HEP see patient instructions tab if with new HEP see patient instructions tab if with new HEP see patient instructions tab if with new HEP        Charges: theraex x 2, neuro sebastián x1    Total Timed Treatment: 40 min  Total Treatment Time: 50 min

## 2024-09-10 ENCOUNTER — OFFICE VISIT (OUTPATIENT)
Dept: PHYSICAL THERAPY | Age: 69
End: 2024-09-10
Attending: STUDENT IN AN ORGANIZED HEALTH CARE EDUCATION/TRAINING PROGRAM
Payer: MEDICARE

## 2024-09-10 PROCEDURE — 97112 NEUROMUSCULAR REEDUCATION: CPT

## 2024-09-10 PROCEDURE — 97110 THERAPEUTIC EXERCISES: CPT

## 2024-09-20 ENCOUNTER — APPOINTMENT (OUTPATIENT)
Dept: PHYSICAL THERAPY | Age: 69
End: 2024-09-20
Attending: STUDENT IN AN ORGANIZED HEALTH CARE EDUCATION/TRAINING PROGRAM
Payer: MEDICARE

## 2024-09-20 ENCOUNTER — TELEPHONE (OUTPATIENT)
Dept: PHYSICAL THERAPY | Facility: HOSPITAL | Age: 69
End: 2024-09-20

## 2024-09-26 ENCOUNTER — APPOINTMENT (OUTPATIENT)
Dept: PHYSICAL THERAPY | Age: 69
End: 2024-09-26
Attending: STUDENT IN AN ORGANIZED HEALTH CARE EDUCATION/TRAINING PROGRAM
Payer: MEDICARE

## 2024-10-23 ENCOUNTER — OFFICE VISIT (OUTPATIENT)
Dept: HEMATOLOGY/ONCOLOGY | Facility: HOSPITAL | Age: 69
End: 2024-10-23
Attending: INTERNAL MEDICINE
Payer: MEDICARE

## 2024-10-23 VITALS
TEMPERATURE: 98 F | OXYGEN SATURATION: 98 % | SYSTOLIC BLOOD PRESSURE: 158 MMHG | RESPIRATION RATE: 18 BRPM | HEART RATE: 111 BPM | DIASTOLIC BLOOD PRESSURE: 84 MMHG

## 2024-10-23 DIAGNOSIS — Z17.0 MALIGNANT NEOPLASM OF OVERLAPPING SITES OF LEFT BREAST IN FEMALE, ESTROGEN RECEPTOR POSITIVE (HCC): Primary | ICD-10-CM

## 2024-10-23 DIAGNOSIS — C50.812 MALIGNANT NEOPLASM OF OVERLAPPING SITES OF LEFT BREAST IN FEMALE, ESTROGEN RECEPTOR POSITIVE (HCC): Primary | ICD-10-CM

## 2024-10-23 DIAGNOSIS — Z17.0 MALIGNANT NEOPLASM OF OVERLAPPING SITES OF LEFT BREAST IN FEMALE, ESTROGEN RECEPTOR POSITIVE (HCC): ICD-10-CM

## 2024-10-23 DIAGNOSIS — G62.0 CHEMOTHERAPY-INDUCED PERIPHERAL NEUROPATHY (HCC): ICD-10-CM

## 2024-10-23 DIAGNOSIS — C50.812 MALIGNANT NEOPLASM OF OVERLAPPING SITES OF LEFT BREAST IN FEMALE, ESTROGEN RECEPTOR POSITIVE (HCC): ICD-10-CM

## 2024-10-23 DIAGNOSIS — T45.1X5A AROMATASE INHIBITOR-ASSOCIATED ARTHRALGIA: ICD-10-CM

## 2024-10-23 DIAGNOSIS — T45.1X5A CHEMOTHERAPY-INDUCED PERIPHERAL NEUROPATHY (HCC): ICD-10-CM

## 2024-10-23 DIAGNOSIS — M85.80 OSTEOPENIA DUE TO CANCER THERAPY: ICD-10-CM

## 2024-10-23 DIAGNOSIS — R79.89 ELEVATED LFTS: ICD-10-CM

## 2024-10-23 DIAGNOSIS — M25.50 AROMATASE INHIBITOR-ASSOCIATED ARTHRALGIA: ICD-10-CM

## 2024-10-23 DIAGNOSIS — Z92.21 HISTORY OF ANTINEOPLASTIC CHEMOTHERAPY: ICD-10-CM

## 2024-10-23 LAB
ALBUMIN SERPL-MCNC: 4.9 G/DL (ref 3.2–4.8)
ALBUMIN/GLOB SERPL: 1.6 {RATIO} (ref 1–2)
ALP LIVER SERPL-CCNC: 76 U/L
ALT SERPL-CCNC: 126 U/L
ANION GAP SERPL CALC-SCNC: 9 MMOL/L (ref 0–18)
AST SERPL-CCNC: 89 U/L (ref ?–34)
BASOPHILS # BLD AUTO: 0.06 X10(3) UL (ref 0–0.2)
BASOPHILS NFR BLD AUTO: 0.8 %
BILIRUB SERPL-MCNC: 0.6 MG/DL (ref 0.2–1.1)
BUN BLD-MCNC: 18 MG/DL (ref 9–23)
BUN/CREAT SERPL: 26.1 (ref 10–20)
CALCIUM BLD-MCNC: 10 MG/DL (ref 8.7–10.4)
CHLORIDE SERPL-SCNC: 106 MMOL/L (ref 98–112)
CO2 SERPL-SCNC: 26 MMOL/L (ref 21–32)
CREAT BLD-MCNC: 0.69 MG/DL
DEPRECATED RDW RBC AUTO: 40.9 FL (ref 35.1–46.3)
EGFRCR SERPLBLD CKD-EPI 2021: 95 ML/MIN/1.73M2 (ref 60–?)
EOSINOPHIL # BLD AUTO: 0.12 X10(3) UL (ref 0–0.7)
EOSINOPHIL NFR BLD AUTO: 1.7 %
ERYTHROCYTE [DISTWIDTH] IN BLOOD BY AUTOMATED COUNT: 12.1 % (ref 11–15)
GLOBULIN PLAS-MCNC: 3.1 G/DL (ref 2–3.5)
GLUCOSE BLD-MCNC: 125 MG/DL (ref 70–99)
HCT VFR BLD AUTO: 42.3 %
HGB BLD-MCNC: 14.5 G/DL
IMM GRANULOCYTES # BLD AUTO: 0.02 X10(3) UL (ref 0–1)
IMM GRANULOCYTES NFR BLD: 0.3 %
LYMPHOCYTES # BLD AUTO: 2.08 X10(3) UL (ref 1–4)
LYMPHOCYTES NFR BLD AUTO: 29.2 %
MCH RBC QN AUTO: 31.7 PG (ref 26–34)
MCHC RBC AUTO-ENTMCNC: 34.3 G/DL (ref 31–37)
MCV RBC AUTO: 92.4 FL
MONOCYTES # BLD AUTO: 0.66 X10(3) UL (ref 0.1–1)
MONOCYTES NFR BLD AUTO: 9.3 %
NEUTROPHILS # BLD AUTO: 4.19 X10 (3) UL (ref 1.5–7.7)
NEUTROPHILS # BLD AUTO: 4.19 X10(3) UL (ref 1.5–7.7)
NEUTROPHILS NFR BLD AUTO: 58.7 %
OSMOLALITY SERPL CALC.SUM OF ELEC: 295 MOSM/KG (ref 275–295)
PLATELET # BLD AUTO: 223 10(3)UL (ref 150–450)
POTASSIUM SERPL-SCNC: 4.2 MMOL/L (ref 3.5–5.1)
PROT SERPL-MCNC: 8 G/DL (ref 5.7–8.2)
RBC # BLD AUTO: 4.58 X10(6)UL
SODIUM SERPL-SCNC: 141 MMOL/L (ref 136–145)
WBC # BLD AUTO: 7.1 X10(3) UL (ref 4–11)

## 2024-10-23 PROCEDURE — 85025 COMPLETE CBC W/AUTO DIFF WBC: CPT

## 2024-10-23 PROCEDURE — G2211 COMPLEX E/M VISIT ADD ON: HCPCS | Performed by: INTERNAL MEDICINE

## 2024-10-23 PROCEDURE — 36415 COLL VENOUS BLD VENIPUNCTURE: CPT

## 2024-10-23 PROCEDURE — 99214 OFFICE O/P EST MOD 30 MIN: CPT | Performed by: INTERNAL MEDICINE

## 2024-10-23 PROCEDURE — 80053 COMPREHEN METABOLIC PANEL: CPT

## 2024-10-23 NOTE — PROGRESS NOTES
Hematology Oncology Progress Note    Patient Name: Kim Hoffman   YOB: 1955   Medical Record Number: [id number]   Attending Physician: Laurita Sylvester MD     Date of Visit: 10/23/2024     Chief Complaint:  Breast cancer  AI monitoring    Oncologic History:  69 year old presented with palpable left breast mass, mammogram with 6 cm span of calcs  2019: bilateral mastectomies and left SNB: left IDC ER 92%, OR 98%, HER2 1+, Ki-26%. + SN with THERESE s/p axillary dissection pT3N1a. Right breast negative. Mammaprint: luminal B, recurrence at 29%  10/2019: adjuvant AC-T chemo completed 2020: completed PMRT  3/2020: started anastrozole --> switched to exemestane --> then letrozole due to intolerance  2024: switched back to exemestane- held 2024     Interval History:  Here for follow up and discussion of endocrine therapy.   Still struggling with severe leg pain and was not able to resume exemestane yet.   Physical therapy did not help and exacerbated her sciatica. This pain is significantly limiting her ADLs and quality of life.    She is taking ibuprofen around the clock. Has not seen ortho or other specialist yet.   She remains on cymbalta 30mg with some improvement in joint aches and neuropathy of the feet. Also helps her mood.   denies chest wall changes or palpable lumps/bumps.       PMH/PSH: Anxiety. Bilateral mastectomies  Family History: Negative for cancer  Social History: ,   of cancer. 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 (Patient not  taking: Reported on 6/25/2024), Disp: 90 tablet, Rfl: 3    DULoxetine 30 MG Oral Cap DR Particles, Take 1 capsule (30 mg total) by mouth daily., Disp: 90 capsule, Rfl: 1    GLUCOSAMINE-CHONDROITIN OR, Take 1 tablet by mouth daily., Disp: , Rfl:     Multiple Vitamins-Minerals (OCUVITE ADULT 50+) Oral Cap, Take 1 capsule by mouth daily. centrum, Disp: , Rfl:     Biotin w/ Vitamins C & E (HAIR/SKIN/NAILS OR), Take 1 tablet by mouth daily., Disp: , Rfl:     Calcium 500-125 MG-UNIT Oral Tab, Take by mouth., Disp: , Rfl:     VITAMIN D, CHOLECALCIFEROL, OR, Take by mouth., Disp: , Rfl:     Bioflavonoid Products (AZIZA C OR), Take 1 tablet by mouth daily.  , Disp: , Rfl:        Review of Systems: Onc ROS negative besides as per HPI    Vitals:  Vitals:    10/23/24 1153   BP: 158/84   Pulse: 111   Resp: 18   Temp: 98.4 °F (36.9 °C)       Weight:  Wt Readings from Last 6 Encounters:   09/03/24 88.1 kg (194 lb 4.8 oz)   08/05/24 86.4 kg (190 lb 6.4 oz)   07/29/24 86.9 kg (191 lb 9.6 oz)   06/25/24 86.2 kg (190 lb)   06/27/24 88 kg (194 lb)   03/04/24 86.2 kg (190 lb 1.6 oz)     Physical Exam:  General: alert and oriented x 3, mild distress due to pain.  HEENT: no palor, oropharynx clear.   Chest:  nonlabored breathing, symmetric expansion.   Breast: previously bilateral mastectomies with excess tissue, no chest wall tenderness or palpable masses. Axilla normal bilaterally.   Abd: non distended.   Extremities: no edema.  Neurological: motor strength grossly intact  Psych: appropriate mood and affect      Labs:  Lab Results   Component Value Date    WBC 7.1 10/23/2024    RBC 4.58 10/23/2024    HGB 14.5 10/23/2024    HCT 42.3 10/23/2024    MCV 92.4 10/23/2024    MCH 31.7 10/23/2024    MCHC 34.3 10/23/2024    RDW 12.1 10/23/2024    .0 10/23/2024     Lab Results   Component Value Date     10/23/2024    K 4.2 10/23/2024     10/23/2024    CO2 26.0 10/23/2024    BUN 18 10/23/2024    CREATSERUM 0.69 10/23/2024    GLU  125 (H) 10/23/2024    CA 10.0 10/23/2024    ALKPHO 76 10/23/2024     (H) 10/23/2024    AST 89 (H) 10/23/2024    BILT 0.6 10/23/2024    ALB 4.9 (H) 10/23/2024    TP 8.0 10/23/2024      Imaging:  No results found.        Cancer Staging   Malignant neoplasm of overlapping sites of left breast in female, estrogen receptor positive (HCC)  Staging form: Breast, AJCC 8th Edition  - Clinical stage from 6/20/2019: Stage IIA (cT3(2), cN0, cM0, G1, ER+, KS+, HER2-) - Signed by Jess Macias MD on 9/5/2019  - Pathologic stage from 8/9/2019: Stage IB (pT3, pN1a(sn), cM0, G2, ER+, KS+, HER2-) - Signed by Katie Reynolds APRN on 9/23/2019      Impression and Plan:    Left breast invasive lobular carcinoma, grade 2, ER/KS positive, HER2 negative, Ki-25%, [id number]  - s/p bilateral mastectomies with left axillary dissection --> adjuvant AC-T --> PMRT all completed 6/2020  - on adjuvant endocrine therapy with difficulty tolerating all AI's, but thinks exemestane was most tolerable one for her.   - offered tamoxifen but she declined due to risk of thrombosis and uterine pathology/cancer.   - switched back to exemestane 2/2024, took it for 2 months until 4/2024 then stopped it for a 2 months drug holiday (which she takes twice yearly).   - still unable to resume exemestane due to severe debilitating left hip and leg pain.   - BCI showed no benefit from extended ET, will need 5 yrs of treatment.   - she still wants to try exemestane when her pain is better controlled.   - clinical SHIVANI. No need for imaging    Bone health  Osteopenia, severe  - DEXA 1/2024 with decreasing bone density and worsening osteopenia, fracture risk of 15%  - discussed growing evidence of iDFS in breast cancer with adjuvant bispho (especially ZA)  - given chronic use of AI and above, started zometa 4 mg IV q6 months plan x2 yrs  - continue zometa, next due in March 2025. Continue dental exams q6 months and vit D 4-5000 units.     Chemo induced neuropathy  -  mild, limited to feet   - continue cymbalta 30 mg daily     Pancreatic mass  - MRCP  noted for a separate LN, no mass. Repeat negative.   - CLN: repeat in 3 years (2024)    Chronic pain, Left hip arthritis with severe pain- completed PT/OT with no benefit. Recommend referral to physical medicine for pain management ASAP and consideration of injections. She will discuss this further with Dr. Castaneda.     Mildly elevated LFTs- due to fatty liver. Stable     Depression- on cymbalta    Seasonal allergies- on loratadine      Return in about 3 months (around 1/23/2025).       Laurita Sylvester MD  Hematology Oncology

## 2024-10-24 ENCOUNTER — OFFICE VISIT (OUTPATIENT)
Dept: FAMILY MEDICINE CLINIC | Facility: CLINIC | Age: 69
End: 2024-10-24

## 2024-10-24 ENCOUNTER — TELEPHONE (OUTPATIENT)
Dept: FAMILY MEDICINE CLINIC | Facility: CLINIC | Age: 69
End: 2024-10-24

## 2024-10-24 VITALS
HEIGHT: 64 IN | WEIGHT: 195.63 LBS | HEART RATE: 112 BPM | DIASTOLIC BLOOD PRESSURE: 98 MMHG | BODY MASS INDEX: 33.4 KG/M2 | SYSTOLIC BLOOD PRESSURE: 162 MMHG

## 2024-10-24 DIAGNOSIS — Z23 IMMUNIZATION DUE: ICD-10-CM

## 2024-10-24 DIAGNOSIS — G89.29 CHRONIC LEFT-SIDED LOW BACK PAIN WITH LEFT-SIDED SCIATICA: Primary | ICD-10-CM

## 2024-10-24 DIAGNOSIS — M54.42 CHRONIC LEFT-SIDED LOW BACK PAIN WITH LEFT-SIDED SCIATICA: Primary | ICD-10-CM

## 2024-10-24 PROCEDURE — G0008 ADMIN INFLUENZA VIRUS VAC: HCPCS | Performed by: STUDENT IN AN ORGANIZED HEALTH CARE EDUCATION/TRAINING PROGRAM

## 2024-10-24 PROCEDURE — 90662 IIV NO PRSV INCREASED AG IM: CPT | Performed by: STUDENT IN AN ORGANIZED HEALTH CARE EDUCATION/TRAINING PROGRAM

## 2024-10-24 PROCEDURE — 99213 OFFICE O/P EST LOW 20 MIN: CPT | Performed by: STUDENT IN AN ORGANIZED HEALTH CARE EDUCATION/TRAINING PROGRAM

## 2024-10-24 RX ORDER — METHYLPREDNISOLONE 4 MG/1
TABLET ORAL
Qty: 1 EACH | Refills: 0 | Status: SHIPPED | OUTPATIENT
Start: 2024-10-24

## 2024-10-24 RX ORDER — METHOCARBAMOL 750 MG/1
750 TABLET, FILM COATED ORAL 4 TIMES DAILY PRN
Qty: 60 TABLET | Refills: 0 | Status: SHIPPED | OUTPATIENT
Start: 2024-10-24

## 2024-10-24 NOTE — PROGRESS NOTES
HPI:    Patient ID: Kim Hoffman is a 69 year old female.    HPI  Pt presenting with sciatica pain.    H/o chronic Left leg pain  Has started PT with worsened symptoms  Reports radicular pain down posterior Left leg  Pain with ambulation, stairs  Advil with minimal relief  Admits prior chiro treatment with improvement but no recent visits  Denies any loss of bowel/bladder control, saddle anesthesia, distal weakness    Review of Systems   A comprehensive 10 point review of systems was completed.  Pertinent positives and negatives noted in the the HPI.       Current Outpatient Medications   Medication Sig Dispense Refill    methocarbamol 750 MG Oral Tab Take 1 tablet (750 mg total) by mouth 4 (four) times daily as needed (pain). 60 tablet 0    methylPREDNISolone (MEDROL) 4 MG Oral Tablet Therapy Pack As directed. 1 each 0    loratadine 10 MG Oral Tab Take 1 tablet (10 mg total) by mouth daily. 90 tablet 0    exemestane 25 MG Oral Tab Take 1 tablet (25 mg total) by mouth daily. 90 tablet 3    DULoxetine 30 MG Oral Cap DR Particles Take 1 capsule (30 mg total) by mouth daily. 90 capsule 1    GLUCOSAMINE-CHONDROITIN OR Take 1 tablet by mouth daily.      Multiple Vitamins-Minerals (OCUVITE ADULT 50+) Oral Cap Take 1 capsule by mouth daily. centrum      Biotin w/ Vitamins C & E (HAIR/SKIN/NAILS OR) Take 1 tablet by mouth daily.      Calcium 500-125 MG-UNIT Oral Tab Take by mouth.      VITAMIN D, CHOLECALCIFEROL, OR Take by mouth.      Bioflavonoid Products (AZIZA C OR) Take 1 tablet by mouth daily.         Allergies:Allergies[1]   Vitals:    10/24/24 1501   BP: (!) 162/98   Pulse: 112   Weight: 195 lb 9.6 oz (88.7 kg)   Height: 5' 4\" (1.626 m)       Body mass index is 33.57 kg/m².   PHYSICAL EXAM:   Physical Exam  Vitals reviewed.   Constitutional:       General: She is not in acute distress.  HENT:      Head: Normocephalic.   Eyes:      Conjunctiva/sclera: Conjunctivae normal.   Cardiovascular:      Rate and  Rhythm: Normal rate and regular rhythm.      Pulses: Normal pulses.   Pulmonary:      Effort: Pulmonary effort is normal. No respiratory distress.   Musculoskeletal:      Lumbar back: Negative right straight leg raise test and negative left straight leg raise test.      Right hip: No tenderness. Decreased range of motion.      Left hip: No tenderness. Decreased range of motion.      Right lower leg: No edema.      Left lower leg: No edema.   Neurological:      Mental Status: She is alert and oriented to person, place, and time.   Psychiatric:         Mood and Affect: Mood normal.         Behavior: Behavior normal.             ASSESSMENT/PLAN:   1. Chronic left-sided low back pain with left-sided sciatica  - provided with gentle stretching/strengthening exercises  - encouraged to increase hydration and rest as able  - steroid burst, Robaxin PRN  - to call with any questions or concerns  - follow-up with Chiro  - discussed role of PMR  - methocarbamol 750 MG Oral Tab; Take 1 tablet (750 mg total) by mouth 4 (four) times daily as needed (pain).  Dispense: 60 tablet; Refill: 0  - methylPREDNISolone (MEDROL) 4 MG Oral Tablet Therapy Pack; As directed.  Dispense: 1 each; Refill: 0  - Physiatry Referral - In Network  - Chiropractic Referral - In Network    2. Immunization due  - High Dose Fluzone trivalent influenza, 65yrs+ PFS (99894)    Pt verbalized understanding and agrees with plan.    Orders Placed This Encounter   Procedures    High Dose Fluzone trivalent influenza, 65yrs+ PFS (91235)       Meds This Visit:  Requested Prescriptions     Signed Prescriptions Disp Refills    methocarbamol 750 MG Oral Tab 60 tablet 0     Sig: Take 1 tablet (750 mg total) by mouth 4 (four) times daily as needed (pain).    methylPREDNISolone (MEDROL) 4 MG Oral Tablet Therapy Pack 1 each 0     Sig: As directed.       Imaging & Referrals:  PHYSIATRY - INTERNAL  CHIROPRACTIC  - INTERNAL  INFLUENZA VAC HIGH DOSE PRSV FREE         ID#[id number]          [1] No Known Allergies

## 2024-10-28 NOTE — TELEPHONE ENCOUNTER
Approved today by [employer] 2017 N[employer]    Request Reference Number: [id number]. METHOCARBAM TAB 750MG is approved through 12/31/2025.     Your patient may now fill this prescription and it will be covered.  Authorization Expiration Date: 12/31/2025

## 2024-11-26 ENCOUNTER — TELEPHONE (OUTPATIENT)
Dept: PHYSICAL MEDICINE AND REHAB | Facility: CLINIC | Age: 69
End: 2024-11-26

## 2024-11-26 ENCOUNTER — OFFICE VISIT (OUTPATIENT)
Dept: PHYSICAL MEDICINE AND REHAB | Facility: CLINIC | Age: 69
End: 2024-11-26
Payer: MEDICARE

## 2024-11-26 VITALS — WEIGHT: 195 LBS | BODY MASS INDEX: 33.29 KG/M2 | HEIGHT: 64 IN

## 2024-11-26 DIAGNOSIS — M54.16 LEFT LUMBAR RADICULOPATHY: Primary | ICD-10-CM

## 2024-11-26 DIAGNOSIS — M16.12 PRIMARY OSTEOARTHRITIS OF LEFT HIP: ICD-10-CM

## 2024-11-26 PROCEDURE — 20611 DRAIN/INJ JOINT/BURSA W/US: CPT | Performed by: PHYSICAL MEDICINE & REHABILITATION

## 2024-11-26 PROCEDURE — 99214 OFFICE O/P EST MOD 30 MIN: CPT | Performed by: PHYSICAL MEDICINE & REHABILITATION

## 2024-11-26 RX ORDER — TRIAMCINOLONE ACETONIDE 40 MG/ML
40 INJECTION, SUSPENSION INTRA-ARTICULAR; INTRAMUSCULAR ONCE
Status: COMPLETED | OUTPATIENT
Start: 2024-11-26 | End: 2024-11-26

## 2024-11-26 RX ORDER — LIDOCAINE HYDROCHLORIDE 10 MG/ML
7 INJECTION, SOLUTION INFILTRATION; PERINEURAL ONCE
Status: COMPLETED | OUTPATIENT
Start: 2024-11-26 | End: 2024-11-26

## 2024-11-26 RX ORDER — PREGABALIN 75 MG/1
75 CAPSULE ORAL 2 TIMES DAILY
Qty: 60 CAPSULE | Refills: 0 | Status: SHIPPED | OUTPATIENT
Start: 2024-11-26

## 2024-11-26 RX ADMIN — LIDOCAINE HYDROCHLORIDE 7 ML: 10 INJECTION, SOLUTION INFILTRATION; PERINEURAL at 16:04:00

## 2024-11-26 RX ADMIN — TRIAMCINOLONE ACETONIDE 40 MG: 40 INJECTION, SUSPENSION INTRA-ARTICULAR; INTRAMUSCULAR at 16:04:00

## 2024-11-26 NOTE — PROCEDURES
Procedure:  Ultrasound guided LEFT hip joint injection  The risks, benefits and anticipated outcomes of the procedure, the risks and benefits of the alternatives to the procedure, and the roles and tasks of the personnel to be involved, were discussed with the patient, and the patient consents to the procedure and agrees to proceed.  UNIVERSAL PROTOCOL / SAFETY CHECKLIST  Sign in Communication: Completed  Time Out:  Team Confirms the Correct Patient, Correct Procedure, Correct Site and Site Marking, Correct Position (if applicable), Prep and Dry Time (if applicable).    Affirmation of Time Out: YES  Sign Out Discussion: Completed if applicable  The procedure was carried out under sterile prep.  A 27 ga 1&1/4in needle was introduced for skin anesthesia with 3 cc of 2% lidocaine.  Then, again with real time ultrasound guidance, a 22 gauge 3.5 in  needle was advanced to the femoral head/neck junction.  Following negative aspiration, a mixture of 4 cc of 1% lidocaine and 1 cc of Kenalog (40mg/ml) was injected.  Permanent images were taken and stored in the PACS system.    Ultrasound interpretation was performed prior to the procedure to identify the target and any adjacent neurovascular structures.  Subsequently, interpretation was performed during real-time needle guidance confirming placement.  Post-intervention interpretation was also performed confirming appropriate injectate flow and hemostasis. The patient tolerated the procedure without complication and was instructed in post-procedure precautions.  See patient instructions.    Adela Weaver DO, FAAPMR & CAQSM  Physical Medicine and Rehabilitation/Sports Medicine  [facility]

## 2024-11-26 NOTE — TELEPHONE ENCOUNTER
Per CMS Guidelines -no authorization is required for Ultrasound guided left hip joint injection with coricosteroid   CPT codes: 72158,   Completed in the office today       Status: Authorization is not required based on medical necessity however is not a guarantee of payment and may be subject to review once claim is submitted-Covered Benefit.

## 2024-11-26 NOTE — PROGRESS NOTES
[facility]  OFFICE FOLLOW UP EVALUATION      HISTORY OF PRESENT ILLNESS:     Chief Complaint   Patient presents with    Low Back Pain     LOV: 1/25/22 Patient f/u on left low back pain that radiates into leg with tingling and weakness. Currently in PT. Takes Advil.  XR Dexa/hip. Rates pain 9/10.       Patient is following up for left-sided low back and hip pain.  She states she has a difficult time putting pressure on her left leg pain radiates from the buttock into the groin down the left leg into the foot with heaviness and numbness sensation towards the end of the day.  She has been attending chiropractic treatment and physical therapy for the last 2 years since her last follow-up.  She was doing okay in managing her symptoms until recently in September when it got flared up.  She rates it to be 9 out of 10 now.  She is taking Advil.  Has completed steroids twice without any significant improvement.  She has a difficult time standing or in the morning the pain is worse but in the groin it is more so with prolonged sitting.  Patient has completed PT and is now doing home exercises.    PHYSICAL EXAM:   Ht 64\"   Wt 195 lb (88.5 kg)   LMP 07/14/2006   BMI 33.47 kg/m²     Significantly restricted range of motion of the left hip with internal and external rotation.  Positive Trinity test.  Strength is 5 out of 5 sensation intact to light touch.    IMAGING:     X-ray left hip completed 11/17/2023 is notable for severe osteoarthritis of the left hip joint    All imaging results were reviewed and discussed with patient.      ASSESSMENT/PLAN:     1. Primary osteoarthritis of left hip    2. Left lumbar radiculopathy        Kim Hoffman is a 69 year old female following up for severe left hip pain with osteoarthritis and left-sided lumbar radiculopathy symptoms.  I recommended starting Lyrica 75 mg twice daily and performed ultrasound-guided hip joint injection with  corticosteroid.  Please see procedure note for further details.  Recommend she follow-up with me in 4 weeks if the pain is no better we will obtain MRI imaging of the lumbar spine and consider further treatment options for the lumbar spine.      The patient verbalized understanding with the plan and was in agreement. All questions/concerns were addressed and there were no barriers to learning.  Please note Dragon dictation software was used to dictate this note and may result in inadvertent typos.    Adela Weaver DO, FAAPMR & CAQSM  Physical Medicine and Rehabilitation  Sports and Spine Medicine    PAST MEDICAL HISTORY:     Past Medical History:    Anxiety    Anxiety state    Breast cancer (HCC)    Calculus of kidney    Cancer (HCC)    breast    Colon adenomas    a tubular adenoma and serrated adenoma    Depression    Esophageal reflux    High cholesterol    Problems with swallowing    partial dentures upper and lower    Serrated adenoma of colon         PAST SURGICAL HISTORY:     Past Surgical History:   Procedure Laterality Date    Colonoscopy  06/15/2021    Colonoscopy N/A 6/15/2021    Procedure: COLONOSCOPY;  Surgeon: Misael Lawson MD;  Location: [facility]    Colonoscopy N/A 6/28/2024    Procedure: COLONOSCOPY with polypectomy;  Surgeon: Misael Lawson MD;  Location: [facility] ENDO    Priscila biopsy stereo nodule 1 site left (cpt=19081)      Priscila biopsy stereo nodule 1 site right (cpt=19081)  07/01/2019    KODAK     Mastectomy left      Mastectomy right      Mastectomy,simple Bilateral 08/08/2019    Bilateral mastectomies w L SLNB and immediate reconstruction with tissue expander and acellular dermal matrix and L SLNB         CURRENT MEDICATIONS:     Current Outpatient Medications   Medication Sig Dispense Refill    pregabalin (LYRICA) 75 MG Oral Cap Take 1 capsule (75 mg total) by mouth 2 (two) times daily. 60 capsule 0    methocarbamol 750 MG Oral Tab Take 1 tablet (750 mg total) by mouth 4 (four) times daily  as needed (pain). 60 tablet 0    exemestane 25 MG Oral Tab Take 1 tablet (25 mg total) by mouth daily. 90 tablet 3    DULoxetine 30 MG Oral Cap DR Particles Take 1 capsule (30 mg total) by mouth daily. 90 capsule 1    GLUCOSAMINE-CHONDROITIN OR Take 1 tablet by mouth daily.      Multiple Vitamins-Minerals (OCUVITE ADULT 50+) Oral Cap Take 1 capsule by mouth daily. centrum      Biotin w/ Vitamins C & E (HAIR/SKIN/NAILS OR) Take 1 tablet by mouth daily.      Calcium 500-125 MG-UNIT Oral Tab Take by mouth.      VITAMIN D, CHOLECALCIFEROL, OR Take by mouth.      Bioflavonoid Products (AZIZA C OR) Take 1 tablet by mouth daily.             ALLERGIES:   Allergies[1]      FAMILY HISTORY:     Family History   Problem Relation Age of Onset    Hypertension Father     Heart Disease Father     Diabetes Father     Heart Disease Mother 88    Heart Disorder Mother         Aortic Regurgitation/AVR    Other (PVD) Mother           SOCIAL HISTORY:     Social History     Socioeconomic History    Marital status:    Tobacco Use    Smoking status: Never     Passive exposure: Never    Smokeless tobacco: Never   Vaping Use    Vaping status: Never Used   Substance and Sexual Activity    Alcohol use: Not Currently     Alcohol/week: 1.0 standard drink of alcohol     Types: 1 Glasses of wine per week     Comment: Mostly at holidays    Drug use: No          REVIEW OF SYSTEMS:   A comprehensive 10 point review of systems was completed.  Pertinent positives and negatives noted in the the HPI.      LABS:     Lab Results   Component Value Date     06/14/2019    A1C 5.8 (H) 06/14/2019     Lab Results   Component Value Date    WBC 7.1 10/23/2024    RBC 4.58 10/23/2024    HGB 14.5 10/23/2024    HCT 42.3 10/23/2024    MCV 92.4 10/23/2024    MCH 31.7 10/23/2024    MCHC 34.3 10/23/2024    RDW 12.1 10/23/2024    .0 10/23/2024     Lab Results   Component Value Date     (H) 10/23/2024    BUN 18 10/23/2024    BUNCREA 26.1 (H)  10/23/2024    CREATSERUM 0.69 10/23/2024    ANIONGAP 9 10/23/2024    GFRNAA 83 05/03/2022    GFRAA 95 05/03/2022    CA 10.0 10/23/2024    OSMOCALC 295 10/23/2024    ALKPHO 76 10/23/2024    AST 89 (H) 10/23/2024     (H) 10/23/2024    BILT 0.6 10/23/2024    TP 8.0 10/23/2024    ALB 4.9 (H) 10/23/2024    GLOBULIN 3.1 10/23/2024     10/23/2024    K 4.2 10/23/2024     10/23/2024    CO2 26.0 10/23/2024     Lab Results   Component Value Date    PTP 12.9 07/25/2019    INR 0.99 07/25/2019     Lab Results   Component Value Date    VITD 62.2 09/07/2022            [1] No Known Allergies

## 2024-12-03 RX ORDER — DULOXETIN HYDROCHLORIDE 30 MG/1
30 CAPSULE, DELAYED RELEASE ORAL DAILY
Qty: 90 CAPSULE | Refills: 1 | Status: SHIPPED | OUTPATIENT
Start: 2024-12-03

## 2024-12-14 ENCOUNTER — OFFICE VISIT (OUTPATIENT)
Dept: FAMILY MEDICINE CLINIC | Facility: CLINIC | Age: 69
End: 2024-12-14
Payer: MEDICARE

## 2024-12-14 VITALS
DIASTOLIC BLOOD PRESSURE: 87 MMHG | WEIGHT: 197 LBS | HEIGHT: 64 IN | BODY MASS INDEX: 33.63 KG/M2 | HEART RATE: 120 BPM | SYSTOLIC BLOOD PRESSURE: 144 MMHG

## 2024-12-14 DIAGNOSIS — Z13.6 SCREENING FOR CARDIOVASCULAR CONDITION: ICD-10-CM

## 2024-12-14 DIAGNOSIS — Z13.29 SCREENING FOR ENDOCRINE, NUTRITIONAL, METABOLIC AND IMMUNITY DISORDER: ICD-10-CM

## 2024-12-14 DIAGNOSIS — Z13.21 SCREENING FOR ENDOCRINE, NUTRITIONAL, METABOLIC AND IMMUNITY DISORDER: ICD-10-CM

## 2024-12-14 DIAGNOSIS — Z13.0 SCREENING FOR ENDOCRINE, NUTRITIONAL, METABOLIC AND IMMUNITY DISORDER: ICD-10-CM

## 2024-12-14 DIAGNOSIS — Z00.00 ENCOUNTER FOR ANNUAL HEALTH EXAMINATION: Primary | ICD-10-CM

## 2024-12-14 DIAGNOSIS — Z13.228 SCREENING FOR ENDOCRINE, NUTRITIONAL, METABOLIC AND IMMUNITY DISORDER: ICD-10-CM

## 2024-12-14 DIAGNOSIS — H91.91 CHANGE IN HEARING OF RIGHT EAR: ICD-10-CM

## 2024-12-14 DIAGNOSIS — R03.0 ELEVATED BLOOD PRESSURE READING: ICD-10-CM

## 2024-12-14 RX ORDER — LISINOPRIL 10 MG/1
10 TABLET ORAL DAILY
COMMUNITY
Start: 2024-12-14

## 2024-12-14 RX ORDER — FLUTICASONE PROPIONATE 50 MCG
1 SPRAY, SUSPENSION (ML) NASAL DAILY
Qty: 1 EACH | Refills: 3 | Status: SHIPPED | OUTPATIENT
Start: 2024-12-14

## 2024-12-14 NOTE — PROGRESS NOTES
Subjective:   Kim Hoffman is a 69 year old female who presents for a Medicare Subsequent Annual Wellness visit (Pt already had Initial Annual Wellness) and scheduled follow up of multiple significant but stable problems    Pt presenting for routine physical and annual medicare wellness check. Denies any acute issues or recent illnesses. Chronic problems as below. Past medical/surgical history, family history, and social history were reviewed. Diet and exercise have been fair. Medicare screening questions per nurse were reviewed. Pt requesting annual testing and med refills.     Notes decreased hearing on Right    H/o chronic Left hip pain  H/o lumbar radiculopathy  Has completed PT  Following with PMR      History/Other:   Fall Risk Assessment:   She has been screened for Falls and is High Risk. Fall Prevention information provided to patient in After Visit Summary.    Do you feel unsteady when standing or walking?: (Patient-Rptd) Yes  Do you worry about falling?: (Patient-Rptd) No  Have you fallen in the past year?: (Patient-Rptd) No     Cognitive Assessment:   She had a completely normal cognitive assessment - see flowsheet entries     Functional Ability/Status:   Kim Hoffman has some abnormal functions as listed below:  She has Walking problems based on screening of functional status.       Depression Screening (PHQ):  PHQ-2 SCORE: 1  , done 12/14/2024   Feeling down, depressed, or hopeless: 1         <5 minutes spent screening and counseling for depression    Advanced Directives:   She does NOT have a Living Will. [Do you have a living will?: (Patient-Rptd) Yes]    She does NOT have a Power of  for Health Care. [Do you have a healthcare power of ?: (Patient-Rptd) Yes]    Patient has Advance Care Planning documents but we do not have a copy in EMR. Discussed Advanced Care Planning with patient and instructed patient to get our office a copy to be scanned into EMR.      Patient  Active Problem List   Diagnosis    Malignant neoplasm of overlapping sites of left breast in female, estrogen receptor positive (HCC)    Absence of breast, bilateral    Encounter for medication management    Encounter for central line care    Visit for monitoring Arimidex therapy    Primary osteoarthritis of left hip    Left lumbar radiculopathy    Chemotherapy-induced peripheral neuropathy (HCC)    Depression, recurrent (HCC)    Osteopenia due to cancer therapy    LFTs abnormal    Long term (current) use of aromatase inhibitors    Aromatase inhibitor-associated arthralgia    History of antineoplastic chemotherapy    Seasonal allergies     Allergies:  She has No Known Allergies.    Current Medications:  Outpatient Medications Marked as Taking for the 12/14/24 encounter (Office Visit) with Nila Castaneda MD   Medication Sig    lisinopril 10 MG Oral Tab Take 1 tablet (10 mg total) by mouth daily.    fluticasone propionate 50 MCG/ACT Nasal Suspension 1 spray by Nasal route daily. One spray per each nostril daily.    DULoxetine 30 MG Oral Cap DR Particles Take 1 capsule (30 mg total) by mouth daily.    pregabalin (LYRICA) 75 MG Oral Cap Take 1 capsule (75 mg total) by mouth 2 (two) times daily.    methocarbamol 750 MG Oral Tab Take 1 tablet (750 mg total) by mouth 4 (four) times daily as needed (pain).    GLUCOSAMINE-CHONDROITIN OR Take 1 tablet by mouth daily.    Multiple Vitamins-Minerals (OCUVITE ADULT 50+) Oral Cap Take 1 capsule by mouth daily. centrum    Biotin w/ Vitamins C & E (HAIR/SKIN/NAILS OR) Take 1 tablet by mouth daily.    Calcium 500-125 MG-UNIT Oral Tab Take by mouth.    VITAMIN D, CHOLECALCIFEROL, OR Take by mouth.    Bioflavonoid Products (AZIZA C OR) Take 1 tablet by mouth daily.         Medical History:  She  has a past medical history of Allergic rhinitis, Anxiety (6/2019), Anxiety state, Breast cancer (HCC), Calculus of kidney, Cancer (HCC), Colon adenomas (06/28/2024), Depression (6/19),  Esophageal reflux, High cholesterol, Osteoarthritis (2021), Problems with swallowing, and Serrated adenoma of colon (06/15/2021).  Surgical History:  She  has a past surgical history that includes travis biopsy stereo nodule 1 site left (cpt=19081); travis biopsy stereo nodule 1 site right (cpt=19081) (2019); mastectomy,simple (Bilateral, 2019); mastectomy left; mastectomy right; colonoscopy (06/15/2021); colonoscopy (N/A, 06/15/2021); colonoscopy (N/A, 2024);  (1990); skin surgery (); and other surgical history (2019).   Family History:  Her family history includes Diabetes in her father; Heart Disease in her father; Heart Disease (age of onset: 88) in her mother; Heart Disorder in her daughter and mother; Hypertension in her father; PVD in her mother.  Social History:  She  reports that she has never smoked. She has never been exposed to tobacco smoke. She has never used smokeless tobacco. She reports that she does not currently use alcohol after a past usage of about 1.0 standard drink of alcohol per week. She reports that she does not use drugs.    Tobacco:  She has never smoked tobacco.    CAGE Alcohol Screen:   CAGE screening score of 0 on 2024, showing low risk of alcohol abuse.      Patient Care Team:  Nila Castaneda MD as PCP - General (Family Medicine)  Cary Alonzo MD as Surgeon (Surgical Oncology)  Tabatha Martinez PTA as Physical Therapy Assistant (Physical Therapy)  Breanna Joseph PT as Physical Therapist (Physical Therapy)  Candace Corona MD (Radiation Oncology)  Laurita Sylvester MD (Hematology and Oncology)  Aline Tirado, PT (Physical Therapy)    Review of Systems     Negative except decreased hearing, chronic hip/back pain    Objective:   Physical Exam  General appearance: alert, appears stated age, and cooperative  Head: Normocephalic, without obvious abnormality, atraumatic  Eyes: negative  Ears: normal TM's and external ear canals both ears,  serous otitis b/l  Nose: no discharge  Throat: normal findings: lips normal without lesions and palate normal  Neck: no adenopathy, supple, symmetrical, trachea midline, and thyroid not enlarged, symmetric, no tenderness/mass/nodules  Back: negative  Lungs: clear to auscultation bilaterally  Breasts:   deferred  Heart: S1, S2 normal, regular rate and rhythm  Abdomen: soft, non-tender; bowel sounds normal; no masses,  no organomegaly  Pelvic: deferred  Extremities: extremities normal, atraumatic, no cyanosis or edema  Pulses: 2+ and symmetric  Skin: Skin color, texture, turgor normal. No rashes or lesions  Lymph nodes:  negative  Neurologic: Grossly normal    /87   Pulse 120   Ht 5' 4\" (1.626 m)   Wt 197 lb (89.4 kg)   LMP 07/14/2006   BMI 33.81 kg/m²  Estimated body mass index is 33.81 kg/m² as calculated from the following:    Height as of this encounter: 5' 4\" (1.626 m).    Weight as of this encounter: 197 lb (89.4 kg).    Medicare Hearing Assessment:   Hearing Screening    Screening Method: Questionnaire  I have a problem hearing over the telephone: No I have trouble following the conversations when two or more people are talking at the same time: No   I have trouble understanding things on the TV: No I have to strain to understand conversations: Sometimes   I have to worry about missing the telephone ring or doorbell: No I have trouble hearing conversations in a noisy background such as a crowded room or restaurant: Yes   I get confused about where sounds come from: No I misunderstand some words in a sentence and need to ask people to repeat themselves: Yes   I especially have trouble understanding the speech of women and children: No I have trouble understanding the speaker in a large room such as at a meeting or place of Jewish: No   Many people I talk to seem to mumble (or don't speak clearly): No People get annoyed because I misunderstand what they say: No   I misunderstand what others are saying  and make inappropriate responses: No I avoid social activities because I cannot hear well and fear I will reply improperly: No   Family members and friends have told me they think I may have hearing loss: Yes                   Assessment & Plan:   Kim Hoffman is a 69 year old female who presents for a Medicare Assessment.     1. Encounter for annual health examination (Primary)  Healthy physical exam. Advised to exercise regularly, monitor diet and weight, and follow-up as directed. Will check labs. Continue current medications. Annual Medicare physical.  Discussed preventative screenings  - Cscope 6/2024, 6yr recall  - DEXA 1/2024  - mammogram ordered  - will check labs as below  - encouraged to continue diet/exercise for overall wellness  - follow-up with eye doctor annually  - follow-up with dentist every 6 months  - return yearly for physicals  - annual flu shot  - tetanus booster every 10yrs  -     Advance Care Planning- 1st 30 minutes  2. Screening for endocrine, nutritional, metabolic and immunity disorder  -     TSH W Reflex To Free T4; Future; Expected date: 12/14/2024  3. Screening for cardiovascular condition  -     Lipid Panel; Future; Expected date: 12/14/2024  4. Change in hearing of right ear  - demonstrated how to administer Flonase medication  - avoid triggers as able  - increase fluid hydration and rest as tolerated  - to call with any questions or concerns  - follow-up with Audiology if no improvement  -     Fluticasone Propionate; 1 spray by Nasal route daily. One spray per each nostril daily.  Dispense: 1 each; Refill: 3  -     Audiology Referral - In Network  5. Elevated blood pressure reading  In-office BP elevated, pt otherwise asymptomatic  - discussed benefits of DASH diet and daily activity  - encouraged home BP monitoring  - continue daily medication -- consider increasing to 20mg Lisinopril daily  - will check labwork  - advised to call if BP is persistently elevated  -      Urinalysis, Routine; Future; Expected date: 12/14/2024  The patient indicates understanding of these issues and agrees to the plan.  Reinforced healthy diet, lifestyle, and exercise.      Return in 6 months (on 6/14/2025).     Nila Castaneda MD, 12/14/2024     Supplementary Documentation:   General Health:  In the past six months, have you lost more than 10 pounds without trying?: (Patient-Rptd) 2 - No  Has your appetite been poor?: (Patient-Rptd) No  Type of Diet: (Patient-Rptd) Balanced  How does the patient maintain a good energy level?: (Patient-Rptd) Stretching  How would you describe your daily physical activity?: (Patient-Rptd) Light  How would you describe your current health state?: (Patient-Rptd) Fair  How do you maintain positive mental well-being?: (Patient-Rptd) Social Interaction;Visiting Friends;Visiting Family  On a scale of 0 to 10, with 0 being no pain and 10 being severe pain, what is your pain level?: (Patient-Rptd) 6 - (Moderate)  In the past six months, have you experienced urine leakage?: (Patient-Rptd) 0-No  At any time do you feel concerned for the safety/well-being of yourself and/or your children, in your home or elsewhere?: No  Have you had any immunizations at another office such as Influenza, Hepatitis B, Tetanus, or Pneumococcal?: (Patient-Rptd) No    Health Maintenance   Topic Date Due    Zoster Vaccines (1 of 2) Never done    COVID-19 Vaccine (5 - 2024-25 season) 09/01/2024    Annual Physical  11/15/2024    HTN: BP Follow-Up  01/14/2025    Colorectal Cancer Screening  06/28/2029    Influenza Vaccine  Completed    DEXA Scan  Completed    Annual Depression Screening  Completed    Fall Risk Screening (Annual)  Completed    Pneumococcal Vaccine: 65+ Years  Completed

## 2024-12-31 ENCOUNTER — TELEMEDICINE (OUTPATIENT)
Dept: PHYSICAL MEDICINE AND REHAB | Facility: CLINIC | Age: 69
End: 2024-12-31
Payer: MEDICARE

## 2024-12-31 DIAGNOSIS — M16.12 PRIMARY OSTEOARTHRITIS OF LEFT HIP: ICD-10-CM

## 2024-12-31 DIAGNOSIS — M54.16 LEFT LUMBAR RADICULOPATHY: Primary | ICD-10-CM

## 2024-12-31 PROCEDURE — 99214 OFFICE O/P EST MOD 30 MIN: CPT | Performed by: PHYSICAL MEDICINE & REHABILITATION

## 2024-12-31 NOTE — PROGRESS NOTES
[facility]    Telemedicine Visit - Follow Up Evaluation    Telehealth Verbal Consent   I conducted a telehealth visit with Kim Hoffman today, 12/31/24, which was completed using two-way, real-time interactive audio and video communication. This has been done in good marion to provide continuity of care in the best interest of the provider-patient relationship, due to the COVID - public health crisis/national emergency where restrictions of face-to-face office visits are ongoing. Every conscious effort was taken to allow for sufficient and adequate time to complete the visit.  The patient was made aware of the limitations of the telehealth visit, including treatment limitations as no physical exam could be performed.  The patient was advised to call 911 or to go to the ER in case there was an emergency.  The patient was also advised of the potential privacy & security concerns related to the telehealth platform.   The patient was made aware of where to find [facility]'s notice of privacy practices, telehealth consent form and other related consent forms and documents.  which are located on the [facility] website. The patient verbally agreed to telehealth consent form, related consents and the risks discussed.    Lastly, the patient confirmed that they were in [place].   Included in this visit, time may have been spent reviewing labs, medications, radiology tests and decision making. Appropriate medical decision-making and tests are ordered as detailed in the plan of care above.  Coding/billing information is submitted for this visit based on complexity of care and/or time spent for the visit.      HISTORY OF PRESENT ILLNESS:     Patient is following up after hip joint injection.  She has states she has noted good improvement for the first few days however the sciatic and low back pain is worse.  Pain radiates along the posterior aspect of the leg and occasionally down to  the foot with burning sensation.  She is having a difficult time sleeping or changing positions or standing or doing any activity as a result of this.  She denies any change in strength or bowel bladder since last visit.  She completed PT in September and is doing home exercises      IMAGING:   No new imaging    All imaging results were reviewed and discussed with patient.      ASSESSMENT:     1. Left lumbar radiculopathy    2. Primary osteoarthritis of left hip          PLAN:   Kim Hoffman is a 69 year old female following up for left-sided low back pain with lumbar radiculopathy as well as left hip pain with osteoarthritis.  She has responded well to the ultrasound-guided hip joint injection but continues to have pain related to the lumbar radiculopathy.  She has tried different medication and is currently taking Lyrica twice daily which I advised her to continue.  Recommend she obtain x-ray imaging and MRI imaging of lumbar spine and follow-up with me after.  She will likely benefit from a lumbar epidural injection.      Follow-up:   After imaging    We discussed that a telemedicine visit is in place of an office visit; however, this limits the ability to perform a thorough physical examination which may affect objective findings related to a specific condition and can affect treatment.    The patient verbalized understanding with this plan and was in agreement.  There are no barriers to learning.  All questions were answered.  Please note Dragon dictation software was used to dictate this note which may result in inadvertent typos.    Adela Weaver D.O. FAAPMR & CAQSM  Physical Medicine and Rehabilitation/Sports Medicine    PAST MEDICAL HISTORY:     Past Medical History:    Allergic rhinitis    Cant temember    Anxiety    Anxiety state    Breast cancer (HCC)    Calculus of kidney    Cancer (HCC)    breast    Colon adenomas    a tubular adenoma and serrated adenoma    Depression    Esophageal reflux    High  cholesterol    Osteoarthritis    Problems with swallowing    partial dentures upper and lower    Serrated adenoma of colon         PAST SURGICAL HISTORY:     Past Surgical History:   Procedure Laterality Date    Colonoscopy  06/15/2021    Colonoscopy N/A 06/15/2021    Procedure: COLONOSCOPY;  Surgeon: Misael Lawson MD;  [facility]    Colonoscopy N/A 2024    Procedure: COLONOSCOPY with polypectomy;  Surgeon: Misael Lawson MD;  [facility]    Priscila biopsy stereo nodule 1 site left (cpt=19081)      Priscila biopsy stereo nodule 1 site right (cpt=19081)  2019    KODAK     Mastectomy left      Mastectomy right      Mastectomy,simple Bilateral 2019    Bilateral mastectomies w L SLNB and immediate reconstruction with tissue expander and acellular dermal matrix and L SLNB      1990    Other surgical history  2019    Double Mascectamy    Skin surgery      Removed squamish skin cancer         CURRENT MEDICATIONS:     Current Outpatient Medications   Medication Sig Dispense Refill    lisinopril 10 MG Oral Tab Take 1 tablet (10 mg total) by mouth daily.      fluticasone propionate 50 MCG/ACT Nasal Suspension 1 spray by Nasal route daily. One spray per each nostril daily. 1 each 3    DULoxetine 30 MG Oral Cap DR Particles Take 1 capsule (30 mg total) by mouth daily. 90 capsule 1    pregabalin (LYRICA) 75 MG Oral Cap Take 1 capsule (75 mg total) by mouth 2 (two) times daily. 60 capsule 0    methocarbamol 750 MG Oral Tab Take 1 tablet (750 mg total) by mouth 4 (four) times daily as needed (pain). 60 tablet 0    GLUCOSAMINE-CHONDROITIN OR Take 1 tablet by mouth daily.      Multiple Vitamins-Minerals (OCUVITE ADULT 50+) Oral Cap Take 1 capsule by mouth daily. centrum      Biotin w/ Vitamins C & E (HAIR/SKIN/NAILS OR) Take 1 tablet by mouth daily.      Calcium 500-125 MG-UNIT Oral Tab Take by mouth.      VITAMIN D, CHOLECALCIFEROL, OR Take by mouth.      Bioflavonoid Products  (AZIZA C OR) Take 1 tablet by mouth daily.             ALLERGIES:   Allergies[1]      FAMILY HISTORY:     Family History   Problem Relation Age of Onset    Hypertension Father     Heart Disease Father     Diabetes Father     Heart Disease Mother 88    Heart Disorder Mother         Aortic Regurgitation/AVR    Other (PVD) Mother     Heart Disorder Daughter           SOCIAL HISTORY:     Social History     Socioeconomic History    Marital status:    Tobacco Use    Smoking status: Never     Passive exposure: Never    Smokeless tobacco: Never   Vaping Use    Vaping status: Never Used   Substance and Sexual Activity    Alcohol use: Not Currently     Alcohol/week: 1.0 standard drink of alcohol     Types: 1 Glasses of wine per week     Comment: Mostly at holidays    Drug use: No          REVIEW OF SYSTEMS:   As noted in HPI      PHYSICAL EXAM:   General: No immediate distress  Head: Normocephalic/ Atraumatic  Eyes: Extra-occular movements intact  Ears/Nose/Throat:  External appearance identifies normal appearance without obvious deformity  Cardiovascular: No cyanosis, clubbing or edema  Respiratory: Non-labored respirations  Skin: No lesions noted   Neurological: alert & oriented x 3, attentive, able to follow commands, comprehention intact, spontaneous speech intact  Psychiatric: Mood and affect appropriate  Musculoskeletal Exam:  No change since last exam        LABS:     Lab Results   Component Value Date     06/14/2019    A1C 5.8 (H) 06/14/2019     Lab Results   Component Value Date    WBC 7.1 10/23/2024    RBC 4.58 10/23/2024    HGB 14.5 10/23/2024    HCT 42.3 10/23/2024    MCV 92.4 10/23/2024    MCH 31.7 10/23/2024    MCHC 34.3 10/23/2024    RDW 12.1 10/23/2024    .0 10/23/2024     Lab Results   Component Value Date     (H) 10/23/2024    BUN 18 10/23/2024    BUNCREA 26.1 (H) 10/23/2024    CREATSERUM 0.69 10/23/2024    ANIONGAP 9 10/23/2024    GFRNAA 83 05/03/2022    GFRAA 95 05/03/2022    CA  10.0 10/23/2024    OSMOCALC 295 10/23/2024    ALKPHO 76 10/23/2024    AST 89 (H) 10/23/2024     (H) 10/23/2024    BILT 0.6 10/23/2024    TP 8.0 10/23/2024    ALB 4.9 (H) 10/23/2024    GLOBULIN 3.1 10/23/2024     10/23/2024    K 4.2 10/23/2024     10/23/2024    CO2 26.0 10/23/2024     Lab Results   Component Value Date    PTP 12.9 07/25/2019    INR 0.99 07/25/2019     Lab Results   Component Value Date    VITD 62.2 09/07/2022              [1] No Known Allergies

## 2024-12-31 NOTE — PATIENT INSTRUCTIONS
-Xray and MRI and follow up after  -Will discuss epidural injection after  -Lyrica to be continued

## 2025-01-02 ENCOUNTER — LAB ENCOUNTER (OUTPATIENT)
Dept: LAB | Age: 70
End: 2025-01-02
Attending: STUDENT IN AN ORGANIZED HEALTH CARE EDUCATION/TRAINING PROGRAM
Payer: MEDICARE

## 2025-01-02 DIAGNOSIS — R03.0 ELEVATED BLOOD PRESSURE READING: ICD-10-CM

## 2025-01-02 DIAGNOSIS — Z13.29 SCREENING FOR ENDOCRINE, NUTRITIONAL, METABOLIC AND IMMUNITY DISORDER: ICD-10-CM

## 2025-01-02 DIAGNOSIS — Z13.228 SCREENING FOR ENDOCRINE, NUTRITIONAL, METABOLIC AND IMMUNITY DISORDER: ICD-10-CM

## 2025-01-02 DIAGNOSIS — Z13.6 SCREENING FOR CARDIOVASCULAR CONDITION: ICD-10-CM

## 2025-01-02 DIAGNOSIS — Z13.21 SCREENING FOR ENDOCRINE, NUTRITIONAL, METABOLIC AND IMMUNITY DISORDER: ICD-10-CM

## 2025-01-02 DIAGNOSIS — Z13.0 SCREENING FOR ENDOCRINE, NUTRITIONAL, METABOLIC AND IMMUNITY DISORDER: ICD-10-CM

## 2025-01-02 LAB
BILIRUB UR QL: NEGATIVE
CHOLEST SERPL-MCNC: 262 MG/DL (ref ?–200)
CLARITY UR: CLEAR
COLOR UR: YELLOW
FASTING PATIENT LIPID ANSWER: YES
GLUCOSE UR-MCNC: NORMAL MG/DL
HDLC SERPL-MCNC: 54 MG/DL (ref 40–59)
HGB UR QL STRIP.AUTO: NEGATIVE
KETONES UR-MCNC: NEGATIVE MG/DL
LDLC SERPL CALC-MCNC: 172 MG/DL (ref ?–100)
LEUKOCYTE ESTERASE UR QL STRIP.AUTO: 250
NITRITE UR QL STRIP.AUTO: NEGATIVE
NONHDLC SERPL-MCNC: 208 MG/DL (ref ?–130)
PH UR: 7 [PH] (ref 5–8)
PROT UR-MCNC: 20 MG/DL
SP GR UR STRIP: 1.02 (ref 1–1.03)
TRIGL SERPL-MCNC: 193 MG/DL (ref 30–149)
TSI SER-ACNC: 1.4 UIU/ML (ref 0.55–4.78)
UROBILINOGEN UR STRIP-ACNC: NORMAL
VLDLC SERPL CALC-MCNC: 39 MG/DL (ref 0–30)

## 2025-01-02 PROCEDURE — 81001 URINALYSIS AUTO W/SCOPE: CPT

## 2025-01-02 PROCEDURE — 80061 LIPID PANEL: CPT

## 2025-01-02 PROCEDURE — 36415 COLL VENOUS BLD VENIPUNCTURE: CPT

## 2025-01-02 PROCEDURE — 84443 ASSAY THYROID STIM HORMONE: CPT

## 2025-01-03 ENCOUNTER — HOSPITAL ENCOUNTER (OUTPATIENT)
Dept: GENERAL RADIOLOGY | Age: 70
Discharge: HOME | End: 2025-01-03
Attending: PHYSICAL MEDICINE & REHABILITATION
Payer: MEDICARE

## 2025-01-03 DIAGNOSIS — M54.16 LEFT LUMBAR RADICULOPATHY: ICD-10-CM

## 2025-01-03 DIAGNOSIS — R31.9 HEMATURIA, UNSPECIFIED TYPE: Primary | ICD-10-CM

## 2025-01-03 PROCEDURE — 72114 X-RAY EXAM L-S SPINE BENDING: CPT | Performed by: PHYSICAL MEDICINE & REHABILITATION

## 2025-01-12 ENCOUNTER — HOSPITAL ENCOUNTER (OUTPATIENT)
Dept: MRI IMAGING | Facility: HOSPITAL | Age: 70
End: 2025-01-12
Attending: PHYSICAL MEDICINE & REHABILITATION
Payer: MEDICARE

## 2025-01-12 ENCOUNTER — HOSPITAL ENCOUNTER (OUTPATIENT)
Dept: MRI IMAGING | Facility: HOSPITAL | Age: 70
Discharge: HOME | End: 2025-01-12
Attending: PHYSICAL MEDICINE & REHABILITATION
Payer: MEDICARE

## 2025-01-12 DIAGNOSIS — M54.16 LEFT LUMBAR RADICULOPATHY: ICD-10-CM

## 2025-01-12 PROCEDURE — 72148 MRI LUMBAR SPINE W/O DYE: CPT | Performed by: PHYSICAL MEDICINE & REHABILITATION

## 2025-01-20 ENCOUNTER — TELEMEDICINE (OUTPATIENT)
Dept: PHYSICAL MEDICINE AND REHAB | Facility: CLINIC | Age: 70
End: 2025-01-20
Payer: MEDICARE

## 2025-01-20 ENCOUNTER — TELEPHONE (OUTPATIENT)
Dept: PHYSICAL MEDICINE AND REHAB | Facility: CLINIC | Age: 70
End: 2025-01-20

## 2025-01-20 DIAGNOSIS — M54.16 LEFT LUMBAR RADICULOPATHY: Primary | ICD-10-CM

## 2025-01-20 RX ORDER — MELOXICAM 15 MG/1
15 TABLET ORAL DAILY
Qty: 14 TABLET | Refills: 0 | Status: SHIPPED | OUTPATIENT
Start: 2025-01-20 | End: 2025-02-03

## 2025-01-20 RX ORDER — PREGABALIN 150 MG/1
150 CAPSULE ORAL 2 TIMES DAILY
Qty: 60 CAPSULE | Refills: 0 | Status: SHIPPED | OUTPATIENT
Start: 2025-01-20

## 2025-01-20 NOTE — PATIENT INSTRUCTIONS
-My office will call once injection is approved  -Lyrica to be increased to 150mg twice daily  -Mobic 15mg daily for 2 weeks  -Start PT and home exercises

## 2025-01-20 NOTE — PROGRESS NOTES
[facility]    Telemedicine Visit - Follow Up Evaluation    Telehealth Verbal Consent   I conducted a telehealth visit with Kim Hoffman today, 01/20/25, which was completed using two-way, real-time interactive audio and video communication. This has been done in good marion to provide continuity of care in the best interest of the provider-patient relationship, due to the COVID -19 public health crisis/national emergency where restrictions of face-to-face office visits are ongoing. Every conscious effort was taken to allow for sufficient and adequate time to complete the visit.  The patient was made aware of the limitations of the telehealth visit, including treatment limitations as no physical exam could be performed.  The patient was advised to call 911 or to go to the ER in case there was an emergency.  The patient was also advised of the potential privacy & security concerns related to the telehealth platform.   The patient was made aware of where to find [facility]'s notice of privacy practices, telehealth consent form and other related consent forms and documents.  which are located on the [facility] website. The patient verbally agreed to telehealth consent form, related consents and the risks discussed.    Lastly, the patient confirmed that they were in [place].   Included in this visit, time may have been spent reviewing labs, medications, radiology tests and decision making. Appropriate medical decision-making and tests are ordered as detailed in the plan of care above.  Coding/billing information is submitted for this visit based on complexity of care and/or time spent for the visit.      HISTORY OF PRESENT ILLNESS:     Patient is following up with eye pain and low back pain.  She had MRI imaging of the lumbar spine which she is here to review.  She continues to take Lyrica 75 mg twice daily which she is tolerating well as well as Advil.  She noticed that when she  stopped the Advil the pain was worse.  She still has difficulty with any prolonged sitting or standing and activity.  She denies any change in strength or bowel bladder.  She has not been in formal PT over the last 4 months but has been doing home exercises.  She did seek advice from a chiropractor and has been doing some home exercises      IMAGING:   MRI lumbar spine completed 1/12/2025 was personally reviewed which is notable for a left paracentral disc protrusion at L2-3 with moderate to severe central canal and severe left neuroforaminal narrowing with cysts moderate to severe left lateral recess stenosis.  There is varying degrees of disc degeneration and herniation throughout the lumbar spine with varying degrees of spinal and foraminal narrowing    All imaging results were reviewed and discussed with patient.      ASSESSMENT:     1. Left lumbar radiculopathy          PLAN:   Kim Hoffman is a 69 year old female following up for persistent left-sided low back pain with radiation of the left anterior thigh with a left L2 and L3 lumbar radiculopathy.  I recommended transforaminal epidural steroid injection under fluoroscopy guidance under local anesthesia.  Recommend she start physical therapy program with home exercises and increase the Lyrica to 150 mg twice daily and also start meloxicam 15 mg daily until we can schedule her for the procedure.  She will follow-up with me 2 weeks after the procedure.      Follow-up:   For injection     We discussed that a telemedicine visit is in place of an office visit; however, this limits the ability to perform a thorough physical examination which may affect objective findings related to a specific condition and can affect treatment.    The patient verbalized understanding with this plan and was in agreement.  There are no barriers to learning.  All questions were answered.  Please note Dragon dictation software was used to dictate this note which may result in  inadvertent typos.    Adela Weaver D.O. FAAPMR & CAQSM  Physical Medicine and Rehabilitation/Sports Medicine    PAST MEDICAL HISTORY:     Past Medical History:    Allergic rhinitis    Cant temember    Anxiety    Anxiety state    Breast cancer (HCC)    Calculus of kidney    Cancer (HCC)    breast    Colon adenomas    a tubular adenoma and serrated adenoma    Depression    Esophageal reflux    High cholesterol    Osteoarthritis    Problems with swallowing    partial dentures upper and lower    Serrated adenoma of colon         PAST SURGICAL HISTORY:     Past Surgical History:   Procedure Laterality Date    Colonoscopy  06/15/2021    Colonoscopy N/A 06/15/2021    Procedure: COLONOSCOPY;  Surgeon: Misael Lawson MD;  Location: [facility]    Colonoscopy N/A 2024    Procedure: COLONOSCOPY with polypectomy;  Surgeon: Misael Lawson MD;  Location: [facility] ENDO    Priscila biopsy stereo nodule 1 site left (cpt=19081)      Priscila biopsy stereo nodule 1 site right (cpt=19081)  2019    KODAK     Mastectomy left      Mastectomy right      Mastectomy,simple Bilateral 2019    Bilateral mastectomies w L SLNB and immediate reconstruction with tissue expander and acellular dermal matrix and L SLNB      1990    Other surgical history  2019    Double Mascectamy    Skin surgery      Removed squamish skin cancer         CURRENT MEDICATIONS:     Current Outpatient Medications   Medication Sig Dispense Refill    Meloxicam (MOBIC) 15 MG Oral Tab Take 1 tablet (15 mg total) by mouth daily for 14 days. 14 tablet 0    pregabalin (LYRICA) 150 MG Oral Cap Take 1 capsule (150 mg total) by mouth 2 (two) times daily. 60 capsule 0    lisinopril 10 MG Oral Tab Take 1 tablet (10 mg total) by mouth daily.      fluticasone propionate 50 MCG/ACT Nasal Suspension 1 spray by Nasal route daily. One spray per each nostril daily. 1 each 3    DULoxetine 30 MG Oral Cap DR Particles Take 1 capsule (30 mg total) by mouth daily. 90  capsule 1    methocarbamol 750 MG Oral Tab Take 1 tablet (750 mg total) by mouth 4 (four) times daily as needed (pain). 60 tablet 0    GLUCOSAMINE-CHONDROITIN OR Take 1 tablet by mouth daily.      Multiple Vitamins-Minerals (OCUVITE ADULT 50+) Oral Cap Take 1 capsule by mouth daily. centrum      Biotin w/ Vitamins C & E (HAIR/SKIN/NAILS OR) Take 1 tablet by mouth daily.      Calcium 500-125 MG-UNIT Oral Tab Take by mouth.      VITAMIN D, CHOLECALCIFEROL, OR Take by mouth.      Bioflavonoid Products (AZIZA C OR) Take 1 tablet by mouth daily.             ALLERGIES:   Allergies[1]      FAMILY HISTORY:     Family History   Problem Relation Age of Onset    Hypertension Father     Heart Disease Father     Diabetes Father     Heart Disease Mother 88    Heart Disorder Mother         Aortic Regurgitation/AVR    Other (PVD) Mother     Heart Disorder Daughter           SOCIAL HISTORY:     Social History     Socioeconomic History    Marital status:    Tobacco Use    Smoking status: Never     Passive exposure: Never    Smokeless tobacco: Never   Vaping Use    Vaping status: Never Used   Substance and Sexual Activity    Alcohol use: Not Currently     Alcohol/week: 1.0 standard drink of alcohol     Types: 1 Glasses of wine per week     Comment: Mostly at holidays    Drug use: No          REVIEW OF SYSTEMS:   As noted in HPI      PHYSICAL EXAM:   General: No immediate distress  Head: Normocephalic/ Atraumatic  Eyes: Extra-occular movements intact  Ears/Nose/Throat:  External appearance identifies normal appearance without obvious deformity  Cardiovascular: No cyanosis, clubbing or edema  Respiratory: Non-labored respirations  Skin: No lesions noted   Neurological: alert & oriented x 3, attentive, able to follow commands, comprehention intact, spontaneous speech intact  Psychiatric: Mood and affect appropriate  Musculoskeletal Exam:  No change since last exam        LABS:     Lab Results   Component Value Date      06/14/2019    A1C 5.8 (H) 06/14/2019     Lab Results   Component Value Date    WBC 7.1 10/23/2024    RBC 4.58 10/23/2024    HGB 14.5 10/23/2024    HCT 42.3 10/23/2024    MCV 92.4 10/23/2024    MCH 31.7 10/23/2024    MCHC 34.3 10/23/2024    RDW 12.1 10/23/2024    .0 10/23/2024     Lab Results   Component Value Date     (H) 10/23/2024    BUN 18 10/23/2024    BUNCREA 26.1 (H) 10/23/2024    CREATSERUM 0.69 10/23/2024    ANIONGAP 9 10/23/2024    GFRNAA 83 05/03/2022    GFRAA 95 05/03/2022    CA 10.0 10/23/2024    OSMOCALC 295 10/23/2024    ALKPHO 76 10/23/2024    AST 89 (H) 10/23/2024     (H) 10/23/2024    BILT 0.6 10/23/2024    TP 8.0 10/23/2024    ALB 4.9 (H) 10/23/2024    GLOBULIN 3.1 10/23/2024     10/23/2024    K 4.2 10/23/2024     10/23/2024    CO2 26.0 10/23/2024     Lab Results   Component Value Date    PTP 12.9 07/25/2019    INR 0.99 07/25/2019     Lab Results   Component Value Date    VITD 62.2 09/07/2022              [1] No Known Allergies

## 2025-01-20 NOTE — TELEPHONE ENCOUNTER
Per CMS Guidelines -no authorization is required for Left L2 and L3 Transforaminal Epidural Steroid Injection under fluoroscopy guidance   CPT codes: 63491, 56356       Status: Authorization is not required based on medical necessity however is not a guarantee of payment and may be subject to review once claim is submitted-Covered Benefit.

## 2025-01-21 DIAGNOSIS — Z92.21 HISTORY OF ANTINEOPLASTIC CHEMOTHERAPY: Primary | ICD-10-CM

## 2025-01-22 ENCOUNTER — APPOINTMENT (OUTPATIENT)
Age: 70
End: 2025-01-22
Attending: INTERNAL MEDICINE
Payer: MEDICARE

## 2025-01-23 ENCOUNTER — NURSE ONLY (OUTPATIENT)
Age: 70
End: 2025-01-23
Attending: INTERNAL MEDICINE
Payer: MEDICARE

## 2025-01-23 ENCOUNTER — OFFICE VISIT (OUTPATIENT)
Age: 70
End: 2025-01-23
Attending: INTERNAL MEDICINE
Payer: MEDICARE

## 2025-01-23 VITALS
SYSTOLIC BLOOD PRESSURE: 156 MMHG | BODY MASS INDEX: 33.8 KG/M2 | HEIGHT: 64 IN | OXYGEN SATURATION: 93 % | RESPIRATION RATE: 18 BRPM | HEART RATE: 116 BPM | WEIGHT: 198 LBS | TEMPERATURE: 98 F | DIASTOLIC BLOOD PRESSURE: 84 MMHG

## 2025-01-23 DIAGNOSIS — T45.1X5A AROMATASE INHIBITOR-ASSOCIATED ARTHRALGIA: ICD-10-CM

## 2025-01-23 DIAGNOSIS — M85.80 OSTEOPENIA DUE TO CANCER THERAPY: ICD-10-CM

## 2025-01-23 DIAGNOSIS — Z92.21 HISTORY OF ANTINEOPLASTIC CHEMOTHERAPY: ICD-10-CM

## 2025-01-23 DIAGNOSIS — G62.0 CHEMOTHERAPY-INDUCED PERIPHERAL NEUROPATHY (HCC): ICD-10-CM

## 2025-01-23 DIAGNOSIS — T45.1X5A CHEMOTHERAPY-INDUCED PERIPHERAL NEUROPATHY (HCC): ICD-10-CM

## 2025-01-23 DIAGNOSIS — Z17.0 MALIGNANT NEOPLASM OF OVERLAPPING SITES OF LEFT BREAST IN FEMALE, ESTROGEN RECEPTOR POSITIVE (HCC): Primary | ICD-10-CM

## 2025-01-23 DIAGNOSIS — Z79.83 ENCOUNTER FOR MONITORING BISPHOSPHONATE THERAPY: ICD-10-CM

## 2025-01-23 DIAGNOSIS — C50.812 MALIGNANT NEOPLASM OF OVERLAPPING SITES OF LEFT BREAST IN FEMALE, ESTROGEN RECEPTOR POSITIVE (HCC): Primary | ICD-10-CM

## 2025-01-23 DIAGNOSIS — Z51.81 ENCOUNTER FOR MONITORING BISPHOSPHONATE THERAPY: ICD-10-CM

## 2025-01-23 DIAGNOSIS — M25.50 AROMATASE INHIBITOR-ASSOCIATED ARTHRALGIA: ICD-10-CM

## 2025-01-23 DIAGNOSIS — Z90.13 ABSENCE OF BREAST, BILATERAL: ICD-10-CM

## 2025-01-23 DIAGNOSIS — G89.29 OTHER CHRONIC PAIN: ICD-10-CM

## 2025-01-23 LAB
ALBUMIN SERPL-MCNC: 4.5 G/DL (ref 3.2–4.8)
ALBUMIN/GLOB SERPL: 1.5 {RATIO} (ref 1–2)
ALP LIVER SERPL-CCNC: 82 U/L
ALT SERPL-CCNC: 108 U/L
ANION GAP SERPL CALC-SCNC: 7 MMOL/L (ref 0–18)
AST SERPL-CCNC: 86 U/L (ref ?–34)
BASOPHILS # BLD AUTO: 0.08 X10(3) UL (ref 0–0.2)
BASOPHILS NFR BLD AUTO: 1.1 %
BILIRUB SERPL-MCNC: 0.5 MG/DL (ref 0.2–1.1)
BUN BLD-MCNC: 20 MG/DL (ref 9–23)
BUN/CREAT SERPL: 26.7 (ref 10–20)
CALCIUM BLD-MCNC: 9.1 MG/DL (ref 8.7–10.4)
CHLORIDE SERPL-SCNC: 104 MMOL/L (ref 98–112)
CO2 SERPL-SCNC: 27 MMOL/L (ref 21–32)
CREAT BLD-MCNC: 0.75 MG/DL
DEPRECATED RDW RBC AUTO: 42.1 FL (ref 35.1–46.3)
EGFRCR SERPLBLD CKD-EPI 2021: 87 ML/MIN/1.73M2 (ref 60–?)
EOSINOPHIL # BLD AUTO: 0.15 X10(3) UL (ref 0–0.7)
EOSINOPHIL NFR BLD AUTO: 2 %
ERYTHROCYTE [DISTWIDTH] IN BLOOD BY AUTOMATED COUNT: 12.1 % (ref 11–15)
GLOBULIN PLAS-MCNC: 3.1 G/DL (ref 2–3.5)
GLUCOSE BLD-MCNC: 211 MG/DL (ref 70–99)
HCT VFR BLD AUTO: 43.3 %
HGB BLD-MCNC: 14.4 G/DL
IMM GRANULOCYTES # BLD AUTO: 0.01 X10(3) UL (ref 0–1)
IMM GRANULOCYTES NFR BLD: 0.1 %
LYMPHOCYTES # BLD AUTO: 1.8 X10(3) UL (ref 1–4)
LYMPHOCYTES NFR BLD AUTO: 24 %
MCH RBC QN AUTO: 31.2 PG (ref 26–34)
MCHC RBC AUTO-ENTMCNC: 33.3 G/DL (ref 31–37)
MCV RBC AUTO: 93.9 FL
MONOCYTES # BLD AUTO: 0.52 X10(3) UL (ref 0.1–1)
MONOCYTES NFR BLD AUTO: 6.9 %
NEUTROPHILS # BLD AUTO: 4.95 X10 (3) UL (ref 1.5–7.7)
NEUTROPHILS # BLD AUTO: 4.95 X10(3) UL (ref 1.5–7.7)
NEUTROPHILS NFR BLD AUTO: 65.9 %
OSMOLALITY SERPL CALC.SUM OF ELEC: 295 MOSM/KG (ref 275–295)
PLATELET # BLD AUTO: 242 10(3)UL (ref 150–450)
POTASSIUM SERPL-SCNC: 4 MMOL/L (ref 3.5–5.1)
PROT SERPL-MCNC: 7.6 G/DL (ref 5.7–8.2)
RBC # BLD AUTO: 4.61 X10(6)UL
SODIUM SERPL-SCNC: 138 MMOL/L (ref 136–145)
WBC # BLD AUTO: 7.5 X10(3) UL (ref 4–11)

## 2025-01-27 ENCOUNTER — APPOINTMENT (OUTPATIENT)
Dept: SURGERY | Facility: CLINIC | Age: 70
End: 2025-01-27
Payer: MEDICARE

## 2025-02-11 ENCOUNTER — TELEMEDICINE (OUTPATIENT)
Dept: PHYSICAL MEDICINE AND REHAB | Facility: CLINIC | Age: 70
End: 2025-02-11
Payer: MEDICARE

## 2025-02-11 DIAGNOSIS — M16.12 PRIMARY OSTEOARTHRITIS OF LEFT HIP: ICD-10-CM

## 2025-02-11 DIAGNOSIS — M54.16 LEFT LUMBAR RADICULOPATHY: Primary | ICD-10-CM

## 2025-02-11 PROCEDURE — 99214 OFFICE O/P EST MOD 30 MIN: CPT | Performed by: PHYSICAL MEDICINE & REHABILITATION

## 2025-02-11 NOTE — PROGRESS NOTES
[facility]    Telemedicine Visit - Follow Up Evaluation    Telehealth Verbal Consent   I conducted a telehealth visit with Kim Hoffman today, 02/11/25, which was completed using two-way, real-time interactive audio and video communication. This has been done in good marion to provide continuity of care in the best interest of the provider-patient relationship, due to the COVID -19 public health crisis/national emergency where restrictions of face-to-face office visits are ongoing. Every conscious effort was taken to allow for sufficient and adequate time to complete the visit.  The patient was made aware of the limitations of the telehealth visit, including treatment limitations as no physical exam could be performed.  The patient was advised to call 911 or to go to the ER in case there was an emergency.  The patient was also advised of the potential privacy & security concerns related to the telehealth platform.   The patient was made aware of where to find [facility]'s notice of privacy practices, telehealth consent form and other related consent forms and documents.  which are located on the [facility] website. The patient verbally agreed to telehealth consent form, related consents and the risks discussed.    Lastly, the patient confirmed that they were in [place].   Included in this visit, time may have been spent reviewing labs, medications, radiology tests and decision making. Appropriate medical decision-making and tests are ordered as detailed in the plan of care above.  Coding/billing information is submitted for this visit based on complexity of care and/or time spent for the visit.      HISTORY OF PRESENT ILLNESS:     Patient is following up after recent left L2 and L3 transforaminal epidural steroid injection.  She states she did not notice any significant relief with this she states the sciatica pain has resolved in the back of the leg however the pain in the  inner thigh around the anterior thigh and hip is worse with standing ambulation increase activity.  She cannot find a comfortable position when standing or ambulating.  She feels like the pain is primarily from her hip joint.  When we did the ultrasound-guided hip joint injection did provide 5 days of complete relief however the pain then returned.  She denies any changes in strength or bowel bladder.      IMAGING:   No new imaging    All imaging results were reviewed and discussed with patient.      ASSESSMENT:     1. Left lumbar radiculopathy    2. Primary osteoarthritis of left hip          PLAN:   Kim Hoffman is a 70 year old female following up for left hip and anterior thigh pain which is multifactorial secondary to severe osteoarthritis of the hip joint but also left lumbar radiculopathy in the L2 and L3 distribution as she has a paracentral disc herniation at this level.  She has responded best with the injection in the hip joint itself but has not had sustained relief or functional improvement and given the ongoing nature of her symptoms that have not responded to physical therapy or oral medications I recommended seeing orthopedic surgery for consideration of a hip replacement surgery.  I recommend that she follow-up with me in 3 months in the office and continue over-the-counter Tylenol as needed for pain management.      Follow-up:  3 months    We discussed that a telemedicine visit is in place of an office visit; however, this limits the ability to perform a thorough physical examination which may affect objective findings related to a specific condition and can affect treatment.    The patient verbalized understanding with this plan and was in agreement.  There are no barriers to learning.  All questions were answered.  Please note Dragon dictation software was used to dictate this note which may result in inadvertent typos.    Adela Weaver D.O. FAAPMR & CAQSM  Physical Medicine and  Rehabilitation/Sports Medicine    PAST MEDICAL HISTORY:     Past Medical History:        breast    Allergic rhinitis    Cant temember    Anxiety    Anxiety state    Breast cancer (HCC)    Calculus of kidney    Colon adenomas    a tubular adenoma and serrated adenoma    Depression    Esophageal reflux    High blood pressure    High cholesterol    Osteoarthritis    Problems with swallowing    partial dentures upper and lower    Serrated adenoma of colon         PAST SURGICAL HISTORY:     Past Surgical History:   Procedure Laterality Date    Colonoscopy  06/15/2021    Colonoscopy N/A 06/15/2021    Procedure: COLONOSCOPY;  Surgeon: Misael Lawson MD;  Location: [facility]    Colonoscopy N/A 2024    Procedure: COLONOSCOPY with polypectomy;  Surgeon: Misael Lawson MD;  Location: [facility] END[facility]    Priscila biopsy stereo nodule 1 site left (cpt=19081)      Priscila biopsy stereo nodule 1 site right (cpt=19081)  2019    KODAK     Mastectomy left      Mastectomy right      Mastectomy,simple Bilateral 2019    Bilateral mastectomies w L SLNB and immediate reconstruction with tissue expander and acellular dermal matrix and L SLNB      1990    Other surgical history  2019    Double Mascectamy    Skin surgery      Removed squamish skin cancer         CURRENT MEDICATIONS:     Current Outpatient Medications   Medication Sig Dispense Refill    pregabalin (LYRICA) 150 MG Oral Cap Take 1 capsule (150 mg total) by mouth 2 (two) times daily. 60 capsule 0    lisinopril 10 MG Oral Tab Take 1 tablet (10 mg total) by mouth daily.      fluticasone propionate 50 MCG/ACT Nasal Suspension 1 spray by Nasal route daily. One spray per each nostril daily. 1 each 3    DULoxetine 30 MG Oral Cap DR Particles Take 1 capsule (30 mg total) by mouth daily. 90 capsule 1    methocarbamol 750 MG Oral Tab Take 1 tablet (750 mg total) by mouth 4 (four) times daily as needed (pain). (Patient taking differently: Take 750 mg by mouth  4 (four) times daily as needed (pain).) 60 tablet 0    GLUCOSAMINE-CHONDROITIN OR Take 1 tablet by mouth daily.      Multiple Vitamins-Minerals (OCUVITE ADULT 50+) Oral Cap Take 1 capsule by mouth daily. centrum      Biotin w/ Vitamins C & E (HAIR/SKIN/NAILS OR) Take 1 tablet by mouth daily.      Calcium 500-125 MG-UNIT Oral Tab Take by mouth.      VITAMIN D, CHOLECALCIFEROL, OR Take by mouth.      Bioflavonoid Products (AZIZA C OR) Take 1 tablet by mouth daily.             ALLERGIES:   Allergies[1]      FAMILY HISTORY:     Family History   Problem Relation Age of Onset    Hypertension Father     Heart Disease Father     Diabetes Father     Heart Disease Mother 88    Heart Disorder Mother         Aortic Regurgitation/AVR    Other (PVD) Mother     Heart Disorder Daughter           SOCIAL HISTORY:     Social History     Socioeconomic History    Marital status:    Tobacco Use    Smoking status: Never     Passive exposure: Never    Smokeless tobacco: Never   Vaping Use    Vaping status: Never Used   Substance and Sexual Activity    Alcohol use: Not Currently     Alcohol/week: 1.0 standard drink of alcohol     Types: 1 Glasses of wine per week     Comment: Mostly at holidays    Drug use: No          REVIEW OF SYSTEMS:   As noted in HPI      PHYSICAL EXAM:   General: No immediate distress  Head: Normocephalic/ Atraumatic  Eyes: Extra-occular movements intact  Ears/Nose/Throat:  External appearance identifies normal appearance without obvious deformity  Cardiovascular: No cyanosis, clubbing or edema  Respiratory: Non-labored respirations  Skin: No lesions noted   Neurological: alert & oriented x 3, attentive, able to follow commands, comprehention intact, spontaneous speech intact  Psychiatric: Mood and affect appropriate  Musculoskeletal Exam:  No change since last exam        LABS:     Lab Results   Component Value Date     06/14/2019    A1C 5.8 (H) 06/14/2019     Lab Results   Component Value Date    WBC  7.5 01/23/2025    RBC 4.61 01/23/2025    HGB 14.4 01/23/2025    HCT 43.3 01/23/2025    MCV 93.9 01/23/2025    MCH 31.2 01/23/2025    MCHC 33.3 01/23/2025    RDW 12.1 01/23/2025    .0 01/23/2025     Lab Results   Component Value Date     (H) 01/23/2025    BUN 20 01/23/2025    BUNCREA 26.7 (H) 01/23/2025    CREATSERUM 0.75 01/23/2025    ANIONGAP 7 01/23/2025    GFRNAA 83 05/03/2022    GFRAA 95 05/03/2022    CA 9.1 01/23/2025    OSMOCALC 295 01/23/2025    ALKPHO 82 01/23/2025    AST 86 (H) 01/23/2025     (H) 01/23/2025    BILT 0.5 01/23/2025    TP 7.6 01/23/2025    ALB 4.5 01/23/2025    GLOBULIN 3.1 01/23/2025     01/23/2025    K 4.0 01/23/2025     01/23/2025    CO2 27.0 01/23/2025     Lab Results   Component Value Date    PTP 12.9 07/25/2019    INR 0.99 07/25/2019     Lab Results   Component Value Date    VITD 62.2 09/07/2022              [1] No Known Allergies

## 2025-02-17 DIAGNOSIS — M54.16 LEFT LUMBAR RADICULOPATHY: ICD-10-CM

## 2025-02-17 NOTE — TELEPHONE ENCOUNTER
Refill Request    Medication request: pregabalin (LYRICA) 150 MG Oral Cap.  Take 1 capsule (150 mg total) by mouth 2 (two) times daily.      LOV:2/11/2025 Adela Weaver DO   Due back to clinic per last office note:  Follow-up:  3 months    NOV: Visit date not found      [place] 1/20/25 #60 (30 days)    Urine drug screen (if applicable): N/A  Pain contract: N/A    LOV plan (if weaning or changing medications): no changes mentioned

## 2025-02-18 ENCOUNTER — TELEPHONE (OUTPATIENT)
Age: 70
End: 2025-02-18

## 2025-02-18 ENCOUNTER — HOSPITAL ENCOUNTER (OUTPATIENT)
Dept: GENERAL RADIOLOGY | Facility: HOSPITAL | Age: 70
Discharge: HOME | End: 2025-02-18
Attending: ORTHOPAEDIC SURGERY
Payer: MEDICARE

## 2025-02-18 ENCOUNTER — OFFICE VISIT (OUTPATIENT)
Age: 70
End: 2025-02-18
Payer: MEDICARE

## 2025-02-18 ENCOUNTER — TELEPHONE (OUTPATIENT)
Dept: FAMILY MEDICINE CLINIC | Facility: CLINIC | Age: 70
End: 2025-02-18

## 2025-02-18 VITALS — BODY MASS INDEX: 32.78 KG/M2 | WEIGHT: 192 LBS | HEIGHT: 64 IN

## 2025-02-18 DIAGNOSIS — R52 PAIN: ICD-10-CM

## 2025-02-18 DIAGNOSIS — M16.12 PRIMARY OSTEOARTHRITIS OF LEFT HIP: Primary | ICD-10-CM

## 2025-02-18 PROCEDURE — 73502 X-RAY EXAM HIP UNI 2-3 VIEWS: CPT | Performed by: ORTHOPAEDIC SURGERY

## 2025-02-18 PROCEDURE — 99204 OFFICE O/P NEW MOD 45 MIN: CPT | Performed by: ORTHOPAEDIC SURGERY

## 2025-02-18 RX ORDER — PREGABALIN 150 MG/1
150 CAPSULE ORAL 2 TIMES DAILY
Qty: 60 CAPSULE | Refills: 0 | Status: SHIPPED | OUTPATIENT
Start: 2025-02-18

## 2025-02-18 RX ORDER — TRAMADOL HYDROCHLORIDE 50 MG/1
50 TABLET ORAL EVERY 6 HOURS PRN
Qty: 30 TABLET | Refills: 0 | Status: SHIPPED | OUTPATIENT
Start: 2025-02-18

## 2025-02-18 NOTE — TELEPHONE ENCOUNTER
Patient was given pre surgical papers today in clinic. Including Dental clearance form, [facility] Surgery Check List (Medical clearance), Pre Operative Education for Total Joint replacements and Medacta/Xiomara booklet.

## 2025-02-18 NOTE — TELEPHONE ENCOUNTER
Type of surgery:  Left anterior total hip arthroplasty   Date: 3/3/25  Location: [facility]  Medical Clearance:      *Medical: Yes      *Dental: Yes      *Other:  Prior Authorization Status: Pending   Workers Comp:  Medacta/Xiomara:  [place]: yes  POV: 3/25/25

## 2025-02-18 NOTE — TELEPHONE ENCOUNTER
Ortho Surgery Request  Surgery: Left Anterior Total Hip Arthroplasty   Surgical Assistant: Yes  Surgery Day Request:   Estimated Procedure Length: 90 mins  Anesthesia type:  Spinal   Position: Supine   Special Needs:[]Mini C-Arm  [x]Large C-arm - Large GE 2D  [x]Nurse Assist [x]SCD's [x]Surgical Assistant []Ultrasound []Ultrasound Mary  [x] [place] Table  Equipment: Depuy Emphasys multihole cups, Actis, Kincise  Location: Main OR  Post Op Visit Date: 3 weeks    CPT Code: 53724     ICD Code: M16.12  Medical Clearance Needed: Medical, Dental  Preadmission Testing Orders:  Use surgeon's preferences card  Additional PAT orders:  Pre OP Orders:  Use [facility] surgeon's personalized order set  Additional Pre Op Orders:  PCN Allergy:  No  If Yes:   __X__  Admit:  Hospital outpatient surgery  [x]Same day discharge  Home Health  Yes

## 2025-02-18 NOTE — PROGRESS NOTES
Chief Complaint: Hip Pain (L hip - Pain onset 4 years ago after chemo. Pain in groin and down her her leg. Had injection to left hip with  in November 2024. Pain with walking and certain movements.)      HPI  Ms. Johnnie Hoffman is referred by Adela Weaver. Kim Hoffman is a 70 year old female being seen in the office today for Hip Pain (L hip - Pain onset 4 years ago after chemo. Pain in groin and down her her leg. Had injection to left hip with  in November 2024. Pain with walking and certain movements.)    Patient is a 70-year-old female presents today with complaints of left hip pain.  She states that this been going on for approximate 4 years.  However over the last 6 months it is progressively worsened.  Pain localizes to the posterior aspect of the hip, lateral hip and into the groin region.  Denies any inciting event or trauma.  She has been seeing Dr. Weaver in pain management.  She had a cortisone injection in November.  This gave her no significant relief and in fact over the last couple months the pain is progressively worsening in the groin region.  He recently saw her for her lumbar spine and also gave her a lumbar epidural steroid injection.  This gave her no significant relief to the hip as well.  She does occasionally have some sciatica type radicular pain.  But today states that majority of her pain and disability is related to the groin and posterior buttock pain.  She rates it as severe.  It is activity limiting.  She is now starting to notice a leg length discrepancy and significant limp.  She is unable to walk more than 1-2 blocks.  She has trouble putting shoes and socks on.  She is not unable to navigate stairs.  She has to lift her leg to get in and out of her car.  She does not have significant night symptoms.  She is taking Advil and Tylenol.  She has also started to use a cane.  She has also recently started physical therapy.  Despite these measures she continues to  have significant pain in the left hip, that is negatively impacting her quality of life.  Perceived leg length discrepancy?  Yes  She is retired.  She does live alone in a single-story home.  She does have family and friend support locally.    Patient reports no chronic narcotic use    History:  Past Medical History:        breast    Allergic rhinitis    Cant temember    Anxiety    Anxiety state    Breast cancer (HCC)    Calculus of kidney    Colon adenomas    a tubular adenoma and serrated adenoma    Depression    Esophageal reflux    High blood pressure    High cholesterol    Osteoarthritis    Problems with swallowing    partial dentures upper and lower    Serrated adenoma of colon     Past Surgical History:   Procedure Laterality Date    Colonoscopy  06/15/2021    Colonoscopy N/A 06/15/2021    Procedure: COLONOSCOPY;  Surgeon: Misael Lawson MD;  Location: [facility]    Colonoscopy N/A 2024    Procedure: COLONOSCOPY with polypectomy;  Surgeon: Misael Lawson MD;  Location: [facility]    Priscila biopsy stereo nodule 1 site left (cpt=19081)      Priscila biopsy stereo nodule 1 site right (cpt=19081)  2019    KODAK     Mastectomy left      Mastectomy right      Mastectomy,simple Bilateral 2019    Bilateral mastectomies w L SLNB and immediate reconstruction with tissue expander and acellular dermal matrix and L SLNB      1990    Other surgical history  2019    Double Mascectamy    Skin surgery      Removed squamish skin cancer     Family History   Problem Relation Age of Onset    Hypertension Father     Heart Disease Father     Diabetes Father     Heart Disease Mother 88    Heart Disorder Mother         Aortic Regurgitation/AVR    Other (PVD) Mother     Heart Disorder Daughter      Family Status   Relation Status    Fa     Mo     Jenn (Not Specified)     Social History     Occupational History    Not on file   Tobacco Use    Smoking status: Never     Passive exposure:  Never    Smokeless tobacco: Never   Vaping Use    Vaping status: Never Used   Substance and Sexual Activity    Alcohol use: Not Currently     Alcohol/week: 1.0 standard drink of alcohol     Types: 1 Glasses of wine per week     Comment: Mostly at holidays    Drug use: No    Sexual activity: Not on file       Medications:    Current Outpatient Medications:     PREGABALIN 150 MG Oral Cap, TAKE 1 CAPSULE(150 MG) BY MOUTH TWICE DAILY, Disp: 60 capsule, Rfl: 0    lisinopril 10 MG Oral Tab, Take 1 tablet (10 mg total) by mouth daily., Disp: , Rfl:     fluticasone propionate 50 MCG/ACT Nasal Suspension, 1 spray by Nasal route daily. One spray per each nostril daily., Disp: 1 each, Rfl: 3    DULoxetine 30 MG Oral Cap DR Particles, Take 1 capsule (30 mg total) by mouth daily., Disp: 90 capsule, Rfl: 1    methocarbamol 750 MG Oral Tab, Take 1 tablet (750 mg total) by mouth 4 (four) times daily as needed (pain). (Patient taking differently: Take 750 mg by mouth 4 (four) times daily as needed (pain).), Disp: 60 tablet, Rfl: 0    GLUCOSAMINE-CHONDROITIN OR, Take 1 tablet by mouth daily., Disp: , Rfl:     Multiple Vitamins-Minerals (OCUVITE ADULT 50+) Oral Cap, Take 1 capsule by mouth daily. centrum, Disp: , Rfl:     Biotin w/ Vitamins C & E (HAIR/SKIN/NAILS OR), Take 1 tablet by mouth daily., Disp: , Rfl:     Calcium 500-125 MG-UNIT Oral Tab, Take by mouth., Disp: , Rfl:     VITAMIN D, CHOLECALCIFEROL, OR, Take by mouth., Disp: , Rfl:     Bioflavonoid Products (AZIZA C OR), Take 1 tablet by mouth daily.  , Disp: , Rfl:     Allergies:  Allergies[1]    Review of Systems  A comprehensive review of systems was completed and is negative unless noted above or in the HPI.    Physical Exam  LMP 07/14/2006   There is no height or weight on file to calculate BMI.    Constitutional: The patient appears well-developed and well-nourished, in no apparent distress.  Psychiatric: The patient demonstrates good comprehension, judgment and  decision making. Normal mood and affect.  Eyes: PER and EOM are normal.  ENT: Hearing appropriate for normal conversation.   Cardiovascular: The patient has symmetric pulses, 2+.  Distal extremity is warm and well perfused with good capillary refill.  Respiratory:  The patient is breathing comfortably without increased respiratory effort or use of accessory muscles.  Hematologic/Lymphatic: No lymphangitis. There is no appreciable enlargement of lymph nodes. No calf swelling, calf non-tender, negative Ortiz's sign. No edema.  Skin: No wounds or ulcers. No hypertrophic scarring.  Neck: FROM without pain.  MSK:    left Hip        Skin: Intact, no intertrigo or rashes at the groin       Deformity: None       Tenderness: None       Range of Motion:      Extension: 10     Flexion Contracture: 10     Flexion: 70     Internal Rotation: 0     External Rotation: 10     Abduction: 10     Adduction: 5       Muscle Strength      Abduction: 5/5     Adduction: 5/5     Flexion: 4/5 due to severe pain         Gait: Normal and Antalgic   Trendelenburg Lurch Negative   Trendelenburg Sign Negative   Stinchfield: Positive   Trinity Test: Negative   Marilin Test: Negative   Anterior Impingement: Positive   Posterior Impingement: Negative   Leg Length Discrepancy Positive       Motor: intact   Sensory: intact   Vascular: intact      Labs:  Lab Results   Component Value Date    WBC 7.5 01/23/2025    HGB 14.4 01/23/2025    HCT 43.3 01/23/2025    .0 01/23/2025     Lab Results   Component Value Date    PTT 26.4 07/25/2019    ALB 4.5 01/23/2025    A1C 5.8 (H) 06/14/2019     Lab Results   Component Value Date     (H) 01/23/2025     (H) 10/23/2024     (H) 02/27/2024         Imaging:  I independently reviewed the patient's relevant imaging studies today.    A standing low-set AP pelvis, and 2 views of the affected hip were obtained today.  There is no evidence of acute fracture, dislocation, or bony lesions.  There is  evidence of joint obliteration of the left hip joint space, flattening of the femoral head, osteophyte formation, and subchondral sclerosis and superior migration that has progressed since imaging from November 2024.  There is a leg length discrepancy.    Assessment and Plan  Assessment & Plan  Pain    Orders:    XR HIP W OR WO PELVIS 2 OR 3 VIEWS, LEFT (CPT=73502) [[id number]] - Orthopedic providers only; Future    Primary osteoarthritis of left hip    Orders:    traMADol 50 MG Oral Tab; Take 1 tablet (50 mg total) by mouth every 6 (six) hours as needed for Pain.      The patient history, physical examination and imaging studies are consistent with the diagnosis above. Options were discussed with the patient today, including continued conservative management vs. potential surgical intervention.  Nonoperative management includes activity modification or restriction, weight loss if applicable, anti-inflammatory medication, physical therapy or a home exercise program,  intra-articular corticosteroid injections, and assistive device usage.  If their hip pain does not respond to conservative measures and causes severe limitations in their activities of daily living, then a total hip arthroplasty can be considered.  Given that the patient has attempted and failed these nonoperative modalities, and continue to have functionally limiting pain that is negatively affecting quality of life, I recommend surgical intervention in the form of a total hip arthroplasty.  As such, I recommend the following:    Surgery scheduling performed.  They will need medical clearance and dental clearance.   In the interim, they will continue activity modification, Tylenol, anti-inflammatory medication ( until 1 week prior to surgery date), and assistive device usage for symptomatic relief.  Activities: As tolerated  Follow-up: 1 week prior to surgery for preoperative H&P visit.      I had a lengthy discussion about joint replacement.  They were  also given AAKS handouts on joint replacement.  We discussed the importance of preoperative clearances, labs and appropriate studies.  We discussed the preoperative, intraoperative and postoperative course related to the surgery.  We discussed the importance of postoperative rehabilitation after surgery.  Risks, benefits and alternatives of the surgery were discussed.  Risks include but are not limited to superficial and deep infection, DVT/PE, damage to surrounding nerves and vessels, bleeding, need for blood transfusion, injury to surrounding structures including nerves, vessels, ligaments, tendons or bone, iatrogenic fractures, dislocation/instability, leg length inequality, loosening, polyethylene wear, osteolysis, continued pain, need for future surgeries, risks for anesthesia including MI, stroke, loss of limb, or loss of life. Understanding of these these risks, the patient elected to proceed with surgical planning.    BMI is is above average; BMI management plan is completed    Yohana Hair MD       [1] No Known Allergies

## 2025-02-18 NOTE — ASSESSMENT & PLAN NOTE
Orders:    traMADol 50 MG Oral Tab; Take 1 tablet (50 mg total) by mouth every 6 (six) hours as needed for Pain.

## 2025-02-18 NOTE — TELEPHONE ENCOUNTER
Per patient she is going to have surgery on 03/03/2025 for hip surgery and need a medical clearance, no available appointment with Dr Castaneda.  Please advise.  Thank you.    Please reply to pool: [facility]

## 2025-02-19 ENCOUNTER — MED REC SCAN ONLY (OUTPATIENT)
Dept: PHYSICAL MEDICINE AND REHAB | Facility: CLINIC | Age: 70
End: 2025-02-19

## 2025-02-19 NOTE — TELEPHONE ENCOUNTER
I spoke with the patient and used a 15min RES24 slot and it scheduled it as a 30min appointment. I scheduled the patient on 02/21 @ 1:15pm.

## 2025-02-19 NOTE — TELEPHONE ENCOUNTER
FYI: Dr Castaneda \"res24\" is only 15 mins and for Pre-op needs a 30 mins slot, is it ok to give patient the \"res24\" 15 mins slot.  Please advise.  Thank you.    Please reply to pool: [facility]

## 2025-02-21 ENCOUNTER — LAB ENCOUNTER (OUTPATIENT)
Dept: LAB | Age: 70
End: 2025-02-21
Attending: STUDENT IN AN ORGANIZED HEALTH CARE EDUCATION/TRAINING PROGRAM
Payer: MEDICARE

## 2025-02-21 ENCOUNTER — OFFICE VISIT (OUTPATIENT)
Dept: FAMILY MEDICINE CLINIC | Facility: CLINIC | Age: 70
End: 2025-02-21
Payer: MEDICARE

## 2025-02-21 VITALS
WEIGHT: 200.81 LBS | SYSTOLIC BLOOD PRESSURE: 138 MMHG | OXYGEN SATURATION: 99 % | DIASTOLIC BLOOD PRESSURE: 81 MMHG | BODY MASS INDEX: 33.46 KG/M2 | HEART RATE: 110 BPM | HEIGHT: 65 IN | TEMPERATURE: 96 F

## 2025-02-21 DIAGNOSIS — Z01.818 PREOPERATIVE GENERAL PHYSICAL EXAMINATION: Primary | ICD-10-CM

## 2025-02-21 DIAGNOSIS — J06.9 UPPER RESPIRATORY TRACT INFECTION, UNSPECIFIED TYPE: ICD-10-CM

## 2025-02-21 DIAGNOSIS — Z01.818 PREOPERATIVE GENERAL PHYSICAL EXAMINATION: ICD-10-CM

## 2025-02-21 DIAGNOSIS — R79.9 ABNORMAL FINDING OF BLOOD CHEMISTRY, UNSPECIFIED: ICD-10-CM

## 2025-02-21 DIAGNOSIS — R31.9 HEMATURIA, UNSPECIFIED TYPE: ICD-10-CM

## 2025-02-21 LAB
ANION GAP SERPL CALC-SCNC: 10 MMOL/L (ref 0–18)
ANTIBODY SCREEN: NEGATIVE
ATRIAL RATE: 99 BPM
BASOPHILS # BLD AUTO: 0.06 X10(3) UL (ref 0–0.2)
BASOPHILS NFR BLD AUTO: 0.9 %
BILIRUB UR QL: NEGATIVE
BILIRUB UR QL: NEGATIVE
BUN BLD-MCNC: 13 MG/DL (ref 9–23)
BUN/CREAT SERPL: 19.7 (ref 10–20)
CALCIUM BLD-MCNC: 10.4 MG/DL (ref 8.7–10.4)
CHLORIDE SERPL-SCNC: 102 MMOL/L (ref 98–112)
CLARITY UR: CLEAR
CLARITY UR: CLEAR
CO2 SERPL-SCNC: 26 MMOL/L (ref 21–32)
CREAT BLD-MCNC: 0.66 MG/DL
DEPRECATED RDW RBC AUTO: 40.6 FL (ref 35.1–46.3)
EGFRCR SERPLBLD CKD-EPI 2021: 95 ML/MIN/1.73M2 (ref 60–?)
EOSINOPHIL # BLD AUTO: 0.19 X10(3) UL (ref 0–0.7)
EOSINOPHIL NFR BLD AUTO: 2.8 %
ERYTHROCYTE [DISTWIDTH] IN BLOOD BY AUTOMATED COUNT: 11.9 % (ref 11–15)
EST. AVERAGE GLUCOSE BLD GHB EST-MCNC: 148 MG/DL (ref 68–126)
FASTING STATUS PATIENT QL REPORTED: NO
GLUCOSE BLD-MCNC: 107 MG/DL (ref 70–99)
GLUCOSE UR-MCNC: NORMAL MG/DL
GLUCOSE UR-MCNC: NORMAL MG/DL
HBA1C MFR BLD: 6.8 % (ref ?–5.7)
HCT VFR BLD AUTO: 42.4 %
HGB BLD-MCNC: 14.7 G/DL
HGB UR QL STRIP.AUTO: NEGATIVE
HGB UR QL STRIP.AUTO: NEGATIVE
IMM GRANULOCYTES # BLD AUTO: 0.02 X10(3) UL (ref 0–1)
IMM GRANULOCYTES NFR BLD: 0.3 %
KETONES UR-MCNC: NEGATIVE MG/DL
KETONES UR-MCNC: NEGATIVE MG/DL
LEUKOCYTE ESTERASE UR QL STRIP.AUTO: 250
LEUKOCYTE ESTERASE UR QL STRIP.AUTO: 250
LYMPHOCYTES # BLD AUTO: 2.47 X10(3) UL (ref 1–4)
LYMPHOCYTES NFR BLD AUTO: 36.1 %
MCH RBC QN AUTO: 32.3 PG (ref 26–34)
MCHC RBC AUTO-ENTMCNC: 34.7 G/DL (ref 31–37)
MCV RBC AUTO: 93.2 FL
MONOCYTES # BLD AUTO: 0.99 X10(3) UL (ref 0.1–1)
MONOCYTES NFR BLD AUTO: 14.5 %
NEUTROPHILS # BLD AUTO: 3.11 X10 (3) UL (ref 1.5–7.7)
NEUTROPHILS # BLD AUTO: 3.11 X10(3) UL (ref 1.5–7.7)
NEUTROPHILS NFR BLD AUTO: 45.4 %
NITRITE UR QL STRIP.AUTO: NEGATIVE
NITRITE UR QL STRIP.AUTO: NEGATIVE
OSMOLALITY SERPL CALC.SUM OF ELEC: 287 MOSM/KG (ref 275–295)
P AXIS: 55 DEGREES
P-R INTERVAL: 160 MS
PH UR: 5.5 [PH] (ref 5–8)
PH UR: 5.5 [PH] (ref 5–8)
PLATELET # BLD AUTO: 238 10(3)UL (ref 150–450)
POTASSIUM SERPL-SCNC: 4 MMOL/L (ref 3.5–5.1)
PROT UR-MCNC: NEGATIVE MG/DL
PROT UR-MCNC: NEGATIVE MG/DL
Q-T INTERVAL: 336 MS
QRS DURATION: 76 MS
QTC CALCULATION (BEZET): 431 MS
R AXIS: 36 DEGREES
RBC # BLD AUTO: 4.55 X10(6)UL
RH BLOOD TYPE: POSITIVE
SODIUM SERPL-SCNC: 138 MMOL/L (ref 136–145)
SP GR UR STRIP: 1.01 (ref 1–1.03)
SP GR UR STRIP: 1.01 (ref 1–1.03)
T AXIS: 49 DEGREES
UROBILINOGEN UR STRIP-ACNC: NORMAL
UROBILINOGEN UR STRIP-ACNC: NORMAL
VENTRICULAR RATE: 99 BPM
WBC # BLD AUTO: 6.8 X10(3) UL (ref 4–11)

## 2025-02-21 PROCEDURE — G2211 COMPLEX E/M VISIT ADD ON: HCPCS | Performed by: STUDENT IN AN ORGANIZED HEALTH CARE EDUCATION/TRAINING PROGRAM

## 2025-02-21 PROCEDURE — 85025 COMPLETE CBC W/AUTO DIFF WBC: CPT

## 2025-02-21 PROCEDURE — 93010 ELECTROCARDIOGRAM REPORT: CPT | Performed by: INTERNAL MEDICINE

## 2025-02-21 PROCEDURE — 81001 URINALYSIS AUTO W/SCOPE: CPT | Performed by: STUDENT IN AN ORGANIZED HEALTH CARE EDUCATION/TRAINING PROGRAM

## 2025-02-21 PROCEDURE — 93005 ELECTROCARDIOGRAM TRACING: CPT

## 2025-02-21 PROCEDURE — 86850 RBC ANTIBODY SCREEN: CPT | Performed by: STUDENT IN AN ORGANIZED HEALTH CARE EDUCATION/TRAINING PROGRAM

## 2025-02-21 PROCEDURE — 86901 BLOOD TYPING SEROLOGIC RH(D): CPT | Performed by: STUDENT IN AN ORGANIZED HEALTH CARE EDUCATION/TRAINING PROGRAM

## 2025-02-21 PROCEDURE — 87086 URINE CULTURE/COLONY COUNT: CPT | Performed by: STUDENT IN AN ORGANIZED HEALTH CARE EDUCATION/TRAINING PROGRAM

## 2025-02-21 PROCEDURE — 80048 BASIC METABOLIC PNL TOTAL CA: CPT

## 2025-02-21 PROCEDURE — 99214 OFFICE O/P EST MOD 30 MIN: CPT | Performed by: STUDENT IN AN ORGANIZED HEALTH CARE EDUCATION/TRAINING PROGRAM

## 2025-02-21 PROCEDURE — 83036 HEMOGLOBIN GLYCOSYLATED A1C: CPT

## 2025-02-21 PROCEDURE — 86900 BLOOD TYPING SEROLOGIC ABO: CPT | Performed by: STUDENT IN AN ORGANIZED HEALTH CARE EDUCATION/TRAINING PROGRAM

## 2025-02-21 PROCEDURE — 36415 COLL VENOUS BLD VENIPUNCTURE: CPT | Performed by: STUDENT IN AN ORGANIZED HEALTH CARE EDUCATION/TRAINING PROGRAM

## 2025-02-21 PROCEDURE — 87641 MR-STAPH DNA AMP PROBE: CPT

## 2025-02-21 PROCEDURE — 81001 URINALYSIS AUTO W/SCOPE: CPT

## 2025-02-21 NOTE — PROGRESS NOTES
HPI:    Patient ID: Kim Hoffman is a 70 year old female.    HPI  Pt presenting for pre-procedure evaluation. She is undergoing Left anterior total hip arthroplasty on 3/3/2025 per Dr. Yohana Hair at [facility]. No significant chronic medical problems. Past medical/surgical history, family history, and social history were reviewed.     H/o breast biopsies, dbl mastectomy, colonoscopies, no history of complications with anesthesia. She reports being able to go up a flight of stairs without significant chest pain or persistent shortness of breath. No consistent exercise. She denies any known cardiac disease or bleeding abnormalities. She denies any tobacco, EtOH, marijuana, illicits use. Mood stable, denies SH/SI/HI.     Onset of cold symptoms  Endorses sneezing, cough, mild chills, sinus headache  Denies fever, dyspnea, N/V/D  .   Review of Systems   A comprehensive 10 point review of systems was completed.  Pertinent positives and negatives noted in the the HPI.       Current Outpatient Medications   Medication Sig Dispense Refill    PREGABALIN 150 MG Oral Cap TAKE 1 CAPSULE(150 MG) BY MOUTH TWICE DAILY 60 capsule 0    traMADol 50 MG Oral Tab Take 1 tablet (50 mg total) by mouth every 6 (six) hours as needed for Pain. 30 tablet 0    lisinopril 10 MG Oral Tab Take 1 tablet (10 mg total) by mouth daily.      fluticasone propionate 50 MCG/ACT Nasal Suspension 1 spray by Nasal route daily. One spray per each nostril daily. 1 each 3    DULoxetine 30 MG Oral Cap DR Particles Take 1 capsule (30 mg total) by mouth daily. 90 capsule 1    methocarbamol 750 MG Oral Tab Take 1 tablet (750 mg total) by mouth 4 (four) times daily as needed (pain). (Patient taking differently: Take 1 tablet (750 mg total) by mouth 4 (four) times daily as needed (pain).) 60 tablet 0    GLUCOSAMINE-CHONDROITIN OR Take 1 tablet by mouth daily.      Multiple Vitamins-Minerals (OCUVITE ADULT 50+) Oral Cap Take 1 capsule by mouth daily.  centrum      Biotin w/ Vitamins C & E (HAIR/SKIN/NAILS OR) Take 1 tablet by mouth daily.      Calcium 500-125 MG-UNIT Oral Tab Take by mouth.      VITAMIN D, CHOLECALCIFEROL, OR Take by mouth.      Bioflavonoid Products (AZIZA C OR) Take 1 tablet by mouth daily.         Allergies:Allergies[1]   Vitals:    02/21/25 1256 02/21/25 1317   BP: (!) 161/98 138/81   Pulse: 110    Temp: (!) 96.1 °F (35.6 °C)    TempSrc: Temporal    SpO2: 99%    Weight: 200 lb 12.8 oz (91.1 kg)    Height: 5' 5\" (1.651 m)        Body mass index is 33.41 kg/m².   PHYSICAL EXAM:   Physical Exam  Vitals reviewed.   Constitutional:       General: She is not in acute distress.     Appearance: Normal appearance. She is well-developed.   HENT:      Head: Normocephalic and atraumatic.      Right Ear: Tympanic membrane, ear canal and external ear normal.      Left Ear: Tympanic membrane, ear canal and external ear normal.      Nose:      Right Turbinates: Swollen.      Left Turbinates: Swollen.      Right Sinus: No maxillary sinus tenderness or frontal sinus tenderness.      Left Sinus: No maxillary sinus tenderness or frontal sinus tenderness.      Mouth/Throat:      Pharynx: Posterior oropharyngeal erythema present.   Eyes:      Conjunctiva/sclera: Conjunctivae normal.   Neck:      Thyroid: No thyroid mass or thyroid tenderness.   Cardiovascular:      Rate and Rhythm: Normal rate and regular rhythm.      Pulses: Normal pulses.      Heart sounds: Normal heart sounds, S1 normal and S2 normal. No murmur heard.  Pulmonary:      Effort: Pulmonary effort is normal. No respiratory distress.      Breath sounds: Normal breath sounds. No wheezing, rhonchi or rales.   Abdominal:      General: Bowel sounds are normal.      Palpations: Abdomen is soft.      Tenderness: There is no abdominal tenderness. There is no guarding or rebound.   Musculoskeletal:      Cervical back: Normal range of motion and neck supple. No muscular tenderness.      Right lower leg: No  edema.      Left lower leg: No edema.   Lymphadenopathy:      Cervical: No cervical adenopathy.   Skin:     General: Skin is warm and dry.      Coloration: Skin is not jaundiced.   Neurological:      General: No focal deficit present.      Mental Status: She is alert and oriented to person, place, and time. Mental status is at baseline.   Psychiatric:         Attention and Perception: Attention normal.         Mood and Affect: Mood normal.         Behavior: Behavior normal. Behavior is cooperative.         Cognition and Memory: Cognition normal.             ASSESSMENT/PLAN:   1. Preoperative general physical examination  No significant chronic medical problems. Past medical/surgical history, family history, and social history were reviewed.   - will check labs, EKG as requested  Pending review, pt is medically optimized and may proceed as scheduled  - Type and screen  - EKG 12 Lead to be performed at [facility]; Future  - CBC With Differential With Platelet; Future  - Basic Metabolic Panel (8) [E]; Future  - Hemoglobin A1C; Future  - UA/M WITH CULTURE REFLEX [3020]  - MRSA Screen by PCR; Future  - Urine Culture, Routine    2. Abnormal finding of blood chemistry, unspecified  - Hemoglobin A1C; Future    3. Upper respiratory tract infection, unspecified type  - continue supportive care including nasal saline/suction, humidifier use  - increase hydration and rest as tolerated  - discussed red flags for urgent evaluation  - to call with any questions/concerns    Pt verbalized understanding and agrees with plan.      Orders Placed This Encounter   Procedures    CBC With Differential With Platelet    Basic Metabolic Panel (8) [E]    Hemoglobin A1C    UA/M WITH CULTURE REFLEX [3020]    Type and screen    ABORH (Blood Type)    Antibody Screen    MRSA Screen by PCR    Urine Culture, Routine       Meds This Visit:  Requested Prescriptions      No prescriptions requested or ordered in this encounter        Imaging & Referrals:  None         ID#[id number]         [1]   Allergies  Allergen Reactions    Seasonal OTHER (SEE COMMENTS)     Runny nose, watery eyes

## 2025-02-22 LAB — MRSA DNA SPEC QL NAA+PROBE: NEGATIVE

## 2025-02-25 RX ORDER — IBUPROFEN, ACETAMINOPHEN 250; 125 MG/1; MG/1
2 TABLET, FILM COATED ORAL 2 TIMES DAILY
COMMUNITY

## 2025-02-26 ENCOUNTER — TELEPHONE (OUTPATIENT)
Facility: CLINIC | Age: 70
End: 2025-02-26

## 2025-02-26 NOTE — TELEPHONE ENCOUNTER
Dr Hernandez [phone] from the dental office contacted [place] because pt does not have clearance for Monday's surgery. The dr needs to email paperwork to dr Hair to be completed or surgery is cancelled. Dr Hernandez doesn't have access to a fax Please advise and f/u with Dr Hernandez   [phone]  Future Appointments  2/27/2025  2:30 PM    Yohana Hair MD      [facility]        [facility]  3/10/2025  9:45 AM    [facility] LAB2                [facility]          [place]  3/10/2025  10:40 AM   Laurita Sylvester MD [facility]        [place]  3/10/2025  11:30 AM   [facility] INFRN 5             [facility]          [place]  3/25/2025  11:00 AM   Yohana Hair MD      [facility]        [facility]  6/23/2025  10:15 AM   [facility]               [facility]        [place]  6/23/2025  11:00 AM   Laurita Sylvester MD [facility]        [place]

## 2025-02-27 ENCOUNTER — OFFICE VISIT (OUTPATIENT)
Age: 70
End: 2025-02-27
Payer: MEDICARE

## 2025-02-27 DIAGNOSIS — Z01.818 PREOP EXAMINATION: ICD-10-CM

## 2025-02-27 DIAGNOSIS — M16.12 PRIMARY OSTEOARTHRITIS OF LEFT HIP: Primary | ICD-10-CM

## 2025-02-27 PROCEDURE — 99214 OFFICE O/P EST MOD 30 MIN: CPT | Performed by: ORTHOPAEDIC SURGERY

## 2025-02-27 RX ORDER — CELECOXIB 200 MG/1
200 CAPSULE ORAL 2 TIMES DAILY
Qty: 60 CAPSULE | Refills: 0 | Status: SHIPPED | OUTPATIENT
Start: 2025-02-27

## 2025-02-27 RX ORDER — OXYCODONE HYDROCHLORIDE 5 MG/1
5 TABLET ORAL EVERY 6 HOURS PRN
Qty: 28 TABLET | Refills: 0 | Status: SHIPPED | OUTPATIENT
Start: 2025-02-27

## 2025-02-27 RX ORDER — ASPIRIN 81 MG/1
81 TABLET ORAL DAILY
Qty: 60 TABLET | Refills: 0 | Status: SHIPPED | OUTPATIENT
Start: 2025-02-27

## 2025-02-27 RX ORDER — FERROUS SULFATE 325(65) MG
325 TABLET ORAL
Qty: 30 TABLET | Refills: 0 | Status: SHIPPED | OUTPATIENT
Start: 2025-02-27

## 2025-02-27 RX ORDER — AMOXICILLIN 250 MG
1 CAPSULE ORAL DAILY
Qty: 30 TABLET | Refills: 0 | Status: SHIPPED | OUTPATIENT
Start: 2025-02-27

## 2025-02-27 NOTE — PROGRESS NOTES
Kim Hoffman is a 70 year old female being seen in the office today for Pre-Op (Pt is scheduled for left IRINEO 25. Pt completed dental and medical sandro. )  .    HPI  Ms. Johnnie Hoffman is here for her preoperative visit for scheduled left IRINEO on 3/3/2025.  She has obtained medical clearance.  She has obtained dental clearance.  She has obtained all appropriate lab work and testing.    From a hip standpoint she continues to have severe pain.  It is activity limiting.  She has failed conservative measures.  She is ready for surgery. Her friend is going to stay with her post surgery.     History:  Past Medical History:        breast    Allergic rhinitis    Cant temember    Anxiety    Anxiety state    Back problem    Breast cancer (HCC)    Calculus of kidney    Colon adenomas    a tubular adenoma and serrated adenoma    Depression    Esophageal reflux    Exposure to medical diagnostic radiation    High blood pressure    High cholesterol    History of stomach ulcers    history    Incontinence    bladder    Neuropathy    feet, occasionally in fingers    Osteoarthritis    Personal history of antineoplastic chemotherapy    Serrated adenoma of colon     Past Surgical History:   Procedure Laterality Date    Colonoscopy N/A 06/15/2021    Procedure: COLONOSCOPY;  Surgeon: Misael Lawson MD;  Location: [facility]    Colonoscopy N/A 2024    Procedure: COLONOSCOPY with polypectomy;  Surgeon: Misael Lawson MD;  Location: [facility]    Priscila biopsy stereo nodule 1 site left (cpt=19081)      Priscila biopsy stereo nodule 1 site right (cpt=19081)  2019    KODAK     Mastectomy left      Mastectomy right      Mastectomy,simple Bilateral 2019    Bilateral mastectomies w L SLNB and immediate reconstruction with tissue expander and acellular dermal matrix and L SLNB      1990    Other surgical history  2019    Double Mascectamy    Skin surgery      Removed [facility] skin cancer     Family  History   Problem Relation Age of Onset    Hypertension Father     Heart Disease Father     Diabetes Father     Heart Disease Mother 88    Heart Disorder Mother         Aortic Regurgitation/AVR    Other (PVD) Mother     Heart Disorder Daughter      Family Status   Relation Status    Fa     Mo     Jenn (Not Specified)     Social History     Occupational History    Not on file   Tobacco Use    Smoking status: Never     Passive exposure: Never    Smokeless tobacco: Never   Vaping Use    Vaping status: Never Used   Substance and Sexual Activity    Alcohol use: Not Currently     Alcohol/week: 1.0 standard drink of alcohol     Types: 1 Glasses of wine per week     Comment: Mostly at holidays    Drug use: No    Sexual activity: Not on file       Medications:  Current Outpatient Medications   Medication Sig Dispense Refill    oxyCODONE 5 MG Oral Tab Take 1 tablet (5 mg total) by mouth every 6 (six) hours as needed for Pain. 28 tablet 0    celecoxib (CELEBREX) 200 MG Oral Cap Take 1 capsule (200 mg total) by mouth 2 (two) times daily. 60 capsule 0    sennosides-docusate (SENNA S) 8.6-50 MG Oral Tab Take 1 tablet by mouth daily. 30 tablet 0    Ferrous Sulfate 325 (65 Fe) MG Oral Tab Take 1 tablet (325 mg total) by mouth daily with breakfast. 30 tablet 0    aspirin 81 MG Oral Tab EC Take 1 tablet (81 mg total) by mouth daily. 60 tablet 0    Ibuprofen-Acetaminophen (ADVIL DUAL ACTION) 125-250 MG Oral Tab Take 2 tablets by mouth in the morning and 2 tablets before bedtime.      Multiple Vitamins-Minerals (CENTRUM ADULTS OR) Take 1 tablet by mouth daily.      PREGABALIN 150 MG Oral Cap TAKE 1 CAPSULE(150 MG) BY MOUTH TWICE DAILY 60 capsule 0    traMADol 50 MG Oral Tab Take 1 tablet (50 mg total) by mouth every 6 (six) hours as needed for Pain. 30 tablet 0    lisinopril 10 MG Oral Tab Take 1 tablet (10 mg total) by mouth daily.      fluticasone propionate 50 MCG/ACT Nasal Suspension 1 spray by Nasal route daily. One  spray per each nostril daily. 1 each 3    DULoxetine 30 MG Oral Cap DR Particles Take 1 capsule (30 mg total) by mouth daily. (Patient taking differently: Take 1 capsule (30 mg total) by mouth at bedtime.) 90 capsule 1    methocarbamol 750 MG Oral Tab Take 1 tablet (750 mg total) by mouth 4 (four) times daily as needed (pain). (Patient taking differently: Take 1 tablet (750 mg total) by mouth 4 (four) times daily as needed (pain).) 60 tablet 0    GLUCOSAMINE-CHONDROITIN OR Take 1 tablet by mouth daily.      Multiple Vitamins-Minerals (OCUVITE ADULT 50+) Oral Cap Take 1 capsule by mouth daily. centrum      Biotin w/ Vitamins C & E (HAIR/SKIN/NAILS OR) Take 1 tablet by mouth daily.      Calcium 500-125 MG-UNIT Oral Tab Take by mouth.      Vitamin D, Cholecalciferol, 25 MCG (1000 UT) Oral Cap Take 1 capsule by mouth daily.      Bioflavonoid Products (AZIZA C OR) Take 1 tablet by mouth daily.           Allergies:  Allergies[1]    Review of Systems  A comprehensive review of systems was completed and is negative unless noted above or in the HPI.    Physical Exam  Physical Exam  LMP 07/14/2006   There is no height or weight on file to calculate BMI.    Constitutional: The patient appears well-developed and well-nourished, in no apparent distress.   Psychiatric: The patient demonstrates good comprehension, judgment and decision making. Normal mood and affect.  Eyes: PER and EOM are normal.  ENT: Hearing appropriate for normal conversation.   Cardiovascular: The patient has symmetric pulses, 2+.  Distal extremity is warm and well perfused with good capillary refill.  Respiratory:  The patient is breathing comfortably without increased respiratory effort or use of accessory muscles.  Hematologic/Lymphatic: No lymphangitis. There is no appreciable enlargement of lymph nodes. No calf swelling, calf non-tender, negative Ortiz's sign. No edema.  Skin: No wounds or ulcers. No hypertrophic scarring.  Neck: FROM without  pain.  MSK:     Left Hip           Skin: Intact, no intertrigo or rashes at the groin         Deformity: None         Tenderness: None         Range of Motion:       Extension: 10     Flexion Contracture: 10     Flexion: 70     Internal Rotation: 0     External Rotation: 10     Abduction: 10     Adduction: 5         Muscle Strength       Abduction: 5/5     Adduction: 5/5     Flexion: 4/5 due to severe pain          Gait: Normal and Antalgic   Trendelenburg Lurch Negative   Trendelenburg Sign Negative   Stinchfield: Positive   Trinity Test: Negative   Marilin Test: Negative   Anterior Impingement: Positive   Posterior Impingement: Negative   Leg Length Discrepancy Positive         Motor: intact   Sensory: intact   Vascular: intact        Labs:  Lab Results   Component Value Date    WBC 6.8 02/21/2025    HGB 14.7 02/21/2025    HCT 42.4 02/21/2025    .0 02/21/2025     Lab Results   Component Value Date    PTT 26.4 07/25/2019    ALB 4.5 01/23/2025    A1C 6.8 (H) 02/21/2025     Lab Results   Component Value Date     (H) 02/21/2025     (H) 01/23/2025     (H) 10/23/2024     Imaging:  None new    Assessment and Plan  Assessment & Plan  Primary osteoarthritis of left hip    Orders:    oxyCODONE 5 MG Oral Tab; Take 1 tablet (5 mg total) by mouth every 6 (six) hours as needed for Pain.  2  Preop examination    Orders:    oxyCODONE 5 MG Oral Tab; Take 1 tablet (5 mg total) by mouth every 6 (six) hours as needed for Pain.      The patient's history, physical examination and imaging studies are consistent with the diagnosis above. Options were discussed with the patient today, including continued conservative management vs surgical intervention. Given that the patient has attempted and failed nonoperative modalities and continue to have functionally limiting pain that is negatively affecting quality of life, I recommend surgical intervention in the form of a total hip arthroplasty. They have been scheduled  for this and are here today for a preoperative visit.    Surgery scheduled for 3/3/2025.  They have obtained medical, dental clearances for this surgery.   Labs were reviewed.  In the interim, they will continue activity modification, anti-inflammatory medication, and assistive device usage for symptomatic relief and work on home exercise program in anticipation for surgery.  They have stopped all appropriate medications and supplements.  Activities: As tolerated  Follow-up: 3 weeks post-surgery with xrays.    I had a lengthy discussion about total hip replacement.  The risks, benefits and alternatives of the surgery were discussed.  Risks include but are not limited to infection, DVT/PE, damage to surrounding nerves and vessels, bleeding, need for blood transfusion, injury to surrounding structures including nerves, vessels, ligaments, tendons, or bone, iatrogenic fractures, dislocation/instability, leg length inequality, loosening, polyethylene wear, osteolysis, continued pain, need for future surgeries, risks for anesthesia including MI, stroke, loss of limb, or loss of life.  Discussed postoperative rehabilitation as well as restrictions if any.  We discussed safe and responsible use of pain medications postoperatively.  Understanding these topics, the patient elected to proceed with surgical planning.    No follow-ups on file.    is above average; BMI management plan is completed    Yohana Hair MD       [1]   Allergies  Allergen Reactions    Seasonal OTHER (SEE COMMENTS)     Runny nose, watery eyes

## 2025-02-27 NOTE — H&P (VIEW-ONLY)
Kim Hoffman is a 70 year old female being seen in the office today for Pre-Op (Pt is scheduled for left IRINEO 25. Pt completed dental and medical sandro. )  .    HPI  Ms. Johnnie Hoffman is here for her preoperative visit for scheduled left IRINEO on 3/3/2025.  She has obtained medical clearance.  She has obtained dental clearance.  She has obtained all appropriate lab work and testing.    From a hip standpoint she continues to have severe pain.  It is activity limiting.  She has failed conservative measures.  She is ready for surgery. Her friend is going to stay with her post surgery.     History:  Past Medical History:        breast    Allergic rhinitis    Cant temember    Anxiety    Anxiety state    Back problem    Breast cancer (HCC)    Calculus of kidney    Colon adenomas    a tubular adenoma and serrated adenoma    Depression    Esophageal reflux    Exposure to medical diagnostic radiation    High blood pressure    High cholesterol    History of stomach ulcers    history    Incontinence    bladder    Neuropathy    feet, occasionally in fingers    Osteoarthritis    Personal history of antineoplastic chemotherapy    Serrated adenoma of colon     Past Surgical History:   Procedure Laterality Date    Colonoscopy N/A 06/15/2021    Procedure: COLONOSCOPY;  Surgeon: Misael Lawson MD;  Location: [facility]    Colonoscopy N/A 2024    Procedure: COLONOSCOPY with polypectomy;  Surgeon: Misael Lawson MD;  Location: [facility]    Priscila biopsy stereo nodule 1 site left (cpt=19081)      Priscila biopsy stereo nodule 1 site right (cpt=19081)  2019    KODAK     Mastectomy left      Mastectomy right      Mastectomy,simple Bilateral 2019    Bilateral mastectomies w L SLNB and immediate reconstruction with tissue expander and acellular dermal matrix and L SLNB      1990    Other surgical history  2019    Double Mascectamy    Skin surgery      Removed [facility] skin cancer     Family  History   Problem Relation Age of Onset    Hypertension Father     Heart Disease Father     Diabetes Father     Heart Disease Mother 88    Heart Disorder Mother         Aortic Regurgitation/AVR    Other (PVD) Mother     Heart Disorder Daughter      Family Status   Relation Status    Fa     Mo     Jenn (Not Specified)     Social History     Occupational History    Not on file   Tobacco Use    Smoking status: Never     Passive exposure: Never    Smokeless tobacco: Never   Vaping Use    Vaping status: Never Used   Substance and Sexual Activity    Alcohol use: Not Currently     Alcohol/week: 1.0 standard drink of alcohol     Types: 1 Glasses of wine per week     Comment: Mostly at holidays    Drug use: No    Sexual activity: Not on file       Medications:  Current Outpatient Medications   Medication Sig Dispense Refill    oxyCODONE 5 MG Oral Tab Take 1 tablet (5 mg total) by mouth every 6 (six) hours as needed for Pain. 28 tablet 0    celecoxib (CELEBREX) 200 MG Oral Cap Take 1 capsule (200 mg total) by mouth 2 (two) times daily. 60 capsule 0    sennosides-docusate (SENNA S) 8.6-50 MG Oral Tab Take 1 tablet by mouth daily. 30 tablet 0    Ferrous Sulfate 325 (65 Fe) MG Oral Tab Take 1 tablet (325 mg total) by mouth daily with breakfast. 30 tablet 0    aspirin 81 MG Oral Tab EC Take 1 tablet (81 mg total) by mouth daily. 60 tablet 0    Ibuprofen-Acetaminophen (ADVIL DUAL ACTION) 125-250 MG Oral Tab Take 2 tablets by mouth in the morning and 2 tablets before bedtime.      Multiple Vitamins-Minerals (CENTRUM ADULTS OR) Take 1 tablet by mouth daily.      PREGABALIN 150 MG Oral Cap TAKE 1 CAPSULE(150 MG) BY MOUTH TWICE DAILY 60 capsule 0    traMADol 50 MG Oral Tab Take 1 tablet (50 mg total) by mouth every 6 (six) hours as needed for Pain. 30 tablet 0    lisinopril 10 MG Oral Tab Take 1 tablet (10 mg total) by mouth daily.      fluticasone propionate 50 MCG/ACT Nasal Suspension 1 spray by Nasal route daily. One  spray per each nostril daily. 1 each 3    DULoxetine 30 MG Oral Cap DR Particles Take 1 capsule (30 mg total) by mouth daily. (Patient taking differently: Take 1 capsule (30 mg total) by mouth at bedtime.) 90 capsule 1    methocarbamol 750 MG Oral Tab Take 1 tablet (750 mg total) by mouth 4 (four) times daily as needed (pain). (Patient taking differently: Take 1 tablet (750 mg total) by mouth 4 (four) times daily as needed (pain).) 60 tablet 0    GLUCOSAMINE-CHONDROITIN OR Take 1 tablet by mouth daily.      Multiple Vitamins-Minerals (OCUVITE ADULT 50+) Oral Cap Take 1 capsule by mouth daily. centrum      Biotin w/ Vitamins C & E (HAIR/SKIN/NAILS OR) Take 1 tablet by mouth daily.      Calcium 500-125 MG-UNIT Oral Tab Take by mouth.      Vitamin D, Cholecalciferol, 25 MCG (1000 UT) Oral Cap Take 1 capsule by mouth daily.      Bioflavonoid Products (AZIZA C OR) Take 1 tablet by mouth daily.           Allergies:  Allergies[1]    Review of Systems  A comprehensive review of systems was completed and is negative unless noted above or in the HPI.    Physical Exam  Physical Exam  LMP 07/14/2006   There is no height or weight on file to calculate BMI.    Constitutional: The patient appears well-developed and well-nourished, in no apparent distress.   Psychiatric: The patient demonstrates good comprehension, judgment and decision making. Normal mood and affect.  Eyes: PER and EOM are normal.  ENT: Hearing appropriate for normal conversation.   Cardiovascular: The patient has symmetric pulses, 2+.  Distal extremity is warm and well perfused with good capillary refill.  Respiratory:  The patient is breathing comfortably without increased respiratory effort or use of accessory muscles.  Hematologic/Lymphatic: No lymphangitis. There is no appreciable enlargement of lymph nodes. No calf swelling, calf non-tender, negative Ortiz's sign. No edema.  Skin: No wounds or ulcers. No hypertrophic scarring.  Neck: FROM without  pain.  MSK:     Left Hip           Skin: Intact, no intertrigo or rashes at the groin         Deformity: None         Tenderness: None         Range of Motion:       Extension: 10     Flexion Contracture: 10     Flexion: 70     Internal Rotation: 0     External Rotation: 10     Abduction: 10     Adduction: 5         Muscle Strength       Abduction: 5/5     Adduction: 5/5     Flexion: 4/5 due to severe pain          Gait: Normal and Antalgic   Trendelenburg Lurch Negative   Trendelenburg Sign Negative   Stinchfield: Positive   Trinity Test: Negative   Marilin Test: Negative   Anterior Impingement: Positive   Posterior Impingement: Negative   Leg Length Discrepancy Positive         Motor: intact   Sensory: intact   Vascular: intact        Labs:  Lab Results   Component Value Date    WBC 6.8 02/21/2025    HGB 14.7 02/21/2025    HCT 42.4 02/21/2025    .0 02/21/2025     Lab Results   Component Value Date    PTT 26.4 07/25/2019    ALB 4.5 01/23/2025    A1C 6.8 (H) 02/21/2025     Lab Results   Component Value Date     (H) 02/21/2025     (H) 01/23/2025     (H) 10/23/2024     Imaging:  None new    Assessment and Plan  Assessment & Plan  Primary osteoarthritis of left hip    Orders:    oxyCODONE 5 MG Oral Tab; Take 1 tablet (5 mg total) by mouth every 6 (six) hours as needed for Pain.  2  Preop examination    Orders:    oxyCODONE 5 MG Oral Tab; Take 1 tablet (5 mg total) by mouth every 6 (six) hours as needed for Pain.      The patient's history, physical examination and imaging studies are consistent with the diagnosis above. Options were discussed with the patient today, including continued conservative management vs surgical intervention. Given that the patient has attempted and failed nonoperative modalities and continue to have functionally limiting pain that is negatively affecting quality of life, I recommend surgical intervention in the form of a total hip arthroplasty. They have been scheduled  for this and are here today for a preoperative visit.    Surgery scheduled for 3/3/2025.  They have obtained medical, dental clearances for this surgery.   Labs were reviewed.  In the interim, they will continue activity modification, anti-inflammatory medication, and assistive device usage for symptomatic relief and work on home exercise program in anticipation for surgery.  They have stopped all appropriate medications and supplements.  Activities: As tolerated  Follow-up: 3 weeks post-surgery with xrays.    I had a lengthy discussion about total hip replacement.  The risks, benefits and alternatives of the surgery were discussed.  Risks include but are not limited to infection, DVT/PE, damage to surrounding nerves and vessels, bleeding, need for blood transfusion, injury to surrounding structures including nerves, vessels, ligaments, tendons, or bone, iatrogenic fractures, dislocation/instability, leg length inequality, loosening, polyethylene wear, osteolysis, continued pain, need for future surgeries, risks for anesthesia including MI, stroke, loss of limb, or loss of life.  Discussed postoperative rehabilitation as well as restrictions if any.  We discussed safe and responsible use of pain medications postoperatively.  Understanding these topics, the patient elected to proceed with surgical planning.    No follow-ups on file.    is above average; BMI management plan is completed    Yohana Hair MD       [1]   Allergies  Allergen Reactions    Seasonal OTHER (SEE COMMENTS)     Runny nose, watery eyes

## 2025-02-27 NOTE — ASSESSMENT & PLAN NOTE
Orders:    oxyCODONE 5 MG Oral Tab; Take 1 tablet (5 mg total) by mouth every 6 (six) hours as needed for Pain.  2

## 2025-02-28 ENCOUNTER — TELEPHONE (OUTPATIENT)
Dept: ORTHOPEDICS CLINIC | Facility: CLINIC | Age: 70
End: 2025-02-28

## 2025-02-28 NOTE — DISCHARGE INSTRUCTIONS
DISCHARGE INSTRUCTIONS FOR SAME DAY TOTAL JOINT REPLACEMENTS  You have undergone hip or knee replacement surgery. Your doctor replaced your painful joint with an artificial joint to relieve the pain and restore movement. Here are some instructions for you to follow once you have been discharged from the hospital.  Home Medications  You should have received your pain medications prescriptions during your pre-op visit. If not, make sure you have them before leaving the hospital.   A known side effect of narcotic medications is constipation. We advise that you purchase a stool softener (such as Colace) and take it twice daily while taking pain medication. Drink plenty of fluids while taking a stool softener.   You should also purchase Aspirin 81 mg (over-the-counter). Please take this, as directed, once you are home. You will take this medication for 4 weeks.   Do not abruptly stop taking your pain medication. If you find that you are having unfavorable side effects from your pain medication, please call our office and speak to our Nursing staff. We will work together to prescribe something that works for you and doesn't impede your recovery.   Incision Care  You will be going home with a dressing over your incision. This dressing is most likely, an Aquacel. There is skin glue under this bandage, there are no staples that need to be removed. The skin glue will protect your incision while it heals and will come off on its own.     Aquacel bandage- you may keep this bandage in place until seven days post-operative. Your physical therapist will remove the bandage and replace with a dry island dressing. This dressing must be kept dry for 48 hours, if after 48 hours there is no drainage from your incision you may remove the dry dressing and leave the incision exposed. If there is continued drainage the dressing must continue to be replaced until drainage has stopped. Your physical therapist will help monitor this daily  until drainage has stopped.    If you have an Aquacel in place, it is important to note the drainage coming from your incision. As with a band aid, you can see the drainage in the center of the Aquacel. If you notice that the drainage has largely increased in size, you should call our office and speak with our Nursing staff.           Minimal Drainage                   Increased - please call our office                       Check the skin surrounding your incision daily for redness, swelling, tenderness or increased drainage; if these occur, call our office.   You may shower after surgery if you have an Aquacel in place. Do NOT soak in a bathtub, hot tub or swimming pool until your doctor has approved.   Occasionally, a negative pressure wound dressing called a Prevena is used. This should remain on and with the sponge sucked down for it to be working properly. This will remain on for 1-2 weeks based on your surgeon's instruction. If there is a charging cord that comes with the machine, you will need to charge the machine every day for it to work properly.                                Home Care  If you have a total knee replacement you will be starting outpatient physical therapy 24-48 hours after you are discharged from the hospital. You should have had a prescription already provided to you during your preop visit.   If you have a total hip replacement you will have home health care. This should be set up during your preop visit and if not, then should be set up before leaving the hospital. A home health care nurse will come to your house 2-3 times a week for two weeks to work with you.  You will be sent home with ulysses hose (support stockings). You are to wear these at all times, including sleeping. You can remove for hygiene purposed during the day. They help to increase circulation and prevent blood clots. You will be expected to wear your ulysses hose for 4 weeks post-operatively, unless otherwise instructed by  your doctor.   Ice your affected joint 3-4 times a day for 15-20 minutes at a time, avoid getting your bandage wet.   Elevate your affected leg on 2-3 pillows when sitting or lying down. Place 2-3 pillows under your ankles, not under your knee. If you had a hip replacement, lay flat a few times a day to prevent blood from pooling at your hip.   Make sure to eat a nutritious diet containing extra protein. Protein will promote joint healing and immune system health.    Pain:   If you have a regional pain block in place (only for total knees), you may not be experiencing any pain upon discharge. However, within 24 hrs. of your surgery, this block will wear off. You may feel a tingling sensation in the operative limb when the block is wearing off.   Please make sure to take your narcotic pain medication as scheduled, regardless of whether you are in pain or not. You will want narcotic pain medication on board when the block wears off. Otherwise, the pain will get away from you and it is very hard to catch up to it.   Remember to ice and elevate, as much as possible, as this will help with decrease the pain, as well.   Follow-up Care  You should have a post-operative visit scheduled for 3 weeks after surgery with Dr. Hair. Please make sure this appointment is scheduled prior to surgery, or before leaving the hospital.   Please call your doctor's office right away if you have any of the following:   Shortness of breath, chest pain, anxiety  Increased calf pain, often accompanied by tenderness to touch  Increased drainage when performing dressing changes  Fever of 100.4 or higher, or shaking chills  Increased redness, tenderness, or swelling in or around your incision  Increased stiffness or inability to move the affected limb         HOME INSTRUCTIONS  [facility] HOME CARE INSTRUCTIONS: POST-OP ANESTHESIA  The medication that you received for sedation or general anesthesia can last up to 24 hours. Your judgment and reflexes  may be altered, even if you feel like your normal self.      We Recommend:   Do not drive any motor vehicle or bicycle   Avoid mowing the lawn, playing sports, or working with power tools/applicances (power saws, electric knives or mixers)   That you have someone stay with you on your first night home   Do not drink alcohol or take sleeping pills or tranquilizers   Do not sign legal documents within 24 hours of your procedure   If you had a nerve block for your surgery, take extra care not to put any pressure on your arm or hand for 24 hours    It is normal:  For you to have a sore throat if you had a breathing tube during surgery (while you were asleep!). The sore throat should get better within 48 hours. You can gargle with warm salt water (1/2 tsp in 4 oz warm water) or use a throat lozenge for comfort  To feel muscle aches or soreness especially in the abdomen, chest or neck. The achy feeling should go away in the next 24 hours  To feel weak, sleepy or \"wiped out\". Your should start feeling better in the next 24 hours.   To experience mild discomforts such as sore lip or tongue, headache, cramps, gas pains or a bloated feeling in your abdomen.   To experience mild back pain or soreness for a day or two if you had spinal or epidural anesthesia.   If you had laparoscopic surgery, to feel shoulder pain or discomfort on the day of surgery.   For some patients to have nausea after surgery/anesthesia    If you feel nausea or experience vomiting:   Try to move around less.   Eat less than usual or drink only liquids until the next morning   Nausea should resolve in about 24 hours    If you have a problem when you are at home:    Call your surgeons office   Discharge Instructions: After Your Surgery  You’ve just had surgery. During surgery, you were given medicine called anesthesia to keep you relaxed and free of pain. After surgery, you may have some pain or nausea. This is common. Here are some tips for feeling better  and getting well after surgery.   Going home  Your healthcare provider will show you how to take care of yourself when you go home. They'll also answer your questions. Have an adult family member or friend drive you home. For the first 24 hours after your surgery:   Don't drive or use heavy equipment.  Don't make important decisions or sign legal papers.  Take medicines as directed.  Don't drink alcohol.  Have someone stay with you, if needed. They can watch for problems and help keep you safe.  Be sure to go to all follow-up visits with your healthcare provider. And rest after your surgery for as long as your provider tells you to.   Coping with pain  If you have pain after surgery, pain medicine will help you feel better. Take it as directed, before pain becomes severe. Also, ask your healthcare provider or pharmacist about other ways to control pain. This might be with heat, ice, or relaxation. And follow any other instructions your surgeon or nurse gives you.      Stay on schedule with your medicine.     Tips for taking pain medicine  To get the best relief possible, remember these points:   Pain medicines can upset your stomach. Taking them with a little food may help.  Most pain relievers taken by mouth need at least 20 to 30 minutes to start to work.  Don't wait till your pain becomes severe before you take your medicine. Try to time your medicine so that you can take it before starting an activity. This might be before you get dressed, go for a walk, or sit down for dinner.  Constipation is a common side effect of some pain medicines. Call your healthcare provider before taking any medicines such as laxatives or stool softeners to help ease constipation. Also ask if you should skip any foods. Drinking lots of fluids and eating foods such as fruits and vegetables that are high in fiber can also help. Remember, don't take laxatives unless your surgeon has prescribed them.  Drinking alcohol and taking pain  medicine can cause dizziness and slow your breathing. It can even be deadly. Don't drink alcohol while taking pain medicine.  Pain medicine can make you react more slowly to things. Don't drive or run machinery while taking pain medicine.  Your healthcare provider may tell you to take acetaminophen to help ease your pain. Ask them how much you're supposed to take each day. Acetaminophen or other pain relievers may interact with your prescription medicines or other over-the-counter (OTC) medicines. Some prescription medicines have acetaminophen and other ingredients in them. Using both prescription and OTC acetaminophen for pain can cause you to accidentally overdose. Read the labels on your OTC medicines with care. This will help you to clearly know the list of ingredients, how much to take, and any warnings. It may also help you not take too much acetaminophen. If you have questions or don't understand the information, ask your pharmacist or healthcare provider to explain it to you before you take the OTC medicine.   Managing nausea  Some people have an upset stomach (nausea) after surgery. This is often because of anesthesia, pain, or pain medicine, less movement of food in the stomach, or the stress of surgery. These tips will help you handle nausea and eat healthy foods as you get better. If you were on a special food plan before surgery, ask your healthcare provider if you should follow it while you get better. Check with your provider on how your eating should progress. It may depend on the surgery you had. These general tips may help:   Don't push yourself to eat. Your body will tell you when to eat and how much.  Start off with clear liquids and soup. They're easier to digest.  Next try semi-solid foods as you feel ready. These include mashed potatoes, applesauce, and gelatin.  Slowly move to solid foods. Don’t eat fatty, rich, or spicy foods at first.  Don't force yourself to have 3 large meals a day.  Instead eat smaller amounts more often.  Take pain medicines with a small amount of solid food, such as crackers or toast. This helps prevent nausea.  When to call your healthcare provider  Call your healthcare provider right away if you have any of these:   You still have too much pain, or the pain gets worse, after taking the medicine. The medicine may not be strong enough. Or there may be a complication from the surgery.  You feel too sleepy, dizzy, or groggy. The medicine may be too strong.  Side effects such as nausea or vomiting. Your healthcare provider may advise taking other medicines to .  Skin changes such as rash, itching, or hives. This may mean you have an allergic reaction. Your provider may advise taking other medicines.  The incision looks different (for instance, part of it opens up).  Bleeding or fluid leaking from the incision site, and weren't told to expect that.  Fever of 100.4°F (38°C) or higher, or as directed by your provider.  Call 911  Call 911 right away if you have:   Trouble breathing  Facial swelling    If you have obstructive sleep apnea   You were given anesthesia medicine during surgery to keep you comfortable and free of pain. After surgery, you may have more apnea spells because of this medicine and other medicines you were given. The spells may last longer than normal.    At home:  Keep using the continuous positive airway pressure (CPAP) device when you sleep. Unless your healthcare provider tells you not to, use it when you sleep, day or night. CPAP is a common device used to treat obstructive sleep apnea.  Talk with your provider before taking any pain medicine, muscle relaxants, or sedatives. Your provider will tell you about the possible dangers of taking these medicines.  Contact your provider if your sleeping changes a lot even when taking medicines as directed.  [employer] last reviewed this educational content on 10/1/2021  © 4059-9714 [employer]. All  rights reserved. This information is not intended as a substitute for professional medical care. Always follow your healthcare professional's instructions.

## 2025-02-28 NOTE — TELEPHONE ENCOUNTER
Patient said [facility] pharmacy is out of the oxyCODONE 5 mg and if the doctor can send over an alternative.  The pharmacy has called and its on back order.  Patient is having surgery on 3/3/25.  Please advise.

## 2025-03-03 ENCOUNTER — HOSPITAL ENCOUNTER (OUTPATIENT)
Facility: HOSPITAL | Age: 70
Setting detail: HOSPITAL OUTPATIENT SURGERY
Discharge: HOME | End: 2025-03-03
Attending: ORTHOPAEDIC SURGERY | Admitting: ORTHOPAEDIC SURGERY
Payer: MEDICARE

## 2025-03-03 ENCOUNTER — ANESTHESIA (OUTPATIENT)
Dept: SURGERY | Facility: HOSPITAL | Age: 70
End: 2025-03-03
Payer: MEDICARE

## 2025-03-03 ENCOUNTER — APPOINTMENT (OUTPATIENT)
Dept: GENERAL RADIOLOGY | Facility: HOSPITAL | Age: 70
End: 2025-03-03
Attending: ORTHOPAEDIC SURGERY
Payer: MEDICARE

## 2025-03-03 ENCOUNTER — ANESTHESIA EVENT (OUTPATIENT)
Dept: SURGERY | Facility: HOSPITAL | Age: 70
End: 2025-03-03
Payer: MEDICARE

## 2025-03-03 VITALS
BODY MASS INDEX: 33.46 KG/M2 | SYSTOLIC BLOOD PRESSURE: 130 MMHG | RESPIRATION RATE: 16 BRPM | HEIGHT: 64 IN | TEMPERATURE: 98 F | HEART RATE: 93 BPM | DIASTOLIC BLOOD PRESSURE: 68 MMHG | WEIGHT: 196 LBS | OXYGEN SATURATION: 97 %

## 2025-03-03 DIAGNOSIS — M16.12 PRIMARY OSTEOARTHRITIS OF LEFT HIP: ICD-10-CM

## 2025-03-03 DIAGNOSIS — Z96.642 S/P HIP REPLACEMENT, LEFT: Primary | ICD-10-CM

## 2025-03-03 LAB — RH BLOOD TYPE: POSITIVE

## 2025-03-03 PROCEDURE — 88311 DECALCIFY TISSUE: CPT | Performed by: ORTHOPAEDIC SURGERY

## 2025-03-03 PROCEDURE — 88305 TISSUE EXAM BY PATHOLOGIST: CPT | Performed by: ORTHOPAEDIC SURGERY

## 2025-03-03 PROCEDURE — 97116 GAIT TRAINING THERAPY: CPT

## 2025-03-03 PROCEDURE — 97161 PT EVAL LOW COMPLEX 20 MIN: CPT

## 2025-03-03 PROCEDURE — 72170 X-RAY EXAM OF PELVIS: CPT | Performed by: ORTHOPAEDIC SURGERY

## 2025-03-03 PROCEDURE — 97530 THERAPEUTIC ACTIVITIES: CPT

## 2025-03-03 PROCEDURE — 76000 FLUOROSCOPY <1 HR PHYS/QHP: CPT | Performed by: ORTHOPAEDIC SURGERY

## 2025-03-03 PROCEDURE — 0SRB03A REPLACEMENT OF LEFT HIP JOINT WITH CERAMIC SYNTHETIC SUBSTITUTE, UNCEMENTED, OPEN APPROACH: ICD-10-PCS | Performed by: ORTHOPAEDIC SURGERY

## 2025-03-03 DEVICE — EMPHASYS POLYETHYLENE LINER AOX NEUTRAL 52MM 36MM
Type: IMPLANTABLE DEVICE | Site: HIP | Status: FUNCTIONAL
Brand: EMPHASYS

## 2025-03-03 DEVICE — EMPHASYS ACETABULAR SHELL THREE-HOLE 52MM CEMENTLESS
Type: IMPLANTABLE DEVICE | Site: HIP | Status: FUNCTIONAL
Brand: EMPHASYS

## 2025-03-03 DEVICE — BIOLOX DELTA CERAMIC FEMORAL HEAD +5.0 36MM DIA 12/14 TAPER
Type: IMPLANTABLE DEVICE | Site: HIP | Status: FUNCTIONAL
Brand: BIOLOX DELTA

## 2025-03-03 DEVICE — ACTIS DUOFIX HIP PROSTHESIS (FEMORAL STEM 12/14 TAPER CEMENTLESS SIZE 7 STD COLLAR)  CE
Type: IMPLANTABLE DEVICE | Site: HIP | Status: FUNCTIONAL
Brand: ACTIS

## 2025-03-03 RX ORDER — HYDROMORPHONE HYDROCHLORIDE 1 MG/ML
0.2 INJECTION, SOLUTION INTRAMUSCULAR; INTRAVENOUS; SUBCUTANEOUS EVERY 5 MIN PRN
Status: DISCONTINUED | OUTPATIENT
Start: 2025-03-03 | End: 2025-03-03

## 2025-03-03 RX ORDER — ACETAMINOPHEN 500 MG
1000 TABLET ORAL EVERY 6 HOURS PRN
COMMUNITY

## 2025-03-03 RX ORDER — OXYCODONE HYDROCHLORIDE 5 MG/1
10 TABLET ORAL EVERY 4 HOURS PRN
Status: DISCONTINUED | OUTPATIENT
Start: 2025-03-03 | End: 2025-03-03

## 2025-03-03 RX ORDER — PHENYLEPHRINE HCL 10 MG/ML
VIAL (ML) INJECTION AS NEEDED
Status: DISCONTINUED | OUTPATIENT
Start: 2025-03-03 | End: 2025-03-03 | Stop reason: SURG

## 2025-03-03 RX ORDER — HYDROMORPHONE HYDROCHLORIDE 1 MG/ML
0.4 INJECTION, SOLUTION INTRAMUSCULAR; INTRAVENOUS; SUBCUTANEOUS EVERY 5 MIN PRN
Status: DISCONTINUED | OUTPATIENT
Start: 2025-03-03 | End: 2025-03-03

## 2025-03-03 RX ORDER — FAMOTIDINE 20 MG/1
20 TABLET, FILM COATED ORAL ONCE
Status: COMPLETED | OUTPATIENT
Start: 2025-03-03 | End: 2025-03-03

## 2025-03-03 RX ORDER — HYDROMORPHONE HYDROCHLORIDE 1 MG/ML
0.8 INJECTION, SOLUTION INTRAMUSCULAR; INTRAVENOUS; SUBCUTANEOUS EVERY 2 HOUR PRN
Status: CANCELLED | OUTPATIENT
Start: 2025-03-03

## 2025-03-03 RX ORDER — MORPHINE SULFATE 4 MG/ML
2 INJECTION, SOLUTION INTRAMUSCULAR; INTRAVENOUS EVERY 10 MIN PRN
Status: DISCONTINUED | OUTPATIENT
Start: 2025-03-03 | End: 2025-03-03

## 2025-03-03 RX ORDER — ACETAMINOPHEN 500 MG
1000 TABLET ORAL
OUTPATIENT
Start: 2025-03-03 | End: 2025-03-03

## 2025-03-03 RX ORDER — BUPIVACAINE HYDROCHLORIDE 7.5 MG/ML
INJECTION, SOLUTION INTRASPINAL
Status: COMPLETED | OUTPATIENT
Start: 2025-03-03 | End: 2025-03-03

## 2025-03-03 RX ORDER — KETOROLAC TROMETHAMINE 30 MG/ML
30 INJECTION, SOLUTION INTRAMUSCULAR; INTRAVENOUS ONCE
Status: DISCONTINUED | OUTPATIENT
Start: 2025-03-03 | End: 2025-03-03

## 2025-03-03 RX ORDER — ONDANSETRON 2 MG/ML
4 INJECTION INTRAMUSCULAR; INTRAVENOUS EVERY 6 HOURS PRN
Status: CANCELLED | OUTPATIENT
Start: 2025-03-03

## 2025-03-03 RX ORDER — ACETAMINOPHEN 500 MG
1000 TABLET ORAL ONCE
Status: DISCONTINUED | OUTPATIENT
Start: 2025-03-03 | End: 2025-03-03 | Stop reason: HOSPADM

## 2025-03-03 RX ORDER — KETOCONAZOLE 20 MG/G
1 CREAM TOPICAL 2 TIMES DAILY
Qty: 60 UNDEFINED | Refills: 0 | Status: SHIPPED | OUTPATIENT
Start: 2025-03-03

## 2025-03-03 RX ORDER — MIDAZOLAM HYDROCHLORIDE 1 MG/ML
INJECTION INTRAMUSCULAR; INTRAVENOUS AS NEEDED
Status: DISCONTINUED | OUTPATIENT
Start: 2025-03-03 | End: 2025-03-03 | Stop reason: SURG

## 2025-03-03 RX ORDER — SODIUM CHLORIDE, SODIUM LACTATE, POTASSIUM CHLORIDE, CALCIUM CHLORIDE 600; 310; 30; 20 MG/100ML; MG/100ML; MG/100ML; MG/100ML
INJECTION, SOLUTION INTRAVENOUS CONTINUOUS
Status: DISCONTINUED | OUTPATIENT
Start: 2025-03-03 | End: 2025-03-03

## 2025-03-03 RX ORDER — NALOXONE HYDROCHLORIDE 0.4 MG/ML
0.08 INJECTION, SOLUTION INTRAMUSCULAR; INTRAVENOUS; SUBCUTANEOUS AS NEEDED
Status: DISCONTINUED | OUTPATIENT
Start: 2025-03-03 | End: 2025-03-03

## 2025-03-03 RX ORDER — MORPHINE SULFATE 10 MG/ML
6 INJECTION, SOLUTION INTRAMUSCULAR; INTRAVENOUS EVERY 10 MIN PRN
Status: DISCONTINUED | OUTPATIENT
Start: 2025-03-03 | End: 2025-03-03

## 2025-03-03 RX ORDER — HYDROCODONE BITARTRATE AND ACETAMINOPHEN 5; 325 MG/1; MG/1
1 TABLET ORAL EVERY 6 HOURS PRN
Qty: 28 TABLET | Refills: 0 | Status: SHIPPED | OUTPATIENT
Start: 2025-03-03

## 2025-03-03 RX ORDER — TRANEXAMIC ACID 10 MG/ML
INJECTION, SOLUTION INTRAVENOUS AS NEEDED
Status: DISCONTINUED | OUTPATIENT
Start: 2025-03-03 | End: 2025-03-03 | Stop reason: SURG

## 2025-03-03 RX ORDER — FAMOTIDINE 10 MG/ML
20 INJECTION, SOLUTION INTRAVENOUS ONCE
Status: COMPLETED | OUTPATIENT
Start: 2025-03-03 | End: 2025-03-03

## 2025-03-03 RX ORDER — CELECOXIB 200 MG/1
400 CAPSULE ORAL ONCE
Status: COMPLETED | OUTPATIENT
Start: 2025-03-03 | End: 2025-03-03

## 2025-03-03 RX ORDER — HYDROMORPHONE HYDROCHLORIDE 1 MG/ML
0.4 INJECTION, SOLUTION INTRAMUSCULAR; INTRAVENOUS; SUBCUTANEOUS EVERY 2 HOUR PRN
Status: CANCELLED | OUTPATIENT
Start: 2025-03-03

## 2025-03-03 RX ORDER — HYDROCODONE BITARTRATE AND ACETAMINOPHEN 5; 325 MG/1; MG/1
1 TABLET ORAL ONCE
Status: DISCONTINUED | OUTPATIENT
Start: 2025-03-03 | End: 2025-03-03

## 2025-03-03 RX ORDER — OXYCODONE HYDROCHLORIDE 5 MG/1
5 TABLET ORAL EVERY 4 HOURS PRN
Status: DISCONTINUED | OUTPATIENT
Start: 2025-03-03 | End: 2025-03-03

## 2025-03-03 RX ORDER — MORPHINE SULFATE 4 MG/ML
4 INJECTION, SOLUTION INTRAMUSCULAR; INTRAVENOUS EVERY 10 MIN PRN
Status: DISCONTINUED | OUTPATIENT
Start: 2025-03-03 | End: 2025-03-03

## 2025-03-03 RX ORDER — HYDROMORPHONE HYDROCHLORIDE 1 MG/ML
0.6 INJECTION, SOLUTION INTRAMUSCULAR; INTRAVENOUS; SUBCUTANEOUS EVERY 5 MIN PRN
Status: DISCONTINUED | OUTPATIENT
Start: 2025-03-03 | End: 2025-03-03

## 2025-03-03 RX ADMIN — TRANEXAMIC ACID 1000 MG: 10 INJECTION, SOLUTION INTRAVENOUS at 07:59:00

## 2025-03-03 RX ADMIN — PHENYLEPHRINE HCL 100 MCG: 10 MG/ML VIAL (ML) INJECTION at 08:32:00

## 2025-03-03 RX ADMIN — BUPIVACAINE HYDROCHLORIDE 1.7 ML: 7.5 INJECTION, SOLUTION INTRASPINAL at 07:36:00

## 2025-03-03 RX ADMIN — SODIUM CHLORIDE, SODIUM LACTATE, POTASSIUM CHLORIDE, CALCIUM CHLORIDE: 600; 310; 30; 20 INJECTION, SOLUTION INTRAVENOUS at 07:30:00

## 2025-03-03 RX ADMIN — PHENYLEPHRINE HCL 100 MCG: 10 MG/ML VIAL (ML) INJECTION at 08:43:00

## 2025-03-03 RX ADMIN — PHENYLEPHRINE HCL 100 MCG: 10 MG/ML VIAL (ML) INJECTION at 08:12:00

## 2025-03-03 RX ADMIN — PHENYLEPHRINE HCL 100 MCG: 10 MG/ML VIAL (ML) INJECTION at 08:16:00

## 2025-03-03 RX ADMIN — TRANEXAMIC ACID 1000 MG: 10 INJECTION, SOLUTION INTRAVENOUS at 08:50:00

## 2025-03-03 RX ADMIN — PHENYLEPHRINE HCL 100 MCG: 10 MG/ML VIAL (ML) INJECTION at 08:36:00

## 2025-03-03 RX ADMIN — PHENYLEPHRINE HCL 100 MCG: 10 MG/ML VIAL (ML) INJECTION at 08:30:00

## 2025-03-03 RX ADMIN — SODIUM CHLORIDE, SODIUM LACTATE, POTASSIUM CHLORIDE, CALCIUM CHLORIDE: 600; 310; 30; 20 INJECTION, SOLUTION INTRAVENOUS at 09:00:00

## 2025-03-03 RX ADMIN — MIDAZOLAM HYDROCHLORIDE 2 MG: 1 INJECTION INTRAMUSCULAR; INTRAVENOUS at 07:33:00

## 2025-03-03 RX ADMIN — PHENYLEPHRINE HCL 100 MCG: 10 MG/ML VIAL (ML) INJECTION at 08:21:00

## 2025-03-03 RX ADMIN — SODIUM CHLORIDE, SODIUM LACTATE, POTASSIUM CHLORIDE, CALCIUM CHLORIDE: 600; 310; 30; 20 INJECTION, SOLUTION INTRAVENOUS at 08:20:00

## 2025-03-03 RX ADMIN — PHENYLEPHRINE HCL 50 MCG: 10 MG/ML VIAL (ML) INJECTION at 08:08:00

## 2025-03-03 RX ADMIN — PHENYLEPHRINE HCL 100 MCG: 10 MG/ML VIAL (ML) INJECTION at 08:19:00

## 2025-03-03 NOTE — ANESTHESIA PREPROCEDURE EVALUATION
Anesthesia PreOp Note    HPI:     Kim Hoffman is a 70 year old female who presents for preoperative consultation requested by: Yohana Hair MD    Date of Surgery: 3/3/2025    Procedure(s):  Left Anterior Total Hip Arthroplasty  Indication: Primary osteoarthritis of left hip [M16.12]    Relevant Problems   No relevant active problems       NPO:  Last Liquid Consumption Date: 03/03/25  Last Liquid Consumption Time: 0400  Last Solid Consumption Date: 03/02/25  Last Solid Consumption Time: 2345  Last Liquid Consumption Date: 03/03/25          History Review:  Patient Active Problem List    Diagnosis Date Noted    Encounter for monitoring bisphosphonate therapy 01/23/2025    Osteopenia due to cancer therapy 02/27/2024    LFTs abnormal 02/27/2024    Long term (current) use of aromatase inhibitors 02/27/2024    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    History of antineoplastic chemotherapy 02/27/2024    Seasonal allergies 02/27/2024    Depression, recurrent 11/15/2023    Primary osteoarthritis of left hip 10/25/2021    Left lumbar radiculopathy 10/25/2021    Chemotherapy-induced peripheral neuropathy (HCC) 10/25/2021    Visit for monitoring Arimidex therapy 03/04/2020    Encounter for medication management 09/06/2019    Encounter for central line care 09/06/2019    Absence of breast, bilateral 08/19/2019    Malignant neoplasm of overlapping sites of left breast in female, estrogen receptor positive (HCC) 06/21/2019       Past Medical History:    2019    breast    Allergic rhinitis    Cant temember    Anxiety    Anxiety state    Back problem    Breast cancer (HCC)    Calculus of kidney    Colon adenomas    a tubular adenoma and serrated adenoma    Depression    Esophageal reflux    Exposure to medical diagnostic radiation    High blood pressure    High cholesterol    History of stomach ulcers    history    Incontinence    bladder    Neuropathy    feet, occasionally in fingers    Osteoarthritis     Personal history of antineoplastic chemotherapy    Serrated adenoma of colon       Past Surgical History:   Procedure Laterality Date    Colonoscopy N/A 06/15/2021    Procedure: COLONOSCOPY;  Surgeon: Misael Lawson MD;  Location: [facility]    Colonoscopy N/A 2024    Procedure: COLONOSCOPY with polypectomy;  Surgeon: Misael Lawson MD;  Location: [facility]    Priscila biopsy stereo nodule 1 site left (cpt=19081)      Priscila biopsy stereo nodule 1 site right (cpt=19081)  2019    KODAK     Mastectomy left      Mastectomy right      Mastectomy,simple Bilateral 2019    Bilateral mastectomies w L SLNB and immediate reconstruction with tissue expander and acellular dermal matrix and L SLNB      1990    Other surgical history  2019    Double Mascectamy    Skin surgery      Removed squamish skin cancer       Prescriptions Prior to Admission[1]  Current Medications and Prescriptions Ordered in Epic[2]    Allergies[3]    Family History   Problem Relation Age of Onset    Hypertension Father     Heart Disease Father     Diabetes Father     Heart Disease Mother 88    Heart Disorder Mother         Aortic Regurgitation/AVR    Other (PVD) Mother     Heart Disorder Daughter      Social History     Socioeconomic History    Marital status:    Tobacco Use    Smoking status: Never     Passive exposure: Never    Smokeless tobacco: Never   Vaping Use    Vaping status: Never Used   Substance and Sexual Activity    Alcohol use: Not Currently     Alcohol/week: 1.0 standard drink of alcohol     Types: 1 Glasses of wine per week     Comment: Mostly at holidays    Drug use: No       Available pre-op labs reviewed.  Lab Results   Component Value Date    WBC 6.8 2025    RBC 4.55 2025    HGB 14.7 2025    HCT 42.4 2025    MCV 93.2 2025    MCH 32.3 2025    MCHC 34.7 2025    RDW 11.9 2025    .0 2025     Lab Results   Component Value Date      02/21/2025    K 4.0 02/21/2025     02/21/2025    CO2 26.0 02/21/2025    BUN 13 02/21/2025    CREATSERUM 0.66 02/21/2025     (H) 02/21/2025    CA 10.4 02/21/2025          Vital Signs:  Body mass index is 33.64 kg/m².   height is 1.626 m (5' 4\") and weight is 88.9 kg (196 lb). Her oral temperature is 98 °F (36.7 °C). Her blood pressure is 105/64 and her pulse is 100. Her respiration is 16 and oxygen saturation is 95%.   Vitals:    02/25/25 0921 03/03/25 0614   BP:  105/64   Pulse:  100   Resp:  16   Temp:  98 °F (36.7 °C)   TempSrc:  Oral   SpO2:  95%   Weight: 90.7 kg (200 lb) 88.9 kg (196 lb)   Height: 1.626 m (5' 4\")         Anesthesia Evaluation     Patient summary reviewed and Nursing notes reviewed    No history of anesthetic complications   Airway   Mallampati: II  TM distance: >3 FB  Neck ROM: full  Dental - Dentition appears grossly intact     Pulmonary - negative ROS and normal exam   Cardiovascular - normal exam  (+) hypertension    Neuro/Psych    (+)  neuromuscular disease, anxiety/panic attacks,  depression      GI/Hepatic/Renal    (+) GERD    Endo/Other - negative ROS   Abdominal                  Anesthesia Plan:   ASA:  2  Plan:   Spinal  Post-op Pain Management: IV analgesics  Plan Comments: No blood thinners, PLT >100 -- proceed with spinal  Informed Consent Plan and Risks Discussed With:  Patient      I have informed Kim Hoffman and/or legal guardian or family member of the nature of the anesthetic plan, benefits, risks including possible dental damage if relevant, major complications, and any alternative forms of anesthetic management.   All of the patient's questions were answered to the best of my ability. The patient desires the anesthetic management as planned.  Gary Matthew MD  3/3/2025 6:43 AM  Present on Admission:  **None**           [1]   Medications Prior to Admission   Medication Sig Dispense Refill Last Dose/Taking    Ibuprofen-Acetaminophen (ADVIL DUAL  ACTION) 125-250 MG Oral Tab Take 2 tablets by mouth in the morning and 2 tablets before bedtime.   2025    Multiple Vitamins-Minerals (CENTRUM ADULTS OR) Take 1 tablet by mouth daily.   2025    PREGABALIN 150 MG Oral Cap TAKE 1 CAPSULE(150 MG) BY MOUTH TWICE DAILY 60 capsule 0 3/3/2025 at  4:00 AM    lisinopril 10 MG Oral Tab Take 1 tablet (10 mg total) by mouth daily.   3/2/2025 at  8:00 AM    DULoxetine 30 MG Oral Cap DR Particles Take 1 capsule (30 mg total) by mouth daily. (Patient taking differently: Take 1 capsule (30 mg total) by mouth at bedtime.) 90 capsule 1 3/2/2025 at 11:00 PM    GLUCOSAMINE-CHONDROITIN OR Take 1 tablet by mouth daily.   2025    Multiple Vitamins-Minerals (OCUVITE ADULT 50+) Oral Cap Take 1 capsule by mouth daily. centrum   2025    Calcium 500-125 MG-UNIT Oral Tab Take by mouth.   2025    Vitamin D, Cholecalciferol, 25 MCG (1000 UT) Oral Cap Take 1 capsule by mouth daily.   2025    Bioflavonoid Products (AZIZA C OR) Take 1 tablet by mouth daily.     2025    oxyCODONE 5 MG Oral Tab Take 1 tablet (5 mg total) by mouth every 6 (six) hours as needed for Pain. 28 tablet 0 Unknown    celecoxib (CELEBREX) 200 MG Oral Cap Take 1 capsule (200 mg total) by mouth 2 (two) times daily. 60 capsule 0 Unknown    sennosides-docusate (SENNA S) 8.6-50 MG Oral Tab Take 1 tablet by mouth daily. 30 tablet 0 Unknown    Ferrous Sulfate 325 (65 Fe) MG Oral Tab Take 1 tablet (325 mg total) by mouth daily with breakfast. 30 tablet 0 Unknown    aspirin 81 MG Oral Tab EC Take 1 tablet (81 mg total) by mouth daily. 60 tablet 0 Unknown    traMADol 50 MG Oral Tab Take 1 tablet (50 mg total) by mouth every 6 (six) hours as needed for Pain. 30 tablet 0 Unknown    [] Meloxicam (MOBIC) 15 MG Oral Tab Take 1 tablet (15 mg total) by mouth daily for 14 days. 14 tablet 0 Unknown    fluticasone propionate 50 MCG/ACT Nasal Suspension 1 spray by Nasal route daily. One spray per each  nostril daily. 1 each 3     methocarbamol 750 MG Oral Tab Take 1 tablet (750 mg total) by mouth 4 (four) times daily as needed (pain). (Patient taking differently: Take 1 tablet (750 mg total) by mouth 4 (four) times daily as needed (pain).) 60 tablet 0 Unknown    Biotin w/ Vitamins C & E (HAIR/SKIN/NAILS OR) Take 1 tablet by mouth daily.   2/24/2025   [2]   Current Facility-Administered Medications Ordered in Epic   Medication Dose Route Frequency Provider Last Rate Last Admin    lactated ringers infusion   Intravenous Continuous Yohana Hair MD        acetaminophen (Tylenol Extra Strength) tab 1,000 mg  1,000 mg Oral Once Yohana Hair MD        famotidine (Pepcid) tab 20 mg  20 mg Oral Once Yohana Hair MD        Or    famotidine (Pepcid) 20 mg/2mL injection 20 mg  20 mg Intravenous Once Yohana Hair MD        celecoxib (CeleBREX) cap 400 mg  400 mg Oral Once Yohana Hair MD        ceFAZolin (Ancef) 2g in 10mL IV syringe premix  2 g Intravenous Once Yohana Hair MD        clonidine-EPINEPHrine-ropivacaine-ketorolac (CERTS) (Duraclon-Adrenalin-Naropin-Toradol) pain cocktail irrigation   Intra-articular Once (Intra-Op) Yohana Hair MD         No current [facility]-ordered outpatient medications on file.   [3]   Allergies  Allergen Reactions    Seasonal OTHER (SEE COMMENTS)     Runny nose, watery eyes

## 2025-03-03 NOTE — INTERVAL H&P NOTE
Pre-op Diagnosis: Primary osteoarthritis of left hip [M16.12]    The above referenced H&P was reviewed by Yohana Hair MD on 3/3/2025, the patient was examined and no significant changes have occurred in the patient's condition since the H&P was performed.  I discussed with the patient and/or legal representative the potential benefits, risks and side effects of this procedure; the likelihood of the patient achieving goals; and potential problems that might occur during recuperation.  I discussed reasonable alternatives to the procedure, including risks, benefits and side effects related to the alternatives and risks related to not receiving this procedure.  We will proceed with procedure as planned.

## 2025-03-03 NOTE — OPERATIVE REPORT
Left Anterior Total Hip Arthroplasty Procedure Note    Kim Hoffman  3/3/2025    Pre-op Diagnosis:  Primary osteoarthritis of left hip [M16.12]    Post-op Diagnosis:  Primary osteoarthritis of left hip [M16.12]    Procedure:  Left Total Hip Arthroplasty, anterior approach    Assistant(s):  Circulating Nurse.: Tali Nova RN  Scrub Person.: Kathrine Clemens Circulating Nurse.: Claudia Llanes RN  Surgical Assistant.: Karey Gilbert; Jesús Sheridan  Xray Tech: Tanvir Rg    Please note that the use of a certified assistant was necessary for this case. Jessú assisted in patient positioning, retracting soft tissue and other vital structures for bony preparation and implantation, and closure.  Without their assistance, the case would have taken significantly longer and stability for more morbidity to the patient.  There were no qualified residents available for assistance.    Anesthesia:  Spinal     Estimated Blood Loss:  200 cc    Specimens:  ID Type Source Tests Collected by Time Destination   1 : 1. Left Hip Bone and Tissue Tissue Hip, left SURGICAL PATHOLOGY TISSUE Yohana Hair MD 3/3/2025  8:50 AM          Drains:  Closed/Suction Drain 1 Left;Lateral Breast Bulb 15 Fr. (Active)       Closed/Suction Drain 2 Left;Medial Breast Bulb 15 Fr. (Active)       Closed/Suction Drain 3 Right;Medial Breast Bulb 15 Fr. (Active)       Closed/Suction Drain 4 Right;Lateral Breast Bulb 15 Fr. (Active)       Implants:  Implant Name Type Inv. Item Serial No.  Lot No. LRB No. Used Action   SHELL ACET 52MM 3 H CEMLSS EMPHASYS - SNA  SHELL ACET 52MM 3 H CEMLSS EMPHASYS NA [employer]  [id number] Left 1 Implanted   LINER ACET OD52MM [id number] AOX ELEV RIM - SNA  LINER ACET OD52MM ID36MM AOX ELEV RIM NA [employer]  [id number] Left 1 Implanted   STEM FEM SZ 7 STD OFFSET 12/14 TAPR CEMLSS - SN/A  STEM FEM SZ 7 STD OFFSET 12/14 TAPR CEMLSS N/A [employer]  [id number] Left 1 Implanted    HEAD FEM 36MM NK L+5MM CERAMIC ARTC EZ - SN/A  HEAD FEM 36MM NK L+5MM CERAMIC ARTC EZ N/A [employer] WD [id number] Left 1 Implanted       FINDINGS:  Assessment of the femoral head revealed severe eburnation of articular cartilage with complete loss of weight bearing chondral surface and formation of cysts in the head and neck. Large marginal osteophytes were present surrounding the femoral neck.    Procedure Narrative:  The patient is 69 yo female with a significant history of progressive left hip pain and arthritis. Their hip pain is severe with activity and has progressed significantly.  X-rays reveal moderate-to-severe loss of articular cartilage on the superior weight bearing surface of the hip with circumferential acetabular and femoral neck osteophytes consistent with advanced hip osteoarthritis. Non-operative treatment has been attempted, but has not improved or controlled symptoms during normal daily activities. Motion has become limited and rotation severely restricted. They have attempted but failed the use of anti-inflammatories, activity modification, weight loss, ambulatory aids, physical therapy and cortisone injection. A left total hip arthroplasty was recommended to relieve pain and improve function. The risks, benefits and potential complications of the arthroplasty surgery were discussed with the patient in detail. Risks include but are not limited to infection, DVT/PE, damage to surrounding neurovascular structures, dislocation/instability, leg length inequality, loosening, wear, continued pain, need for future sutures, blood loss/need for transfusion, MI, stroke, loss of limb or even life. Specific details of the procedure, hospitalization, recovery, rehabilitation, and long-term precautions were also provided. Pre-operative teaching was provided. Implant/prosthesis selection was outlined, and the many options available were explained; the final choice will be made at the time of the  procedure to match the anatomy and condition of the bone, ligaments, tendons, and muscles. Appropriate preoperative clearances were obtained. Understanding of all topics was conveyed to me by the patient, and consent was given to proceed with a left total hip arthroplasty.    The patient was brought into the operating room after informed consent.  A spinal anesthetic was administered and the patient was placed on the [place] table with a perineal post. The image intensifier was brought in to visualize the hips. The left hip was prepped and draped in usual sterile fashion for a total hip arthroplasty. The patient was given appropriate antibiotics, and other appropriate medications per IRINEO protocol. A surgical time-out was performed immediately preceding the incision with all personnel in the operating room; the patient identity was again confirmed, the surgical site and extremity were identified and confirmed, X-rays were reviewed, and availability of the appropriate surgical equipment was established.    The patient had a 8 cm incision anteriorly on the left hip, 2 finger breadths distal and 2 finger breadths lateral to the anterior superior iliac spine in the direction of the fibular head down through skin and subcutaneous tissue. This was taken down to the subcutaneous tissue making sure to stay lateral to the ASIS to avoid iatrogenic injury to the LFCN. The tensor fascia was identified and  sharply. The sartorius was bluntly taken anteriorly, the tensor fascia muscle was taken posteriorly. The ascending branch of lateral circumflex vessels were identified, tied and cauterized. Cobra retractors were placed around the surgical neck. A T surgical capsulotomy was performed. The cobra retractors were then place intracapsular and around the neck. Light traction was applied.   Osteotomy was performed in a napkin-ring fashion based on the preoperative template neck cut length using fluoroscopy. Femoral head and  neck were removed. The operative leg was externally rotated to 60 degrees.    Attention was then turned to the acetabulum.  Retractors were carefully placed circumferentially for wide acetabular exposure making sure to protect all the vital structures. The labrum and osteophytes were debrided from the rim, and the medial wall was identified and the depth of the socket assessed by excising the pulvinar. Bleeders were controlled, especially the area of the obturator artery with the electrocautery. Acetabular reaming was then started with the hemispherical instrument matching the size of the excised femoral head. Sequential reaming of the acetabulum was then performed by increasing size in 1-2 mm increments, to a final ream that was 1 under for a press fit. The reamers created an excellent hemispherical bed of bleeding cancellous bone.  A 52 mm acetabular shell was then placed in 40 degrees of abduction and 10-15 degrees of anteversion and fully impacted. Care was taken to make sure no rim of the cup was protruding beyond the anterior wall of the acetabulum to prevent psoas tendon impingement.  The cup press-fit was firm, stable, and apically seated. The acetabular shell was pulled on with a Kocher and did not move and was found to be very stable. Further osteophyte debridement was done around the socket. All impinging soft tissue was removed from the edges of the socket.  A standard 36 mm polyethylene liner was then placed. The liner was impacted and felt to be perfectly seated and checked for stability.    Attention was then turned to the femur. The elevating femoral hook was placed around the femur proximally. Superior and inferior capsular releases were performed.  A two prong [place] retractor was then placed medially, an angled [place] retractor was placed between the superior capsule and gluteus minimus. The capsule was released. The operative leg was then rotated to 90 degrees of external rotation slowly,  extended and placed in adduction to expose the proximal femur. Further releases were then performed as needed to safely elevate the proximal femur into the wound with the elevating hook  attached to the the [place] table.  A trochanteric retractor was placed lateral to the troch to assist in elevation of the femur. The leg and retractors were positioned so access did not result in soft-tissue injury.  A curved handle box osteotome was used to remove the lateral neck aiming to stay along the posterior contex of the proximal femur.  A curved starter awl was used to find the femoral canal. Lateral ridge of bone was rongeured and rasped away to prevent varus malpositioning.  Actis broaches were then employed in an incremental fashion up to the final size of 6. A trial standard offset neck with a +1.5 head based on templating was then placed and the elevating hook released and retractors removed. The leg was brought back up and the hip was reduced. A fluoroscopic image was then taken to assess fit and position of the femoral component as well as to assess our metrics in terms of leg lengths and offset. We noted that she as short about 5-6 mm. Next, anterior stability was checked with 30 of extension and 90 of external rotation with no dislocations anteriorly but mild play with shuck, and I felt we could lengthen her a bit. I removed the broach and broached with a 7 which sat 2 mm proud. With good rotational stability and satisfied with the length, I removed the broach and the actual implants were called for. The wound was copiously irrigated, and the permanent femoral stem was then impacted down in approximately 10-15 degrees of anteversion in line with the posterior cortex of the femoral neck. The press-fit was firm, and stable to axial and rotational force in all planes.  The final +5 ceramic head was impacted onto a the clean trunnion and checked to be fully engaged on the Spencer taper. The socket and wound were  irrigated, suctioned, and inspected for debris. The final reduction was performed, and again the hip was stable in all planes without impingement when stressed to the extremes. The wound was copiously irrigated using antibiotic irrigation after a dilute betadine soak. Then the wound was injected with local periarticular cocktail.    The tensor fascia  was closed using a #1 Vicryl and #2 Quill. The subcutaneous tissue was closed using a #1 Vicryl, 2-0 running Quill suture, and 2-0 Vicryl interrupted sutures. The skin was closed using a running 3-0 monocryl suture and Dermabond adhesive.  At this point a sterile dressing was applied. The patient tolerated the procedure well and was awaken from anesthesia and brought to the recovery room in stable condition.    COMPLICATIONS: None apparent    POSTOPERATIVE PLAN:    WBAT  DVT prophylaxis: after risk assessment, IPCDs and ASA 81 mg twice daily will be used.  Home with home PT    Yohana Hair MD

## 2025-03-03 NOTE — HOME CARE LIAISON
Referral received from SW/CM team via [facility]. Discussed with patient the recommendation for home health services, patient agreeable, and all questions answered.

## 2025-03-03 NOTE — PHYSICAL THERAPY NOTE
PHYSICAL THERAPY EVALUATION - INPATIENT     [facility]  Evaluation Date: 3/3/2025  Type of Evaluation: Initial   Physician Order: PT Eval and Treat    Presenting Problem: primary osteoarthritis of left hip, s/p left anterior IRINEO     Reason for Therapy: Mobility Dysfunction and Discharge Planning    PHYSICAL THERAPY ASSESSMENT   Patient is a 70 year old female admitted 3/3/2025 for primary osteoarthritis of left hip, s/p left anterior IRINEO. Prior to admission, patient's baseline is Mod I for functional mobility with use of cane.  Patient is currently functioning below baseline with bed mobility, transfers, gait, stair negotiation, standing prolonged periods, and performing household tasks. Patient is requiring supervision and stand-by assist as a result of the following impairments: decreased functional strength, pain, impaired dynamic standing balance, decreased muscular endurance, and limited L hip ROM. Based on patient demonstrating good understanding of post operative protocol and safe functional mobility observed this date, no further skilled IP PT required at this time. RN made aware. Patient agreeable. Safe for DC to home with HHPT from a PT standpoint.     PLAN  Patient has been evaluated and presents with no skilled Physical Therapy needs at this time.  Patient will be discharged from Physical Therapy services. Please re-order if a new functional limitation presents during this admission.    PT Device Recommendation: Rolling walker    PHYSICAL THERAPY MEDICAL/SOCIAL HISTORY     Problem List  Active Problems:    * No active hospital problems. *    HOME SITUATION  Type of Home: House  Home Layout: One level  Stairs to Enter : 1   Railing: Yes    Lives With: Alone (friends will be staying with patient upon discharge)    Drives: Yes   Patient Regularly Uses: Reading glasses;Cane      SUBJECTIVE  \"It feels so good to be able to stand up with my foot flat on the ground!\"    PHYSICAL THERAPY  EXAMINATION   OBJECTIVE  Precautions: IRINEO - anterior;Limb alert - left  Fall Risk: Standard fall risk    WEIGHT BEARING RESTRICTION  L Lower Extremity: Weight Bearing as Tolerated    PAIN ASSESSMENT  Ratin  Location: left hip  Management Techniques: Activity promotion;Body mechanics;Repositioning    COGNITION  Overall Cognitive Status:  WFL - within functional limits    RANGE OF MOTION AND STRENGTH ASSESSMENT  Upper extremity ROM and strength are within functional limits  Lower extremity ROM is within functional limits; L hip limited due to pain and anterior hip precautions   Lower extremity strength is within functional limit; LLE not formally assessed due to pain and recent procedure, approx 3/5     BALANCE  Static Sitting: Good  Dynamic Sitting: Fair +  Static Standing: Fair  Dynamic Standing: Fair -    ACTIVITY TOLERANCE  Pulse: 106 (with activity)  Heart Rate Source: Monitor    O2 WALK  Oxygen Therapy  SPO2% Ambulation on Room Air: 95    AM-PAC '6-Clicks' INPATIENT SHORT FORM - BASIC MOBILITY  How much difficulty does the patient currently have...  Patient Difficulty: Turning over in bed (including adjusting bedclothes, sheets and blankets)?: A Little   Patient Difficulty: Sitting down on and standing up from a chair with arms (e.g., wheelchair, bedside commode, etc.): A Little   Patient Difficulty: Moving from lying on back to sitting on the side of the bed?: A Little   How much help from another person does the patient currently need...   Help from Another: Moving to and from a bed to a chair (including a wheelchair)?: A Little   Help from Another: Need to walk in hospital room?: A Little   Help from Another: Climbing 3-5 steps with a railing?: A Little     AM-PAC Score:  Raw Score: 18   Approx Degree of Impairment: 46.58%   Standardized Score (AM-PAC Scale): 43.63   CMS Modifier (G-Code): CK    FUNCTIONAL ABILITY STATUS  Functional Mobility/Gait Assessment  Gait Assistance:  (CGA progressing to  Supervision as distance increased)  Distance (ft): 250 ft and 40 ft x 2  Assistive Device: Rolling walker  Pattern: L Decreased stance time;Shuffle (decreased yan speed, decreased step length. Initial flexed posture d/t pushing RW anteriorly - improved with cues to stand within frame of RW at all times.)  Stairs: Stairs  How Many Stairs: 4  Device: 2 Rails  Assist:  (SBA)  Pattern: Ascend and Descend  Ascend and Descend : Step to  Supine to Sit: supervision  Sit to Supine: supervision  Sit to Stand: supervision for multiple trials from EOB and toilet; initial cues provided for appropriate hand placement during functional transfers    Exercise/Education Provided:  Bed mobility  Body mechanics  Functional activity tolerated  Gait training  Posture  Transfer training  Stair negotiation    Skilled Therapy Provided: Patient is LLE WBAT with anterior hip precautions. RN approved activity. Patient received supine in bed; agreeable to participate in therapy on this date. Patient educated on POC. Education on physiological benefits of mobility post operatively. Discussed anterior hip protocol, weight bearing status, precautions and education on therapeutic exercises - patient provided with handout to summarize. Patient reporting left hip pain, rated 5 out of 10 per the pain scale. Patient denies any functional mobility concerns at this time. Reports that she will have assistance as needed from her friend that will be staying with her.     The patient's Approx Degree of Impairment: 46.58% has been calculated based on documentation in the [facility] '6 clicks' Inpatient Basic Mobility Short Form.  Research supports that patients with this level of impairment may benefit from HHPT.  Final disposition will be made by interdisciplinary medical team.    Patient End of Session: In bed;Needs met;Call light within reach;RN aware of session/findings;All patient questions and concerns addressed;Hospital anti-slip socks;Family  present    Patient was able to achieve the following ...   Patient able to transfer   Supervision, safe for home    Patient able to ambulate on level surfaces   Supervision with RW, safe for home     Patient Evaluation Complexity Level:  History Low - no personal factors and/or co-morbidities   Examination of body systems Low -  addressing 1-2 elements   Clinical Presentation Low- Stable   Clinical Decision Making  Low Complexity     Gait Trainin minutes  Therapeutic Activity:  15 minutes

## 2025-03-03 NOTE — ANESTHESIA PROCEDURE NOTES
Spinal Block    Date/Time: 3/3/2025 7:36 AM    Performed by: Karen Fritz CRNA  Authorized by: Gary Matthew MD      General Information and Staff    Start Time:  3/3/2025 7:36 AM  End Time:  3/3/2025 7:40 AM  Anesthesiologist:  Gary Matthew MD  CRNA:  Karen Fritz CRNA  Performed by:  CRNA  Preanesthetic Checklist: patient identified, IV checked, risks and benefits discussed, monitors and equipment checked, pre-op evaluation, timeout performed, anesthesia consent and sterile technique used      Procedure Details    Patient Position:  Sitting  Prep: ChloraPrep    Monitoring:  Cardiac monitor  Approach:  Midline  Location:  L3-4  Injection Technique:  Single-shot    Needle    Needle Type:  Pencil-tip  Needle Gauge:  24 G  Needle Length:  3.5 in    Assessment    Sensory Level:  T10  Events: clear CSF, CSF aspirated, well tolerated and blood negative      Additional Comments

## 2025-03-03 NOTE — ANESTHESIA POSTPROCEDURE EVALUATION
Patient: Kim Hoffman    Procedure Summary       Date: 03/03/25 Room / Location: [facility] MAIN OR  / [facility] MAIN OR    Anesthesia Start: 0730 Anesthesia Stop: 0928    Procedure: Left Anterior Total Hip Arthroplasty (Left: Hip) Diagnosis:       Primary osteoarthritis of left hip      (Primary osteoarthritis of left hip [M16.12])    Surgeons: Yohana Hair MD Anesthesiologist: Gary Matthew MD    Anesthesia Type: spinal ASA Status: 2            Anesthesia Type: spinal    Vitals Value Taken Time   /76 03/03/25 0928   Temp 97.8 03/03/25 0928   Pulse 97 03/03/25 0928   Resp 16 03/03/25 0928   SpO2 99 % 03/03/25 0928   Vitals shown include unfiled device data.    [facility] AN Post Evaluation:   Patient Evaluated in PACU  Patient Participation: complete - patient participated  Level of Consciousness: awake and alert  Pain Management: adequate  Airway Patency:patent  Yes    Nausea/Vomiting: none  Cardiovascular Status: acceptable  Respiratory Status: acceptable and nasal cannula  Postoperative Hydration acceptable      Gary Matthew MD  3/3/2025 9:28 AM

## 2025-03-04 ENCOUNTER — APPOINTMENT (OUTPATIENT)
Age: 70
End: 2025-03-04
Attending: INTERNAL MEDICINE
Payer: MEDICARE

## 2025-03-04 ENCOUNTER — TELEPHONE (OUTPATIENT)
Dept: ORTHOPEDICS CLINIC | Facility: CLINIC | Age: 70
End: 2025-03-04

## 2025-03-04 NOTE — TELEPHONE ENCOUNTER
Post-op call for Dr. Hair    Date: 3/4/2025      Time: 3:14 PM   Patient: Kim Hoffman      number: [id number]   Surgery and surgery date:3-3-2025    Yohana Hair MD Primary    Procedure Laterality Anesthesia   Left Anterior Total Hip Arthroplasty          Patient unavailable.  Left message on voicemail to call clinic with questions or concerns.  Number was provided./SPOKE TO PATIENT  Patient's general feeling since discharge:Hanging in there  Pain control (0-10):/ 3-4  Pain Medication:  dose/strength  Medication Quantity Refills Start End   HYDROcodone-acetaminophen 5-325 MG Oral Tab         Medication Quantity Refills Start End   oxyCODONE 5 MG Oral Tab       NOT TAKING  Medication Quantity Refills Start End   celecoxib (CELEBREX) 200 MG Oral Cap 60 capsule 0 2/27/2025 --   Sig:   Take 1 capsule (200 mg total) by mouth 2 (two) times daily.     Route:   Oral       Medication Quantity Refills Start End   Ferrous Sulfate 325 (65 Fe) MG Oral Tab 30 tablet 0 2/27/2025 --   Sig:   Take 1 tablet (325 mg total) by mo       Medication Quantity Refills Start End   aspirin 81 MG Oral Tab EC         Fever:  no  Chills no  SOB:  no  Incision site appearance:  Redness no  Drainage no  Clean/dry/Intact yes  Calf pain, redness or warmth:    no   Bowel Regimen: yes  If not, what are you taking stool softener/laxative? Advised fresh fiber fruit with peeling and Miralax if needed  Confirmed appointment date for post op:3/25/2025  Other concerns patients may ask: The patient will start alternating tylenol with norco in a couple days. She will keep track of the tylenol 3000mg-4000mg in 24 hours  Bathing and bandages   Patient needs to keep incision clean and dry for the first week./ DONE  Enforce rest, ice, compression elevation and use their pain medication accordingly./ We went over ice application 20 min on 20 min off multiple times a day/ We discussed keeping the leg straight and not bend the joint        SHE NEEDS  AN ORDER FOR P.T.OUTSIDE OF ENDEAVOR PLACED  If the patient needs more pain medication, please put in a communication.

## 2025-03-05 ENCOUNTER — TELEPHONE (OUTPATIENT)
Age: 70
End: 2025-03-05

## 2025-03-05 NOTE — TELEPHONE ENCOUNTER
Called and spoke with patient will cancel appointments for Monday for lab/follow up and infusion (zometa) and follow up in June as already scheduled.  Patient stated understanding and thanked me for the call.

## 2025-03-07 ENCOUNTER — TELEPHONE (OUTPATIENT)
Facility: CLINIC | Age: 70
End: 2025-03-07

## 2025-03-07 NOTE — TELEPHONE ENCOUNTER
Nurse from [facility] reached out and is requesting if patient's outpatient PT referral can be faxed to [facility] in [place].    [address] [place] [address]  [phone]  Fax:[phone]    Please advise.

## 2025-03-10 ENCOUNTER — APPOINTMENT (OUTPATIENT)
Age: 70
End: 2025-03-10
Attending: INTERNAL MEDICINE
Payer: MEDICARE

## 2025-03-10 NOTE — TELEPHONE ENCOUNTER
Patient s/p left IRINEO on 3/3/25- Patient is still in process of home health PT but would like to start scheduling the outpatient PT.     Please advise on placing outpatient PT order for patient

## 2025-03-10 NOTE — TELEPHONE ENCOUNTER
Left message to call back.     Ahmed, Mohammed M, MD Fahr, Souleymane RN2 hours ago (12:54 PM)     MA  Generally I don't do outpatient PT for hips. I usually do home PT for 3 weeks and then a home program only

## 2025-03-12 ENCOUNTER — TELEPHONE (OUTPATIENT)
Facility: CLINIC | Age: 70
End: 2025-03-12

## 2025-03-12 NOTE — TELEPHONE ENCOUNTER
Patient would like a nurse to call her back she has some questions about the recovery for the hip.     Would like to know what to do moving forward.    Please advise.

## 2025-03-13 NOTE — TELEPHONE ENCOUNTER
Yohana Hair MD Mencke, Shereen, RN  Caller: Unspecified (Yesterday, 12:33 PM)  Pain med regimen is fine.  Ok to DC stockings.  Ok to Drove  No PT for at least 6 weeks after surgery, including low back. Sometimes they can overwork the hip and cause a flare up unintentionally    Spoke with patient and gave response from MD. She is agreeable to plan. No further questions or concerns at this time.

## 2025-03-13 NOTE — TELEPHONE ENCOUNTER
Spoke with patient. States she is doing really well. Pain is managed on 1 tylenol arthritis q8. Started this yesterday, done with her norcos and does not feel she needs them. Wanting to know if that regimen is ok. We went over upper limits of acetaminophen and she is well under that. Patient reports that Physical Therapist has her off the walker at this point and she is walking with cane unassisted.    She asked how long she needs to wear the compression stockings as they hurt her because they are so tight and it feels like a bruise at the top of the stockings. She is also wondering why she has to wear them on both legs. We did discuss clot prevention. Is on baby aspirin daily at this time.    She was asking when she can drive since she is off the norco. We discussed being able to make hard stop and starting with short distances like around the block, but that I would ask MD.    We did go over response from MD about earlier question about Physical Therapy and that 3 weeks Home Health Physical Therapy then home exercise regimen. She is wondering if she can still get another order to go to [facility] as she already sees them for her back and likes them, she is also afraid of not following through on her own.

## 2025-03-21 DIAGNOSIS — M54.16 LEFT LUMBAR RADICULOPATHY: ICD-10-CM

## 2025-03-24 NOTE — TELEPHONE ENCOUNTER
Refill Request    Medication request: PREGABALIN 150 MG Oral Cap. PREGABALIN 150 MG Oral Cap      LOV:2/11/2025 Adela Weaver DO   Due back to clinic per last office note:  Follow-up:  3 months   NOV: Visit date not found      [place] 2/18/25 #60 (30 days)    Urine drug screen (if applicable): N/A  Pain contract: N/A    LOV plan (if weaning or changing medications): no changes mentioned

## 2025-03-25 ENCOUNTER — OFFICE VISIT (OUTPATIENT)
Age: 70
End: 2025-03-25
Payer: MEDICARE

## 2025-03-25 ENCOUNTER — HOSPITAL ENCOUNTER (OUTPATIENT)
Dept: GENERAL RADIOLOGY | Facility: HOSPITAL | Age: 70
Discharge: HOME | End: 2025-03-25
Attending: ORTHOPAEDIC SURGERY
Payer: MEDICARE

## 2025-03-25 DIAGNOSIS — Z47.89 ORTHOPEDIC AFTERCARE: ICD-10-CM

## 2025-03-25 DIAGNOSIS — M54.16 LEFT LUMBAR RADICULOPATHY: ICD-10-CM

## 2025-03-25 DIAGNOSIS — Z96.642 S/P HIP REPLACEMENT, LEFT: Primary | ICD-10-CM

## 2025-03-25 PROCEDURE — 73502 X-RAY EXAM HIP UNI 2-3 VIEWS: CPT | Performed by: ORTHOPAEDIC SURGERY

## 2025-03-25 PROCEDURE — 99024 POSTOP FOLLOW-UP VISIT: CPT | Performed by: ORTHOPAEDIC SURGERY

## 2025-03-25 RX ORDER — PREGABALIN 150 MG/1
150 CAPSULE ORAL 2 TIMES DAILY
Qty: 60 CAPSULE | Refills: 0 | OUTPATIENT
Start: 2025-03-25

## 2025-03-25 NOTE — PROGRESS NOTES
DATE OF SURGERY:  3/3/2025  PROCEDURE:  Left Total Hip Arthroplasty    HISTORY: Kim Hoffman who presents today for post op evaluation of the left IRINEO. She is doing very well. She has no pain now. She is taking celebrex regularly and tylenol prn.      She is using her cane when she is outside, no assistive device at home.  Denies fevers chills or wound issues.    Past medical history, medications, allergies are unchanged.    PHYSICAL EXAM:  Vitals: LMP 07/14/2006  There is no height or weight on file to calculate BMI.  General: Patient appears well developed, well nourished and in no acute distress.  Neurological: Patient is alert and oriented to time, place and person. There is intact sensation bilaterally in upper extremities.  Circulation: Patient has intact distal pulses and capillary refill bilaterally.  Lymphatics: Patient has no lymphadenopathy.  Skin: Intact with no obvious rashes.  Musculoskeletal:  Left hip Exam:  Gait: Mildly antalgic when she first mobilizes, after that  Skin: Incision is healing well, without redness or drainage.  Swelling/Effusion: Minimal/Mild  Tenderness: None  ROM: Full and painless at the hip  NV: Motor intact to TA, EHL, GSC. Sensation intact to light touch in L4, L5, S1 distribution. 2+ DP pulse.           No calf tenderness    IMAGING  X-rays from 3/25/2025 are personally visualized and interpreted by me. They show a left total hip arthroplasty in satisfactory position without hardware failure or migration. There are no periprosthetic fractures. There are no obvious signs of stem subsidence. There are no obvious signs of loosening, wear or osteolysis.     ASSESSMENT & PLAN: Kim Hoffman:    Status post left Total hip Replacement    I had a lengthy discussion with the patient today about their recovery for IRINEO.  Treatment options were discussed with the patient at length. Specifically, I recommend the following:    Continue home exercise program.  Continue rest,  ice, anti-inflammatories/Tylenol for symptoms.  No refills provided today.  Continue aspirin 81 mg twice daily for DVT prophylaxis for 4 weeks total from surgery.   Activities: as tolerated. Wean of ambulatory aids as tolerated  Follow up: 2 months with repeat xrays.  The patient understands and agrees with this plan. All of the patient's questions were answered today.    Yohana Hair MD

## 2025-03-25 NOTE — TELEPHONE ENCOUNTER
Refill Request    Medication request: PREGABALIN 150 MG Oral Cap.  TAKE 1 CAPSULE(150 MG) BY MOUTH TWICE DAILY      LOV:2/11/2025 Adela Weaver, DO   Due back to clinic per last office note:  Follow-up:  3 months   NOV: Visit date not found      [place] 2/18/25 #60 (30 days)    Urine drug screen (if applicable): N/A  Pain contract: N/A    LOV plan (if weaning or changing medications): not mentioned.     Duplicate. See refill reqiest on 3/21/25

## 2025-03-26 RX ORDER — PREGABALIN 150 MG/1
150 CAPSULE ORAL 2 TIMES DAILY
Qty: 60 CAPSULE | Refills: 0 | Status: SHIPPED | OUTPATIENT
Start: 2025-03-26

## 2025-04-01 ENCOUNTER — TELEPHONE (OUTPATIENT)
Facility: CLINIC | Age: 70
End: 2025-04-01

## 2025-04-01 NOTE — TELEPHONE ENCOUNTER
Pt request for CELEBREX  200 MG  refill, she wants it sent to the [facility] in [place]. Please f/u with pt [phone]  Future Appointments   Date Time Provider Department Center   6/23/2025 10:15 AM [facility] LAB1 [facility] [place] [facility]   6/23/2025 11:00 AM Laurita Sylvester MD [facility] [place] [facility]   6/23/2025 12:00 PM [facility] INFRN 4 [facility] [place] [facility]

## 2025-04-02 RX ORDER — CELECOXIB 200 MG/1
200 CAPSULE ORAL 2 TIMES DAILY
Qty: 60 CAPSULE | Refills: 0 | Status: SHIPPED | OUTPATIENT
Start: 2025-04-02

## 2025-04-02 NOTE — TELEPHONE ENCOUNTER
Dr Angelo placed refill for the medication    Called patient and informed her of refill. She had no other questions at this time.

## 2025-04-02 NOTE — TELEPHONE ENCOUNTER
Patient reached out and stated that she is still waiting for an update.    Patient stated that Krystle told her that they have been reaching out to our clinic but has not gotten a response either.    Patient ran out of prescription last night.    Please advise.    negative normal/cranial nerves II-XII intact/sensation intact

## 2025-04-02 NOTE — TELEPHONE ENCOUNTER
Patient s/p left IRINEO on 3/3/25 with Dr Hair    Patient requesting refill of Celebrex 200mg  Last Rx:2/27/25, #60, 0 refills  LOV: 3/25/25    Pending order for you to sign if you approve

## 2025-04-17 DIAGNOSIS — M54.16 LEFT LUMBAR RADICULOPATHY: ICD-10-CM

## 2025-04-17 RX ORDER — PREGABALIN 150 MG/1
150 CAPSULE ORAL 2 TIMES DAILY
Qty: 60 CAPSULE | Refills: 0 | Status: SHIPPED | OUTPATIENT
Start: 2025-04-17

## 2025-04-17 NOTE — TELEPHONE ENCOUNTER
Refill Request    Medication request: PREGABALIN 150 MG Oral Cap.   TAKE 1 CAPSULE(150 MG) BY MOUTH TWICE DAILY      LOV:2/11/2025 Adela Weaver,    Due back to clinic per last office note:  Follow-up:  3 months   NOV: Visit date not found      [place] 3/26/25 #60 (30 days)    Urine drug screen (if applicable): N/A  Pain contract: N/A    LOV plan (if weaning or changing medications): not mentioned

## 2025-04-29 NOTE — TELEPHONE ENCOUNTER
Celecoxib 200mg    DOS:  S/P Left IRINEO on 03/03   Last OV: 3/25/25 Dr Yohana Hair  Last refill date: 4/3/25 #/refills: 60/0  Upcoming appt: None         Component      Latest Ref Rng 2/21/2025   BUN      9 - 23 mg/dL 13    CREATININE      0.55 - 1.02 mg/dL 0.66    BUN/CREATININE RATIO      10.0 - 20.0  19.7    EGFR      >=60 mL/min/1.73m2 95       Legend:  (H) High

## 2025-05-02 RX ORDER — CELECOXIB 200 MG/1
200 CAPSULE ORAL 2 TIMES DAILY
Qty: 60 CAPSULE | Refills: 0 | Status: SHIPPED | OUTPATIENT
Start: 2025-05-02

## 2025-05-19 DIAGNOSIS — M54.16 LEFT LUMBAR RADICULOPATHY: ICD-10-CM

## 2025-05-20 ENCOUNTER — HOSPITAL ENCOUNTER (OUTPATIENT)
Dept: GENERAL RADIOLOGY | Facility: HOSPITAL | Age: 70
Discharge: HOME | End: 2025-05-20
Attending: ORTHOPAEDIC SURGERY
Payer: MEDICARE

## 2025-05-20 ENCOUNTER — OFFICE VISIT (OUTPATIENT)
Age: 70
End: 2025-05-20
Payer: MEDICARE

## 2025-05-20 DIAGNOSIS — Z47.89 ORTHOPEDIC AFTERCARE: Primary | ICD-10-CM

## 2025-05-20 DIAGNOSIS — Z96.642 S/P HIP REPLACEMENT, LEFT: ICD-10-CM

## 2025-05-20 DIAGNOSIS — Z47.89 ORTHOPEDIC AFTERCARE: ICD-10-CM

## 2025-05-20 PROCEDURE — 99024 POSTOP FOLLOW-UP VISIT: CPT | Performed by: ORTHOPAEDIC SURGERY

## 2025-05-20 PROCEDURE — 73502 X-RAY EXAM HIP UNI 2-3 VIEWS: CPT | Performed by: ORTHOPAEDIC SURGERY

## 2025-05-20 NOTE — PROGRESS NOTES
Chief Complaint   Patient presents with    Post-Op     S/P POV L IRINEO  - Pt is healing well. Pain when walking a lot.        HPI  Kim Hoffman is a 70 year old female being seen in the office today for follow up of left hip.  She is 2 and half month status post surgery.  She is doing very well.  She has no pain.  She has occasional aches when she does a lot of walking on the lateral aspect of the hip.  This is improving.  Denies any fevers chills or wound complications.         The following portions of the patient's history were reviewed [unfilled] and updated as appropriate: allergies, current medications, past family history, past medical history, past social history, past surgical history and problem list.      Physical Exam  LMP 07/14/2006   There is no height or weight on file to calculate BMI.  Ortho Exam  Left hip exam no Chiselm with her surgical scar.  She is no obvious deformity.  She has no obvious tenderness.  She has no pain with range of motion.  Negative Stinchfield.  She walks a nonantalgic gait.  Negative Trendelenburg lurch or sign.  Distally neurovascularly intact.    Imaging:  I independently reviewed imaging dated 5/20/2025.  AP pelvis and 2 Chiselm left hip demonstrate her uncemented left total hip arthroplasty to be in good position without obvious signs of loosening, periprostatic fractures, or other hardware related complications    Assessment and Plan  Assessment & Plan  Orthopedic aftercare    Orders:    XR HIP W OR WO PELVIS 2 OR 3 VIEWS, LEFT (CPT=73502) [[id number]] - Orthopedic providers only; Future    S/P hip replacement, left           I had a lengthy discussion with the patient today about the patient's hip.  I am pleased with her progress..  Treatment options were discussed with the patient at length. Specifically, I recommend the following:    Rest, ice, compression, elevation, anti-inflammatories/Tylenol for symptoms.  Continue exercise program  Activities: As tolerated, no  restrictions  Follow up: 1 year from surgery with repeat x-rays or as needed  The patient understands and agrees with this plan. All of the patient's questions were answered today.       No follow-ups on file.    @BRE@    Yohana Hair MD

## 2025-05-20 NOTE — TELEPHONE ENCOUNTER
Refill Request    Medication request: PREGABALIN 150 MG Oral CapTAKE 1 CAPSULE(150 MG) BY MOUTH TWICE DAILY      LOV:2/11/2025 - televisit Adela Weaver, DO   Due back to clinic per last office note:  \"I recommend that she follow-up with me in 3 months in the office and continue over-the-counter Tylenol as needed for pain management. \"  NOV: Visit date not found      [place]: 4/24/25 #60 - 30 day supply    Urine drug screen (if applicable): n/a  Pain contract: n/a    LOV plan (if weaning or changing medications): Not mentioned at LOV

## 2025-05-20 NOTE — ASSESSMENT & PLAN NOTE
Orders:    XR HIP W OR WO PELVIS 2 OR 3 VIEWS, LEFT (CPT=73502) [[id number]] - Orthopedic providers only; Future

## 2025-05-22 RX ORDER — PREGABALIN 150 MG/1
150 CAPSULE ORAL 2 TIMES DAILY
Qty: 60 CAPSULE | Refills: 0 | Status: SHIPPED | OUTPATIENT
Start: 2025-05-22

## 2025-05-27 RX ORDER — CELECOXIB 200 MG/1
200 CAPSULE ORAL 2 TIMES DAILY
Qty: 60 CAPSULE | Refills: 0 | OUTPATIENT
Start: 2025-05-27

## 2025-06-04 RX ORDER — DULOXETIN HYDROCHLORIDE 30 MG/1
30 CAPSULE, DELAYED RELEASE ORAL DAILY
Qty: 90 CAPSULE | Refills: 1 | OUTPATIENT
Start: 2025-06-04

## 2025-06-18 DIAGNOSIS — Z92.21 HISTORY OF ANTINEOPLASTIC CHEMOTHERAPY: ICD-10-CM

## 2025-06-23 ENCOUNTER — OFFICE VISIT (OUTPATIENT)
Age: 70
End: 2025-06-23
Attending: INTERNAL MEDICINE
Payer: MEDICARE

## 2025-06-23 ENCOUNTER — NURSE ONLY (OUTPATIENT)
Age: 70
End: 2025-06-23
Attending: INTERNAL MEDICINE
Payer: MEDICARE

## 2025-06-23 ENCOUNTER — APPOINTMENT (OUTPATIENT)
Age: 70
End: 2025-06-23
Attending: INTERNAL MEDICINE
Payer: MEDICARE

## 2025-06-23 VITALS
HEIGHT: 64 IN | SYSTOLIC BLOOD PRESSURE: 150 MMHG | TEMPERATURE: 98 F | DIASTOLIC BLOOD PRESSURE: 91 MMHG | WEIGHT: 197.19 LBS | RESPIRATION RATE: 18 BRPM | HEART RATE: 98 BPM | BODY MASS INDEX: 33.66 KG/M2 | OXYGEN SATURATION: 98 %

## 2025-06-23 DIAGNOSIS — G62.0 CHEMOTHERAPY-INDUCED PERIPHERAL NEUROPATHY (HCC): ICD-10-CM

## 2025-06-23 DIAGNOSIS — Z17.0 MALIGNANT NEOPLASM OF OVERLAPPING SITES OF LEFT BREAST IN FEMALE, ESTROGEN RECEPTOR POSITIVE (HCC): Primary | ICD-10-CM

## 2025-06-23 DIAGNOSIS — Z85.3 ENCOUNTER FOR FOLLOW-UP SURVEILLANCE OF BREAST CANCER: ICD-10-CM

## 2025-06-23 DIAGNOSIS — Z92.21 HISTORY OF ANTINEOPLASTIC CHEMOTHERAPY: ICD-10-CM

## 2025-06-23 DIAGNOSIS — Z79.83 ENCOUNTER FOR MONITORING BISPHOSPHONATE THERAPY: ICD-10-CM

## 2025-06-23 DIAGNOSIS — Z92.21 HISTORY OF AROMATASE INHIBITOR THERAPY: ICD-10-CM

## 2025-06-23 DIAGNOSIS — Z51.81 ENCOUNTER FOR MONITORING BISPHOSPHONATE THERAPY: ICD-10-CM

## 2025-06-23 DIAGNOSIS — Z90.13 HISTORY OF BILATERAL MASTECTOMY: ICD-10-CM

## 2025-06-23 DIAGNOSIS — M85.80 OSTEOPENIA DUE TO CANCER THERAPY: ICD-10-CM

## 2025-06-23 DIAGNOSIS — Z08 ENCOUNTER FOR FOLLOW-UP SURVEILLANCE OF BREAST CANCER: ICD-10-CM

## 2025-06-23 DIAGNOSIS — C50.812 MALIGNANT NEOPLASM OF OVERLAPPING SITES OF LEFT BREAST IN FEMALE, ESTROGEN RECEPTOR POSITIVE (HCC): Primary | ICD-10-CM

## 2025-06-23 DIAGNOSIS — T45.1X5A CHEMOTHERAPY-INDUCED PERIPHERAL NEUROPATHY (HCC): ICD-10-CM

## 2025-06-23 LAB
ALBUMIN SERPL-MCNC: 4.7 G/DL (ref 3.2–4.8)
ALBUMIN/GLOB SERPL: 1.6 {RATIO} (ref 1–2)
ALP LIVER SERPL-CCNC: 88 U/L (ref 55–142)
ALT SERPL-CCNC: 113 U/L (ref 10–49)
ANION GAP SERPL CALC-SCNC: 7 MMOL/L (ref 0–18)
AST SERPL-CCNC: 99 U/L (ref ?–34)
BASOPHILS # BLD AUTO: 0.06 X10(3) UL (ref 0–0.2)
BASOPHILS NFR BLD AUTO: 1.1 %
BILIRUB SERPL-MCNC: 0.6 MG/DL (ref 0.2–1.1)
BUN BLD-MCNC: 17 MG/DL (ref 9–23)
BUN/CREAT SERPL: 22.7 (ref 10–20)
CALCIUM BLD-MCNC: 10.7 MG/DL (ref 8.7–10.4)
CHLORIDE SERPL-SCNC: 103 MMOL/L (ref 98–112)
CO2 SERPL-SCNC: 27 MMOL/L (ref 21–32)
CREAT BLD-MCNC: 0.75 MG/DL (ref 0.55–1.02)
DEPRECATED RDW RBC AUTO: 40.1 FL (ref 35.1–46.3)
EGFRCR SERPLBLD CKD-EPI 2021: 87 ML/MIN/1.73M2 (ref 60–?)
EOSINOPHIL # BLD AUTO: 0.12 X10(3) UL (ref 0–0.7)
EOSINOPHIL NFR BLD AUTO: 2.1 %
ERYTHROCYTE [DISTWIDTH] IN BLOOD BY AUTOMATED COUNT: 12.3 % (ref 11–15)
GLOBULIN PLAS-MCNC: 3 G/DL (ref 2–3.5)
GLUCOSE BLD-MCNC: 121 MG/DL (ref 70–99)
HCT VFR BLD AUTO: 43 % (ref 35–48)
HGB BLD-MCNC: 14.6 G/DL (ref 12–16)
IMM GRANULOCYTES # BLD AUTO: 0.01 X10(3) UL (ref 0–1)
IMM GRANULOCYTES NFR BLD: 0.2 %
LYMPHOCYTES # BLD AUTO: 1.56 X10(3) UL (ref 1–4)
LYMPHOCYTES NFR BLD AUTO: 27.5 %
MCH RBC QN AUTO: 30.1 PG (ref 26–34)
MCHC RBC AUTO-ENTMCNC: 34 G/DL (ref 31–37)
MCV RBC AUTO: 88.7 FL (ref 80–100)
MONOCYTES # BLD AUTO: 0.47 X10(3) UL (ref 0.1–1)
MONOCYTES NFR BLD AUTO: 8.3 %
NEUTROPHILS # BLD AUTO: 3.46 X10 (3) UL (ref 1.5–7.7)
NEUTROPHILS # BLD AUTO: 3.46 X10(3) UL (ref 1.5–7.7)
NEUTROPHILS NFR BLD AUTO: 60.8 %
OSMOLALITY SERPL CALC.SUM OF ELEC: 287 MOSM/KG (ref 275–295)
PLATELET # BLD AUTO: 214 10(3)UL (ref 150–450)
POTASSIUM SERPL-SCNC: 3.7 MMOL/L (ref 3.5–5.1)
PROT SERPL-MCNC: 7.7 G/DL (ref 5.7–8.2)
RBC # BLD AUTO: 4.85 X10(6)UL (ref 3.8–5.3)
SODIUM SERPL-SCNC: 137 MMOL/L (ref 136–145)
WBC # BLD AUTO: 5.7 X10(3) UL (ref 4–11)

## 2025-06-23 NOTE — PROGRESS NOTES
[facility] Hematology Oncology   Progress Note    Patient Name: Kim Hoffman   YOB: 1955   Medical Record Number: [id number]   Attending Physician: Laurita Sylvester MD     Kim Hoffman verbally consented to be recorded using ambient AI listening technology and understand that they can each withdraw their consent to this listening technology at any point by asking the clinician to turn off or pause the recording.      Date of Visit: 6/23/2025     Chief Complaint:  Breast cancer  AI monitoring    Oncologic History:  70 year old presented with palpable left breast mass, mammogram with 6 cm span of calcs  8/9/2019: bilateral mastectomies and left SNB: left IDC ER 92%, NE 98%, HER2 1+, Ki-26%. + SN with THERESE s/p axillary dissection pT3N1a. Right breast negative. Mammaprint: luminal B, recurrence at 29%  10/2019: adjuvant AC-T chemo completed 2/2020 6/2/2020: completed PMRT  3/2020: started anastrozole --> switched to exemestane --> then letrozole due to intolerance  2/2024: switched back to exemestane- discontinued 4/2024     Interval History:  History of Present Illness  Kim Hoffman is a 70 year old female with history of breast cancer presents for follow-up.  She has been off endocrine therapy since April 2024 due to intolerable musculoskeletal pains.     She underwent hip replacement surgery on March 3, 2025, which has significantly improved her quality of life. She was able to walk with a walker for only a few days post-surgery and received home PT for a couple of weeks.     She is experiencing dental issues, specifically a tooth problem that has required her to be on antibiotics for two weeks and high dose of Advil for pain management. She is going to follow up with her dentist for further evaluation as the exact issue remains undetermined. Due to this, she has postponed her scheduled zometa infusion till next month.    She has a history of bilateral mastectomy in August 2019.  She reports occasional swelling on the side where lymph nodes were removed, especially after sun exposure, with no palpable masses or enlarged nodes. No changes in her chest wall, such as lumps, bumps, or skin changes.     She has been celebrating her improved mobility and quality of life, including a recent trip to [place] where she was able to walk on the beach and up steps. She has also lost some weight due to increased physical activity.    She is currently on calcium supplements, which have resulted in slightly elevated calcium levels. She continues to decline further hormone therapy.      ECOG 0      PMH/PSH: Anxiety. Bilateral mastectomies  Family History: Negative for cancer  Social History: ,   of cancer. 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      Current Outpatient Medications:     amoxicillin 500 MG Oral Tab, 4 tablets (2.0 grams) to be given 1 hour before dental appointment or procedure, Disp: 4 tablet, Rfl: 0    DULoxetine 30 MG Oral Cap DR Particles, Take 1 capsule (30 mg total) by mouth daily., Disp: 90 capsule, Rfl: 1    acetaminophen 500 MG Oral Tab, Take 2 tablets (1,000 mg total) by mouth every 6 (six) hours as needed for Pain., Disp: , Rfl:     aspirin 81 MG Oral Tab EC, Take 1 tablet (81 mg total) by mouth daily., Disp: 60 tablet, Rfl: 0    Ibuprofen-Acetaminophen (ADVIL DUAL ACTION) 125-250 MG Oral Tab, Take 2 tablets by mouth in the morning and 2 tablets before bedtime., Disp: , Rfl:     Multiple Vitamins-Minerals (CENTRUM ADULTS OR), Take 1 tablet by mouth in the morning., Disp: , Rfl:     lisinopril 10 MG Oral Tab, Take 1 tablet (10 mg total) by mouth in the morning., Disp: , Rfl:     GLUCOSAMINE-CHONDROITIN OR, Take 1 tablet by mouth in the morning., Disp: , Rfl:     Multiple Vitamins-Minerals (OCUVITE ADULT 50+) Oral Cap, Take 1 capsule by mouth in the morning. centrum., Disp: , Rfl:     Biotin w/ Vitamins C & E (HAIR/SKIN/NAILS  OR), Take 1 tablet by mouth in the morning., Disp: , Rfl:     Calcium 500-125 MG-UNIT Oral Tab, Take by mouth., Disp: , Rfl:     Vitamin D, Cholecalciferol, 25 MCG (1000 UT) Oral Cap, Take 1 capsule by mouth in the morning., Disp: , Rfl:     Bioflavonoid Products (AZIZA C OR), Take 1 tablet by mouth in the morning., Disp: , Rfl:     PREGABALIN 150 MG Oral Cap, TAKE 1 CAPSULE(150 MG) BY MOUTH TWICE DAILY, Disp: 60 capsule, Rfl: 0    CELECOXIB 200 MG Oral Cap, TAKE 1 CAPSULE(200 MG) BY MOUTH TWICE DAILY, Disp: 60 capsule, Rfl: 0    HYDROcodone-acetaminophen 5-325 MG Oral Tab, Take 1 tablet by mouth every 6 (six) hours as needed for Pain., Disp: 28 tablet, Rfl: 0    ketoconazole 2 % External Cream, Apply 1 Application topically 2 (two) times daily. Apply to groin rash, do not apply near or on top of the left hip incision., Disp: 60 Undefined, Rfl: 0    oxyCODONE 5 MG Oral Tab, Take 1 tablet (5 mg total) by mouth every 6 (six) hours as needed for Pain., Disp: 28 tablet, Rfl: 0    sennosides-docusate (SENNA S) 8.6-50 MG Oral Tab, Take 1 tablet by mouth daily., Disp: 30 tablet, Rfl: 0    Ferrous Sulfate 325 (65 Fe) MG Oral Tab, Take 1 tablet (325 mg total) by mouth daily with breakfast., Disp: 30 tablet, Rfl: 0    traMADol 50 MG Oral Tab, Take 1 tablet (50 mg total) by mouth every 6 (six) hours as needed for Pain., Disp: 30 tablet, Rfl: 0    fluticasone propionate 50 MCG/ACT Nasal Suspension, 1 spray by Nasal route daily. One spray per each nostril daily., Disp: 1 each, Rfl: 3    methocarbamol 750 MG Oral Tab, Take 1 tablet (750 mg total) by mouth 4 (four) times daily as needed (pain). (Patient taking differently: Take 1 tablet (750 mg total) by mouth 4 (four) times daily as needed (pain).), Disp: 60 tablet, Rfl: 0       Review of Systems: Onc ROS negative besides as per HPI    Vitals:  BP (!) 150/91 (BP Location: Radial, Patient Position: Sitting, Cuff Size: adult)   Pulse 98   Temp 97.7 °F (36.5 °C) (Tympanic)   Resp  18   Ht 1.626 m (5' 4\")   Wt 89.4 kg (197 lb 3.2 oz)   LMP 07/14/2006   SpO2 98%   BMI 33.85 kg/m²        Physical Exam:  General: alert and oriented x 3, mild distress due to pain.  HEENT: no palor, oropharynx clear.   Chest:  nonlabored breathing, symmetric expansion.   Breast: bilateral mastectomies with excess tissue, stable skin telangiectasias on the left no chest wall tenderness or palpable masses. Axilla normal bilaterally.   Abd: non distended.   Extremities: no edema.  Neurological: motor strength grossly intact  Psych: appropriate mood and affect      Labs:  Lab Results   Component Value Date    WBC 5.7 06/23/2025    RBC 4.85 06/23/2025    HGB 14.6 06/23/2025    HCT 43.0 06/23/2025    MCV 88.7 06/23/2025    MCH 30.1 06/23/2025    MCHC 34.0 06/23/2025    RDW 12.3 06/23/2025    .0 06/23/2025     Lab Results   Component Value Date     06/23/2025    K 3.7 06/23/2025     06/23/2025    CO2 27.0 06/23/2025    BUN 17 06/23/2025    CREATSERUM 0.75 06/23/2025     (H) 06/23/2025    CA 10.7 (H) 06/23/2025    ALKPHO 88 06/23/2025     (H) 06/23/2025    AST 99 (H) 06/23/2025    BILT 0.6 06/23/2025    ALB 4.7 06/23/2025    TP 7.7 06/23/2025      Imaging:  XR HIP W OR WO PELVIS 2 OR 3 VIEWS, LEFT (CPT=73502)  Result Date: 5/21/2025  CONCLUSION:   1. Left hip arthroplasty with no radiographic evidence of hardware failure.  2. Slight to moderate degenerative changes within the right hip.     Dictated by (CST): Jorge Isidro MD on 5/21/2025 at 2:12 PM     Finalized by (CST): Jorge Isidro MD on 5/21/2025 at 2:14 PM                Cancer Staging   Malignant neoplasm of overlapping sites of left breast in female, estrogen receptor positive (HCC)  Staging form: Breast, AJCC 8th Edition  - Clinical stage from 6/20/2019: Stage IIA (cT3(2), cN0, cM0, G1, ER+, DC+, HER2-) - Signed by Jess Macias MD on 9/5/2019  - Pathologic stage from 8/9/2019: Stage IB (pT3, pN1a(sn), cM0, G2, ER+, DC+,  HER2-) - Signed by Katie Reynolds APRN on 9/23/2019      Impression and Plan:    Left breast invasive lobular carcinoma, grade 2, ER/VT positive, HER2 negative, Ki-25%, stage IIIA ([id number])  - s/p bilateral mastectomies with left axillary dissection 8/2019 --> adjuvant AC-T --> PMRT all completed 6/2020  - received total ~ 4 years of endocrine therapy with poor tolerance to all AI's, but thinks exemestane was most tolerable one for her.   - offered tamoxifen but she declined due to risk of thrombosis and uterine pathology/malignancy.  - self discontinued exemestane in 4/2024 due to intolerable arthralgias and declined further therapy.    - BCI showed no benefit from extended ET.   - she is more than 5 years out with no evidence of disease. Continue clinical surveillance q6-12 months.   - no routine imaging post b/l mastectomies.     Bone health  - DEXA 1/2024 with decreasing bone density and significant osteopenia, fracture risk of 15%  - on zometa 4 mg IV q6 months started 3/2024 and plan x2 yrs  - next dose scheduled July 2025- delayed due to recent hip surgery and ongoing dental issues.   - repeat DEXA scan 1/2025 and discontinue zometa if bone density improves.  - continue dental exams q6 months and vit D 4-5000 units.     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 Stable on cymbalta 30 mg daily       Mildly elevated LFTs- due to fatty liver. Stable     Depression- on cymbalta        Return in about 7 months (around 1/23/2026).       Laurita Sylvester MD  Hematology Oncology

## 2025-06-27 ENCOUNTER — TELEPHONE (OUTPATIENT)
Age: 70
End: 2025-06-27

## 2025-07-28 ENCOUNTER — OFFICE VISIT (OUTPATIENT)
Facility: LOCATION | Age: 70
End: 2025-07-28
Attending: INTERNAL MEDICINE
Payer: MEDICARE

## 2025-07-28 VITALS
TEMPERATURE: 99 F | WEIGHT: 198.38 LBS | RESPIRATION RATE: 18 BRPM | HEART RATE: 114 BPM | BODY MASS INDEX: 34 KG/M2 | DIASTOLIC BLOOD PRESSURE: 94 MMHG | OXYGEN SATURATION: 95 % | SYSTOLIC BLOOD PRESSURE: 153 MMHG

## 2025-07-28 DIAGNOSIS — M85.80 OSTEOPENIA DUE TO CANCER THERAPY: Primary | ICD-10-CM

## 2025-07-28 NOTE — PROGRESS NOTES
Pt here for zometa . Pt denies any dental issues and states receiving dental clearance for to this tx.  Procedure explained to pt.  Pt verbalized understanding.  PIV placed and zometa given without incident.     Ordering MD: Nga       Pt tolerated infusion without difficulty or complaint. Reviewed next apt date/time: [facility]      Education Record  Learner:  Patient  Disease / Diagnosis: osteopenia  Barriers / Limitations:  None  Method:  Discussion  General Topics:  Procedure and Plan of care reviewed  Outcome:  Shows understanding

## (undated) DEVICE — KIT CLEAN ENDOKIT 1.1OZ GOWNX2

## (undated) DEVICE — 35 ML SYRINGE LUER-LOCK TIP: Brand: MONOJECT

## (undated) DEVICE — LASSO POLYPECTOMY SNARE: Brand: LASSO

## (undated) DEVICE — COVER PRB NEOGUARD 30X2.6CM US

## (undated) DEVICE — CAUTERY BLADE 2IN INS E1455

## (undated) DEVICE — SUPER SPONGES,MEDIUM: Brand: KERLIX

## (undated) DEVICE — PROXIMATE SKIN STAPLERS (35 WIDE) CONTAINS 35 STAINLESS STEEL STAPLES (FIXED HEAD): Brand: PROXIMATE

## (undated) DEVICE — Device: Brand: DEFENDO AIR/WATER/SUCTION AND BIOPSY VALVE

## (undated) DEVICE — CLIP MED INTNL HMCLP TNTLM

## (undated) DEVICE — BIPOLAR SEALER 23-112-1 AQM 6.0: Brand: AQUAMANTYS ®

## (undated) DEVICE — HOOD, PEEL-AWAY: Brand: FLYTE

## (undated) DEVICE — VEST SRG 3 MED CUP R/L

## (undated) DEVICE — SNARE CAPTI HEX STIFF SMALL

## (undated) DEVICE — INTENDED FOR TISSUE SEPARATION, AND OTHER PROCEDURES THAT REQUIRE A SHARP SURGICAL BLADE TO PUNCTURE OR CUT.: Brand: BARD-PARKER ® STAINLESS STEEL BLADES

## (undated) DEVICE — 3M™ IOBAN™ 2 ANTIMICROBIAL INCISE DRAPE 6650EZ: Brand: IOBAN™ 2

## (undated) DEVICE — SUTURE MONOCRYL 4-0 PS-2

## (undated) DEVICE — CLIP SM INTNL HMCLP TI ESCP

## (undated) DEVICE — SUT VCRL 2-0 36IN CT-1 ABSRB UD L36MM 1/2 CIR

## (undated) DEVICE — DRAIN RESERVOIR RELIAVAC 100CC

## (undated) DEVICE — DRESSING BIOPATCH 1X4 CNTR

## (undated) DEVICE — SOLUTION IRRIG 3000ML 0.9% NACL FLX CONT

## (undated) DEVICE — SUT MCRYL 3-0 27IN ABSRB UD L24MM PS-1

## (undated) DEVICE — FLEXIBLE YANKAUER,MEDIUM TIP, NO VACUUM CONTROL: Brand: ARGYLE

## (undated) DEVICE — PEN: MARKING STD PT 100/CS: Brand: MEDICAL ACTION INDUSTRIES

## (undated) DEVICE — HOOD: Brand: FLYTE

## (undated) DEVICE — BANDAGE ROLL,100% COTTON, 6 PLY, LARGE: Brand: KERLIX

## (undated) DEVICE — SYRINGE, LUER SLIP, STERILE, 60ML: Brand: MEDLINE

## (undated) DEVICE — NEEDLE HPO 18GA 1.5IN ECLPS

## (undated) DEVICE — SUT ETHBND XL 2 30IN V-37 NABSRB GRN 40MM 1/2

## (undated) DEVICE — SOL  .9 1000ML BTL

## (undated) DEVICE — HEX-LOCKING BLADE ELECTRODE: Brand: EDGE

## (undated) DEVICE — Device: Brand: STABLECUT®

## (undated) DEVICE — ZZ-CONVERTED-TO-522442- SPONGE 4X4 10PK

## (undated) DEVICE — DRESSING SUR 9X25CM SIL SP CVR WTRPRF VIR

## (undated) DEVICE — SUTURE PDS II 2-0 CT-2

## (undated) DEVICE — LINE MNTR ADLT SET O2 INTMD

## (undated) DEVICE — NEEDLE SPNL 18GA L3.5IN W/ QNCKE SHARPER BVL

## (undated) DEVICE — PACK CDS ANTERIOR HIP

## (undated) DEVICE — MAJOR GENERAL: Brand: MEDLINE INDUSTRIES, INC.

## (undated) DEVICE — PROXIMATE RH ROTATING HEAD SKIN STAPLERS (35 WIDE) CONTAINS 35 STAINLESS STEEL STAPLES: Brand: PROXIMATE

## (undated) DEVICE — SUCTION CANISTER, 3000CC,SAFELINER: Brand: DEROYAL

## (undated) DEVICE — Device: Brand: CUSTOM PROCEDURE KIT

## (undated) DEVICE — APPLICATOR 2% CHG 70% ISOPRPNL

## (undated) DEVICE — INSULATED BLADE ELECTRODE 6.5

## (undated) DEVICE — ADHESIVE SKIN CLSR 0.7ML TOP DERMBND ADV

## (undated) DEVICE — CLIP SM INTNL HMCLP TNTLM ESCP

## (undated) DEVICE — DRAPE,UTILITY,TAPE,15X26,STERILE: Brand: MEDLINE

## (undated) DEVICE — SUTURE ETHILON 3-0 669H

## (undated) DEVICE — SKIN PREP TRAY 4 COMPARTM TRAY: Brand: MEDLINE INDUSTRIES, INC.

## (undated) DEVICE — MINOR GENERAL: Brand: MEDLINE INDUSTRIES, INC.

## (undated) DEVICE — SPONGE LAP 18X18 XRAY STRL

## (undated) DEVICE — 2T11 #2 PDO 36 X 36: Brand: 2T11 #2 PDO 36 X 36

## (undated) DEVICE — SUTURE VICRYL 3-0 SH

## (undated) DEVICE — 1010 S-DRAPE TOWEL DRAPE 10/BX: Brand: STERI-DRAPE™

## (undated) DEVICE — 60 ML SYRINGE LUER-LOCK TIP: Brand: MONOJECT

## (undated) DEVICE — KIT MFLD FOR SPEC COLL

## (undated) DEVICE — ENCORE® PERRY STYLE 42 PF SIZE 6.5, STERILE LATEX POWDER-FREE SURGICAL GLOVE: Brand: ENCORE

## (undated) DEVICE — SUT VCRL 1-0 36IN CTX ABSRB UD L48MM 1/2 CIR

## (undated) DEVICE — BLAKE SILICONE DRAIN, 15 FR ROUND, HUBLESS WITH 3/16" TROCAR: Brand: BLAKE

## (undated) DEVICE — 3M™ COBAN™ NL STERILE NON-LATEX SELF-ADHERENT WRAP, 2084S, 4 IN X 5 YD (10 CM X 4,5 M), 18 ROLLS/CASE: Brand: 3M™ COBAN™

## (undated) DEVICE — KIT ENDO ORCAPOD 160/180/190

## (undated) DEVICE — CONTAINER SPEC STR 4OZ GRY LID

## (undated) DEVICE — DISPOSABLE ORTHOPAEDIC PROTECTOR: Brand: ALEXIS ® ORTHOPAEDIC PROTECTOR

## (undated) DEVICE — SPONGE LAP 18X18IN WHT COT 4 PLY FLD STRUNG

## (undated) DEVICE — 6 ML SYRINGE LUER-LOCK TIP: Brand: MONOJECT

## (undated) DEVICE — STANDARD HYPODERMIC NEEDLE,POLYPROPYLENE HUB: Brand: MONOJECT

## (undated) DEVICE — SNARE ENDOSCOPIC 10MM ROUND

## (undated) DEVICE — SNARE OPTMZ PLPCTM TRP

## (undated) DEVICE — GLOVE SUR 8 SENSICARE PIP WHT PWD F

## (undated) DEVICE — 9534HP TRANSPARENT DRSG W/FRAME: Brand: 3M™ TEGADERM™

## (undated) DEVICE — DRAPE SHEET LG

## (undated) DEVICE — 35 ML SYRINGE REGULAR TIP: Brand: MONOJECT

## (undated) DEVICE — CO2 CANNULA,SSOFT,ADLT,7O2,4CO2,FEMALE: Brand: MEDLINE

## (undated) DEVICE — CHLORAPREP 26ML APPLICATOR

## (undated) DEVICE — SUTURE SILK 2-0 FS

## (undated) DEVICE — SOLUTION IRRIG 1000ML 0.9% NACL USP BTL

## (undated) DEVICE — CASED DISP BIPOLAR CORD

## (undated) DEVICE — GLOVE SUR 7.5 SENSICARE PIP WHT PWD F

## (undated) DEVICE — VIOLET BRAIDED (POLYGLACTIN 910), SYNTHETIC ABSORBABLE SUTURE: Brand: COATED VICRYL

## (undated) DEVICE — GIJAW SINGLE-USE BIOPSY FORCEPS WITH NEEDLE: Brand: GIJAW

## (undated) DEVICE — HANDPIECE SET WITH HIGH FLOW TIP AND SUCTION TUBE: Brand: INTERPULSE

## (undated) DEVICE — TIP BOVIE 4\" MEGADYNE

## (undated) NOTE — Clinical Note
Valentin Mercado,She was quite hesitant to initially proceed with EUS. I'm going to try and get an MRI in the next 2-3 days so if ultimately she needs EUS I can hopefully do it next week prior to her port and chemo. Mariel Ortiz

## (undated) NOTE — LETTER
To Whom It May Concern:    Eder Cantu is currently under my medical care and may not return to work at this time. Please excuse Kai Last for the time being from work until further notice.         If you require additional information please contact o

## (undated) NOTE — LETTER
[facility]  Administration of Anesthesia  I, Kim Hoffman agree to be cared for by a physician anesthesiologist alone and/or with a nurse anesthetist, who is specially trained to monitor me and give me medicine to put me to sleep or keep me comfortable during my procedure    I understand that my anesthesiologist and/or anesthetist is not an employee or agent of [facility] or [employer]. He or she works for [facility]    As the patient asking for anesthesia services, I agree to:  Allow the anesthesiologist (anesthesia doctor) to give me medicine and do additional procedures as necessary. Some examples are: Starting or using an “IV” to give me medicine, fluids or blood during my procedure, and having a breathing tube placed to help me breathe when I’m asleep (intubation). In the event that my heart stops working properly, I understand that my anesthesiologist will make every effort to sustain my life, unless otherwise directed by [facility] Do Not Resuscitate documents.  Tell my anesthesia doctor before my procedure:  If I am pregnant.  The last time that I ate or drank.  iii. All of the medicines I take (including prescriptions, herbal supplements, and pills I can buy without a prescription (including street drugs/illegal medications). Failure to inform my anesthesiologist about these medicines may increase my risk of anesthetic complications.  iv.If I am allergic to anything or have had a reaction to anesthesia before.  I understand how the anesthesia medicine will help me (benefits).  I understand that with any type of anesthesia medicine there are risks:  The most common risks are: nausea, vomiting, sore throat, muscle soreness, damage to my eyes, mouth, or teeth (from breathing tube placement).  Rare risks include: remembering what happened during my procedure, allergic reactions to medications, injury to my airway, heart, lungs, vision, nerves, or  muscles and in extremely rare instances death.  My doctor has explained to me other choices available to me for my care (alternatives).  Pregnant Patients (“epidural”):  I understand that the risks of having an epidural (medicine given into my back to help control pain during labor), include itching, low blood pressure, difficulty urinating, headache or slowing of the baby’s heart. Very rare risks include infection, bleeding, seizure, irregular heart rhythms and nerve injury.  Regional Anesthesia (“spinal”, “epidural”, & “nerve blocks”):  I understand that rare but potential complications include headache, bleeding, infection, seizure, irregular heart rhythms, and nerve injury.    _____________________________________________________________________________  Patient (or Representative) Signature/Relationship to Patient  Date   Time    _____________________________________________________________________________   Name (if used)    Language/Organization   Time    _____________________________________________________________________________  Nurse Anesthetist Signature     Date   Time  _____________________________________________________________________________  Anesthesiologist Signature     Date   Time  I have discussed the procedure and information above with the patient (or patient’s representative) and answered their questions. The patient or their representative has agreed to have anesthesia services.    _____________________________________________________________________________  Witness        Date   Time  I have verified that the signature is that of the patient or patient’s representative, and that it was signed before the procedure  Patient Name: Kim Hoffman     : 1955                 Printed: 2024 at 7:00 AM    Medical Record #: [id number]                                            Page 1 of 1  ----------ANESTHESIA CONSENT----------

## (undated) NOTE — MR AVS SNAPSHOT
After Visit Summary   11/29/2019    Boston Claudio    MRN: [id number]           Visit Information     Date & Time  11/29/2019  3:00 PM Provider  [facility] [address] [place] [address] Dept.  Phone  [phone] Bioflavonoid Products (AZIZA C OR) Take 1 tablet by mouth daily.         Diagnoses for This Visit    Malignant neoplasm of overlapping sites of left breast in female, estrogen receptor positive   [[id number]]  -  Primary  Encounter for medication management 1/9/2020 1:45 PM EM CC LAB2 [address] - Infusion    1/9/2020 2:15 PM Ivy Cifuentes [facility] Hematology Oncology    1/10/2020 3:00 PM EM CC INFRN [address] - Infusion    1/13/2020 3:45 PM Salvador Hernandez [place] Additional Information  If you have questions, you can call [phone] to talk to our [address] staff. Remember, [email or web address] is NOT to be used for urgent needs. For medical emergencies, dial 911.     Sincerely,    EM CC INFRN 2              [facility] now offers Vid Saturday - Sunday  10:00 am - 4:00 pm       Conditions needing urgent attention, but are not life-threatening.          Average cost  $120*       EMERGENCY ROOM         Life-threatening emergencies needing immediate intervention   at a hospital emergency ro

## (undated) NOTE — MR AVS SNAPSHOT
After Visit Summary   11/21/2019    Radha Sandoval    MRN: [id number]           Visit Information     Date & Time  11/21/2019  3:00 PM Provider  EM CC INFRN [address] Dept.  Phone  [phone] Bioflavonoid Products (AZIZA C OR) Take 1 tablet by mouth daily.         Diagnoses for This Visit    Malignant neoplasm of overlapping sites of left breast in female, estrogen receptor positive   [[id number]]  -  Primary           Future Appointments        P [address] [address]     Dear Zacarias Duarte : Thank you for enrolling in [address] Please follow the instructions below to securely access your online medical record.  [employer] allows you to send messages to your doctor, view headache and pink eye. The cost for a Video Visit is currently $35.         If you receive a survey from [employer], please take a few minutes to complete it and provide feedback.  We strive to deliver the best patient experience and are looking for ways visit: [email or web address] or call [phone]. MY. ([phone])

## (undated) NOTE — LETTER
[address] [place] [address]  Authorization for Surgical Operation or Procedure  Date: ______________       Time: _______________  1.  I hereby authorize Dr. Everett Ryan , my physician and the assistant, to perform the following operation an own blood, or a directed donor transfusion, I will discuss this with my physician.     5. I consent to the photographing of the operations or procedures to be performed for the purposes of advancing medicine, science, and/or education, provided my identity of the procedure, I have explained to the patient and/or her guardian, the risks and benefits involved in the proposed treatment and any reasonable alternative to the proposed treatment.  I have also explained the risks and benefits involved in the refusal

## (undated) NOTE — MR AVS SNAPSHOT
After Visit Summary   12/27/2019    Jame Cadet    MRN: [id number]           Visit Information     Date & Time  12/27/2019  3:00 PM Provider  [facility] [address] [place] [address] Dept.  Phone  [phone] daily. Bioflavonoid Products (AZIZA C OR) Take 1 tablet by mouth daily.         Diagnoses for This Visit    Malignant neoplasm of overlapping sites of left breast in female, estrogen receptor positive   [[id number]]  -  Primary           Future Appointmen messages to your doctor, view test results, renew prescriptions and request appointments. How Do I Sign Up? 1. In your Internet browser, go to [email or web address]  2.  Click on the Activate your Account if you have an activation code i experience and are looking for ways to make improvements. Your feedback will help us do so. For more information on [employer], please visit [email or web address]                   DO YOU KNOW WHERE TO GO?   Injury & illness are neve *Cost varies based on your insurance coverage  For more information about hours, locations or appointment options available at [facility],   visit [email or web address] or call [phone]. MY. ([phone])

## (undated) NOTE — LETTER
[address] [place] [address] Rd, [place], [place]     AUTHORIZATION FOR SURGICAL OPERATION OR PROCEDURE    I hereby authorize Dr. Juan Eaton MD, my Physician(s) and whomever may be designated as the doctor's Assistant, to perform the f own blood, or a directed donor transfusion, I will discuss this with my Physician. 4. I consent to the photographing of procedure(s) to be performed for the purposes of advancing medicine, science and/or education, provided my identity is not revealed.  If _______________________________________________________________ ____________________________  (Witness signature)                                                                                                  (Date)                                (Time)

## (undated) NOTE — LETTER
[address], [place]  Authorization for Invasive Procedures  1.  I hereby authorize Nicolasa Cobb *** , my physician and whomever may be designated as the doctor's assistant, to perform the following operation and/or procedure: COLO fever and allergic reactions, hemolytic reactions, transmission of disease such as hepatitis, AIDS, cytomegalovirus (CMV), and flluid overload.  In the event that I wish to have autologous transfusions of my own blood, or a directed donor transfusion, I porsha Signature of Patient:  ________________________________________________ Date: _________Time: _________    Responsible person in case of minor or unconscious: _____________________________Relationship: ____________     Witness Signature: ___________________

## (undated) NOTE — LETTER
[address] [place] [address]  Authorization for Surgical Operation or Procedure  Date: ______________       Time: _______________  1.  I hereby authorize Dr. Amber Gonzales, my physician and the assistant, to perform the following operation and/or pr 5. I consent to the photographing of the operations or procedures to be performed for the purposes of advancing medicine, science, and/or education, provided my identity is not revealed.  If the procedure has been videotaped, the physician/surgeon will obta risks and benefits involved in the proposed treatment and any reasonable alternative to the proposed treatment. I have also explained the risks and benefits involved in the refusal of the proposed treatment and have answered the patient's questions.  If I h

## (undated) NOTE — MR AVS SNAPSHOT
After Visit Summary   1/17/2020    Christiane Nicole    MRN: [id number]           Visit Information     Date & Time  1/17/2020 12:00 PM Provider   [address] [address] [address] Dept.  Phone  [phone]      Y Multiple Vitamins-Minerals (EYE VITAMINS OR) Take 1 tablet by mouth daily. Bioflavonoid Products (AZIZA C OR) Take 1 tablet by mouth daily.         Diagnoses for This Visit    Malignant neoplasm of overlapping sites of left breast in female, estrogen 4. Enter your Zip Code and Date of Birth (mm/dd/yyyy) as indicated and click Next. You will be taken to the next sign-up page. 5. Create a MyChart Username.  This will be your [employer] Knowledgehart login Username and cannot be changed, so think of one that is secure and ONLINE VISIT  Primary Care Providers  Treatment for mild illness or injury that does not require immediate attention  VIDEO VISITS  Average cost  $35*    e-VISTS  Average cost  $35*       SAME DAY APPOINTMENTS   Available at primary care offices    AFTER H

## (undated) NOTE — LETTER
Dear Dr. Danielle Lemus    This letter is to inform you that Deonna Hooks has been attending Physical Therapy with me. See below for my most recent plan of care.     Progress Summary     Pt has attended 20 visits in Physical Therapy.      Progress STARTING POINT 17cm from 3rd cuticle 17cm from 3rd cuticle   LEFT HAND VOLUME 350 345   MEASUREMENT A 16.3 16   MEASUREMENT B 17 16.8   MEASUREMENT C 21.8 22   MEASUREMENT D 24.7 24.7   MEASUREMENT E 25.7 26   MEASUREMENT F 28.8 27.4   MEASUREMENT G 30.5 2              Protracted scapula and forward head  AROM/Strength:                 L shld AROM flex 65, abd 50               L shld AROM flex 75, abd 75     Edema/Tissue Observations:   Patient arrived wearing binder across chest, pulled tight, causing swell

## (undated) NOTE — MR AVS SNAPSHOT
After Visit Summary   12/20/2019    Jame Cadet    MRN: [id number]           Visit Information     Date & Time  12/20/2019  3:00 PM Provider  [facility] [address] [place], [place] [address]t.  Phone  [phone] Bioflavonoid Products (AZIZA C OR) Take 1 tablet by mouth daily.         Diagnoses for This Visit    Malignant neoplasm of overlapping sites of left breast in female, estrogen receptor positive   [[id number]]  -  Primary  Encounter for medication management will not need to use this code after you have completed the sign-up process. If you do not sign up before the expiration date, you must request a new code. [employer] Activation Code: [id number]  Expires: 2/24/2020  1:40 PM    4.  Enter your Zip Code and D Visit face-to-face with a [facility] physician or   KATI using your mobile device or computer   using [address] with a [facility] Physician or KATI online. The physician will respond and provide   a treatment plan within a few hours.    ONLINE VISIT

## (undated) NOTE — LETTER
[facility]  [address], [place], [place]    Authorization for Surgical Operation and Procedure                               I hereby authorize Misael Lawson MD, my physician and his/her assistants (if applicable), which may include medical students, residents, and/or fellows, to perform the following surgical operation/ procedure and administer such anesthesia as may be determined necessary by my physician: Operation/Procedure name (s) COLONOSCOPY on Kim Hoffman   2.   I recognize that during the surgical operation/procedure, unforeseen conditions may necessitate additional or different procedures than those listed above.  I, therefore, further authorize and request that the above-named surgeon, assistants, or designees perform such procedures as are, in their judgment, necessary and desirable.    3.   My surgeon/physician has discussed prior to my surgery the potential benefits, risks and side effects of this procedure; the likelihood of achieving goals; and potential problems that might occur during recuperation.  They also discussed reasonable alternatives to the procedure, including risks, benefits, and side effects related to the alternatives and risks related to not receiving this procedure.  I have had all my questions answered and I acknowledge that no guarantee has been made as to the result that may be obtained.    4.   Should the need arise during my operation/procedure, which includes change of level of care prior to discharge, I also consent to the administration of blood and/or blood products.  Further, I understand that despite careful testing and screening of blood or blood products by collecting agencies, I may still be subject to ill effects as a result of receiving a blood transfusion and/or blood products.  The following are some, but not all, of the potential risks that can occur: fever and allergic reactions, hemolytic reactions, transmission of diseases  such as Hepatitis, AIDS and Cytomegalovirus (CMV) and fluid overload.  In the event that I wish to have an autologous transfusion of my own blood, or a directed donor transfusion, I will discuss this with my physician.  Check only if Refusing Blood or Blood Products  I understand refusal of blood or blood products as deemed necessary by my physician may have serious consequences to my condition to include possible death. I hereby assume responsibility for my refusal and release the hospital, its personnel, and my physicians from any responsibility for the consequences of my refusal.    o  Refuse   5.   I authorize the use of any specimen, organs, tissues, body parts or foreign objects that may be removed from my body during the operation/procedure for diagnosis, research or teaching purposes and their subsequent disposal by hospital authorities.  I also authorize the release of specimen test results and/or written reports to my treating physician on the hospital medical staff or other referring or consulting physicians involved in my care, at the discretion of the Pathologist or my treating physician.    6.   I consent to the photographing or videotaping of the operations or procedures to be performed, including appropriate portions of my body for medical, scientific, or educational purposes, provided my identity is not revealed by the pictures or by descriptive texts accompanying them.  If the procedure has been photographed/videotaped, the surgeon will obtain the original picture, image, videotape or CD.  The hospital will not be responsible for storage, release or maintenance of the picture, image, tape or CD.    7.   I consent to the presence of a  or observers in the operating room as deemed necessary by my physician or their designees.    8.   I recognize that in the event my procedure results in extended X-Ray/fluoroscopy time, I may develop a skin reaction.    9. If I have a Do Not Attempt  Resuscitation (DNAR) order in place, that status will be suspended while in the operating room, procedural suite, and during the recovery period unless otherwise explicitly stated by me (or a person authorized to consent on my behalf). The surgeon or my attending physician will determine when the applicable recovery period ends for purposes of reinstating the DNAR order.  10. Patients having a sterilization procedure: I understand that if the procedure is successful the results will be permanent and it will therefore be impossible for me to inseminate, conceive, or bear children.  I also understand that the procedure is intended to result in sterility, although the result has not been guaranteed.   11. I acknowledge that my physician has explained sedation/analgesia administration to me including the risk and benefits I consent to the administration of sedation/analgesia as may be necessary or desirable in the judgment of my physician.    I CERTIFY THAT I HAVE READ AND FULLY UNDERSTAND THE ABOVE CONSENT TO OPERATION and/or OTHER PROCEDURE.     ____________________________________  _________________________________        ______________________________  Signature of Patient    Signature of Responsible Person                Printed Name of Responsible Person                                      ____________________________________  _____________________________                ________________________________  Signature of Witness        Date  Time         Relationship to Patient    STATEMENT OF PHYSICIAN My signature below affirms that prior to the time of the procedure; I have explained to the patient and/or his/her legal representative, the risks and benefits involved in the proposed treatment and any reasonable alternative to the proposed treatment. I have also explained the risks and benefits involved in refusal of the proposed treatment and alternatives to the proposed treatment and have answered the patient's  questions. If I have a significant financial interest in a co-management agreement or a significant financial interest in any product or implant, or other significant relationship used in this procedure/surgery, I have disclosed this and had a discussion with my patient.     _____________________________________________________              _____________________________  (Signature of Physician)                                                                                         (Date)                                   (Time)  Patient Name: Kim Hoffman      : 1955      Printed: 2024     Medical Record #: [id number]                                      Page 1 of 1

## (undated) NOTE — MR AVS SNAPSHOT
After Visit Summary   10/3/2019    Golden Veliz    MRN: [id number]           Visit Information     Date & Time  10/3/2019  2:00 PM Provider  EM [address] [place] [address] Road [address] [place] [address] - [facility] Dept.  Phone  [phone]      Y Malignant neoplasm of overlapping sites of left breast in female, estrogen receptor positive   [[id number]]  -  Primary  Encounter for medication management   [[id number]]    Encounter for central line care   [[id number]]             Future Appointments        Provid 3. Enter your Q[employer]kt Activation Code exactly as it appears below. You will not need to use this code after you have completed the sign-up process. If you do not sign up before the expiration date, you must request a new code.     Qnekt Activation Code: C non-emergency, consider your options before heading to an ER.          SAME DAY  APPOINTMENTS  Available at primary care offices      [facility]     Treatment for mild  illness or inj

## (undated) NOTE — LETTER
[address] [place] [address]  Authorization for Surgical Operation or Procedure  Date: ______________       Time: _______________  1.  I hereby authorize Dr. Marley Thomas , my physician and the assistant, to perform the following operation and/ 5. I consent to the photographing of the operations or procedures to be performed for the purposes of advancing medicine, science, and/or education, provided my identity is not revealed.  If the procedure has been videotaped, the physician/surgeon will obta risks and benefits involved in the proposed treatment and any reasonable alternative to the proposed treatment. I have also explained the risks and benefits involved in the refusal of the proposed treatment and have answered the patient's questions.  If I h

## (undated) NOTE — MR AVS SNAPSHOT
After Visit Summary   1/10/2020    Nia Payne    MRN: [id number]           Visit Information     Date & Time  1/10/2020  3:00 PM Provider   [address] - Infusion Dept.  Phone  [phone] y Multiple Vitamins-Minerals (EYE VITAMINS OR) Take 1 tablet by mouth daily. Bioflavonoid Products (AZIZA C OR) Take 1 tablet by mouth daily.         Diagnoses for This Visit    Malignant neoplasm of overlapping sites of left breast in female, estrogen 3. Enter your [employer] Activation Code exactly as it appears below. You will not need to use this code after you have completed the sign-up process. If you do not sign up before the expiration date, you must request a new code.     [employer] Activation Code: C non-emergency, consider your options before heading to an ER. VIDEO VISITS  Visit face-to-face with a [facility] physician or   KATI using your mobile device or computer   using [employer].    e-VISITS  Communicate with a [facility] Physician or KATI online.  The physic

## (undated) NOTE — LETTER
[address], [place]  Authorization for Surgical Operation or Procedure  Date: ______________       Time: _______________  1.  I hereby authorize Too Asenciow , my physician and the assistant, to perform the following operation and/o 5. I consent to the photographing of the operations or procedures to be performed for the purposes of advancing medicine, science, and/or education, provided my identity is not revealed.  If the procedure has been videotaped, the physician/surgeon will obta risks and benefits involved in the proposed treatment and any reasonable alternative to the proposed treatment. I have also explained the risks and benefits involved in the refusal of the proposed treatment and have answered the patient's questions.  If I h

## (undated) NOTE — LETTER
[address] [place] [address] Rd, [place], [place]     AUTHORIZATION FOR SURGICAL OPERATION OR PROCEDURE    I hereby authorize Dr. Trent Sam MD, my Physician(s) and whomever may be designated as the doctor's Assistant, to perform the follo Physician. 4. I consent to the photographing of procedure(s) to be performed for the purposes of advancing medicine, science and/or education, provided my identity is not revealed.  If the procedure has been videotaped, the physician/surgeon will obtain th (Witness signature)                                                                                                  (Date)                                (Time)  STATEMENT OF PHYSICIAN My signature below affirms that prior to the time of the procedure;  I

## (undated) NOTE — LETTER
[facility]  Administration of Anesthesia  1.  Luz Kerr, or _________________________________ acting on her behalf, (Patient) (Dependent/Representative) request to receive anesthesia for my pending procedure/operation/treatment infections, high spinal block, spinal bleeding, seizure, cardiac arrest and death. 7. AWARENESS: I understand that it is possible (but unlikely) to have explicit memory of events from the operating room while under general anesthesia.   8. ELECTROCONVULSIV unconscious pt /Relationship    My signature below affirms that prior to the time of the procedure, I have explained to the patient and/or his/her guardian, the risks and benefits of undergoing anesthesia, as well as any reasonable alternatives.     _______

## (undated) NOTE — LETTER
2/18/2025                                                                      Kim Hoffman is scheduled for outpatient surgery on March 3, 2025 at [facility] with Yohana Underwood MD. The procedure(s) being performed is/are: Left Anterior Total Hip Arthroplasty        The patient has been advised to contact your office for pre-operative clearance. The patient needs the following test/exams (as marked):    __X___ History & Physical (should be no older that 30 days)    __x___ MRSA (Valid for 30 Days)  __x___ Type & Screen (Only to be done at an [facility])  __X___ EKG (Valid for 1 year)  __x___ CBC & BMP (Valid for 3 months)   __X___ Urinalysis Reflex to culture (Valid for 30 days)  __x___ Hemoglobin A1c             Please include any other tests you deem necessary for medical clearance. Please fax these results to our office at [phone].   If you have any questions regarding this case, please contact our office at [phone]. Thank you for your assistance with this matter.

## (undated) NOTE — MR AVS SNAPSHOT
After Visit Summary   12/5/2019    Kunal Fraire    MRN: [id number]           Visit Information     Date & Time  12/5/2019  3:00 PM Provider  EM CC [facility] [address] [address] Dept.  Phone  [phone] y daily. Bioflavonoid Products (AZIZA C OR) Take 1 tablet by mouth daily.         Diagnoses for This Visit    Malignant neoplasm of overlapping sites of left breast in female, estrogen receptor positive   [[id number]]  -  Primary  Encounter for medication m Kunal Fraire   [address] [facility] [address]     Dear Joshua Brown : Thank you for enrolling in [address] Please follow the instructions below to securely access your online medical record.  [employer] allows you to send messages to your doctor, view headache and pink eye. The cost for a Video Visit is currently $35.         If you receive a survey from [employer], please take a few minutes to complete it and provide feedback.  We strive to deliver the best patient experience and are looking for ways visit: [email or web address] or call [phone]. MY. ([phone])

## (undated) NOTE — LETTER
[address] [place] [address] Rd, [place], [place]     AUTHORIZATION FOR SURGICAL OPERATION OR PROCEDURE    I hereby authorize Dr. Dari Oreilly MD, my Physician(s) and whomever may be designated as the doctor's Assistant, to perform the follo own blood, or a directed donor transfusion, I will discuss this with my Physician. 4. I consent to the photographing of procedure(s) to be performed for the purposes of advancing medicine, science and/or education, provided my identity is not revealed.  If _______________________________________________________________ ____________________________  (Witness signature)                                                                                                  (Date)                                (Time)

## (undated) NOTE — LETTER
[address]  [facility] [place], [place], [place]  [address]  INFORMED CONSENT FOR TRANSFUSION OF BLOOD OR BLOOD PRODUCTS  My physician has informed me of the nature, purpose, benefits and risks of transfusion for blood and blood components that ______________________________________________  (Signature of Patient)                                                            (Responsible party in case of Minor,

## (undated) NOTE — LETTER
To Whom It May Concern:    Tawnya Saucedo has been under our care regarding ongoing medical issues. Because of this, she has been required to restrict her physical activities.     She may resume her usual activities, including work, on Tuesday 9/24,2

## (undated) NOTE — MR AVS SNAPSHOT
After Visit Summary   1/3/2020    Carlos Mena    MRN: [id number]           Visit Information     Date & Time  1/3/2020  3:00 PM Provider  EM [facility] [address] [address] Dept.  Phone  [phone]      You Multiple Vitamins-Minerals (EYE VITAMINS OR) Take 1 tablet by mouth daily. Bioflavonoid Products (AZIZA C OR) Take 1 tablet by mouth daily.         Diagnoses for This Visit    Malignant neoplasm of overlapping sites of left breast in female, estrogen messages to your doctor, view test results, renew prescriptions and request appointments. How Do I Sign Up? 1. In your Internet browser, go to [email or web address]. [employer]. [employer]  2.  Click on the Activate your Account if you have an activation code i experience and are looking for ways to make improvements. Your feedback will help us do so. For more information on [employer], please visit [email or web address]                   DO YOU KNOW WHERE TO GO?   Injury & illness are neve *Cost varies based on your insurance coverage  For more information about hours, locations or appointment options available at [facility],   visit [email or web address] or call [phone] MY. ([phone])

## (undated) NOTE — MR AVS SNAPSHOT
After Visit Summary   10/31/2019    Jame Cadet    MRN: [id number]           Visit Information     Date & Time  10/31/2019  2:00 PM Provider  EM [facility] [address] Dept[address]  Phone  [phone] Multiple Vitamins-Minerals (EYE VITAMINS OR) Take 1 tablet by mouth daily. Bioflavonoid Products (AZIZA C OR) Take 1 tablet by mouth daily.         Diagnoses for This Visit    Malignant neoplasm of overlapping sites of left breast in female, estrogen 1. In your Internet browser, go to [email or web address]. [employer]. [facility]  2. Click on the Activate your Account if you have an activation code in the box under the *New User? section. 3. Enter your ipadio Activation Code exactly as it appears below.  You [email or web address]                   DO YOU KNOW WHERE TO GO? Injury & illness are never convenient. If you are dealing with a   non-emergency, consider your options before heading to an ER.          SAME DAY  APPOINTMENTS  [place]

## (undated) NOTE — MR AVS SNAPSHOT
After Visit Summary   12/12/2019    Cathy Melissa    MRN: [id number]           Visit Information     Date & Time  12/12/2019  2:00 PM Provider   [address] Dept[address]  Phone  [phone] Multiple Vitamins-Minerals (MULTIVITAMIN OR) Take 1 tablet by mouth daily. Multiple Vitamins-Minerals (EYE VITAMINS OR) Take 1 tablet by mouth daily. Bioflavonoid Products (AZIZA C OR) Take 1 tablet by mouth daily.         Diagnoses for This Visi December 18, 2019      [address] [address]     Dear Josie Niño : Thank you for enrolling in [facility] [address] Please follow the instructions below to securely access your online medical record.  MyFit allows you to send HitchedPic headache and pink eye. The cost for a Video Visit is currently $35.         If you receive a survey from [facility], please take a few minutes to complete it and provide feedback.  We strive to deliver the best patient experience and are looking for ways visit: [email or web address] or call [phone] MY. ([phone])

## (undated) NOTE — LETTER
4/5/2024    Kim Hoffman        [address] [facility] [address]            Dear Kim Hoffman,      Our records indicate that you are due for an appointment for a Colonoscopy with Misael Lawson MD. Our doctors are booking out about 3-6 months in advance for procedures.     Please call our office to schedule a phone screening appointment to plan for the procedure(s).   Your medical well-being is important to us.    If your insurance requires a referral, please call your primary care office to request one.      Thank you,      The Physicians and Staff at [facility]

## (undated) NOTE — LETTER
[address] [place] [address]  Authorization for Surgical Operation or Procedure  Date: ______________       Time: _______________  1.  I hereby authorize Dr. Marie Jones , my physician and the assistant, to perform the following operation and own blood, or a directed donor transfusion, I will discuss this with my physician.     5. I consent to the photographing of the operations or procedures to be performed for the purposes of advancing medicine, science, and/or education, provided my identity of the procedure, I have explained to the patient and/or her guardian, the risks and benefits involved in the proposed treatment and any reasonable alternative to the proposed treatment.  I have also explained the risks and benefits involved in the refusal

## (undated) NOTE — LETTER
[facility]  Administration of Anesthesia  1.  Abbey Diego, or _________________________________ acting on her behalf, (Patient) (Dependent/Representative) request to receive anesthesia for my pending procedure/operation/treatment spinal bleeding, seizure, cardiac arrest and death. 7. AWARENESS: I understand that it is possible (but unlikely) to have explicit memory of events from the operating room while under general anesthesia.   8. ELECTROCONVULSIVE THERAPY PATIENTS: This consen signature below affirms that prior to the time of the procedure, I have explained to the patient and/or his/her guardian, the risks and benefits of undergoing anesthesia, as well as any reasonable alternatives.     __________________________________________

## (undated) NOTE — LETTER
JOLEEN Notifier: [employer]. Patient Name: Kim Rizvi Identification Number: [id number]                          Advance Beneficiary Notice of Noncoverage (ABN)   NOTE:  If Medicare doesn’t pay for D. item/service(s) below, you may have to pay.  Medicare does not pay for everything, even some care that you or your health care provider have good reason to think you need. We expect Medicare may not pay for the D. item/service(s) below.  D. Items or Services  Ultrasound guided left hip joint injection with coricosteroid    E. Reason Medicare May Not Pay: F. Estimated Cost   __ EKG ($87.00)  __ Pap smear ($101) __Pelvic/Breast ($147.00)  __ Ear Irrigation ($138)  _x_ Injection(s)  ___ Tdap ($181)       ___ Meningitis ($290)   __Prevnar ($555)  ___ Td ($66)              ___ Prevnar 20 ($549)  ___ Hep A ($152)     ___ Prolia ($1827)         __ Xiaflex ($            )   ___ Hep B ($150)     ___ Pneumovax ($287)                                         ___ Vaccine Administration ($65)   __ Medicare does not cover this service      __ Medicare may not pay for this   item/service for your condition     __ Medicare may not pay for this item/service as often as this   $1200.00   WHAT YOU NEED TO DO NOW:  Read this notice, so you can make an informed decision about your care.  Ask us any questions that you may have after you finish reading.  Choose an option below about whether to receive the D. item/service(s) listed above.  Note: If you choose Option 1 or 2, we may help you to use any other insurance that you might have, but Medicare cannot require us to do this.  G. OPTIONS: Check only one box.  We cannot choose a box for you.   OPTION 1. I want the D. item/service(s) listed above. You may ask to be paid now, but I also want Medicare billed for an official decision on payment, which is sent to me on a Medicare Summary Notice (MSN). I understand that if Medicare doesn’t pay, I am responsible for  payment, but I can appeal to Medicare by following the directions on the MSN. If Medicare does pay, you will refund any payments I made to you, less co-pays or deductibles.  OPTION 2. I want the D. item/service(s) listed above, but do not bill Medicare. You may ask to be paid now as I am responsible for payment. I cannot appeal if Medicare is not billed.  OPTION 3. I don't want the D. item/service(s) listed above. I understand with this choice I am not responsible for payment, and I cannot appeal to see if Medicare would pay.    H. Additional Information:    This notice gives our opinion, not an official Medicare decision. If you have other questions on this notice or Medicare billing, call 1-800-MEDICARE ([phone]/TTY: [phone]). Signing below means that you have received and understand this notice. You also receive a copy.  I. Signature: J. Date:       You have the right to get Medicare information in an accessible format, like large print, Braille, or audio. You also have the right to file a complaint if you feel you’ve been discriminated against. Visit Medicare.gov/about- us/fahescehvyzjs-rkoeskxzfotidzncc-tdrwiw.  According to the Paperwork Reduction Act of 1995, no persons are required to respond to a collection of information unless it displays a valid OMB control number. The valid OMB control number for this information collection is 8935-6618. The time required to complete this information collection is estimated to average 7 minutes per response, including the time to review instructions, search existing data resources, gather the data needed, and complete and review the information collection. If you have comments concerning the accuracy of the time estimate or suggestions for improving this form, please write to: CMS, [facility]     [place], Attn: TYERE Reports Clearance Officer, [place], [place] [address].  Form CMS-R-131 (Exp. 1/31/2026) Form Approved OMB No. 1075-1408

## (undated) NOTE — MR AVS SNAPSHOT
After Visit Summary   10/17/2019    Hao Solorzano    MRN: [id number]           Visit Information     Date & Time  10/17/2019  1:00 PM Provider  EM [address] [place] [address] Dept.  Phone  [phone] Bioflavonoid Products (AZIZA C OR) Take 1 tablet by mouth daily.         Diagnoses for This Visit    Malignant neoplasm of overlapping sites of left breast in female, estrogen receptor positive   [[id number]]  -  Primary  Encounter for medication management will not need to use this code after you have completed the sign-up process. If you do not sign up before the expiration date, you must request a new code. Coinex-IO Activation Code: [id number]  Expires: 12/27/2019  3:37 PM    4.  Enter your Zip Code and Available at primary care offices      [facility]     Treatment for mild  illness or injury that does  not require immediate attention.           Average cost  $70*       VIDEO VISITS